# Patient Record
Sex: MALE | Race: WHITE | NOT HISPANIC OR LATINO | URBAN - METROPOLITAN AREA
[De-identification: names, ages, dates, MRNs, and addresses within clinical notes are randomized per-mention and may not be internally consistent; named-entity substitution may affect disease eponyms.]

---

## 2022-08-15 ENCOUNTER — INPATIENT (INPATIENT)
Facility: HOSPITAL | Age: 62
LOS: 4 days | Discharge: HOME | DRG: 240 | End: 2022-08-20
Attending: COLON & RECTAL SURGERY | Admitting: INTERNAL MEDICINE
Payer: MEDICAID

## 2022-08-15 VITALS
RESPIRATION RATE: 15 BRPM | DIASTOLIC BLOOD PRESSURE: 82 MMHG | HEART RATE: 96 BPM | HEIGHT: 71 IN | SYSTOLIC BLOOD PRESSURE: 162 MMHG | WEIGHT: 235.01 LBS | OXYGEN SATURATION: 100 % | TEMPERATURE: 98 F

## 2022-08-15 DIAGNOSIS — D64.9 ANEMIA, UNSPECIFIED: ICD-10-CM

## 2022-08-15 LAB
ALBUMIN SERPL ELPH-MCNC: 3.1 G/DL — LOW (ref 3.3–5)
ALP SERPL-CCNC: 130 U/L — HIGH (ref 40–120)
ALT FLD-CCNC: 33 U/L — SIGNIFICANT CHANGE UP (ref 12–78)
ANION GAP SERPL CALC-SCNC: 4 MMOL/L — LOW (ref 5–17)
APTT BLD: 23.5 SEC — LOW (ref 27.5–35.5)
AST SERPL-CCNC: 33 U/L — SIGNIFICANT CHANGE UP (ref 15–37)
BASOPHILS # BLD AUTO: 0.11 K/UL — SIGNIFICANT CHANGE UP (ref 0–0.2)
BASOPHILS NFR BLD AUTO: 1.4 % — SIGNIFICANT CHANGE UP (ref 0–2)
BILIRUB SERPL-MCNC: 0.2 MG/DL — SIGNIFICANT CHANGE UP (ref 0.2–1.2)
BUN SERPL-MCNC: 13 MG/DL — SIGNIFICANT CHANGE UP (ref 7–23)
CALCIUM SERPL-MCNC: 8.7 MG/DL — SIGNIFICANT CHANGE UP (ref 8.5–10.1)
CHLORIDE SERPL-SCNC: 109 MMOL/L — HIGH (ref 96–108)
CO2 SERPL-SCNC: 25 MMOL/L — SIGNIFICANT CHANGE UP (ref 22–31)
CREAT SERPL-MCNC: 1 MG/DL — SIGNIFICANT CHANGE UP (ref 0.5–1.3)
EGFR: 86 ML/MIN/1.73M2 — SIGNIFICANT CHANGE UP
EOSINOPHIL # BLD AUTO: 0.12 K/UL — SIGNIFICANT CHANGE UP (ref 0–0.5)
EOSINOPHIL NFR BLD AUTO: 1.5 % — SIGNIFICANT CHANGE UP (ref 0–6)
GLUCOSE SERPL-MCNC: 135 MG/DL — HIGH (ref 70–99)
HCT VFR BLD CALC: 25.7 % — LOW (ref 39–50)
HCT VFR BLD CALC: 26.5 % — LOW (ref 39–50)
HGB BLD-MCNC: 7.2 G/DL — LOW (ref 13–17)
HGB BLD-MCNC: 7.2 G/DL — LOW (ref 13–17)
IMM GRANULOCYTES NFR BLD AUTO: 0.6 % — SIGNIFICANT CHANGE UP (ref 0–1.5)
INR BLD: 1.3 RATIO — HIGH (ref 0.88–1.16)
LYMPHOCYTES # BLD AUTO: 1.16 K/UL — SIGNIFICANT CHANGE UP (ref 1–3.3)
LYMPHOCYTES # BLD AUTO: 14.6 % — SIGNIFICANT CHANGE UP (ref 13–44)
MCHC RBC-ENTMCNC: 19.7 PG — LOW (ref 27–34)
MCHC RBC-ENTMCNC: 27.2 GM/DL — LOW (ref 32–36)
MCV RBC AUTO: 72.4 FL — LOW (ref 80–100)
MONOCYTES # BLD AUTO: 0.62 K/UL — SIGNIFICANT CHANGE UP (ref 0–0.9)
MONOCYTES NFR BLD AUTO: 7.8 % — SIGNIFICANT CHANGE UP (ref 2–14)
NEUTROPHILS # BLD AUTO: 5.91 K/UL — SIGNIFICANT CHANGE UP (ref 1.8–7.4)
NEUTROPHILS NFR BLD AUTO: 74.1 % — SIGNIFICANT CHANGE UP (ref 43–77)
NT-PROBNP SERPL-SCNC: 47 PG/ML — SIGNIFICANT CHANGE UP (ref 0–125)
PLATELET # BLD AUTO: 359 K/UL — SIGNIFICANT CHANGE UP (ref 150–400)
POTASSIUM SERPL-MCNC: 4.3 MMOL/L — SIGNIFICANT CHANGE UP (ref 3.5–5.3)
POTASSIUM SERPL-SCNC: 4.3 MMOL/L — SIGNIFICANT CHANGE UP (ref 3.5–5.3)
PROT SERPL-MCNC: 7.8 GM/DL — SIGNIFICANT CHANGE UP (ref 6–8.3)
PROTHROM AB SERPL-ACNC: 15.1 SEC — HIGH (ref 10.5–13.4)
RBC # BLD: 3.66 M/UL — LOW (ref 4.2–5.8)
RBC # FLD: 16.5 % — HIGH (ref 10.3–14.5)
SODIUM SERPL-SCNC: 138 MMOL/L — SIGNIFICANT CHANGE UP (ref 135–145)
TROPONIN I, HIGH SENSITIVITY RESULT: 5.51 NG/L — SIGNIFICANT CHANGE UP
WBC # BLD: 7.97 K/UL — SIGNIFICANT CHANGE UP (ref 3.8–10.5)
WBC # FLD AUTO: 7.97 K/UL — SIGNIFICANT CHANGE UP (ref 3.8–10.5)

## 2022-08-15 PROCEDURE — 99223 1ST HOSP IP/OBS HIGH 75: CPT

## 2022-08-15 PROCEDURE — 82728 ASSAY OF FERRITIN: CPT

## 2022-08-15 PROCEDURE — 84443 ASSAY THYROID STIM HORMONE: CPT

## 2022-08-15 PROCEDURE — C1788: CPT

## 2022-08-15 PROCEDURE — 99285 EMERGENCY DEPT VISIT HI MDM: CPT

## 2022-08-15 PROCEDURE — 85027 COMPLETE CBC AUTOMATED: CPT

## 2022-08-15 PROCEDURE — 85018 HEMOGLOBIN: CPT

## 2022-08-15 PROCEDURE — 74177 CT ABD & PELVIS W/CONTRAST: CPT

## 2022-08-15 PROCEDURE — 83010 ASSAY OF HAPTOGLOBIN QUANT: CPT

## 2022-08-15 PROCEDURE — 86920 COMPATIBILITY TEST SPIN: CPT

## 2022-08-15 PROCEDURE — 93306 TTE W/DOPPLER COMPLETE: CPT

## 2022-08-15 PROCEDURE — 85045 AUTOMATED RETICULOCYTE COUNT: CPT

## 2022-08-15 PROCEDURE — 85730 THROMBOPLASTIN TIME PARTIAL: CPT

## 2022-08-15 PROCEDURE — P9016: CPT

## 2022-08-15 PROCEDURE — 77001 FLUOROGUIDE FOR VEIN DEVICE: CPT

## 2022-08-15 PROCEDURE — 84466 ASSAY OF TRANSFERRIN: CPT

## 2022-08-15 PROCEDURE — 84100 ASSAY OF PHOSPHORUS: CPT

## 2022-08-15 PROCEDURE — 82378 CARCINOEMBRYONIC ANTIGEN: CPT

## 2022-08-15 PROCEDURE — 83540 ASSAY OF IRON: CPT

## 2022-08-15 PROCEDURE — 76937 US GUIDE VASCULAR ACCESS: CPT

## 2022-08-15 PROCEDURE — 70450 CT HEAD/BRAIN W/O DYE: CPT | Mod: MA

## 2022-08-15 PROCEDURE — 83615 LACTATE (LD) (LDH) ENZYME: CPT

## 2022-08-15 PROCEDURE — 80048 BASIC METABOLIC PNL TOTAL CA: CPT

## 2022-08-15 PROCEDURE — 83550 IRON BINDING TEST: CPT

## 2022-08-15 PROCEDURE — 76942 ECHO GUIDE FOR BIOPSY: CPT | Mod: 59

## 2022-08-15 PROCEDURE — C1894: CPT

## 2022-08-15 PROCEDURE — 86803 HEPATITIS C AB TEST: CPT

## 2022-08-15 PROCEDURE — C9113: CPT

## 2022-08-15 PROCEDURE — C1769: CPT

## 2022-08-15 PROCEDURE — 70450 CT HEAD/BRAIN W/O DYE: CPT | Mod: 26,MA

## 2022-08-15 PROCEDURE — 85610 PROTHROMBIN TIME: CPT

## 2022-08-15 PROCEDURE — 83036 HEMOGLOBIN GLYCOSYLATED A1C: CPT

## 2022-08-15 PROCEDURE — 93010 ELECTROCARDIOGRAM REPORT: CPT

## 2022-08-15 PROCEDURE — 71045 X-RAY EXAM CHEST 1 VIEW: CPT | Mod: 26

## 2022-08-15 PROCEDURE — 47000 NEEDLE BIOPSY OF LIVER PERQ: CPT

## 2022-08-15 PROCEDURE — 87635 SARS-COV-2 COVID-19 AMP PRB: CPT

## 2022-08-15 PROCEDURE — 85025 COMPLETE CBC W/AUTO DIFF WBC: CPT

## 2022-08-15 PROCEDURE — 83735 ASSAY OF MAGNESIUM: CPT

## 2022-08-15 PROCEDURE — 36561 INSERT TUNNELED CV CATH: CPT

## 2022-08-15 PROCEDURE — 71260 CT THORAX DX C+: CPT

## 2022-08-15 PROCEDURE — 88341 IMHCHEM/IMCYTCHM EA ADD ANTB: CPT

## 2022-08-15 PROCEDURE — 88342 IMHCHEM/IMCYTCHM 1ST ANTB: CPT

## 2022-08-15 PROCEDURE — 85014 HEMATOCRIT: CPT

## 2022-08-15 PROCEDURE — 88312 SPECIAL STAINS GROUP 1: CPT

## 2022-08-15 PROCEDURE — 80053 COMPREHEN METABOLIC PANEL: CPT

## 2022-08-15 PROCEDURE — 88360 TUMOR IMMUNOHISTOCHEM/MANUAL: CPT

## 2022-08-15 PROCEDURE — 36430 TRANSFUSION BLD/BLD COMPNT: CPT

## 2022-08-15 PROCEDURE — 36415 COLL VENOUS BLD VENIPUNCTURE: CPT

## 2022-08-15 PROCEDURE — 88307 TISSUE EXAM BY PATHOLOGIST: CPT

## 2022-08-15 PROCEDURE — 88305 TISSUE EXAM BY PATHOLOGIST: CPT

## 2022-08-15 RX ORDER — PANTOPRAZOLE SODIUM 20 MG/1
80 TABLET, DELAYED RELEASE ORAL ONCE
Refills: 0 | Status: COMPLETED | OUTPATIENT
Start: 2022-08-15 | End: 2022-08-15

## 2022-08-15 RX ORDER — ONDANSETRON 8 MG/1
4 TABLET, FILM COATED ORAL EVERY 6 HOURS
Refills: 0 | Status: DISCONTINUED | OUTPATIENT
Start: 2022-08-15 | End: 2022-08-20

## 2022-08-15 RX ORDER — PANTOPRAZOLE SODIUM 20 MG/1
40 TABLET, DELAYED RELEASE ORAL EVERY 12 HOURS
Refills: 0 | Status: DISCONTINUED | OUTPATIENT
Start: 2022-08-15 | End: 2022-08-20

## 2022-08-15 RX ADMIN — PANTOPRAZOLE SODIUM 40 MILLIGRAM(S): 20 TABLET, DELAYED RELEASE ORAL at 21:47

## 2022-08-15 RX ADMIN — PANTOPRAZOLE SODIUM 80 MILLIGRAM(S): 20 TABLET, DELAYED RELEASE ORAL at 15:10

## 2022-08-15 NOTE — ED PROVIDER NOTE - PROGRESS NOTE DETAILS
Pt noted to be anemic.  Pt guaiac positive here.  No e/o melena on exam.  Dosed with protonix.  AVSS.  Would repeat h/h prior to any transfusion.  Will admit for further w/u and management.

## 2022-08-15 NOTE — H&P ADULT - HISTORY OF PRESENT ILLNESS
HPI:  61M w/ no PMH as he has not sought Healthcare for many years as he's always felt well, presents today after a syncopal episode as witnessed by a coworker.  Pt endorses that since he woke up this AM, he's been having weakness in both legs. Later at work, he went to the bathroom as he felt he had gas in his abdomen. As he wasn't feeling well, a coworker gave him some water while he was sitting and then told him that she witnessed his eyes roll back and he passed out. He does not recall any of this. He denies any dizziness, lightheadedness, cp, palpitations. 1 month ago, while at a [employer] game, after having some chicken and soda, he felt dizzy but felt better after an episode of vomiting. He does endorse that he been getting progressively more SMITH when he goes up the stairs.  1 month ago, he had an episode of Melena, but none since then.      In ED, Hg 7.2, MCV 72, Guaiac Pos, given IV PPI x1,       PAST MEDICAL & SURGICAL HISTORY:  none (PT HAS NOT SEEN DOCTOR FOR MANY YEARS)    Review of Systems:   CONSTITUTIONAL: No fever. +weakness   EYES: No eye pain or discharge.  ENMT:  No sinus or throat pain  NECK: No pain or stiffness  RESPIRATORY: No cough, wheezing, chills or hemoptysis; ++ shortness of breath  CARDIOVASCULAR: No chest pain, palpitations, dizziness, or leg swelling  GASTROINTESTINAL: No abdominal or epigastric pain. No nausea, vomiting, or hematemesis; No diarrhea or constipation. ++ melena NO hematochezia.  GENITOURINARY: No dysuria or incontinence  NEUROLOGICAL: No headaches, memory loss, loss of strength, numbness, or tremors  SKIN: No rashes.  MUSCULOSKELETAL: No joint pain or swelling; No muscle, back, or extremity pain  PSYCHIATRIC: No depression, anxiety, mood swings, or difficulty sleeping      Social History:   lives alone  denies smoking/etoh  ind ADLs  works in a [email or web address]    FAMILY HISTORY:  MOther  at 55yo from Leukemia  Father is estranged    Home Medications:  none    MEDICATIONS  (STANDING):  pantoprazole  Injectable 40 milliGRAM(s) IV Push every 12 hours    MEDICATIONS  (PRN):  ondansetron Injectable 4 milliGRAM(s) IV Push every 6 hours PRN Nausea      PHYSICAL EXAM:  Vital Signs Last 24 Hrs  T(C): 36.8 (15 Aug 2022 11:07), Max: 36.8 (15 Aug 2022 11:07)  T(F): 98.2 (15 Aug 2022 11:07), Max: 98.2 (15 Aug 2022 11:07)  HR: 96 (15 Aug 2022 11:07) (96 - 96)  BP: 162/82 (15 Aug 2022 11:07) (162/82 - 162/82)  BP(mean): --  RR: 15 (15 Aug 2022 11:07) (15 - 15)  SpO2: 100% (15 Aug 2022 11:07) (100% - 100%)  Parameters below as of 15 Aug 2022 11:07  Patient On (Oxygen Delivery Method): room air  GENERAL: NAD, PALE  HEAD:  Atraumatic, Normocephalic  EYES: EOMI, PERRLA, conjunctiva and sclera clear  ENT: normal hearing, no nasal discharge, throat clear, dentition normal ++Dark spot on Rt side of tongue (pt states he's had it for many years, no change in size)   NECK: Supple, No JVD, no LAD, no thyromegaly   CHEST/LUNG: Clear to auscultation bilaterally; No wheeze, respirations unlabored  HEART: Regular rate and rhythm; No murmurs, rubs, or gallops  ABDOMEN: Soft, Nontender, Nondistended; Bowel sounds present, no HSM  EXTREMITIES:  2+ Peripheral Pulses, No clubbing, cyanosis, or edema  PSYCH: AAOx3, normal behavior  NEUROLOGY: non-focal, sensory and cn 2-12 intact, speech/language intact  SKIN: No visible rashes or lesions    LABS:                        7.2    7.97  )-----------( 359      ( 15 Aug 2022 11:45 )             26.5     08-15    138  |  109<H>  |  13  ----------------------------<  135<H>  4.3   |  25  |  1.00    Ca    8.7      15 Aug 2022 11:45    TPro  7.8  /  Alb  3.1<L>  /  TBili  0.2  /  DBili  x   /  AST  33  /  ALT  33  /  AlkPhos  130<H>  08-15    PT/INR - ( 15 Aug 2022 11:45 )   PT: 15.1 sec;   INR: 1.30 ratio         PTT - ( 15 Aug 2022 11:45 )  PTT:23.5 sec      RADIOLOGY & ADDITIONAL TESTS:    Imaging Personally Reviewed:  n/a  EKG Personally Reviewed:  n/a  Consultant(s) Notes Reviewed:      Care Discussed with Consultants/Other Providers:

## 2022-08-15 NOTE — ED ADULT NURSE NOTE - OBJECTIVE STATEMENT
60 y/o alert male  comes into the ED for c/o of syncope, pt reports that he went to the bathroom and afterwards 'sat down and my coworker said I passed out for about 1 minute." +LOC -Blood thinners -Headstrike, EKG performed at bedside, vs are WNL, cardiac monitor reads NS rhythm at this time. 18g placed in Left AC.

## 2022-08-15 NOTE — ED PROVIDER NOTE - IV ALTEPLASE DOOR HIDDEN
*[facility]*   [address] [place] [address]   Transthoracic Echocardiogram (TTE)      Patient: Nelson Plunkett  Study Date/Time:        2018 2:20PM   MRN:     [id number]     FIN#:                   [id number]   :     1947    Ht/Wt:                  182.9cm 103.9kg   Age:     70            BSA/BMI:                2.32m2 31.1kg/m2   Gender:  M             Baseline BP:   Ordering Physician:      MARY BASSETT   Attending Physician:     MARY BASSETT   Diagnostic Physician:    Gilberto Adams MD   Sonographer:             Frederick Dockery      --------------------------------------------------------------------------   INDICATIONS:   Chf.      --------------------------------------------------------------------------   STUDY CONCLUSIONS   SUMMARY:      1. Left ventricle: The cavity size is mildly dilated. Wall thickness is      moderately to severely increased. There is concentric hypertrophy.      Systolic function is mildly to moderately reduced. The estimated      ejection fraction is 43%, by biplane method of disks. Features are      consistent with a pseudonormal left ventricular filling pattern, with      concomitant abnormal relaxation and increased filling pressure (grade 2      diastolic dysfunction).   2. Aortic valve: Mild regurgitation.   3. Mitral valve: Moderate regurgitation.   4. Left atrium: The atrium is moderately dilated.   5. Right ventricle: Systolic pressure is markedly increased. The estimated      peak pressure is 71mm Hg.   6. Pulmonary arteries: Systolic pressure is severely increased.   7. Pericardium, extracardiac: A moderate pericardial effusion is      identified circumferential to the heart.      --------------------------------------------------------------------------   STUDY DATA:   Procedure:  Transthoracic echocardiography was performed.   Image quality was good.  M-mode, complete 2D, complete spectral Doppler,   and color Doppler.   Study completion:  There were no complications.      FINDINGS      LEFT VENTRICLE:  The cavity size is mildly dilated. Wall thickness is   moderately to severely increased. There is concentric hypertrophy.   Systolic function is mildly to moderately reduced. The estimated ejection   fraction is 43%, by biplane method of disks.  Mild global hypokinesis.   Features are consistent with a pseudonormal left ventricular filling   pattern, with concomitant abnormal relaxation and increased filling   pressure (grade 2 diastolic dysfunction).      AORTIC VALVE:   Trileaflet; mildly thickened, mildly calcified leaflets.   Cusp separation is normal.  Doppler:  Transvalvular velocity is within the   normal range. There is no stenosis.  Mild regurgitation.      MITRAL VALVE:   Structurally normal valve.   Leaflet separation is normal.    Doppler:   There is no evidence for stenosis.    Moderate regurgitation.     Peak gradient (D): 5mm Hg.      LEFT ATRIUM:  The atrium is moderately dilated.      RIGHT VENTRICLE:  The cavity size is mildly dilated. The moderator band is   prominent. Systolic function is normal. Systolic pressure is markedly   increased. The estimated peak pressure is 71mm Hg.      PULMONIC VALVE:   The leaflets are normal thickness.  Doppler:   Trivial   regurgitation.      TRICUSPID VALVE:   Structurally normal valve.    Doppler:   Mild   regurgitation.      PULMONARY ARTERY:   Systolic pressure is severely increased.      RIGHT ATRIUM:  The atrium is dilated.      PERICARDIUM:  A moderate pericardial effusion is identified   circumferential to the heart.      BASELINE ECG:   Normal sinus rhythm.      --------------------------------------------------------------------------   Measurements       Left ventricle                          Value        04/28/2018 Reference    LV ID, ED, PLAX chordal         (H)     5.9   cm     6.8        4.2 - 5.8    LV ID, ES, PLAX chordal         (H)     4.3   cm     5.2         2.5 - 4.0    LV PW thickness, ED             (H)     1.8   cm     1.6        0.6 - 1.0    IVS/LV PW ratio, ED                     1.04         1.01       ---------    LV end-diastolic volume         (H)     174   ml     235        62 - 150    LV end-systolic volume          (H)     85    ml     127        21 - 61    Stroke volume, 2D                       90    ml     108        ---------    LV end-diastolic volume/bsa     (H)     75    ml/m2 109        34 - 74    LV end-systolic volume/bsa      (H)     36    ml/m2 59         11 - 31    Stroke volume/bsa, 2D                   39    ml/m2 50         ---------       Ventricular septum                      Value        04/28/2018 Reference    IVS thickness, ED               (H)     1.9   cm     1.6        0.6 - 1.0       Aortic valve                            Value        04/28/2018 Reference    Aortic regurg peak velocity             4.68  m/s    4.45       ---------    Aortic regurg deceleration              310   cm/s2 277        ---------    Aortic regurg deceleration time         1511  ms     1616       ---------    Aortic regurg pressure                  438   ms     469        ---------    half-time    Aortic regurg peak gradient             88    mm Hg  79         ---------       Aorta                                   Value        04/28/2018 Reference    Aortic root ID, S               (N)     4.1   cm     4.2        <4.4       Left atrium                             Value        04/28/2018 Reference    LA ID, A-P, ES                  (H)     5.0   cm     ---------- 3.0 - 4.0    LA ID/bsa, A-P                  (N)     2.2   cm/m2 ---------- 1.5 - 2.3       Mitral valve                            Value        04/28/2018 Reference    Mitral E-wave peak velocity             1.07  m/sec  0.77       ---------    Mitral A-wave peak velocity             0.65  m/sec  0.93       ---------    Mitral deceleration time                168   ms     185        ---------     Mitral peak gradient, D                 5     mm Hg  2          ---------    Mitral E/A ratio, peak                  1.65         0.83       ---------    Mitral regurg peak velocity             0.06  m/sec  0.05       ---------    Mitral peak LV-LA gradient, S           0     mm Hg  0          ---------       Pulmonary arteries                      Value        04/28/2018 Reference    PA pressure, S, DP                      71    mm Hg  47         ---------       Tricuspid valve                         Value        04/28/2018 Reference    Tricuspid regurg peak velocity          3.7   m/sec  3          ---------    Tricuspid peak RV-RA gradient           55    mm Hg  37         ---------       Right ventricle                         Value        04/28/2018 Reference    RV ID, ED, PLAX                         3.1   cm     3.1        ---------    RV pressure, S, DP                      71    mm Hg  47         ---------   Legend:   (L)  and  (H)  leidy values outside specified reference range.      (N)  marks values inside specified reference range.      Prepared and electronically signed by   Gilberto Adams MD   07/18/2018 15:49         show

## 2022-08-15 NOTE — PHARMACOTHERAPY INTERVENTION NOTE - COMMENTS
Med history complete, reviewed medications and allergies with patient and confirmed medication list with doctor first med profile, Patient states he does not take currently take any medications regularly all medication related questions answered

## 2022-08-15 NOTE — ED PROVIDER NOTE - NS ED ROS FT
Constitutional: nad, well appearing  HEENT:  no nasal congestion, eye drainage or ear pain.    CVS:  no cp  Resp:  No sob, no cough  GI:  no abdominal pain, no nausea or vomiting  :  no dysuria  MSK: no joint pain or limited ROM  Skin: no rash  Neuro: +LOC, weakness   Heme/lymph: no bleeding

## 2022-08-15 NOTE — ED PROVIDER NOTE - OBJECTIVE STATEMENT
62 yo male presents to the ED s/p syncope after having acute onset of abd pain at work. +LOC. Coworkers states that pt had an episode for a min. Pt had multiple syncope episodes this month, however, never had an episode before this month. Pt states that he experienced dark stools weeks ago but not recently. Pt states that he has been feeling weak. Denies fever,

## 2022-08-15 NOTE — H&P ADULT - ASSESSMENT
61M w/ no PMH as he has not sought Healthcare for many years as he's always felt well, presents today after a syncopal episode as witnessed by a coworker.  Pt endorses that since he woke up this AM, he's been having weakness in both legs.   In ED, Hg 7.2, MCV 72, Guaiac Pos, given IV PPI x1,     #Syncope- likely d/t anemia 2/2 gib  - admit to tele  - orthostatic vitals  - TTE  - cardio cs  - fall precautions, ambulate w/ assist    #GIB  - PPI IV BID  - serial h/h tonight and in am  - GI cs  - CLD for now     #Anemia, microcytic, suspect slow GIB  - check serial h/h  - iron studies    #Tongue lesion- chronic, needs op evaluation    #PPX- ambulate w/ assist, SCDs

## 2022-08-16 DIAGNOSIS — D62 ACUTE POSTHEMORRHAGIC ANEMIA: ICD-10-CM

## 2022-08-16 DIAGNOSIS — R55 SYNCOPE AND COLLAPSE: ICD-10-CM

## 2022-08-16 LAB
ADD ON TEST-SPECIMEN IN LAB: SIGNIFICANT CHANGE UP
BASOPHILS # BLD AUTO: 0.11 K/UL — SIGNIFICANT CHANGE UP (ref 0–0.2)
BASOPHILS NFR BLD AUTO: 1.4 % — SIGNIFICANT CHANGE UP (ref 0–2)
EOSINOPHIL # BLD AUTO: 0.28 K/UL — SIGNIFICANT CHANGE UP (ref 0–0.5)
EOSINOPHIL NFR BLD AUTO: 3.4 % — SIGNIFICANT CHANGE UP (ref 0–6)
FERRITIN SERPL-MCNC: 11 NG/ML — LOW (ref 30–400)
HCT VFR BLD CALC: 27.1 % — LOW (ref 39–50)
HCV AB S/CO SERPL IA: 0.15 S/CO — SIGNIFICANT CHANGE UP (ref 0–0.99)
HCV AB SERPL-IMP: SIGNIFICANT CHANGE UP
HGB BLD-MCNC: 7.3 G/DL — LOW (ref 13–17)
IMM GRANULOCYTES NFR BLD AUTO: 0.5 % — SIGNIFICANT CHANGE UP (ref 0–1.5)
IRON SATN MFR SERPL: 13 UG/DL — LOW (ref 45–165)
IRON SATN MFR SERPL: 3 % — LOW (ref 16–55)
LYMPHOCYTES # BLD AUTO: 2.11 K/UL — SIGNIFICANT CHANGE UP (ref 1–3.3)
LYMPHOCYTES # BLD AUTO: 25.9 % — SIGNIFICANT CHANGE UP (ref 13–44)
MCHC RBC-ENTMCNC: 19.5 PG — LOW (ref 27–34)
MCHC RBC-ENTMCNC: 26.9 GM/DL — LOW (ref 32–36)
MCV RBC AUTO: 72.3 FL — LOW (ref 80–100)
MONOCYTES # BLD AUTO: 0.66 K/UL — SIGNIFICANT CHANGE UP (ref 0–0.9)
MONOCYTES NFR BLD AUTO: 8.1 % — SIGNIFICANT CHANGE UP (ref 2–14)
NEUTROPHILS # BLD AUTO: 4.94 K/UL — SIGNIFICANT CHANGE UP (ref 1.8–7.4)
NEUTROPHILS NFR BLD AUTO: 60.7 % — SIGNIFICANT CHANGE UP (ref 43–77)
PLATELET # BLD AUTO: 379 K/UL — SIGNIFICANT CHANGE UP (ref 150–400)
RBC # BLD: 3.75 M/UL — LOW (ref 4.2–5.8)
RBC # FLD: 16.4 % — HIGH (ref 10.3–14.5)
TIBC SERPL-MCNC: 403 UG/DL — SIGNIFICANT CHANGE UP (ref 220–430)
UIBC SERPL-MCNC: 390 UG/DL — HIGH (ref 110–370)
WBC # BLD: 8.14 K/UL — SIGNIFICANT CHANGE UP (ref 3.8–10.5)
WBC # FLD AUTO: 8.14 K/UL — SIGNIFICANT CHANGE UP (ref 3.8–10.5)

## 2022-08-16 PROCEDURE — 99222 1ST HOSP IP/OBS MODERATE 55: CPT

## 2022-08-16 PROCEDURE — 93306 TTE W/DOPPLER COMPLETE: CPT | Mod: 26

## 2022-08-16 PROCEDURE — 99233 SBSQ HOSP IP/OBS HIGH 50: CPT

## 2022-08-16 PROCEDURE — 99254 IP/OBS CNSLTJ NEW/EST MOD 60: CPT

## 2022-08-16 RX ORDER — CHLORHEXIDINE GLUCONATE 213 G/1000ML
1 SOLUTION TOPICAL
Refills: 0 | Status: DISCONTINUED | OUTPATIENT
Start: 2022-08-16 | End: 2022-08-20

## 2022-08-16 RX ADMIN — PANTOPRAZOLE SODIUM 40 MILLIGRAM(S): 20 TABLET, DELAYED RELEASE ORAL at 22:26

## 2022-08-16 RX ADMIN — CHLORHEXIDINE GLUCONATE 1 APPLICATION(S): 213 SOLUTION TOPICAL at 11:00

## 2022-08-16 RX ADMIN — PANTOPRAZOLE SODIUM 40 MILLIGRAM(S): 20 TABLET, DELAYED RELEASE ORAL at 09:37

## 2022-08-16 NOTE — CONSULT NOTE ADULT - SUBJECTIVE AND OBJECTIVE BOX
CHIEF COMPLAINT: passed out at work       HPI:  61M w/ no PMH as he has not sought Healthcare for many years as he's always felt well, presents today after a syncopal episode as witnessed by a coworker.  Pt endorses that since he woke up this AM, he's been having weakness in both legs. Later at work, he went to the bathroom as he felt he had gas in his abdomen. As he wasn't feeling well, a coworker gave him some water while he was sitting and then told him that she witnessed his eyes roll back and he passed out. He does not recall any of this. He denies any dizziness, lightheadedness, cp, palpitations. 1 month ago, while at a [employer] game, after having some chicken and soda, he felt dizzy but felt better after an episode of vomiting. He does endorse that he been getting progressively more SMITH when he goes up the stairs.  1 month ago, he had an episode of Melena, but none since then.      In ED, Hg 7.2, MCV 72, Guaiac Pos, given IV PPI x1,  patient blood pressure noted was normal ,  ekg normal       PAST MEDICAL & SURGICAL HISTORY:   left knee surgery       Social History:   lives alone  denies smoking/etoh  ind ADLs  works in a [employer]    FAMILY HISTORY:  MOther  at 53yo from Leukemia  Father is estranged        MEDICATIONS:  MEDICATIONS  (STANDING):  chlorhexidine 4% Liquid 1 Application(s) Topical <User Schedule>  pantoprazole  Injectable 40 milliGRAM(s) IV Push every 12 hours    MEDICATIONS  (PRN):  ondansetron Injectable 4 milliGRAM(s) IV Push every 6 hours PRN Nausea      REVIEW OF SYSTEMS:    CONSTITUTIONAL: mild fatigue   EYES/ENT: No visual changes;  No vertigo or throat pain   NECK: No pain or stiffness  RESPIRATORY: No cough, wheezing, hemoptysis; No shortness of breath  CARDIOVASCULAR: No chest pain or palpitations  GASTROINTESTINAL: positive for melena , mild abdominal discomfort gassy feeling   GENITOURINARY: No dysuria, frequency or hematuria  NEUROLOGICAL: No numbness or weakness  SKIN: No itching, burning, rashes, or lesions   All other review of systems is negative unless indicated above    Vital Signs Last 24 Hrs  T(C): 36.9 (16 Aug 2022 08:18), Max: 36.9 (15 Aug 2022 15:51)  T(F): 98.4 (16 Aug 2022 08:18), Max: 98.5 (15 Aug 2022 19:39)  HR: 74 (16 Aug 2022 08:18) (74 - 96)  BP: 142/80 (16 Aug 2022 08:18) (141/67 - 162/82)  BP(mean): --  RR: 19 (16 Aug 2022 08:18) (15 - 20)  SpO2: 98% (16 Aug 2022 08:18) (98% - 100%)    Parameters below as of 16 Aug 2022 08:18  Patient On (Oxygen Delivery Method): room air        I&O's Summary      PHYSICAL EXAM:    Constitutional: NAD, awake and alert, well-developed  HEENT: PERR, EOMI,  No oral cyananosis.  Neck:  supple,  No JVD  Respiratory: Breath sounds are clear bilaterally, No wheezing, rales or rhonchi  Cardiovascular: S1 and S2, regular rate and rhythm, no Murmurs, gallops or rubs  Gastrointestinal: Bowel Sounds present, soft, nontender.   Extremities: No peripheral edema. No clubbing or cyanosis.  Vascular: 2+ peripheral pulses  Neurological: A/O x 3, no focal deficits  Musculoskeletal: no calf tenderness.  Skin: No rashes.      LABS: All Labs Reviewed:                        7.3    8.14  )-----------( 379      ( 16 Aug 2022 07:47 )             27.1                         7.2    x     )-----------( x        ( 15 Aug 2022 20:42 )             25.7                         7.2    7.97  )-----------( 359      ( 15 Aug 2022 11:45 )             26.5     15 Aug 2022 11:45    138    |  109    |  13     ----------------------------<  135    4.3     |  25     |  1.00     Ca    8.7        15 Aug 2022 11:45    TPro  7.8    /  Alb  3.1    /  TBili  0.2    /  DBili  x      /  AST  33     /  ALT  33     /  AlkPhos  130    15 Aug 2022 11:45    PT/INR - ( 15 Aug 2022 11:45 )   PT: 15.1 sec;   INR: 1.30 ratio         PTT - ( 15 Aug 2022 11:45 )  PTT:23.5 sec        Blood Culture:   08-15 @ 11:45  Pro Bnp 47        RADIOLOGY/EKG: normal sinus rhythm     - TroponinI hsT: <-5.51

## 2022-08-16 NOTE — CONSULT NOTE ADULT - SUBJECTIVE AND OBJECTIVE BOX
Patient is a 61y old  Male who presents with a chief complaint of syncope (16 Aug 2022 10:40)      HPI:  HPI:  61M w/ no PMH as he has not sought Healthcare for many years as he's always felt well, presents today after a syncopal episode as witnessed by a coworker.  Pt endorses that since he woke up this AM, he's been having weakness in both legs. Later at work, he went to the bathroom as he felt he had gas in his abdomen. As he wasn't feeling well, a coworker gave him some water while he was sitting and then told him that she witnessed his eyes roll back and he passed out. He does not recall any of this. He denies any dizziness, lightheadedness, cp, palpitations. 1 month ago, while at a [employer] game, after having some chicken and soda, he felt dizzy but felt better after an episode of vomiting. He does endorse that he been getting progressively more SMITH when he goes up the stairs.  1 month ago, he had an episode of Melena, but none since then.      In ED, Hg 7.2, MCV 72, Guaiac Pos, given IV PPI x1,       PAST MEDICAL & SURGICAL HISTORY:  none (PT HAS NOT SEEN DOCTOR FOR MANY YEARS)    Review of Systems:   CONSTITUTIONAL: No fever. +weakness   EYES: No eye pain or discharge.  ENMT:  No sinus or throat pain  NECK: No pain or stiffness  RESPIRATORY: No cough, wheezing, chills or hemoptysis; ++ shortness of breath  CARDIOVASCULAR: No chest pain, palpitations, dizziness, or leg swelling  GASTROINTESTINAL: No abdominal or epigastric pain. No nausea, vomiting, or hematemesis; No diarrhea or constipation. ++ melena NO hematochezia.  GENITOURINARY: No dysuria or incontinence  NEUROLOGICAL: No headaches, memory loss, loss of strength, numbness, or tremors  SKIN: No rashes.  MUSCULOSKELETAL: No joint pain or swelling; No muscle, back, or extremity pain  PSYCHIATRIC: No depression, anxiety, mood swings, or difficulty sleeping      Social History:   lives alone  denies smoking/etoh  ind ADLs  works in a [employer]    FAMILY HISTORY:  MOther  at 55yo from Leukemia  Father is estranged    Home Medications:  none    MEDICATIONS  (STANDING):  pantoprazole  Injectable 40 milliGRAM(s) IV Push every 12 hours    MEDICATIONS  (PRN):  ondansetron Injectable 4 milliGRAM(s) IV Push every 6 hours PRN Nausea      PHYSICAL EXAM:  Vital Signs Last 24 Hrs  T(C): 36.8 (15 Aug 2022 11:07), Max: 36.8 (15 Aug 2022 11:07)  T(F): 98.2 (15 Aug 2022 11:07), Max: 98.2 (15 Aug 2022 11:07)  HR: 96 (15 Aug 2022 11:07) (96 - 96)  BP: 162/82 (15 Aug 2022 11:07) (162/82 - 162/82)  BP(mean): --  RR: 15 (15 Aug 2022 11:07) (15 - 15)  SpO2: 100% (15 Aug 2022 11:07) (100% - 100%)  Parameters below as of 15 Aug 2022 11:07  Patient On (Oxygen Delivery Method): room air  GENERAL: NAD, PALE  HEAD:  Atraumatic, Normocephalic  EYES: EOMI, PERRLA, conjunctiva and sclera clear  ENT: normal hearing, no nasal discharge, throat clear, dentition normal ++Dark spot on Rt side of tongue (pt states he's had it for many years, no change in size)   NECK: Supple, No JVD, no LAD, no thyromegaly   CHEST/LUNG: Clear to auscultation bilaterally; No wheeze, respirations unlabored  HEART: Regular rate and rhythm; No murmurs, rubs, or gallops  ABDOMEN: Soft, Nontender, Nondistended; Bowel sounds present, no HSM  EXTREMITIES:  2+ Peripheral Pulses, No clubbing, cyanosis, or edema  PSYCH: AAOx3, normal behavior  NEUROLOGY: non-focal, sensory and cn 2-12 intact, speech/language intact  SKIN: No visible rashes or lesions    LABS:                        7.2    7.97  )-----------( 359      ( 15 Aug 2022 11:45 )             26.5     08-15    138  |  109<H>  |  13  ----------------------------<  135<H>  4.3   |  25  |  1.00    Ca    8.7      15 Aug 2022 11:45    TPro  7.8  /  Alb  3.1<L>  /  TBili  0.2  /  DBili  x   /  AST  33  /  ALT  33  /  AlkPhos  130<H>  08-15    PT/INR - ( 15 Aug 2022 11:45 )   PT: 15.1 sec;   INR: 1.30 ratio         PTT - ( 15 Aug 2022 11:45 )  PTT:23.5 sec      RADIOLOGY & ADDITIONAL TESTS:    Imaging Personally Reviewed:  n/a  EKG Personally Reviewed:  n/a  Consultant(s) Notes Reviewed:      Care Discussed with Consultants/Other Providers:   (15 Aug 2022 15:20)      PAST MEDICAL & SURGICAL HISTORY:  No pertinent past medical history          MEDICATIONS  (STANDING):  chlorhexidine 4% Liquid 1 Application(s) Topical <User Schedule>  pantoprazole  Injectable 40 milliGRAM(s) IV Push every 12 hours    MEDICATIONS  (PRN):  ondansetron Injectable 4 milliGRAM(s) IV Push every 6 hours PRN Nausea      Allergies    No Known Allergies    Intolerances        SOCIAL HISTORY:    FAMILY HISTORY:      REVIEW OF SYSTEMS:    CONSTITUTIONAL: No weakness, fevers or chills  EYES/ENT: No visual changes;  No vertigo or throat pain   NECK: No pain or stiffness  RESPIRATORY: No cough, wheezing, hemoptysis; No shortness of breath  CARDIOVASCULAR: No chest pain or palpitations  GASTROINTESTINAL: No abdominal or epigastric pain. No nausea, vomiting, or hematemesis; No diarrhea or constipation. No melena or hematochezia.  GENITOURINARY: No dysuria, frequency or hematuria  NEUROLOGICAL: No numbness or weakness  SKIN: No itching, burning, rashes, or lesions   PSYCH: Normal mood and affect  All other review of systems is negative unless indicated above.    Vital Signs Last 24 Hrs  T(C): 36.5 (16 Aug 2022 13:32), Max: 36.9 (15 Aug 2022 15:51)  T(F): 97.7 (16 Aug 2022 13:32), Max: 98.5 (15 Aug 2022 19:39)  HR: 78 (16 Aug 2022 13:32) (74 - 94)  BP: 151/77 (16 Aug 2022 13:32) (141/67 - 158/81)  BP(mean): --  RR: 18 (16 Aug 2022 13:32) (18 - 20)  SpO2: 100% (16 Aug 2022 13:32) (98% - 100%)    Parameters below as of 16 Aug 2022 13:32  Patient On (Oxygen Delivery Method): room air        PHYSICAL EXAM:    Constitutional: No acute distress, well-developed, non-toxic appearing  HEENT: masked, good phonation, not icteric  Neck: supple, no lymphadenopathy  Respiratory: clear to ascultation bilaterally, no wheezing  Cardiovascular: S1 and S2, regular rate and rhythm, no murmurs rubs or gallops  Gastrointestinal: soft, non-tender, non-distended, +bowel sounds, no rebound or guarding, no surgical scars, no drains  Extremities: No peripheral edema, no cyanosis or clubbing  Vascular: 2+ peripheral pulses, no venous stasis  Neurological: A/O x 3, no focal deficits, no asterixis  Psychiatric: Normal mood, normal affect  Skin: No rashes, not jaundiced    LABS:                        7.3    8.14  )-----------( 379      ( 16 Aug 2022 07:47 )             27.1     08-15    138  |  109<H>  |  13  ----------------------------<  135<H>  4.3   |  25  |  1.00    Ca    8.7      15 Aug 2022 11:45    TPro  7.8  /  Alb  3.1<L>  /  TBili  0.2  /  DBili  x   /  AST  33  /  ALT  33  /  AlkPhos  130<H>  08-15    PT/INR - ( 15 Aug 2022 11:45 )   PT: 15.1 sec;   INR: 1.30 ratio         PTT - ( 15 Aug 2022 11:45 )  PTT:23.5 sec  LIVER FUNCTIONS - ( 15 Aug 2022 11:45 )  Alb: 3.1 g/dL / Pro: 7.8 gm/dL / ALK PHOS: 130 U/L / ALT: 33 U/L / AST: 33 U/L / GGT: x             RADIOLOGY & ADDITIONAL STUDIES: Patient is a 61y old  Male who presents with a chief complaint of syncope (16 Aug 2022 10:40)    HPI:  This is a 61 year old male with no known pat medical history presenting to ED with report of weakness in legs and syncopal episode as witnessed by a coworker.  Pt endorses that since he woke up this AM, he's been having weakness in both legs. Later at work, he went to the bathroom as he felt he had gas in his abdomen. As he wasn't feeling well, a coworker gave him some water while he was sitting and then told him that she witnessed his eyes roll back and he passed out. He does not recall any of this. He denies any dizziness, lightheadedness, cp, palpitations. 1 month ago, while at a [employer] game, after having some chicken and soda, he felt dizzy but felt better after an episode of vomiting. He does endorse that he been getting progressively more SMITH when he goes up the stairs.  1 month ago, he had an episode of Melena, but none since then.      In ED, Hg 7.2, MCV 72, Guaiac Pos, given IV PPI x1,       PAST MEDICAL & SURGICAL HISTORY:  none (PT HAS NOT SEEN DOCTOR FOR MANY YEARS)    Review of Systems:   CONSTITUTIONAL: No fever. +weakness   EYES: No eye pain or discharge.  ENMT:  No sinus or throat pain  NECK: No pain or stiffness  RESPIRATORY: No cough, wheezing, chills or hemoptysis; ++ shortness of breath  CARDIOVASCULAR: No chest pain, palpitations, dizziness, or leg swelling  GASTROINTESTINAL: No abdominal or epigastric pain. No nausea, vomiting, or hematemesis; No diarrhea or constipation. ++ melena NO hematochezia.  GENITOURINARY: No dysuria or incontinence  NEUROLOGICAL: No headaches, memory loss, loss of strength, numbness, or tremors  SKIN: No rashes.  MUSCULOSKELETAL: No joint pain or swelling; No muscle, back, or extremity pain  PSYCHIATRIC: No depression, anxiety, mood swings, or difficulty sleeping      Social History:   lives alone  denies smoking/etoh  ind ADLs  works in a [employer]    FAMILY HISTORY:  MOther  at 55yo from Leukemia  Father is estranged    Home Medications:  none    MEDICATIONS  (STANDING):  pantoprazole  Injectable 40 milliGRAM(s) IV Push every 12 hours    MEDICATIONS  (PRN):  ondansetron Injectable 4 milliGRAM(s) IV Push every 6 hours PRN Nausea      PHYSICAL EXAM:  Vital Signs Last 24 Hrs  T(C): 36.8 (15 Aug 2022 11:07), Max: 36.8 (15 Aug 2022 11:07)  T(F): 98.2 (15 Aug 2022 11:07), Max: 98.2 (15 Aug 2022 11:07)  HR: 96 (15 Aug 2022 11:07) (96 - 96)  BP: 162/82 (15 Aug 2022 11:07) (162/82 - 162/82)  BP(mean): --  RR: 15 (15 Aug 2022 11:07) (15 - 15)  SpO2: 100% (15 Aug 2022 11:07) (100% - 100%)  Parameters below as of 15 Aug 2022 11:07  Patient On (Oxygen Delivery Method): room air  GENERAL: NAD, PALE  HEAD:  Atraumatic, Normocephalic  EYES: EOMI, PERRLA, conjunctiva and sclera clear  ENT: normal hearing, no nasal discharge, throat clear, dentition normal ++Dark spot on Rt side of tongue (pt states he's had it for many years, no change in size)   NECK: Supple, No JVD, no LAD, no thyromegaly   CHEST/LUNG: Clear to auscultation bilaterally; No wheeze, respirations unlabored  HEART: Regular rate and rhythm; No murmurs, rubs, or gallops  ABDOMEN: Soft, Nontender, Nondistended; Bowel sounds present, no HSM  EXTREMITIES:  2+ Peripheral Pulses, No clubbing, cyanosis, or edema  PSYCH: AAOx3, normal behavior  NEUROLOGY: non-focal, sensory and cn 2-12 intact, speech/language intact  SKIN: No visible rashes or lesions    LABS:                        7.2    7.97  )-----------( 359      ( 15 Aug 2022 11:45 )             26.5     08-15    138  |  109<H>  |  13  ----------------------------<  135<H>  4.3   |  25  |  1.00    Ca    8.7      15 Aug 2022 11:45    TPro  7.8  /  Alb  3.1<L>  /  TBili  0.2  /  DBili  x   /  AST  33  /  ALT  33  /  AlkPhos  130<H>  08-15    PT/INR - ( 15 Aug 2022 11:45 )   PT: 15.1 sec;   INR: 1.30 ratio         PTT - ( 15 Aug 2022 11:45 )  PTT:23.5 sec      RADIOLOGY & ADDITIONAL TESTS:    Imaging Personally Reviewed:  n/a  EKG Personally Reviewed:  n/a  Consultant(s) Notes Reviewed:      Care Discussed with Consultants/Other Providers:   (15 Aug 2022 15:20)      PAST MEDICAL & SURGICAL HISTORY:  No pertinent past medical history          MEDICATIONS  (STANDING):  chlorhexidine 4% Liquid 1 Application(s) Topical <User Schedule>  pantoprazole  Injectable 40 milliGRAM(s) IV Push every 12 hours    MEDICATIONS  (PRN):  ondansetron Injectable 4 milliGRAM(s) IV Push every 6 hours PRN Nausea      Allergies    No Known Allergies    Intolerances        SOCIAL HISTORY:    FAMILY HISTORY:      REVIEW OF SYSTEMS:    CONSTITUTIONAL: No weakness, fevers or chills  EYES/ENT: No visual changes;  No vertigo or throat pain   NECK: No pain or stiffness  RESPIRATORY: No cough, wheezing, hemoptysis; No shortness of breath  CARDIOVASCULAR: No chest pain or palpitations  GASTROINTESTINAL: No abdominal or epigastric pain. No nausea, vomiting, or hematemesis; No diarrhea or constipation. No melena or hematochezia.  GENITOURINARY: No dysuria, frequency or hematuria  NEUROLOGICAL: No numbness or weakness  SKIN: No itching, burning, rashes, or lesions   PSYCH: Normal mood and affect  All other review of systems is negative unless indicated above.    Vital Signs Last 24 Hrs  T(C): 36.5 (16 Aug 2022 13:32), Max: 36.9 (15 Aug 2022 15:51)  T(F): 97.7 (16 Aug 2022 13:32), Max: 98.5 (15 Aug 2022 19:39)  HR: 78 (16 Aug 2022 13:32) (74 - 94)  BP: 151/77 (16 Aug 2022 13:32) (141/67 - 158/81)  BP(mean): --  RR: 18 (16 Aug 2022 13:32) (18 - 20)  SpO2: 100% (16 Aug 2022 13:32) (98% - 100%)    Parameters below as of 16 Aug 2022 13:32  Patient On (Oxygen Delivery Method): room air        PHYSICAL EXAM:    Constitutional: No acute distress, well-developed, non-toxic appearing  HEENT: masked, good phonation, not icteric  Neck: supple, no lymphadenopathy  Respiratory: clear to ascultation bilaterally, no wheezing  Cardiovascular: S1 and S2, regular rate and rhythm, no murmurs rubs or gallops  Gastrointestinal: soft, non-tender, non-distended, +bowel sounds, no rebound or guarding, no surgical scars, no drains  Extremities: No peripheral edema, no cyanosis or clubbing  Vascular: 2+ peripheral pulses, no venous stasis  Neurological: A/O x 3, no focal deficits, no asterixis  Psychiatric: Normal mood, normal affect  Skin: No rashes, not jaundiced    LABS:                        7.3    8.14  )-----------( 379      ( 16 Aug 2022 07:47 )             27.1     08-15    138  |  109<H>  |  13  ----------------------------<  135<H>  4.3   |  25  |  1.00    Ca    8.7      15 Aug 2022 11:45    TPro  7.8  /  Alb  3.1<L>  /  TBili  0.2  /  DBili  x   /  AST  33  /  ALT  33  /  AlkPhos  130<H>  08-15    PT/INR - ( 15 Aug 2022 11:45 )   PT: 15.1 sec;   INR: 1.30 ratio         PTT - ( 15 Aug 2022 11:45 )  PTT:23.5 sec  LIVER FUNCTIONS - ( 15 Aug 2022 11:45 )  Alb: 3.1 g/dL / Pro: 7.8 gm/dL / ALK PHOS: 130 U/L / ALT: 33 U/L / AST: 33 U/L / GGT: x             RADIOLOGY & ADDITIONAL STUDIES: Patient is a 61y old  Male who presents with a chief complaint of syncope (16 Aug 2022 10:40)    HPI:  This is a 61 year old male with no known pat medical history presenting to ED with report of weakness in legs and syncopal episode as witnessed by a coworker.    Endorses he has been having progressive fatigue, dypsnea on exertion on stairs for past month. Denies chest pain, epigastric pain, abdominal pain. Does report black stool "once or twice" in past four weeks. Denies ever having colonoscopy or endoscopy. Denies hematochezia or hematemesis. Denies weight loss.   Denies blood thinning agents.     PAST MEDICAL & SURGICAL HISTORY:  No pertinent past medical history    MEDICATIONS  (STANDING):  chlorhexidine 4% Liquid 1 Application(s) Topical <User Schedule>  pantoprazole  Injectable 40 milliGRAM(s) IV Push every 12 hours    MEDICATIONS  (PRN):  ondansetron Injectable 4 milliGRAM(s) IV Push every 6 hours PRN Nausea      Allergies    No Known Allergies    Intolerances        SOCIAL HISTORY:    FAMILY HISTORY:      REVIEW OF SYSTEMS:    CONSTITUTIONAL: No weakness, fevers or chills  EYES/ENT: No visual changes;  No vertigo or throat pain   NECK: No pain or stiffness  RESPIRATORY: No cough, wheezing, hemoptysis; No shortness of breath  CARDIOVASCULAR: No chest pain or palpitations  GASTROINTESTINAL: See abovve  GENITOURINARY: No dysuria, frequency or hematuria  NEUROLOGICAL: No numbness or weakness  SKIN: No itching, burning, rashes, or lesions   PSYCH: Normal mood and affect  All other review of systems is negative unless indicated above.    Vital Signs Last 24 Hrs  T(C): 36.5 (16 Aug 2022 13:32), Max: 36.9 (15 Aug 2022 15:51)  T(F): 97.7 (16 Aug 2022 13:32), Max: 98.5 (15 Aug 2022 19:39)  HR: 78 (16 Aug 2022 13:32) (74 - 94)  BP: 151/77 (16 Aug 2022 13:32) (141/67 - 158/81)  BP(mean): --  RR: 18 (16 Aug 2022 13:32) (18 - 20)  SpO2: 100% (16 Aug 2022 13:32) (98% - 100%)    Parameters below as of 16 Aug 2022 13:32  Patient On (Oxygen Delivery Method): room air        PHYSICAL EXAM:    Constitutional: No acute distress, well-developed, non-toxic appearing  HEENT: masked, good phonation, not icteric  Neck: supple, no lymphadenopathy  Respiratory: clear to ascultation bilaterally, no wheezing  Cardiovascular: S1 and S2, regular rate and rhythm, no murmurs rubs or gallops  Gastrointestinal: soft, non-tender, non-distended, +bowel sounds, no rebound or guarding, no surgical scars, no drains  Extremities: No peripheral edema, no cyanosis or clubbing  Vascular: 2+ peripheral pulses, no venous stasis  Neurological: A/O x 3, no focal deficits, no asterixis  Psychiatric: Normal mood, normal affect  Skin: No rashes, not jaundiced, pale    LABS:                        7.3    8.14  )-----------( 379      ( 16 Aug 2022 07:47 )             27.1     08-15    138  |  109<H>  |  13  ----------------------------<  135<H>  4.3   |  25  |  1.00    Ca    8.7      15 Aug 2022 11:45    TPro  7.8  /  Alb  3.1<L>  /  TBili  0.2  /  DBili  x   /  AST  33  /  ALT  33  /  AlkPhos  130<H>  08-15    PT/INR - ( 15 Aug 2022 11:45 )   PT: 15.1 sec;   INR: 1.30 ratio         PTT - ( 15 Aug 2022 11:45 )  PTT:23.5 sec  LIVER FUNCTIONS - ( 15 Aug 2022 11:45 )  Alb: 3.1 g/dL / Pro: 7.8 gm/dL / ALK PHOS: 130 U/L / ALT: 33 U/L / AST: 33 U/L / GGT: x             RADIOLOGY & ADDITIONAL STUDIES: Patient is a 61y old  Male who presents with a chief complaint of syncope (16 Aug 2022 10:40)    HPI:  This is a 61 year old male with no known pat medical history presenting to ED with report of weakness in legs and syncopal episode as witnessed by a coworker.        Endorses he has been having progressive fatigue, dypsnea on exertion on stairs for past month. Doesn't remember falling or if hit head but does denies preceeding chest pain or shortness of breath. Felt very tired at work prior to the event occuring.  Denies chest pain, epigastric pain, abdominal pain. No post prandial nausea or vomiting.  Does report black stool "once or twice" in past four weeks. Denies ever having colonoscopy or endoscopy. Denies hematochezia or hematemesis. Denies weight loss. Denies blood thinning agents.      PAST MEDICAL & SURGICAL HISTORY:  No pertinent past medical history    MEDICATIONS  (STANDING):  chlorhexidine 4% Liquid 1 Application(s) Topical <User Schedule>  pantoprazole  Injectable 40 milliGRAM(s) IV Push every 12 hours    MEDICATIONS  (PRN):  ondansetron Injectable 4 milliGRAM(s) IV Push every 6 hours PRN Nausea      Allergies    No Known Allergies    Intolerances    SOCIAL HISTORY:  no smoking, drinking or drugs    FAMILY HISTORY:  no colon or stomach cancer    REVIEW OF SYSTEMS:    CONSTITUTIONAL: +weakness, no fevers or chills  EYES/ENT: No visual changes;  No vertigo or throat pain   NECK: No pain or stiffness  RESPIRATORY: No cough, wheezing, hemoptysis; +shortness of breath on exertion  CARDIOVASCULAR: No chest pain or palpitations  GASTROINTESTINAL: See above  GENITOURINARY: No dysuria, frequency or hematuria  NEUROLOGICAL: No numbness or neurologic weakness, +dizziness  SKIN: No itching, burning, rashes, or lesions   PSYCH: Normal mood and affect  All other review of systems is negative unless indicated above.    Vital Signs Last 24 Hrs  T(C): 36.5 (16 Aug 2022 13:32), Max: 36.9 (15 Aug 2022 15:51)  T(F): 97.7 (16 Aug 2022 13:32), Max: 98.5 (15 Aug 2022 19:39)  HR: 78 (16 Aug 2022 13:32) (74 - 94)  BP: 151/77 (16 Aug 2022 13:32) (141/67 - 158/81)  BP(mean): --  RR: 18 (16 Aug 2022 13:32) (18 - 20)  SpO2: 100% (16 Aug 2022 13:32) (98% - 100%)    Parameters below as of 16 Aug 2022 13:32  Patient On (Oxygen Delivery Method): room air        PHYSICAL EXAM:    Constitutional: No acute distress, pale  HEENT: masked, good phonation, not icteric  Neck: supple, no lymphadenopathy  Respiratory: clear to ascultation bilaterally, no wheezing  Cardiovascular: S1 and S2, regular rate and rhythm, no murmurs rubs or gallops  Gastrointestinal: soft, non-tender, non-distended, +bowel sounds, no rebound or guarding, no surgical scars, no drains  Extremities: No peripheral edema, no cyanosis or clubbing  Vascular: 2+ peripheral pulses, no venous stasis  Neurological: A/O x 3, no focal deficits, no asterixis  Psychiatric: Normal mood, normal affect  Skin: No rashes, not jaundiced, pale    LABS:                        7.3    8.14  )-----------( 379      ( 16 Aug 2022 07:47 )             27.1     08-15    138  |  109<H>  |  13  ----------------------------<  135<H>  4.3   |  25  |  1.00    Ca    8.7      15 Aug 2022 11:45    TPro  7.8  /  Alb  3.1<L>  /  TBili  0.2  /  DBili  x   /  AST  33  /  ALT  33  /  AlkPhos  130<H>  08-15    PT/INR - ( 15 Aug 2022 11:45 )   PT: 15.1 sec;   INR: 1.30 ratio         PTT - ( 15 Aug 2022 11:45 )  PTT:23.5 sec  LIVER FUNCTIONS - ( 15 Aug 2022 11:45 )  Alb: 3.1 g/dL / Pro: 7.8 gm/dL / ALK PHOS: 130 U/L / ALT: 33 U/L / AST: 33 U/L / GGT: x             RADIOLOGY & ADDITIONAL STUDIES:

## 2022-08-16 NOTE — CONSULT NOTE ADULT - NS ATTEND AMEND GEN_ALL_CORE FT
61 year old man with episodes of melena with symptomatic anemia.     Plan for EGD.   PPI.   At least two large bore IV's.   IV PPI  NPO at midnight

## 2022-08-16 NOTE — PROGRESS NOTE ADULT - SUBJECTIVE AND OBJECTIVE BOX
Chief Complaint: Syncope    Interval Hx: Patient seen and examined. Patient reports recent dyspnea with exertion, fatigue, reduced activity tolerance. Reports waking up the other day feeling weak, tired, bit lightheaded, went to work where the symptoms continued, sat down to rest and have some fluids and was witnessed to have passed out for about one minute. No other acute complaints. He does note that he had a dark, possibly melanotic bowel movement several weeks ago without recurrent episode since then.     ROS: Multi system review is comprehensively negative x 10 systems except as above    Vitals:  T(F): 98.5 (16 Aug 2022 17:16), Max: 98.5 (15 Aug 2022 19:39)  HR: 76 (16 Aug 2022 17:16) (74 - 98)  BP: 144/71 (16 Aug 2022 17:16) (142/80 - 163/75)  RR: 18 (16 Aug 2022 17:16) (18 - 19)  SpO2: 99% (16 Aug 2022 17:16) (98% - 100%) on RA    Exam:  Gen: Comfortable appearing  HEENT: NCAT PERRL EOMI MMM clear oropharynx  Neck: Supple no JVD  Chest: Normal resp effort, lungs CTA B/L  CVS: s1 s2 normal RRR  Abd: +BS, soft NT ND   Ext: No edema or calf tenderness  Skin: Warm, dry, intact  Neuro: A+Ox3, no deficits  Mood: Calm, pleasant    Labs:                       7.3    8.14 )-----------( 379              27.1     MCV 72  RDW 16.4  Retic 1.7%    Iron 13  TIBC 403  Ferritin 11    LDH WNL Bili WNL      138  |  109  |  13  ---------------------< 135  4.3   |   25   |  1.00    Ca 8.7    TPro  7.8  /  Alb  3.1  /  TBili  0.2  /  DBili  x   /  AST  33  /  ALT  33  /  AlkPhos  130    PT: 15.1 sec;   INR: 1.30 ratio;   PTT:23.5 sec    A1c 6.0   TSH WNL    Troponin (-)   proBNP 47      Micro:  COVID19 PCR 8/15: Negative  Flu PCR 8/15: Negative  RSV PCR 8/15: Negative    Imaging:  CXR 8/15: Heart magnified by technique. Lungs are clear.    CT head 8/15: Parenchymal volume loss and chronic microvascular ischemic changes. No evidence of acute hemorrhage, mass or mass-effect in the posterior fossa or in the supratentorial region.    Cardiac Testing:  EKG 8/15: Rate WNL. NSR.     Meds:  MEDICATIONS  (STANDING):  chlorhexidine 4% Liquid 1 Application(s) Topical <User Schedule>  pantoprazole  Injectable 40 milliGRAM(s) IV Push every 12 hours    MEDICATIONS  (PRN):  ondansetron Injectable 4 milliGRAM(s) IV Push every 6 hours PRN Nausea

## 2022-08-16 NOTE — PROGRESS NOTE ADULT - ASSESSMENT
61 year-old man with no known PMHx, has not seen a doctor in years as he has been generally in good health, here for further evaluation after an episode of syncope while at work. Event was preceded by lightheadedness, dizziness, fatigue. Reports recent new dyspnea on exertion, decreased activity tolerance. Possibly had an episode of dark stool vs melena several weeks ago though no recurrence since then. In the ED, hemodynamics were WNL. Exam unremarkable. Stool guaiac (+). Hgb 7.2. MCV 72. Patient given dose of IV Protonix and admitted to Medicine.     Exertional dyspnea, fatigue, syncope  Likely due to symptomatic anemia, see below. Alternatively, patient could have additional cardiac disease. Reassuringly though, troponin negative, EKG negative.   - Management of anemia as below  - TTE for completeness    Symptomatic anemia  Hgb 7.2 on presentation. MCV 72. Iron studies suggestive of iron deficiency. Guaiac (+). GI consulted.   - Awaiting GI input, likely will need endoscopic evaluation  - For transfusion of 2u PRBC for symptomatic anemia

## 2022-08-16 NOTE — CONSULT NOTE ADULT - ASSESSMENT
61 year old male with symptomatic anemia and melena.    Recommend:  ::EGD elyse am  ::CASTRO estes [place]  ::[facility] and transfuse prn  ::PPI

## 2022-08-17 ENCOUNTER — RESULT REVIEW (OUTPATIENT)
Age: 62
End: 2022-08-17

## 2022-08-17 LAB
ANION GAP SERPL CALC-SCNC: 6 MMOL/L — SIGNIFICANT CHANGE UP (ref 5–17)
BUN SERPL-MCNC: 8 MG/DL — SIGNIFICANT CHANGE UP (ref 7–23)
CALCIUM SERPL-MCNC: 8.7 MG/DL — SIGNIFICANT CHANGE UP (ref 8.5–10.1)
CHLORIDE SERPL-SCNC: 108 MMOL/L — SIGNIFICANT CHANGE UP (ref 96–108)
CO2 SERPL-SCNC: 26 MMOL/L — SIGNIFICANT CHANGE UP (ref 22–31)
CREAT SERPL-MCNC: 0.98 MG/DL — SIGNIFICANT CHANGE UP (ref 0.5–1.3)
EGFR: 88 ML/MIN/1.73M2 — SIGNIFICANT CHANGE UP
GLUCOSE SERPL-MCNC: 96 MG/DL — SIGNIFICANT CHANGE UP (ref 70–99)
HCT VFR BLD CALC: 32.4 % — LOW (ref 39–50)
HGB BLD-MCNC: 9.6 G/DL — LOW (ref 13–17)
MCHC RBC-ENTMCNC: 22.1 PG — LOW (ref 27–34)
MCHC RBC-ENTMCNC: 29.6 GM/DL — LOW (ref 32–36)
MCV RBC AUTO: 74.7 FL — LOW (ref 80–100)
PLATELET # BLD AUTO: 349 K/UL — SIGNIFICANT CHANGE UP (ref 150–400)
POTASSIUM SERPL-MCNC: 3.9 MMOL/L — SIGNIFICANT CHANGE UP (ref 3.5–5.3)
POTASSIUM SERPL-SCNC: 3.9 MMOL/L — SIGNIFICANT CHANGE UP (ref 3.5–5.3)
RBC # BLD: 4.34 M/UL — SIGNIFICANT CHANGE UP (ref 4.2–5.8)
RBC # FLD: 17.6 % — HIGH (ref 10.3–14.5)
SODIUM SERPL-SCNC: 140 MMOL/L — SIGNIFICANT CHANGE UP (ref 135–145)
WBC # BLD: 7.73 K/UL — SIGNIFICANT CHANGE UP (ref 3.8–10.5)
WBC # FLD AUTO: 7.73 K/UL — SIGNIFICANT CHANGE UP (ref 3.8–10.5)

## 2022-08-17 PROCEDURE — 88342 IMHCHEM/IMCYTCHM 1ST ANTB: CPT | Mod: 26

## 2022-08-17 PROCEDURE — 88312 SPECIAL STAINS GROUP 1: CPT | Mod: 26

## 2022-08-17 PROCEDURE — 88305 TISSUE EXAM BY PATHOLOGIST: CPT | Mod: 26

## 2022-08-17 PROCEDURE — 99232 SBSQ HOSP IP/OBS MODERATE 35: CPT

## 2022-08-17 PROCEDURE — 43239 EGD BIOPSY SINGLE/MULTIPLE: CPT | Mod: GC

## 2022-08-17 RX ORDER — SOD SULF/SODIUM/NAHCO3/KCL/PEG
2000 SOLUTION, RECONSTITUTED, ORAL ORAL ONCE
Refills: 0 | Status: COMPLETED | OUTPATIENT
Start: 2022-08-17 | End: 2022-08-17

## 2022-08-17 RX ADMIN — PANTOPRAZOLE SODIUM 40 MILLIGRAM(S): 20 TABLET, DELAYED RELEASE ORAL at 10:04

## 2022-08-17 RX ADMIN — CHLORHEXIDINE GLUCONATE 1 APPLICATION(S): 213 SOLUTION TOPICAL at 10:04

## 2022-08-17 RX ADMIN — Medication 2000 MILLILITER(S): at 21:09

## 2022-08-17 RX ADMIN — PANTOPRAZOLE SODIUM 40 MILLIGRAM(S): 20 TABLET, DELAYED RELEASE ORAL at 21:11

## 2022-08-17 NOTE — PROGRESS NOTE ADULT - ASSESSMENT
61 year-old man with no known PMHx, has not seen a doctor in years as he has been generally in good health, here for further evaluation after an episode of syncope while at work. Event was preceded by lightheadedness, dizziness, fatigue. Reports recent new dyspnea on exertion, decreased activity tolerance. Possibly had an episode of dark stool vs melena several weeks ago though no recurrence since then. In the ED, hemodynamics were WNL. Exam unremarkable. Stool guaiac (+). Hgb 7.2. MCV 72. Patient given dose of IV Protonix and admitted to Medicine.     Exertional dyspnea, fatigue, syncope  Likely due to symptomatic anemia, see below. Troponin negative, EKG negative. TTE done. Normal appearing left atrium. Left ventricle normal in size, wall thickness, wall motion and contractility. Ejection fraction ~ 60%. Normal appearing right ventricle structure and function. Normal appearing right atrium. No significant valve disease. No events on tele.   - Management of anemia as below    Symptomatic anemia  Hgb 7.2 on presentation. MCV 72. Iron studies suggestive of iron deficiency. Guaiac (+). S/P 2u PRBCs. Hgb now 9.6. GI consulted. EGD done today. Esophagus was normal. A few localized, 7 mm, non-bleeding erosions were found in the gastric antrum without stigmata of recent bleeding. Biopsies were taken for histology. Duodenum was normal. Not clear if this is the source but the non-bleeding erosive gastropathy could be since he reported having melena a few weeks ago and might have been from that. He is now planned for colonoscopy tomorrow.   - Colonoscopy tomorrow  - Anticipate starting iron supplementation

## 2022-08-17 NOTE — PROGRESS NOTE ADULT - SUBJECTIVE AND OBJECTIVE BOX
Chief Complaint: Syncope    Interval Hx: Patient seen and examined. No acute events overnight. No dizziness or lightheadedness. No abdominal complaints. Hgb improved s/p PRBC transfusion. Underwent EGD today. Esophagus was normal. A few localized, 7 mm, non-bleeding erosions were found in the gastric antrum without stigmata of recent bleeding. Biopsies were taken for histology. Duodenum was normal. He is now planned for colonoscopy tomorrow.     ROS: Multi system review is comprehensively negative x 10 systems except as above    Vitals:  T(F): 98.5 (16 Aug 2022 17:16), Max: 98.5 (15 Aug 2022 19:39)  HR: 76 (16 Aug 2022 17:16) (74 - 98)  BP: 144/71 (16 Aug 2022 17:16) (142/80 - 163/75)  RR: 18 (16 Aug 2022 17:16) (18 - 19)  SpO2: 99% (16 Aug 2022 17:16) (98% - 100%) on RA    Exam:  Gen: Comfortable appearing  HEENT: NCAT PERRL EOMI MMM clear oropharynx  Neck: Supple no JVD  Chest: Normal resp effort, lungs CTA B/L  CVS: s1 s2 normal RRR  Abd: +BS, soft NT ND   Ext: No edema or calf tenderness  Skin: Warm, dry, intact  Neuro: A+Ox3, no deficits  Mood: Calm, pleasant    Labs:                       9.6    7.73 )-----------( 349              32.4     MCV 72  RDW 16.4  Retic 1.7%    Iron 13  TIBC 403  Ferritin 11    LDH WNL Bili WNL      140  |  108  |  8  ---------------------< 96  3.9   |   26   |  0.98    Ca 8.7    TPro  7.8  /  Alb  3.1  /  TBili  0.2  /  DBili  x   /  AST  33  /  ALT  33  /  AlkPhos  130    PT: 15.1 sec;   INR: 1.30 ratio;   PTT:23.5 sec    A1c 6.0   TSH WNL    Troponin (-)   proBNP 47    Micro:  COVID19 PCR 8/15: Negative  Flu PCR 8/15: Negative  RSV PCR 8/15: Negative    Imaging:  CXR 8/15: Heart magnified by technique. Lungs are clear.    CT head 8/15: Parenchymal volume loss and chronic microvascular ischemic changes. No evidence of acute hemorrhage, mass or mass-effect in the posterior fossa or in the supratentorial region.    Cardiac Testing:  TTE 8/16: Normal appearing mitral valve structure. Trace mitral regurgitation is present. Normal aortic valve structure and function. Normal appearing tricuspid valve structure. Trace tricuspid valve regurgitation is present. Mild pulmonary hypertension. Normal appearing pulmonic valve structure. Trace pulmonic valvular regurgitation is present. Normal appearing left atrium. The left ventricle is normal in size, wall thickness, wall motion and contractility. Estimated left ventricular ejection fraction is 60%. Normal appearing right ventricle structure and function. Normal appearing right atrium.    EKG 8/15: Rate WNL. NSR.     Procedures:  EGD 8/17:  Esophagus was normal. A few localized, 7 mm, non-bleeding erosions were found in the gastric antrum without stigmata of recent bleeding. Biopsies were taken for histology. Duodenum was normal.     Meds:  MEDICATIONS  (STANDING):  chlorhexidine 4% Liquid 1 Application(s) Topical <User Schedule>  pantoprazole  Injectable 40 milliGRAM(s) IV Push every 12 hours  polyethylene glycol/electrolyte Solution 2000 milliLiter(s) Oral once    MEDICATIONS  (PRN):  ondansetron Injectable 4 milliGRAM(s) IV Push every 6 hours PRN Nausea

## 2022-08-18 ENCOUNTER — RESULT REVIEW (OUTPATIENT)
Age: 62
End: 2022-08-18

## 2022-08-18 LAB
ANION GAP SERPL CALC-SCNC: 5 MMOL/L — SIGNIFICANT CHANGE UP (ref 5–17)
BASOPHILS # BLD AUTO: 0.11 K/UL — SIGNIFICANT CHANGE UP (ref 0–0.2)
BASOPHILS NFR BLD AUTO: 1.4 % — SIGNIFICANT CHANGE UP (ref 0–2)
BUN SERPL-MCNC: 8 MG/DL — SIGNIFICANT CHANGE UP (ref 7–23)
CALCIUM SERPL-MCNC: 8.6 MG/DL — SIGNIFICANT CHANGE UP (ref 8.5–10.1)
CHLORIDE SERPL-SCNC: 108 MMOL/L — SIGNIFICANT CHANGE UP (ref 96–108)
CO2 SERPL-SCNC: 26 MMOL/L — SIGNIFICANT CHANGE UP (ref 22–31)
CREAT SERPL-MCNC: 0.96 MG/DL — SIGNIFICANT CHANGE UP (ref 0.5–1.3)
EGFR: 90 ML/MIN/1.73M2 — SIGNIFICANT CHANGE UP
EOSINOPHIL # BLD AUTO: 0.23 K/UL — SIGNIFICANT CHANGE UP (ref 0–0.5)
EOSINOPHIL NFR BLD AUTO: 3 % — SIGNIFICANT CHANGE UP (ref 0–6)
GLUCOSE SERPL-MCNC: 96 MG/DL — SIGNIFICANT CHANGE UP (ref 70–99)
HCT VFR BLD CALC: 33 % — LOW (ref 39–50)
HGB BLD-MCNC: 9.7 G/DL — LOW (ref 13–17)
IMM GRANULOCYTES NFR BLD AUTO: 0.3 % — SIGNIFICANT CHANGE UP (ref 0–1.5)
LYMPHOCYTES # BLD AUTO: 1.93 K/UL — SIGNIFICANT CHANGE UP (ref 1–3.3)
LYMPHOCYTES # BLD AUTO: 24.8 % — SIGNIFICANT CHANGE UP (ref 13–44)
MAGNESIUM SERPL-MCNC: 2.2 MG/DL — SIGNIFICANT CHANGE UP (ref 1.6–2.6)
MCHC RBC-ENTMCNC: 22.1 PG — LOW (ref 27–34)
MCHC RBC-ENTMCNC: 29.4 GM/DL — LOW (ref 32–36)
MCV RBC AUTO: 75.2 FL — LOW (ref 80–100)
MONOCYTES # BLD AUTO: 0.55 K/UL — SIGNIFICANT CHANGE UP (ref 0–0.9)
MONOCYTES NFR BLD AUTO: 7.1 % — SIGNIFICANT CHANGE UP (ref 2–14)
NEUTROPHILS # BLD AUTO: 4.94 K/UL — SIGNIFICANT CHANGE UP (ref 1.8–7.4)
NEUTROPHILS NFR BLD AUTO: 63.4 % — SIGNIFICANT CHANGE UP (ref 43–77)
PLATELET # BLD AUTO: 363 K/UL — SIGNIFICANT CHANGE UP (ref 150–400)
POTASSIUM SERPL-MCNC: 3.7 MMOL/L — SIGNIFICANT CHANGE UP (ref 3.5–5.3)
POTASSIUM SERPL-SCNC: 3.7 MMOL/L — SIGNIFICANT CHANGE UP (ref 3.5–5.3)
RBC # BLD: 4.39 M/UL — SIGNIFICANT CHANGE UP (ref 4.2–5.8)
RBC # FLD: 18.1 % — HIGH (ref 10.3–14.5)
SODIUM SERPL-SCNC: 139 MMOL/L — SIGNIFICANT CHANGE UP (ref 135–145)
WBC # BLD: 7.78 K/UL — SIGNIFICANT CHANGE UP (ref 3.8–10.5)
WBC # FLD AUTO: 7.78 K/UL — SIGNIFICANT CHANGE UP (ref 3.8–10.5)

## 2022-08-18 PROCEDURE — 74177 CT ABD & PELVIS W/CONTRAST: CPT | Mod: 26

## 2022-08-18 PROCEDURE — 99232 SBSQ HOSP IP/OBS MODERATE 35: CPT

## 2022-08-18 PROCEDURE — 71260 CT THORAX DX C+: CPT | Mod: 26

## 2022-08-18 PROCEDURE — 88305 TISSUE EXAM BY PATHOLOGIST: CPT | Mod: 26

## 2022-08-18 PROCEDURE — 45380 COLONOSCOPY AND BIOPSY: CPT | Mod: GC

## 2022-08-18 PROCEDURE — 88341 IMHCHEM/IMCYTCHM EA ADD ANTB: CPT | Mod: 26

## 2022-08-18 PROCEDURE — 45381 COLONOSCOPY SUBMUCOUS NJX: CPT | Mod: GC

## 2022-08-18 RX ADMIN — PANTOPRAZOLE SODIUM 40 MILLIGRAM(S): 20 TABLET, DELAYED RELEASE ORAL at 10:15

## 2022-08-18 RX ADMIN — PANTOPRAZOLE SODIUM 40 MILLIGRAM(S): 20 TABLET, DELAYED RELEASE ORAL at 22:45

## 2022-08-18 RX ADMIN — CHLORHEXIDINE GLUCONATE 1 APPLICATION(S): 213 SOLUTION TOPICAL at 10:18

## 2022-08-18 NOTE — PROGRESS NOTE ADULT - ASSESSMENT
61 year-old man with no known PMHx, has not seen a doctor in years as he has been generally in good health, here for further evaluation after an episode of syncope while at work. Event was preceded by lightheadedness, dizziness, fatigue. Reports recent new dyspnea on exertion, decreased activity tolerance. Possibly had an episode of dark stool vs melena several weeks ago though no recurrence since then. In the ED, hemodynamics were WNL. Exam unremarkable. Stool guaiac (+). Hgb 7.2. MCV 72. Patient given dose of IV Protonix and admitted to Medicine.     Exertional dyspnea, fatigue, syncope  Likely due to symptomatic anemia, see below. Troponin negative, EKG negative. TTE done. Normal appearing left atrium. Left ventricle normal in size, wall thickness, wall motion and contractility. Ejection fraction ~ 60%. Normal appearing right ventricle structure and function. Normal appearing right atrium. No significant valve disease. No events on tele.   - Management of anemia as below    Symptomatic anemia  Hgb 7.2 on presentation. MCV 72. Iron studies suggestive of iron deficiency. Guaiac (+). S/P 2u PRBCs. Hgb now 9.6. GI consulted. EGD done today. Esophagus was normal. A few localized, 7 mm, non-bleeding erosions were found in the gastric antrum without stigmata of recent bleeding. Biopsies were taken for histology. Duodenum was normal. Not clear if this is the source but the non-bleeding erosive gastropathy could be since he reported having melena a few weeks ago and might have been from that. He is now planned for colonoscopy tomorrow.   - Colonoscopy tomorrow  - Anticipate starting iron supplementation   61 year-old man with no known PMHx, has not seen a doctor in years as he has been generally in good health, here for further evaluation after an episode of syncope while at work. Event was preceded by lightheadedness, dizziness, fatigue. Reports recent new dyspnea on exertion, decreased activity tolerance. Possibly had an episode of dark stool vs melena several weeks ago though no recurrence since then. In the ED, hemodynamics were WNL. Exam unremarkable. Stool guaiac (+). Hgb 7.2. MCV 72. Patient given dose of IV Protonix and admitted to Medicine.     Exertional dyspnea, fatigue, syncope  Likely due to symptomatic anemia, colon mass highly suggestive of malignancy, see below. Troponin negative, EKG negative. TTE done. Normal appearing left atrium. Left ventricle normal in size, wall thickness, wall motion and contractility. Ejection fraction ~ 60%. Normal appearing right ventricle structure and function. Normal appearing right atrium. No significant valve disease. No events on tele.   - Management of anemia as below    Symptomatic anemia  Hgb 7.2 on presentation. MCV 72. Iron studies suggestive of iron deficiency. Guaiac (+). S/P 2u PRBCs. Hgb now 9.6. GI consulted. EGD done 8/17. Esophagus was normal. A few localized, 7 mm, non-bleeding erosions were found in the gastric antrum without stigmata of recent bleeding. Biopsies were taken for histology. Duodenum was normal. Colonoscopy was performed today, significant for finding of descending colon mass suggestive of malignancy.   - Management of colon mass as below    Colon mass  As noted on colonoscopy today. Non obstructing. Suggestive of malignancy.  - CT C/A/P W/ contrast ordered to evaluate for metastases  - Tumor markers  - Oncology consult  - Colorectal Surgery consult

## 2022-08-18 NOTE — CONSULT NOTE ADULT - SUBJECTIVE AND OBJECTIVE BOX
61 year old male presents with melena and syncope. Patient reports    ROS neg except as above    PMH:   Allergies: NKA  Meds: as per chart  PSH: None  Sohx: no tobacco, etoh, or illicit drug use    Vitals:  T(C): 36.9 ( @ 07:51), Max: 36.9 ( @ 07:51)  HR: 66 ( @ 07:51) (66 - 78)  BP: 146/83 ( @ 07:51) (146/83 - 163/79)  RR: 19 ( @ 07:51) (19 - 19)  SpO2: 98% ( @ 07:51) (98% - 99%)    Physical Exam:  General: AAOx3, Well developed, NAD  Chest: Normal respiratory effort  Heart: RRR, normotensive  Abdomen: Soft, NTND, no masses  Neuro/Psych: No localized deficits. Normal speech, normal tone  Skin: Normal, no rashes, no lesions noted.   Extremities: Warm, well perfused, no edema, Pulses intact     @ 07:15                    9.7  CBC: 7.78>)-------(<363                     33.0                 139 | 108 | 8    CMP:  ----------------------< 96               3.7 | 26 | 0.96                      Ca:8.6  Phos:-  M.2               -|      |-        LFTs:  ------|-|-----             -|      |-  08-17 @ 07:10                    9.6  CBC: 7.73>)-------(<349                     32.4                 140 | 108 | 8    CMP:  ----------------------< 96               3.9 | 26 | 0.98                      Ca:8.7  Phos:-  Mg:-               -|      |-        LFTs:  ------|-|-----             -|      |-      Current Inpatient Medications:  chlorhexidine 4% Liquid 1 Application(s) Topical <User Schedule>  ondansetron Injectable 4 milliGRAM(s) IV Push every 6 hours PRN  pantoprazole  Injectable 40 milliGRAM(s) IV Push every 12 hours       61 year old male presents with melena and syncope. Patient reports    ROS neg except as above    PMH: preDM,   Allergies: NKA  Meds: as per chart  PSH: left knee surgery  FH: leukemia in mother   Sohx: no tobacco, etoh, or illicit drug use    Vitals:  T(C): 36.9 ( @ 07:51), Max: 36.9 (18 @ 07:51)  HR: 66 ( @ 07:51) (66 - 78)  BP: 146/83 ( @ 07:51) (146/83 - 163/79)  RR: 19 ( @ 07:51) (19 - 19)  SpO2: 98% ( @ 07:51) (98% - 99%)    Physical Exam:  General: AAOx3, Well developed, NAD  Chest: Normal respiratory effort  Heart: RRR, normotensive  Abdomen: Soft, NTND, no masses  Neuro/Psych: No localized deficits. Normal speech, normal tone  Skin: Normal, no rashes, no lesions noted.   Extremities: Warm, well perfused, no edema, Pulses intact     @ 07:15                    9.7  CBC: 7.78>)-------(<363                     33.0                 139 | 108 | 8    CMP:  ----------------------< 96               3.7 | 26 | 0.96                      Ca:8.6  Phos:-  M.2               -|      |-        LFTs:  ------|-|-----             -|      |-   @ 07:10                    9.6  CBC: 7.73>)-------(<349                     32.4                 140 | 108 | 8    CMP:  ----------------------< 96               3.9 | 26 | 0.98                      Ca:8.7  Phos:-  Mg:-               -|      |-        LFTs:  ------|-|-----             -|      |-      Current Inpatient Medications:  chlorhexidine 4% Liquid 1 Application(s) Topical <User Schedule>  ondansetron Injectable 4 milliGRAM(s) IV Push every 6 hours PRN  pantoprazole  Injectable 40 milliGRAM(s) IV Push every 12 hours       61 year old male presents with melena and syncope. Patient reports bilateral leg weakness for 2 weeks. He states this has neever happended in the past. He also reported an episode of nonbloody, nonbilious vomiting. He had maroon-colored stools 1 month ago. He denies loss of appetite, fever or chills. No change in stool caliber or bowel habits. He had a colonoscopy today that showed a descending colon mass, nonobstructing.    ROS neg except as above    PMH: preDM, left DVT treated with AC  Allergies: NKA  Meds: as per chart  PSH: left knee surgery 40 yrs ago  FH: mother  from leukemia at the age of 54  Sohx: no tobacco, etoh, or illicit drug use    Vitals:  T(C): 36.9 (08-18 @ 07:51), Max: 36.9 (08-18 @ 07:51)  HR: 66 (08-18 @ 07:51) (66 - 78)  BP: 146/83 (08-18 @ 07:51) (146/83 - 163/79)  RR: 19 (08-18 @ 07:51) (19 - 19)  SpO2: 98% (08-18 @ 07:51) (98% - 99%)    Physical Exam:  General: AAOx3, Well developed, NAD  Chest: Normal respiratory effort  Heart: RRR, normotensive  Abdomen: Soft, NTND, no masses  Neuro/Psych: No localized deficits. Normal speech, normal tone  Skin: Normal, no rashes, no lesions noted.   Extremities: Warm, well perfused, no edema, Pulses intact    08-18 @ 07:15                    9.7  CBC: 7.78>)-------(<363                     33.0                 139 | 108 | 8    CMP:  ----------------------< 96               3.7 | 26 | 0.96                      Ca:8.6  Phos:-  M.2               -|      |-        LFTs:  ------|-|-----             -|      |-  08-17 @ 07:10                    9.6  CBC: 7.73>)-------(<349                     32.4                 140 | 108 | 8    CMP:  ----------------------< 96               3.9 | 26 | 0.98                      Ca:8.7  Phos:-  Mg:-               -|      |-        LFTs:  ------|-|-----             -|      |-      Current Inpatient Medications:  chlorhexidine 4% Liquid 1 Application(s) Topical <User Schedule>  ondansetron Injectable 4 milliGRAM(s) IV Push every 6 hours PRN  pantoprazole  Injectable 40 milliGRAM(s) IV Push every 12 hours

## 2022-08-18 NOTE — CONSULT NOTE ADULT - ASSESSMENT
60 yo male with sigmoid colon wall thickening, with suspected metastatic disease in the lungs, liver and left adrenal gland.    Plan:  -colonoscopy and biopsy 8/18/22:  descending colon non-obstructing mass concerning for malignancy. Path pending.  -CT:   irregular colonic wall thickening of the proximal sigmoid colon with stranding of the surrounding paracolic fat. Regional lymph nodes are identified adjacent to the colonic mass measuring up to 1.2 cm.  Multiple hypodense masses identified within the hepatic   parenchyma is noted to be 3.9 cm, suspicious for hepatic parenchymal neoplastic disease. 3.4 cm left adrenal mass, suspicious for neoplastic disease. There is a 1.0 cm soft tissue nodule identified along the right lateral wall of the distal trachea, superior to the tracheal bifurcation. Lung nodules suspicious for lung parenchymal neoplastic disease.     62 yo male with sigmoid colon wall thickening, with suspected metastatic disease in the lungs, liver and left adrenal gland.    Assessment/Plan:  -8/16/22 CEA = 633  -8/18/22 Colonoscopy : A frond-like/villous and ulcerated non-obstructing large mass was found in the descending colon. The mass was circumferential. The mass measured five cm in length.   -Path pending.   Will send for NGS with [facility] for immunoprofile.  -08/18/22 CT: irregular colonic wall thickening of the proximal sigmoid colon with stranding of the surrounding paracolic fat. Regional lymph nodes are identified adjacent to the colonic mass measuring up to 1.2 cm.  Multiple hypodense masses identified within the hepatic parenchyma noted to be 3.9 cm, suspicious for hepatic parenchymal neoplastic disease. 3.4 cm left adrenal mass, suspicious for neoplastic disease. There is a 1.0 cm soft tissue nodule identified along the right lateral wall of the distal trachea, superior to the tracheal bifurcation. Lung nodules suspicious for lung parenchymal neoplastic disease.  -pending biopsy confirmation of metastatic colon cancer, recommend IR port placement and plan for outpatient systemic therapy with combination targeted and chemotherapy.  Will assess for Her2, extended PHI and BRAF status.

## 2022-08-18 NOTE — PROGRESS NOTE ADULT - SUBJECTIVE AND OBJECTIVE BOX
Chief Complaint: Syncope    Interval Hx: Patient seen and examined. No acute events overnight. No dizziness or lightheadedness. No abdominal complaints. Patient underwent colonoscopy this AM which found a descending colon non-obstructing mass concerning for malignancy. Colorectal Surgery was consulted. CT C/A/P ordered to assess whether patient has localized disease. Tumor markers ordered as well.     ROS: Multi system review is comprehensively negative x 10 systems except as above    Vitals:  T(F): 98.4 (18 Aug 2022 07:51), Max: 98.4 (18 Aug 2022 07:51)  HR: 66 (18 Aug 2022 07:51) (66 - 78)  BP: 146/83 (18 Aug 2022 07:51) (146/83 - 163/79)  RR: 19 (18 Aug 2022 07:51) (19 - 19)  SpO2: 98% (18 Aug 2022 07:51) (98% - 99%) on RA    Exam:  Gen: Comfortable appearing  HEENT: NCAT PERRL EOMI MMM clear oropharynx  Neck: Supple no JVD  Chest: Normal resp effort, lungs CTA B/L  CVS: s1 s2 normal RRR  Abd: +BS, soft NT ND   Ext: No edema or calf tenderness  Skin: Warm, dry, intact  Neuro: A+Ox3, no deficits  Mood: Calm, pleasant    Labs:                       9.7    7.78 )-----------( 363              33.0     MCV 72  RDW 16.4  Retic 1.7%    Iron 13  TIBC 403  Ferritin 11    LDH WNL Bili WNL      139  |  108  |  8  ---------------------< 96  3.9   |   26   |  0.96    Ca 8.6    TPro  7.8  /  Alb  3.1  /  TBili  0.2  /  DBili  x   /  AST  33  /  ALT  33  /  AlkPhos  130    PT: 15.1 sec;   INR: 1.30 ratio;   PTT:23.5 sec    A1c 6.0   TSH WNL    Troponin (-)   proBNP 47    Micro:  COVID19 PCR 8/15: Negative  Flu PCR 8/15: Negative  RSV PCR 8/15: Negative    Imaging:  CXR 8/15: Heart magnified by technique. Lungs are clear.    CT head 8/15: Parenchymal volume loss and chronic microvascular ischemic changes. No evidence of acute hemorrhage, mass or mass-effect in the posterior fossa or in the supratentorial region.    Cardiac Testing:  TTE 8/16: Normal appearing mitral valve structure. Trace mitral regurgitation is present. Normal aortic valve structure and function. Normal appearing tricuspid valve structure. Trace tricuspid valve regurgitation is present. Mild pulmonary hypertension. Normal appearing pulmonic valve structure. Trace pulmonic valvular regurgitation is present. Normal appearing left atrium. The left ventricle is normal in size, wall thickness, wall motion and contractility. Estimated left ventricular ejection fraction is 60%. Normal appearing right ventricle structure and function. Normal appearing right atrium.    EKG 8/15: Rate WNL. NSR.     Procedures:  EGD 8/17:  Esophagus was normal. A few localized, 7 mm, non-bleeding erosions were found in the gastric antrum without stigmata of recent bleeding. Biopsies were taken for histology. Duodenum was normal.     Meds:  MEDICATIONS  (STANDING):  chlorhexidine 4% Liquid 1 Application(s) Topical <User Schedule>  pantoprazole  Injectable 40 milliGRAM(s) IV Push every 12 hours  polyethylene glycol/electrolyte Solution 2000 milliLiter(s) Oral once    MEDICATIONS  (PRN):  ondansetron Injectable 4 milliGRAM(s) IV Push every 6 hours PRN Nausea                           Chief Complaint: Syncope    Interval Hx: Patient seen and examined. No acute events overnight. No dizziness or lightheadedness. No abdominal complaints. Patient underwent colonoscopy this AM which found a descending colon non-obstructing mass concerning for malignancy. Colorectal Surgery was consulted. CT C/A/P ordered to assess whether patient has localized disease. Tumor markers ordered as well.     ROS: Multi system review is comprehensively negative x 10 systems except as above    Vitals:  T(F): 98.4 (18 Aug 2022 07:51), Max: 98.4 (18 Aug 2022 07:51)  HR: 66 (18 Aug 2022 07:51) (66 - 78)  BP: 146/83 (18 Aug 2022 07:51) (146/83 - 163/79)  RR: 19 (18 Aug 2022 07:51) (19 - 19)  SpO2: 98% (18 Aug 2022 07:51) (98% - 99%) on RA    Exam:  Gen: Comfortable appearing  HEENT: NCAT PERRL EOMI MMM clear oropharynx  Neck: Supple no JVD  Chest: Normal resp effort, lungs CTA B/L  CVS: s1 s2 normal RRR  Abd: +BS, soft NT ND   Ext: No edema or calf tenderness  Skin: Warm, dry, intact  Neuro: A+Ox3, no deficits  Mood: Calm, pleasant    Labs:                       9.7    7.78 )---------( 363              33.0     MCV 72  RDW 16.4  Retic 1.7%  Iron 13  TIBC 403  Ferritin 11  LDH WNL Bili WNL      139  |  108  |  8  -----------------------< 96  3.9   |   26   |  0.96    Ca 8.6    TPro  7.8  /  Alb  3.1  /  TBili  0.2  /  DBili  x   /  AST  33  /  ALT  33  /  AlkPhos  130    PT: 15.1 sec;   INR: 1.30 ratio;   PTT:23.5 sec    A1c 6.0   TSH WNL    Troponin (-)   proBNP 47    Micro:  COVID19 PCR 8/15: Negative  Flu PCR 8/15: Negative  RSV PCR 8/15: Negative    Imaging:  CXR 8/15: Heart magnified by technique. Lungs are clear.    CT head 8/15: Parenchymal volume loss and chronic microvascular ischemic changes. No evidence of acute hemorrhage, mass or mass-effect in the posterior fossa or in the supratentorial region.    Cardiac Testing:  TTE 8/16: Normal appearing mitral valve structure. Trace mitral regurgitation is present. Normal aortic valve structure and function. Normal appearing tricuspid valve structure. Trace tricuspid valve regurgitation is present. Mild pulmonary hypertension. Normal appearing pulmonic valve structure. Trace pulmonic valvular regurgitation is present. Normal appearing left atrium. The left ventricle is normal in size, wall thickness, wall motion and contractility. Estimated left ventricular ejection fraction is 60%. Normal appearing right ventricle structure and function. Normal appearing right atrium.    EKG 8/15: Rate WNL. NSR.     Procedures:  Colonoscopy 8/18: A frond-like/villous and ulcerated non-obstructing large mass was found in the descending colon. The mass was circumferential. The mass measured five cm in length. Oozing was present. This was biopsied with a cold forceps for histology. Area was successfully injected with 8 mL Spot (carbon black) for tattooing. The scope was able to traverse the mass. The colon was otherwise normal. No immediate complications.    EGD 8/17:  Esophagus was normal. A few localized, 7 mm, non-bleeding erosions were found in the gastric antrum without stigmata of recent bleeding. Biopsies were taken for histology. Duodenum was normal.     Meds:  MEDICATIONS  (STANDING):  chlorhexidine 4% Liquid 1 Application(s) Topical <User Schedule>  pantoprazole  Injectable 40 milliGRAM(s) IV Push every 12 hours    MEDICATIONS  (PRN):  ondansetron Injectable 4 milliGRAM(s) IV Push every 6 hours PRN Nausea

## 2022-08-18 NOTE — CONSULT NOTE ADULT - SUBJECTIVE AND OBJECTIVE BOX
HPI:  61M w/ no PMH as he has not sought Healthcare for many years as he's always felt well, presents today after a syncopal episode as witnessed by a coworker.  Pt endorses that since he woke up this AM, he's been having weakness in both legs. Later at work, he went to the bathroom as he felt he had gas in his abdomen. As he wasn't feeling well, a coworker gave him some water while he was sitting and then told him that she witnessed his eyes roll back and he passed out. He does not recall any of this. He denies any dizziness, lightheadedness, cp, palpitations. 1 month ago, while at a [employer] game, after having some chicken and soda, he felt dizzy but felt better after an episode of vomiting. He does endorse that he been getting progressively more SMITH when he goes up the stairs.  1 month ago, he had an episode of melena, but none since then.      In ED, Hg 7.2, MCV 72, Guaiac Pos, given IV PPI x1,       PAST MEDICAL & SURGICAL HISTORY:  none (PT HAS NOT SEEN DOCTOR FOR MANY YEARS)    Review of Systems:   CONSTITUTIONAL: No fever. +weakness   EYES: No eye pain or discharge.  ENMT:  No sinus or throat pain  NECK: No pain or stiffness  RESPIRATORY: No cough, wheezing, chills or hemoptysis; ++ shortness of breath  CARDIOVASCULAR: No chest pain, palpitations, dizziness, or leg swelling  GASTROINTESTINAL: No abdominal or epigastric pain. No nausea, vomiting, or hematemesis; No diarrhea or constipation. ++ melena NO hematochezia.  GENITOURINARY: No dysuria or incontinence  NEUROLOGICAL: No headaches, memory loss, loss of strength, numbness, or tremors  SKIN: No rashes.  MUSCULOSKELETAL: No joint pain or swelling; No muscle, back, or extremity pain  PSYCHIATRIC: No depression, anxiety, mood swings, or difficulty sleeping      Social History:   lives alone  denies smoking/etoh  ind ADLs  works in a [employer]    FAMILY HISTORY:  MOther  at 53yo from Leukemia  Father is estranged    Home Medications:  none    MEDICATIONS  (STANDING):  pantoprazole  Injectable 40 milliGRAM(s) IV Push every 12 hours    MEDICATIONS  (PRN):  ondansetron Injectable 4 milliGRAM(s) IV Push every 6 hours PRN Nausea    PAST MEDICAL & SURGICAL HISTORY:  No pertinent past medical history          MEDICATIONS  (STANDING):  chlorhexidine 4% Liquid 1 Application(s) Topical <User Schedule>  pantoprazole  Injectable 40 milliGRAM(s) IV Push every 12 hours    MEDICATIONS  (PRN):  ondansetron Injectable 4 milliGRAM(s) IV Push every 6 hours PRN Nausea      Allergies    No Known Allergies    Intolerances        FAMILY HISTORY:      Review of Systems    Constitutional, Eyes, ENT, Cardiovascular, Respiratory, Gastrointestinal, Genitourinary, Musculoskeletal, Integumentary, Neurological, Psychiatric, Endocrine, Heme/Lymph and Allergic/Immunologic review of systems are otherwise negative except as noted in HPI.     Vital Signs Last 24 Hrs  T(C): 36.9 (18 Aug 2022 19:17), Max: 36.9 (18 Aug 2022 07:51)  T(F): 98.5 (18 Aug 2022 19:17), Max: 98.5 (18 Aug 2022 19:17)  HR: 75 (18 Aug 2022 19:17) (66 - 75)  BP: 152/73 (18 Aug 2022 19:17) (146/83 - 152/73)  BP(mean): --  RR: 18 (18 Aug 2022 19:17) (18 - 19)  SpO2: 98% (18 Aug 2022 19:17) (98% - 98%)    Parameters below as of 18 Aug 2022 19:17  Patient On (Oxygen Delivery Method): room air    Physical Exam  GENERAL: NAD, PALE  HEAD:  Atraumatic, Normocephalic  EYES: EOMI, PERRLA, conjunctiva and sclera clear  ENT: normal hearing, no nasal discharge, throat clear, dentition normal ++Dark spot on Rt side of tongue (pt states he's had it for many years, no change in size)   NECK: Supple, No JVD, no LAD, no thyromegaly   CHEST/LUNG: Clear to auscultation bilaterally; No wheeze, respirations unlabored  HEART: Regular rate and rhythm; No murmurs, rubs, or gallops  ABDOMEN: Soft, Nontender, Nondistended; Bowel sounds present, no HSM  EXTREMITIES:  2+ Peripheral Pulses, No clubbing, cyanosis, or edema  PSYCH: AAOx3, normal behavior  NEUROLOGY: non-focal, sensory and cn 2-12 intact, speech/language intact  SKIN: No visible rashes or lesions    LABS:  CBC Full  -  ( 18 Aug 2022 07:15 )  WBC Count : 7.78 K/uL  RBC Count : 4.39 M/uL  Hemoglobin : 9.7 g/dL  Hematocrit : 33.0 %  Platelet Count - Automated : 363 K/uL  Mean Cell Volume : 75.2 fl  Mean Cell Hemoglobin : 22.1 pg  Mean Cell Hemoglobin Concentration : 29.4 gm/dL  Auto Neutrophil # : 4.94 K/uL  Auto Lymphocyte # : 1.93 K/uL  Auto Monocyte # : 0.55 K/uL  Auto Eosinophil # : 0.23 K/uL  Auto Basophil # : 0.11 K/uL  Auto Neutrophil % : 63.4 %  Auto Lymphocyte % : 24.8 %  Auto Monocyte % : 7.1 %  Auto Eosinophil % : 3.0 %  Auto Basophil % : 1.4 %        139  |  108  |  8   ----------------------------<  96  3.7   |  26  |  0.96    Ca    8.6      18 Aug 2022 07:15  Mg     2.2                 RADIOLOGY & ADDITIONAL STUDIES:    < from: CT Abdomen and Pelvis w/ IV Cont (22 @ 10:45) >  ACC: [id number] EXAM:  CT ABDOMEN AND PELVIS IC                        ACC: [id number] EXAM:  CT CHEST IC                          PROCEDURE DATE:  2022          INTERPRETATION:  CLINICAL INFORMATION: Descending colonic mass. Evaluate   for metastatic disease.    COMPARISON: None.    CONTRAST/COMPLICATIONS:  IV Contrast: Omnipaque 350.  90 cc administered   10 cc discarded  Oral Contrast: NONE  Complications: None reported at time of study completion    PROCEDURE:  CT of the Chest, Abdomen and Pelvis was performed.  Sagittal and coronal reformats were performed.    FINDINGS:  CHEST:  LUNGS AND LARGE AIRWAYS: There is a 1.0 cm soft tissue nodule identified   along the right lateral wall of the distal trachea, medially superior to   the tracheal bifurcation. 7 mm right middle lobe nodule. 8 mm left lower   lobe nodule medially located (series 2, image 49). Minimal nodular   opacity anterior right upper lobe (series 2, image 34).  PLEURA: No pleural effusion. No pneumothorax.  VESSELS: Within normal limits.  HEART: Heart size is normal. No pericardial effusion.  MEDIASTINUM AND HERMES: No lymphadenopathy.  CHEST WALL AND LOWER NECK: Within normal limits.    ABDOMEN AND PELVIS:  LIVER: Multiple hypodense masses identified within the hepatic parenchyma   is not to 3.9 cm, suspicious for hepatic parenchymal neoplastic disease.  BILE DUCTS: Normal caliber.  GALLBLADDER: Within normal limits.  SPLEEN: Within normal limits.  PANCREAS: Within normal limits.  ADRENALS: Right adrenal gland unremarkable. 3.4 cm left adrenal mass,   suspicious for neoplastic disease.  KIDNEYS/URETERS: No hydronephrosis. No renal calculi. Hypodense foci   noted within the upper pole region measuring 1.1 cm and midpole region   measuring 0.9 cm, too small to characterize. No space-occupying lesions   of the left kidney noted.    BLADDER: Within normal limits.  REPRODUCTIVE ORGANS: Prostate appears enlarged.    BOWEL: Fecal material scattered throughout the colon. No evidence for   mechanical bowel obstruction. There is irregular colonic wall thickening   of the proximal sigmoid colon with stranding of the surrounding paracolic   fat. Regional lymph nodes are identified adjacent to the colonic mass   measuring up to 1.2 cm. The appendix appears unremarkable.    PERITONEUM: No free intraperitoneal air or fluid.  VESSELS: Within normal limits.  RETROPERITONEUM/LYMPH NODES: No retroperitoneal lymphadenopathy.   Mesenteric lymphadenopathy, as described above.  ABDOMINAL WALL: Within normal limits.  BONES: No acute osseous fractures. No osteolytic or osteosclerotic foci   identified.    IMPRESSION:  There is irregular colonic wall thickening of the proximal   sigmoid colon with stranding of the surrounding paracolic fat. Regional   lymph nodes areidentified adjacent to the colonic mass measuring up to   1.2 cm.  Multiple hypodense masses identified within the hepatic   parenchyma is not to 3.9 cm, suspicious for hepatic parenchymal   neoplastic disease. 3.4 cm left adrenal mass, suspicious for neoplastic   disease. There is a 1.0 cm soft tissue nodule identified along the right   lateral wall of the distal trachea, superior to the tracheal bifurcation.   Lung nodules suspicious for lung parenchymal neoplastic disease.          --- End of Report ---    < end of copied text >

## 2022-08-18 NOTE — CONSULT NOTE ADULT - ASSESSMENT
61 year old male presents with a descending colon mass.    Plan:  Obtain a CT of chest and abdomen with IV contrast to rule out metastasis  Obtain CEA  Rest of management as per primary team    Plan discussed with Dr. Woods.  61 year old male presents with a descending colon mass.    Plan:  Obtain a CT of chest and abdomen with IV contrast to rule out metastasis  Obtain CEA  Rest of management as per primary team    Plan discussed with Tesfaye Weber & Peggy. 61 year old male presents with a descending colon mass.    Plan:  Obtain a CT of chest and abdomen with IV contrast to rule out metastasis  Obtain CEA  Transfer service to colorectal surgery, Dr. Weber  Plan for left hemicolectomy Monday    Plan discussed with Tesfaye Weber & Peggy. 61 year old male presents with a descending colon mass.    Plan:  Obtain a CT of chest and abdomen with IV contrast to rule out metastasis  Obtain CEA  Transfer service to colorectal surgery, Dr. Weber. Spoke to Dr. Henriquez ( attending) who agreed to transfer service. According to him, patient is cleared for surgical intervention  Transfer to   Plan for left hemicolectomy Monday    Plan discussed with Tesfaye Weber & Peggy. 61 year old male presents with a descending colon mass.     Plan:  Obtain a CT of chest and abdomen with IV contrast to rule out metastasis  Obtain CEA  Transfer service to colorectal surgery, Dr. Weber. Spoke to Dr. Henriquez ( attending) who agreed to transfer service. According to him, patient is cleared for surgical intervention  Transfer to   Follow up biopsy results  Plan for left hemicolectomy Monday if no metastasis on imaging    Plan discussed with Tesfaye Weber & Peggy.

## 2022-08-18 NOTE — BRIEF OPERATIVE NOTE - COMMENTS
CT chest abd pelvis  CEA  Dr. Murdock aware
Colonoscopy tomorrow.   Clears today, npo midnight.   prep orders written start at 7 pm

## 2022-08-18 NOTE — CHART NOTE - NSCHARTNOTEFT_GEN_A_CORE
CT of chest and abdomen + likely for diffuse metastatic disease. Patient will need IR for biopsy of lung and liver mets. to confirm origin.

## 2022-08-19 ENCOUNTER — RESULT REVIEW (OUTPATIENT)
Age: 62
End: 2022-08-19

## 2022-08-19 ENCOUNTER — TRANSCRIPTION ENCOUNTER (OUTPATIENT)
Age: 62
End: 2022-08-19

## 2022-08-19 DIAGNOSIS — R16.0 HEPATOMEGALY, NOT ELSEWHERE CLASSIFIED: ICD-10-CM

## 2022-08-19 LAB
ANION GAP SERPL CALC-SCNC: 6 MMOL/L — SIGNIFICANT CHANGE UP (ref 5–17)
APTT BLD: 27 SEC — LOW (ref 27.5–35.5)
BUN SERPL-MCNC: 13 MG/DL — SIGNIFICANT CHANGE UP (ref 7–23)
CALCIUM SERPL-MCNC: 8.9 MG/DL — SIGNIFICANT CHANGE UP (ref 8.5–10.1)
CHLORIDE SERPL-SCNC: 108 MMOL/L — SIGNIFICANT CHANGE UP (ref 96–108)
CO2 SERPL-SCNC: 26 MMOL/L — SIGNIFICANT CHANGE UP (ref 22–31)
CREAT SERPL-MCNC: 0.99 MG/DL — SIGNIFICANT CHANGE UP (ref 0.5–1.3)
EGFR: 87 ML/MIN/1.73M2 — SIGNIFICANT CHANGE UP
GLUCOSE SERPL-MCNC: 106 MG/DL — HIGH (ref 70–99)
HCT VFR BLD CALC: 35.1 % — LOW (ref 39–50)
HGB BLD-MCNC: 10.2 G/DL — LOW (ref 13–17)
INR BLD: 1.3 RATIO — HIGH (ref 0.88–1.16)
MAGNESIUM SERPL-MCNC: 2.3 MG/DL — SIGNIFICANT CHANGE UP (ref 1.6–2.6)
MCHC RBC-ENTMCNC: 21.6 PG — LOW (ref 27–34)
MCHC RBC-ENTMCNC: 29.1 GM/DL — LOW (ref 32–36)
MCV RBC AUTO: 74.2 FL — LOW (ref 80–100)
PHOSPHATE SERPL-MCNC: 3.3 MG/DL — SIGNIFICANT CHANGE UP (ref 2.5–4.5)
PLATELET # BLD AUTO: 380 K/UL — SIGNIFICANT CHANGE UP (ref 150–400)
POTASSIUM SERPL-MCNC: 3.9 MMOL/L — SIGNIFICANT CHANGE UP (ref 3.5–5.3)
POTASSIUM SERPL-SCNC: 3.9 MMOL/L — SIGNIFICANT CHANGE UP (ref 3.5–5.3)
PROTHROM AB SERPL-ACNC: 15.1 SEC — HIGH (ref 10.5–13.4)
RBC # BLD: 4.73 M/UL — SIGNIFICANT CHANGE UP (ref 4.2–5.8)
RBC # FLD: 18.4 % — HIGH (ref 10.3–14.5)
SARS-COV-2 RNA SPEC QL NAA+PROBE: SIGNIFICANT CHANGE UP
SODIUM SERPL-SCNC: 140 MMOL/L — SIGNIFICANT CHANGE UP (ref 135–145)
SURGICAL PATHOLOGY STUDY: SIGNIFICANT CHANGE UP
WBC # BLD: 9.64 K/UL — SIGNIFICANT CHANGE UP (ref 3.8–10.5)
WBC # FLD AUTO: 9.64 K/UL — SIGNIFICANT CHANGE UP (ref 3.8–10.5)

## 2022-08-19 PROCEDURE — 99221 1ST HOSP IP/OBS SF/LOW 40: CPT

## 2022-08-19 PROCEDURE — 88307 TISSUE EXAM BY PATHOLOGIST: CPT | Mod: 26

## 2022-08-19 PROCEDURE — 76942 ECHO GUIDE FOR BIOPSY: CPT | Mod: 26,59

## 2022-08-19 PROCEDURE — 47000 NEEDLE BIOPSY OF LIVER PERQ: CPT

## 2022-08-19 PROCEDURE — 99255 IP/OBS CONSLTJ NEW/EST HI 80: CPT

## 2022-08-19 PROCEDURE — 36561 INSERT TUNNELED CV CATH: CPT

## 2022-08-19 PROCEDURE — 99232 SBSQ HOSP IP/OBS MODERATE 35: CPT

## 2022-08-19 PROCEDURE — 88341 IMHCHEM/IMCYTCHM EA ADD ANTB: CPT | Mod: 26

## 2022-08-19 PROCEDURE — 76937 US GUIDE VASCULAR ACCESS: CPT | Mod: 26

## 2022-08-19 PROCEDURE — 88342 IMHCHEM/IMCYTCHM 1ST ANTB: CPT | Mod: 26

## 2022-08-19 PROCEDURE — 77001 FLUOROGUIDE FOR VEIN DEVICE: CPT | Mod: 26

## 2022-08-19 RX ORDER — OXYCODONE HYDROCHLORIDE 5 MG/1
5 TABLET ORAL ONCE
Refills: 0 | Status: DISCONTINUED | OUTPATIENT
Start: 2022-08-19 | End: 2022-08-19

## 2022-08-19 RX ORDER — SODIUM CHLORIDE 9 MG/ML
1000 INJECTION INTRAMUSCULAR; INTRAVENOUS; SUBCUTANEOUS
Refills: 0 | Status: DISCONTINUED | OUTPATIENT
Start: 2022-08-19 | End: 2022-08-19

## 2022-08-19 RX ORDER — ONDANSETRON 8 MG/1
4 TABLET, FILM COATED ORAL ONCE
Refills: 0 | Status: DISCONTINUED | OUTPATIENT
Start: 2022-08-19 | End: 2022-08-19

## 2022-08-19 RX ORDER — FENTANYL CITRATE 50 UG/ML
50 INJECTION INTRAVENOUS
Refills: 0 | Status: DISCONTINUED | OUTPATIENT
Start: 2022-08-19 | End: 2022-08-19

## 2022-08-19 RX ORDER — ACETAMINOPHEN 500 MG
650 TABLET ORAL EVERY 6 HOURS
Refills: 0 | Status: DISCONTINUED | OUTPATIENT
Start: 2022-08-19 | End: 2022-08-20

## 2022-08-19 RX ORDER — SODIUM CHLORIDE 9 MG/ML
1000 INJECTION, SOLUTION INTRAVENOUS
Refills: 0 | Status: DISCONTINUED | OUTPATIENT
Start: 2022-08-19 | End: 2022-08-20

## 2022-08-19 RX ORDER — ACETAMINOPHEN 500 MG
2 TABLET ORAL
Qty: 0 | Refills: 0 | DISCHARGE
Start: 2022-08-19

## 2022-08-19 RX ADMIN — CHLORHEXIDINE GLUCONATE 1 APPLICATION(S): 213 SOLUTION TOPICAL at 09:28

## 2022-08-19 RX ADMIN — Medication 650 MILLIGRAM(S): at 15:14

## 2022-08-19 RX ADMIN — Medication 650 MILLIGRAM(S): at 14:10

## 2022-08-19 RX ADMIN — SODIUM CHLORIDE 150 MILLILITER(S): 9 INJECTION, SOLUTION INTRAVENOUS at 07:45

## 2022-08-19 RX ADMIN — SODIUM CHLORIDE 150 MILLILITER(S): 9 INJECTION, SOLUTION INTRAVENOUS at 14:10

## 2022-08-19 RX ADMIN — PANTOPRAZOLE SODIUM 40 MILLIGRAM(S): 20 TABLET, DELAYED RELEASE ORAL at 09:46

## 2022-08-19 RX ADMIN — Medication 650 MILLIGRAM(S): at 21:51

## 2022-08-19 RX ADMIN — PANTOPRAZOLE SODIUM 40 MILLIGRAM(S): 20 TABLET, DELAYED RELEASE ORAL at 21:51

## 2022-08-19 RX ADMIN — Medication 650 MILLIGRAM(S): at 22:20

## 2022-08-19 NOTE — DISCHARGE NOTE PROVIDER - HOSPITAL COURSE
61 year old male presents with melena and syncope. Patient reports bilateral leg weakness for 2 weeks. He states this has neever happended in the past. He also reported an episode of nonbloody, nonbilious vomiting. He had maroon-colored stools 1 month ago. He denies loss of appetite, fever or chills. No change in stool caliber or bowel habits. He had a colonoscopy today that showed a descending colon mass, nonobstructing.   CT: irregular colonic wall thickening of the proximal sigmoid colon with stranding of the surrounding paracolic fat. Regional lymph nodes are identified adjacent to the colonic mass measuring up to 1.2 cm.  Multiple hypodense masses identified within the hepatic parenchyma noted to be 3.9 cm, suspicious for hepatic parenchymal neoplastic disease. 3.4 cm left adrenal mass, suspicious for neoplastic disease. There is a 1.0 cm soft tissue nodule identified along the right lateral wall of the distal trachea, superior to the tracheal bifurcation. Lung nodules suspicious for lung parenchymal neoplastic disease.  -pending biopsy confirmation of metastatic colon cancer, recommend IR port placement and plan for outpatient systemic therapy with combination targeted and chemotherapy. 61 year old male presents with melena and syncope. Patient reports bilateral leg weakness for 2 weeks. He states this has neever happended in the past. He also reported an episode of nonbloody, nonbilious vomiting. He had maroon-colored stools 1 month ago. He denies loss of appetite, fever or chills. No change in stool caliber or bowel habits. He had a colonoscopy today that showed a descending colon mass, nonobstructing.   CT: irregular colonic wall thickening of the proximal sigmoid colon with stranding of the surrounding paracolic fat. Regional lymph nodes are identified adjacent to the colonic mass measuring up to 1.2 cm.  Multiple hypodense masses identified within the hepatic parenchyma noted to be 3.9 cm, suspicious for hepatic parenchymal neoplastic disease. 3.4 cm left adrenal mass, suspicious for neoplastic disease. There is a 1.0 cm soft tissue nodule identified along the right lateral wall of the distal trachea, superior to the tracheal bifurcation. Lung nodules suspicious for lung parenchymal neoplastic disease.  -pending biopsy confirmation of metastatic colon cancer, recommend IR port placement and plan for outpatient systemic therapy with combination targeted and chemotherapy.    s/p IR liver biopsy and chemoport placement 08/19. PT would like to go home and follow up outpatient with Dr Harrison for chemotherapy.

## 2022-08-19 NOTE — CONSULT NOTE ADULT - PROBLEM SELECTOR RECOMMENDATION 9
- Case reviewed with Dr. Duarte. Keep pt NPO for potential IR biopsy/port placement today, pending schedule availability. Otherwise plan for outpatient or next week. Surgical resident Jj goodson
vasovagal/ acute  blood loss anemia? microcytic anemia  ,severe anemia , normal ekg, normal troponin  if echo is normal , can proceed with endo/colonoscopy

## 2022-08-19 NOTE — PROGRESS NOTE ADULT - TIME BILLING
Seen and examined.  No complaints.  Passing gas and stool.  AFVSS  NAD  soft, NTND  Labs reviewed    CT imaging and report reviewed  A/P - Presumed metastatic colon cancer  For IR biopsy of liver lesions and port placement.  Given absence of obstructing symptoms and active bleeding, anticipate systemic chemotherapy first for treatment.

## 2022-08-19 NOTE — PROGRESS NOTE ADULT - ASSESSMENT
61 year old male presents with a descending colon mass.   CT scan is concerning for lung and liver metastasis.    Plan:    Transfer to   Follow up biopsy results  IR consult for liver and lung masses  NPO   hold DVT ppx  Hem/onc reccs      Plan discussed with colorectal surgery team.

## 2022-08-19 NOTE — CONSULT NOTE ADULT - CONSULT REASON
Descending colon mass
syncope , severe anemia GI bleed
Anemia, melena
colorectal mass
60yo M with colon CA, possible mets, referred to IR for liver mass biopsy and port placement

## 2022-08-19 NOTE — CONSULT NOTE ADULT - SUBJECTIVE AND OBJECTIVE BOX
Chief Complaint:  Patient is a 61y old  Male who presents with a chief complaint of needs biopsy and port    HPI:  61M w/ no PMH as he has not sought Healthcare for many years as he's always felt well, presents today after a syncopal episode as witnessed by a coworker.  Pt admitted for management, found ot have colon mass, as well as lesions in the liver. IR consulted for biopsy and port placement.     Allergies  No Known Allergies    MEDICATIONS  (STANDING):  chlorhexidine 4% Liquid 1 Application(s) Topical <User Schedule>  lactated ringers. 1000 milliLiter(s) (150 mL/Hr) IV Continuous <Continuous>  pantoprazole  Injectable 40 milliGRAM(s) IV Push every 12 hours    MEDICATIONS  (PRN):  ondansetron Injectable 4 milliGRAM(s) IV Push every 6 hours PRN Nausea      PAST MEDICAL & SURGICAL HISTORY:  No pertinent past medical history      Vital Signs Last 24 Hrs  T(C): 36.7 (19 Aug 2022 07:53), Max: 36.9 (18 Aug 2022 19:17)  T(F): 98.1 (19 Aug 2022 07:53), Max: 98.5 (18 Aug 2022 19:17)  HR: 63 (19 Aug 2022 07:53) (63 - 75)  BP: 156/72 (19 Aug 2022 07:53) (152/73 - 156/72)  BP(mean): --  RR: 18 (19 Aug 2022 07:53) (18 - 18)  SpO2: 98% (19 Aug 2022 07:53) (98% - 98%)    Parameters below as of 19 Aug 2022 07:53  Patient On (Oxygen Delivery Method): room air        CBC                        10.2   9.64  )-----------( 380      ( 19 Aug 2022 07:31 )             35.1       Chemistry  08-19    140  |  108  |  13  ----------------------------<  106<H>  3.9   |  26  |  0.99    Ca    8.9      19 Aug 2022 07:31  Phos  3.3     08-19  Mg     2.3     08-19      Coags  PT/INR - ( 19 Aug 2022 07:31 )   PT: 15.1 sec;   INR: 1.30 ratio    PTT - ( 19 Aug 2022 07:31 )  PTT:27.0 sec    < from: CT Abdomen and Pelvis w/ IV Cont (08.18.22 @ 10:45) >  IMPRESSION:  There is irregular colonic wall thickening of the proximal   sigmoid colon with stranding of the surrounding paracolic fat. Regional   lymph nodes areidentified adjacent to the colonic mass measuring up to   1.2 cm.  Multiple hypodense masses identified within the hepatic   parenchyma is not to 3.9 cm, suspicious for hepatic parenchymal   neoplastic disease. 3.4 cm left adrenal mass, suspicious for neoplastic   disease. There is a 1.0 cm soft tissue nodule identified along the right   lateral wall of the distal trachea, superior to the tracheal bifurcation.   Lung nodules suspicious for lung parenchymal neoplastic disease.    < end of copied text >

## 2022-08-19 NOTE — BRIEF OPERATIVE NOTE - OPERATION/FINDINGS
Colon mass in descending colon. Biopsied. Tattoo placed.
Gastropathy, biopsied.   No clear source of anemia, melena.
1) 8F SL RIJV Port, tip in SVC/RA  2) 18G core of liver mass x 4, formalin

## 2022-08-19 NOTE — PROGRESS NOTE ADULT - SUBJECTIVE AND OBJECTIVE BOX
Pt seen at bedside, resting comfortably.   Pt denies nausea, vomiting, fever, chills or pain.  No acute events overnight.     Vitals:  T(C): 36.7 ( @ 07:53), Max: 36.9 ( @ 19:17)  HR: 63 ( @ 07:53) (63 - 75)  BP: 156/72 ( @ 07:53) (152/73 - 156/72)  RR: 18 ( @ 07:53) (18 - 18)  SpO2: 98% ( @ 07:53) (98% - 98%)      Physical Exam:  General: AAOx3, Well developed, NAD  Chest: Normal respiratory effort  Heart: RRR  Abdomen: Soft, NTND, no masses  Neuro/Psych: No localized deficits. Normal speech, normal tone  Skin: Normal, no rashes, no lesions noted.   Extremities: Warm, well perfused, no edema, Pulses intact     @ 07:31                    10.2  CBC: 9.64>)-------(<380                     35.1                 140 | 108 | 13    CMP:  ----------------------< 106               3.9 | 26 | 0.99                      Ca:8.9  Phos:3.3  M.3               -|      |-        LFTs:  ------|-|-----             -|      |-   @ 07:15                    9.7  CBC: 7.78>)-------(<363                     33.0                 139 | 108 | 8    CMP:  ----------------------< 96               3.7 | 26 | 0.96                      Ca:8.6  Phos:-  M.2               -|      |-        LFTs:  ------|-|-----             -|      |-      Current Inpatient Medications:  chlorhexidine 4% Liquid 1 Application(s) Topical <User Schedule>  lactated ringers. 1000 milliLiter(s) (150 mL/Hr) IV Continuous <Continuous>  ondansetron Injectable 4 milliGRAM(s) IV Push every 6 hours PRN  pantoprazole  Injectable 40 milliGRAM(s) IV Push every 12 hours

## 2022-08-19 NOTE — DISCHARGE NOTE PROVIDER - CARE PROVIDER_API CALL
Carmela Harrison)  Medical Oncology  [address], [place] [address]  Phone: [phone]  Fax: [phone]  Follow Up Time: 1 week

## 2022-08-19 NOTE — DISCHARGE NOTE PROVIDER - NSDCCPCAREPLAN_GEN_ALL_CORE_FT
PRINCIPAL DISCHARGE DIAGNOSIS  Diagnosis: Anemia  Assessment and Plan of Treatment:       SECONDARY DISCHARGE DIAGNOSES  Diagnosis: GI bleed  Assessment and Plan of Treatment:     Diagnosis: Syncope  Assessment and Plan of Treatment:

## 2022-08-19 NOTE — CONSULT NOTE ADULT - ASSESSMENT
60yo M with colon CA, possible mets, referred to IR for liver mass biopsy and port placement  - Pt is NPO  - Covid negative  - Labs OK  - No blood thinners

## 2022-08-20 ENCOUNTER — TRANSCRIPTION ENCOUNTER (OUTPATIENT)
Age: 62
End: 2022-08-20

## 2022-08-20 VITALS
DIASTOLIC BLOOD PRESSURE: 85 MMHG | SYSTOLIC BLOOD PRESSURE: 145 MMHG | HEART RATE: 76 BPM | RESPIRATION RATE: 17 BRPM | OXYGEN SATURATION: 100 % | TEMPERATURE: 98 F

## 2022-08-20 LAB
ALBUMIN SERPL ELPH-MCNC: 3 G/DL — LOW (ref 3.3–5)
ALP SERPL-CCNC: 145 U/L — HIGH (ref 40–120)
ALT FLD-CCNC: 34 U/L — SIGNIFICANT CHANGE UP (ref 12–78)
ANION GAP SERPL CALC-SCNC: 6 MMOL/L — SIGNIFICANT CHANGE UP (ref 5–17)
AST SERPL-CCNC: 28 U/L — SIGNIFICANT CHANGE UP (ref 15–37)
BASOPHILS # BLD AUTO: 0.08 K/UL — SIGNIFICANT CHANGE UP (ref 0–0.2)
BASOPHILS NFR BLD AUTO: 0.9 % — SIGNIFICANT CHANGE UP (ref 0–2)
BILIRUB SERPL-MCNC: 0.3 MG/DL — SIGNIFICANT CHANGE UP (ref 0.2–1.2)
BUN SERPL-MCNC: 11 MG/DL — SIGNIFICANT CHANGE UP (ref 7–23)
CALCIUM SERPL-MCNC: 8.6 MG/DL — SIGNIFICANT CHANGE UP (ref 8.5–10.1)
CHLORIDE SERPL-SCNC: 108 MMOL/L — SIGNIFICANT CHANGE UP (ref 96–108)
CO2 SERPL-SCNC: 27 MMOL/L — SIGNIFICANT CHANGE UP (ref 22–31)
CREAT SERPL-MCNC: 0.85 MG/DL — SIGNIFICANT CHANGE UP (ref 0.5–1.3)
EGFR: 99 ML/MIN/1.73M2 — SIGNIFICANT CHANGE UP
EOSINOPHIL # BLD AUTO: 0.25 K/UL — SIGNIFICANT CHANGE UP (ref 0–0.5)
EOSINOPHIL NFR BLD AUTO: 2.9 % — SIGNIFICANT CHANGE UP (ref 0–6)
GLUCOSE SERPL-MCNC: 92 MG/DL — SIGNIFICANT CHANGE UP (ref 70–99)
HCT VFR BLD CALC: 31.6 % — LOW (ref 39–50)
HGB BLD-MCNC: 9.2 G/DL — LOW (ref 13–17)
IMM GRANULOCYTES NFR BLD AUTO: 0.3 % — SIGNIFICANT CHANGE UP (ref 0–1.5)
LYMPHOCYTES # BLD AUTO: 2.09 K/UL — SIGNIFICANT CHANGE UP (ref 1–3.3)
LYMPHOCYTES # BLD AUTO: 24.3 % — SIGNIFICANT CHANGE UP (ref 13–44)
MAGNESIUM SERPL-MCNC: 2.1 MG/DL — SIGNIFICANT CHANGE UP (ref 1.6–2.6)
MCHC RBC-ENTMCNC: 21.6 PG — LOW (ref 27–34)
MCHC RBC-ENTMCNC: 29.1 GM/DL — LOW (ref 32–36)
MCV RBC AUTO: 74.2 FL — LOW (ref 80–100)
MONOCYTES # BLD AUTO: 0.62 K/UL — SIGNIFICANT CHANGE UP (ref 0–0.9)
MONOCYTES NFR BLD AUTO: 7.2 % — SIGNIFICANT CHANGE UP (ref 2–14)
NEUTROPHILS # BLD AUTO: 5.54 K/UL — SIGNIFICANT CHANGE UP (ref 1.8–7.4)
NEUTROPHILS NFR BLD AUTO: 64.4 % — SIGNIFICANT CHANGE UP (ref 43–77)
PHOSPHATE SERPL-MCNC: 3.3 MG/DL — SIGNIFICANT CHANGE UP (ref 2.5–4.5)
PLATELET # BLD AUTO: 349 K/UL — SIGNIFICANT CHANGE UP (ref 150–400)
POTASSIUM SERPL-MCNC: 3.7 MMOL/L — SIGNIFICANT CHANGE UP (ref 3.5–5.3)
POTASSIUM SERPL-SCNC: 3.7 MMOL/L — SIGNIFICANT CHANGE UP (ref 3.5–5.3)
PROT SERPL-MCNC: 7.3 GM/DL — SIGNIFICANT CHANGE UP (ref 6–8.3)
RBC # BLD: 4.26 M/UL — SIGNIFICANT CHANGE UP (ref 4.2–5.8)
RBC # FLD: 18.2 % — HIGH (ref 10.3–14.5)
SODIUM SERPL-SCNC: 141 MMOL/L — SIGNIFICANT CHANGE UP (ref 135–145)
WBC # BLD: 8.61 K/UL — SIGNIFICANT CHANGE UP (ref 3.8–10.5)
WBC # FLD AUTO: 8.61 K/UL — SIGNIFICANT CHANGE UP (ref 3.8–10.5)

## 2022-08-20 PROCEDURE — 99232 SBSQ HOSP IP/OBS MODERATE 35: CPT

## 2022-08-20 RX ADMIN — PANTOPRAZOLE SODIUM 40 MILLIGRAM(S): 20 TABLET, DELAYED RELEASE ORAL at 09:47

## 2022-08-20 NOTE — DISCHARGE NOTE NURSING/CASE MANAGEMENT/SOCIAL WORK - NSDCPEFALRISK_GEN_ALL_CORE
For information on Fall & Injury Prevention, visit: [email or web address] OR  [email or web address] OR  https://www.cdc.gov/steadi/patient.html

## 2022-08-20 NOTE — PROGRESS NOTE ADULT - SUBJECTIVE AND OBJECTIVE BOX
Pt seen at bedside, resting comfortably.   Pt denies nausea, vomiting, fever, chills or pain.  No acute events overnight.     Vital Signs Last 24 Hrs  T(C): 36.5 (20 Aug 2022 05:35), Max: 37 (19 Aug 2022 22:23)  T(F): 97.7 (20 Aug 2022 05:35), Max: 98.6 (19 Aug 2022 22:23)  HR: 68 (20 Aug 2022 05:35) (63 - 85)  BP: 164/91 (20 Aug 2022 05:35) (129/67 - 166/82)  BP(mean): 107 (20 Aug 2022 05:35) (107 - 107)  RR: 18 (20 Aug 2022 05:35) (14 - 20)  SpO2: 98% (20 Aug 2022 05:35) (97% - 100%)    Parameters below as of 20 Aug 2022 05:35  Patient On (Oxygen Delivery Method): room air      Physical Exam:  General: AAOx3, Well developed, NAD  Chest: Normal respiratory effort  Heart: RRR  Abdomen: Soft, NTND, no masses  Neuro/Psych: No localized deficits. Normal speech, normal tone  Skin: Normal, no rashes, no lesions noted.   Extremities: Warm, well perfused, no edema, Pulses intact     LABS:                        10.2   9.64  )-----------( 380      ( 19 Aug 2022 07:31 )             35.1     19 Aug 2022 07:31    140    |  108    |  13     ----------------------------<  106    3.9     |  26     |  0.99     Ca    8.9        19 Aug 2022 07:31  Phos  3.3       19 Aug 2022 07:31  Mg     2.3       19 Aug 2022 07:31      PT/INR - ( 19 Aug 2022 07:31 )   PT: 15.1 sec;   INR: 1.30 ratio         PTT - ( 19 Aug 2022 07:31 )  PTT:27.0 sec

## 2022-08-20 NOTE — DISCHARGE NOTE NURSING/CASE MANAGEMENT/SOCIAL WORK - PATIENT PORTAL LINK FT
You can access the FollowMyHealth Patient Portal offered by [facility] by registering at the following website: [email or web address]. By joining [employer]’s FollowMyHealth portal, you will also be able to view your health information using other applications (apps) compatible with our system.

## 2022-08-20 NOTE — PROGRESS NOTE ADULT - ASSESSMENT
61 year old male presents with a descending colon mass.   CT scan is concerning for lung and liver metastasis.    Plan:    s/p IR liver bx and chemoport placement  Follow up biopsy results outpatient  hold DVT ppx  Hem/onc reccs, f/u outpaitent w/ Dr Harrison  Dispo discharge home this am      Plan discussed with colorectal surgery team, Dr Salmeron

## 2022-08-22 ENCOUNTER — APPOINTMENT (OUTPATIENT)
Dept: COLORECTAL SURGERY | Facility: HOSPITAL | Age: 62
End: 2022-08-22

## 2022-08-22 ENCOUNTER — NON-APPOINTMENT (OUTPATIENT)
Age: 62
End: 2022-08-22

## 2022-08-22 LAB — SURGICAL PATHOLOGY STUDY: SIGNIFICANT CHANGE UP

## 2022-08-24 ENCOUNTER — OUTPATIENT (OUTPATIENT)
Dept: OUTPATIENT SERVICES | Facility: HOSPITAL | Age: 62
LOS: 1 days | Discharge: HOME | End: 2022-08-24

## 2022-08-24 DIAGNOSIS — C18.9 MALIGNANT NEOPLASM OF COLON, UNSPECIFIED: ICD-10-CM

## 2022-08-28 DIAGNOSIS — K92.2 GASTROINTESTINAL HEMORRHAGE, UNSPECIFIED: ICD-10-CM

## 2022-08-28 DIAGNOSIS — C18.6 MALIGNANT NEOPLASM OF DESCENDING COLON: ICD-10-CM

## 2022-08-28 DIAGNOSIS — D62 ACUTE POSTHEMORRHAGIC ANEMIA: ICD-10-CM

## 2022-08-28 DIAGNOSIS — K31.89 OTHER DISEASES OF STOMACH AND DUODENUM: ICD-10-CM

## 2022-08-28 DIAGNOSIS — C78.7 SECONDARY MALIGNANT NEOPLASM OF LIVER AND INTRAHEPATIC BILE DUCT: ICD-10-CM

## 2022-08-28 DIAGNOSIS — C79.72 SECONDARY MALIGNANT NEOPLASM OF LEFT ADRENAL GLAND: ICD-10-CM

## 2022-08-28 DIAGNOSIS — R55 SYNCOPE AND COLLAPSE: ICD-10-CM

## 2022-08-28 DIAGNOSIS — C78.01 SECONDARY MALIGNANT NEOPLASM OF RIGHT LUNG: ICD-10-CM

## 2022-08-28 DIAGNOSIS — Z20.822 CONTACT WITH AND (SUSPECTED) EXPOSURE TO COVID-19: ICD-10-CM

## 2022-08-28 DIAGNOSIS — Z80.6 FAMILY HISTORY OF LEUKEMIA: ICD-10-CM

## 2022-08-28 DIAGNOSIS — C78.02 SECONDARY MALIGNANT NEOPLASM OF LEFT LUNG: ICD-10-CM

## 2022-08-28 DIAGNOSIS — K14.9 DISEASE OF TONGUE, UNSPECIFIED: ICD-10-CM

## 2022-08-29 ENCOUNTER — RESULT REVIEW (OUTPATIENT)
Age: 62
End: 2022-08-29

## 2022-08-29 ENCOUNTER — APPOINTMENT (OUTPATIENT)
Dept: HEMATOLOGY ONCOLOGY | Facility: CLINIC | Age: 62
End: 2022-08-29

## 2022-08-29 ENCOUNTER — NON-APPOINTMENT (OUTPATIENT)
Age: 62
End: 2022-08-29

## 2022-08-29 VITALS
HEART RATE: 104 BPM | WEIGHT: 233.19 LBS | TEMPERATURE: 98 F | DIASTOLIC BLOOD PRESSURE: 89 MMHG | OXYGEN SATURATION: 99 % | HEIGHT: 71 IN | RESPIRATION RATE: 18 BRPM | SYSTOLIC BLOOD PRESSURE: 170 MMHG | BODY MASS INDEX: 32.65 KG/M2

## 2022-08-29 DIAGNOSIS — Z00.00 ENCOUNTER FOR GENERAL ADULT MEDICAL EXAMINATION W/OUT ABNORMAL FINDINGS: ICD-10-CM

## 2022-08-29 LAB
ALBUMIN SERPL ELPH-MCNC: 4.4 G/DL — SIGNIFICANT CHANGE UP (ref 3.3–5)
ALP SERPL-CCNC: 166 U/L — HIGH (ref 40–120)
ALT FLD-CCNC: 28 U/L — SIGNIFICANT CHANGE UP (ref 10–45)
ANION GAP SERPL CALC-SCNC: 11 MMOL/L — SIGNIFICANT CHANGE UP (ref 5–17)
AST SERPL-CCNC: 30 U/L — SIGNIFICANT CHANGE UP (ref 10–40)
BASOPHILS # BLD AUTO: 0.12 K/UL — SIGNIFICANT CHANGE UP (ref 0–0.2)
BASOPHILS NFR BLD AUTO: 1.5 % — SIGNIFICANT CHANGE UP (ref 0–2)
BILIRUB SERPL-MCNC: 0.2 MG/DL — SIGNIFICANT CHANGE UP (ref 0.2–1.2)
BUN SERPL-MCNC: 13 MG/DL — SIGNIFICANT CHANGE UP (ref 7–23)
CALCIUM SERPL-MCNC: 10 MG/DL — SIGNIFICANT CHANGE UP (ref 8.4–10.5)
CEA SERPL-MCNC: 718 NG/ML — HIGH (ref 0–3.8)
CHLORIDE SERPL-SCNC: 105 MMOL/L — SIGNIFICANT CHANGE UP (ref 96–108)
CO2 SERPL-SCNC: 25 MMOL/L — SIGNIFICANT CHANGE UP (ref 22–31)
CREAT SERPL-MCNC: 1.24 MG/DL — SIGNIFICANT CHANGE UP (ref 0.5–1.3)
EGFR: 66 ML/MIN/1.73M2 — SIGNIFICANT CHANGE UP
EOSINOPHIL # BLD AUTO: 0.25 K/UL — SIGNIFICANT CHANGE UP (ref 0–0.5)
EOSINOPHIL NFR BLD AUTO: 3.1 % — SIGNIFICANT CHANGE UP (ref 0–6)
GLUCOSE SERPL-MCNC: 107 MG/DL — HIGH (ref 70–99)
HAV IGM SER-ACNC: SIGNIFICANT CHANGE UP
HBV CORE IGM SER-ACNC: SIGNIFICANT CHANGE UP
HBV SURFACE AG SER-ACNC: SIGNIFICANT CHANGE UP
HCT VFR BLD CALC: 34.1 % — LOW (ref 39–50)
HCV AB S/CO SERPL IA: 0.17 S/CO — SIGNIFICANT CHANGE UP (ref 0–0.99)
HCV AB SERPL-IMP: SIGNIFICANT CHANGE UP
HGB BLD-MCNC: 10.2 G/DL — LOW (ref 13–17)
IMM GRANULOCYTES NFR BLD AUTO: 0.4 % — SIGNIFICANT CHANGE UP (ref 0–1.5)
INR BLD: 1.19 RATIO — HIGH (ref 0.88–1.16)
LYMPHOCYTES # BLD AUTO: 1.69 K/UL — SIGNIFICANT CHANGE UP (ref 1–3.3)
LYMPHOCYTES # BLD AUTO: 20.9 % — SIGNIFICANT CHANGE UP (ref 13–44)
MAGNESIUM SERPL-MCNC: 2.2 MG/DL — SIGNIFICANT CHANGE UP (ref 1.6–2.6)
MCHC RBC-ENTMCNC: 21.9 PG — LOW (ref 27–34)
MCHC RBC-ENTMCNC: 29.9 GM/DL — LOW (ref 32–36)
MCV RBC AUTO: 73.3 FL — LOW (ref 80–100)
MONOCYTES # BLD AUTO: 0.59 K/UL — SIGNIFICANT CHANGE UP (ref 0–0.9)
MONOCYTES NFR BLD AUTO: 7.3 % — SIGNIFICANT CHANGE UP (ref 2–14)
NEUTROPHILS # BLD AUTO: 5.39 K/UL — SIGNIFICANT CHANGE UP (ref 1.8–7.4)
NEUTROPHILS NFR BLD AUTO: 66.8 % — SIGNIFICANT CHANGE UP (ref 43–77)
NRBC # BLD: 0 /100 WBCS — SIGNIFICANT CHANGE UP (ref 0–0)
PLATELET # BLD AUTO: 475 K/UL — HIGH (ref 150–400)
POTASSIUM SERPL-MCNC: 4.7 MMOL/L — SIGNIFICANT CHANGE UP (ref 3.5–5.3)
POTASSIUM SERPL-SCNC: 4.7 MMOL/L — SIGNIFICANT CHANGE UP (ref 3.5–5.3)
PROT SERPL-MCNC: 7.8 G/DL — SIGNIFICANT CHANGE UP (ref 6–8.3)
PROTHROM AB SERPL-ACNC: 14 SEC — HIGH (ref 10.5–13.4)
RBC # BLD: 4.65 M/UL — SIGNIFICANT CHANGE UP (ref 4.2–5.8)
RBC # FLD: 19.1 % — HIGH (ref 10.3–14.5)
SODIUM SERPL-SCNC: 141 MMOL/L — SIGNIFICANT CHANGE UP (ref 135–145)
WBC # BLD: 8.07 K/UL — SIGNIFICANT CHANGE UP (ref 3.8–10.5)
WBC # FLD AUTO: 8.07 K/UL — SIGNIFICANT CHANGE UP (ref 3.8–10.5)

## 2022-08-29 PROCEDURE — 99215 OFFICE O/P EST HI 40 MIN: CPT

## 2022-08-30 ENCOUNTER — NON-APPOINTMENT (OUTPATIENT)
Age: 62
End: 2022-08-30

## 2022-08-30 RX ORDER — PROCHLORPERAZINE MALEATE 10 MG/1
10 TABLET ORAL EVERY 6 HOURS
Qty: 120 | Refills: 2 | Status: ACTIVE | COMMUNITY
Start: 2022-08-30 | End: 1900-01-01

## 2022-08-30 RX ORDER — ONDANSETRON 8 MG/1
8 TABLET, ORALLY DISINTEGRATING ORAL
Qty: 90 | Refills: 3 | Status: ACTIVE | COMMUNITY
Start: 2022-08-30 | End: 1900-01-01

## 2022-09-12 ENCOUNTER — NON-APPOINTMENT (OUTPATIENT)
Age: 62
End: 2022-09-12

## 2022-09-13 ENCOUNTER — APPOINTMENT (OUTPATIENT)
Dept: INFUSION THERAPY | Facility: CLINIC | Age: 62
End: 2022-09-13

## 2022-09-15 ENCOUNTER — APPOINTMENT (OUTPATIENT)
Dept: INFUSION THERAPY | Facility: CLINIC | Age: 62
End: 2022-09-15

## 2022-09-26 ENCOUNTER — TRANSCRIPTION ENCOUNTER (OUTPATIENT)
Age: 62
End: 2022-09-26

## 2022-09-26 NOTE — PHYSICAL EXAM
[Restricted in physically strenuous activity but ambulatory and able to carry out work of a light or sedentary nature] : Status 1- Restricted in physically strenuous activity but ambulatory and able to carry out work of a light or sedentary nature, e.g., light house work, office work [Normal] : affect appropriate [de-identified] : family denies wt changes [de-identified] : port dressing remains intact

## 2022-09-26 NOTE — REVIEW OF SYSTEMS
[Negative] : Allergic/Immunologic [Recent Change In Weight] : ~T no recent weight change [Chest Pain] : no chest pain [Shortness Of Breath] : no shortness of breath [Abdominal Pain] : no abdominal pain [Confused] : no confusion

## 2022-09-26 NOTE — RESULTS/DATA
[FreeTextEntry1] : 8/19/22 Path: Liver, core biopsy: Metastatic adenocarcinoma, consistent with metastasis from colonic primary. Immunohistochemical stains were performed and results as follows: CDX - 2 is positive. CK 20 is patchy positive. CK 7 is negative. These findings support the diagnosis. No loss of nuclear expression of MMR proteins (MLH1, MSH2,\par MSH6, and PMS2).   These results show low probability of microsatellite instability-high (MSI-H).\par \par 8/18/22 Path: Colon, descending, mass, biopsy. Invasive adenocarcinoma, moderately differentiated. No loss of nuclear expression of MMR proteins (MLH1, MSH2,\par MSH6, and PMS2).   These results show low probability of microsatellite instability-high (MSI-H).\par \par 8/18/22 Colonoscopy: A frond-like/villous and ulcerated non-obstructing large mass was found in the descending colon. The lass was circumferential. The mass measured five cm in length. Oozing was present. Biopsied and tattooed. The scope was able to transverse the mass. The colon was otherwise normal.\par \par 8/18/22 CT C/A/P: There is irregular colonic wall thickening of the proximal sigmoid colon with stranding of the surrounding paracolic fat. Regional lymph nodes are identified adjacent to the colonic mass measuring up to 1.2 cm.  Multiple hypodense masses identified within the hepatic parenchyma is not to 3.9 cm, suspicious for hepatic parenchymal neoplastic disease. 3.4 cm left adrenal mass, suspicious for neoplastic disease. There is a 1.0 cm soft tissue nodule identified along the right lateral wall of the distal trachea, superior to the tracheal bifurcation. Lung nodules suspicious for lung parenchymal neoplastic disease.

## 2022-09-26 NOTE — HISTORY OF PRESENT ILLNESS
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 On 08/18/22, he had a CT C/A/P which showed  irregular colonic wall thickening of the proximal sigmoid colon with stranding of the surrounding paracolic fat. Regional lymph nodes are identified adjacent to the colonic mass measuring up to 1.2 cm. Multiple masses identified within the hepatic parenchyma noted to be 3.9 cm, suspicious for hepatic parenchymal neoplastic disease. 3.4 cm left adrenal mass, suspicious for neoplastic disease. There is a 1.0 cm soft tissue nodule identified along the right lateral wall of the distal trachea, superior to the tracheal bifurcation. Lung nodules suspicious for lung parenchymal neoplastic disease.  \par \par On 8/18/22, he had a colonoscopy that showed a frond-like/villous and ulcerated non-obstructing large mass was found in the descending colon. The mass was circumferential. The mass measured five cm in length. Pathology showed invasive adenocarcinoma, moderately differentiated. No loss of nuclear expression of MMR proteins (MLH1, MSH2, MSH6, and PMS2). These results show low probability of microsatellite instability-high (MSI-H). On 8/19/22, he had a liver, core biopsy which showed metastatic adenocarcinoma, consistent with metastasis from colonic primary.\par \par  [de-identified] : no MMR deficiency [de-identified] : Medical Hx: DVT 14 years ago (unknown cause) \par Surgical Hx: left eye sx; left torn meniscus repaired \par Family Hx: Mother leukemia\par Social Hx: never smoker; ETOH never; lives alone; single; works PT at [employer]; daughters close by [de-identified] : Today he denies BRBPR and pain. No abdominal discomfort. See full review of systems below.

## 2022-09-27 ENCOUNTER — APPOINTMENT (OUTPATIENT)
Dept: INFUSION THERAPY | Facility: CLINIC | Age: 62
End: 2022-09-27

## 2022-09-27 ENCOUNTER — APPOINTMENT (OUTPATIENT)
Dept: HEMATOLOGY ONCOLOGY | Facility: CLINIC | Age: 62
End: 2022-09-27

## 2022-09-28 ENCOUNTER — TRANSCRIPTION ENCOUNTER (OUTPATIENT)
Age: 62
End: 2022-09-28

## 2022-09-28 ENCOUNTER — RESULT REVIEW (OUTPATIENT)
Age: 62
End: 2022-09-28

## 2022-09-28 ENCOUNTER — INPATIENT (INPATIENT)
Facility: HOSPITAL | Age: 62
LOS: 4 days | Discharge: HOME | DRG: 488 | End: 2022-10-03
Attending: ORTHOPAEDIC SURGERY | Admitting: ORTHOPAEDIC SURGERY
Payer: MEDICAID

## 2022-09-28 ENCOUNTER — NON-APPOINTMENT (OUTPATIENT)
Age: 62
End: 2022-09-28

## 2022-09-28 ENCOUNTER — APPOINTMENT (OUTPATIENT)
Dept: ORTHOPEDIC SURGERY | Facility: HOSPITAL | Age: 62
End: 2022-09-28

## 2022-09-28 ENCOUNTER — OUTPATIENT (OUTPATIENT)
Dept: OUTPATIENT SERVICES | Facility: HOSPITAL | Age: 62
LOS: 1 days | End: 2022-09-28
Payer: SELF-PAY

## 2022-09-28 VITALS
SYSTOLIC BLOOD PRESSURE: 125 MMHG | TEMPERATURE: 96.5 F | BODY MASS INDEX: 32.62 KG/M2 | HEART RATE: 94 BPM | HEIGHT: 71 IN | DIASTOLIC BLOOD PRESSURE: 84 MMHG | RESPIRATION RATE: 16 BRPM | WEIGHT: 233 LBS

## 2022-09-28 VITALS
OXYGEN SATURATION: 97 % | HEART RATE: 96 BPM | WEIGHT: 229.94 LBS | RESPIRATION RATE: 16 BRPM | DIASTOLIC BLOOD PRESSURE: 82 MMHG | HEIGHT: 71 IN | TEMPERATURE: 99 F | SYSTOLIC BLOOD PRESSURE: 126 MMHG

## 2022-09-28 DIAGNOSIS — S82.142A DISPLACED BICONDYLAR FRACTURE OF LEFT TIBIA, INITIAL ENCOUNTER FOR CLOSED FRACTURE: ICD-10-CM

## 2022-09-28 DIAGNOSIS — S82.209A UNSPECIFIED FRACTURE OF SHAFT OF UNSPECIFIED TIBIA, INITIAL ENCOUNTER FOR CLOSED FRACTURE: ICD-10-CM

## 2022-09-28 DIAGNOSIS — M79.609 PAIN IN UNSPECIFIED LIMB: ICD-10-CM

## 2022-09-28 LAB
ALBUMIN SERPL ELPH-MCNC: 4.3 G/DL — SIGNIFICANT CHANGE UP (ref 3.3–5)
ALP SERPL-CCNC: 232 U/L — HIGH (ref 40–120)
ALT FLD-CCNC: 39 U/L — SIGNIFICANT CHANGE UP (ref 10–45)
ANION GAP SERPL CALC-SCNC: 12 MMOL/L — SIGNIFICANT CHANGE UP (ref 5–17)
APTT BLD: 29.1 SEC — SIGNIFICANT CHANGE UP (ref 27.5–35.5)
AST SERPL-CCNC: 29 U/L — SIGNIFICANT CHANGE UP (ref 10–40)
BASOPHILS # BLD AUTO: 0.11 K/UL — SIGNIFICANT CHANGE UP (ref 0–0.2)
BASOPHILS NFR BLD AUTO: 1.3 % — SIGNIFICANT CHANGE UP (ref 0–2)
BILIRUB SERPL-MCNC: 0.3 MG/DL — SIGNIFICANT CHANGE UP (ref 0.2–1.2)
BLD GP AB SCN SERPL QL: NEGATIVE — SIGNIFICANT CHANGE UP
BUN SERPL-MCNC: 13 MG/DL — SIGNIFICANT CHANGE UP (ref 7–23)
CALCIUM SERPL-MCNC: 9.8 MG/DL — SIGNIFICANT CHANGE UP (ref 8.4–10.5)
CHLORIDE SERPL-SCNC: 101 MMOL/L — SIGNIFICANT CHANGE UP (ref 96–108)
CO2 SERPL-SCNC: 26 MMOL/L — SIGNIFICANT CHANGE UP (ref 22–31)
CREAT SERPL-MCNC: 0.79 MG/DL — SIGNIFICANT CHANGE UP (ref 0.5–1.3)
EGFR: 101 ML/MIN/1.73M2 — SIGNIFICANT CHANGE UP
EOSINOPHIL # BLD AUTO: 0.16 K/UL — SIGNIFICANT CHANGE UP (ref 0–0.5)
EOSINOPHIL NFR BLD AUTO: 2 % — SIGNIFICANT CHANGE UP (ref 0–6)
FLUAV AG NPH QL: SIGNIFICANT CHANGE UP
FLUBV AG NPH QL: SIGNIFICANT CHANGE UP
GLUCOSE SERPL-MCNC: 99 MG/DL — SIGNIFICANT CHANGE UP (ref 70–99)
HCT VFR BLD CALC: 34.3 % — LOW (ref 39–50)
HGB BLD-MCNC: 9.6 G/DL — LOW (ref 13–17)
IMM GRANULOCYTES NFR BLD AUTO: 0.6 % — SIGNIFICANT CHANGE UP (ref 0–0.9)
INR BLD: 1.21 RATIO — HIGH (ref 0.88–1.16)
LYMPHOCYTES # BLD AUTO: 1.7 K/UL — SIGNIFICANT CHANGE UP (ref 1–3.3)
LYMPHOCYTES # BLD AUTO: 20.8 % — SIGNIFICANT CHANGE UP (ref 13–44)
MCHC RBC-ENTMCNC: 22.4 PG — LOW (ref 27–34)
MCHC RBC-ENTMCNC: 28 GM/DL — LOW (ref 32–36)
MCV RBC AUTO: 80 FL — SIGNIFICANT CHANGE UP (ref 80–100)
MONOCYTES # BLD AUTO: 0.58 K/UL — SIGNIFICANT CHANGE UP (ref 0–0.9)
MONOCYTES NFR BLD AUTO: 7.1 % — SIGNIFICANT CHANGE UP (ref 2–14)
NEUTROPHILS # BLD AUTO: 5.57 K/UL — SIGNIFICANT CHANGE UP (ref 1.8–7.4)
NEUTROPHILS NFR BLD AUTO: 68.2 % — SIGNIFICANT CHANGE UP (ref 43–77)
NRBC # BLD: 0 /100 WBCS — SIGNIFICANT CHANGE UP (ref 0–0)
PLATELET # BLD AUTO: 667 K/UL — HIGH (ref 150–400)
POTASSIUM SERPL-MCNC: 4.3 MMOL/L — SIGNIFICANT CHANGE UP (ref 3.5–5.3)
POTASSIUM SERPL-SCNC: 4.3 MMOL/L — SIGNIFICANT CHANGE UP (ref 3.5–5.3)
PROT SERPL-MCNC: 8.7 G/DL — HIGH (ref 6–8.3)
PROTHROM AB SERPL-ACNC: 13.9 SEC — HIGH (ref 10.5–13.4)
RBC # BLD: 4.29 M/UL — SIGNIFICANT CHANGE UP (ref 4.2–5.8)
RBC # FLD: 19.9 % — HIGH (ref 10.3–14.5)
RH IG SCN BLD-IMP: POSITIVE — SIGNIFICANT CHANGE UP
RSV RNA NPH QL NAA+NON-PROBE: SIGNIFICANT CHANGE UP
SARS-COV-2 RNA SPEC QL NAA+PROBE: SIGNIFICANT CHANGE UP
SODIUM SERPL-SCNC: 139 MMOL/L — SIGNIFICANT CHANGE UP (ref 135–145)
WBC # BLD: 8.17 K/UL — SIGNIFICANT CHANGE UP (ref 3.8–10.5)
WBC # FLD AUTO: 8.17 K/UL — SIGNIFICANT CHANGE UP (ref 3.8–10.5)

## 2022-09-28 PROCEDURE — 73590 X-RAY EXAM OF LOWER LEG: CPT

## 2022-09-28 PROCEDURE — 73590 X-RAY EXAM OF LOWER LEG: CPT | Mod: 26,LT

## 2022-09-28 PROCEDURE — G0463: CPT

## 2022-09-28 PROCEDURE — 73562 X-RAY EXAM OF KNEE 3: CPT

## 2022-09-28 PROCEDURE — 99285 EMERGENCY DEPT VISIT HI MDM: CPT

## 2022-09-28 PROCEDURE — 71045 X-RAY EXAM CHEST 1 VIEW: CPT | Mod: 26

## 2022-09-28 PROCEDURE — 73562 X-RAY EXAM OF KNEE 3: CPT | Mod: 26,LT

## 2022-09-28 PROCEDURE — 93010 ELECTROCARDIOGRAM REPORT: CPT

## 2022-09-28 PROCEDURE — 73700 CT LOWER EXTREMITY W/O DYE: CPT | Mod: 26,LT

## 2022-09-28 PROCEDURE — 76377 3D RENDER W/INTRP POSTPROCES: CPT | Mod: 26

## 2022-09-28 RX ORDER — INFLUENZA VIRUS VACCINE 15; 15; 15; 15 UG/.5ML; UG/.5ML; UG/.5ML; UG/.5ML
0.5 SUSPENSION INTRAMUSCULAR ONCE
Refills: 0 | Status: DISCONTINUED | OUTPATIENT
Start: 2022-09-28 | End: 2022-10-03

## 2022-09-28 RX ORDER — OXYCODONE HYDROCHLORIDE 5 MG/1
10 TABLET ORAL EVERY 4 HOURS
Refills: 0 | Status: DISCONTINUED | OUTPATIENT
Start: 2022-09-28 | End: 2022-09-29

## 2022-09-28 RX ORDER — ACETAMINOPHEN 500 MG
650 TABLET ORAL EVERY 6 HOURS
Refills: 0 | Status: DISCONTINUED | OUTPATIENT
Start: 2022-09-28 | End: 2022-09-29

## 2022-09-28 RX ORDER — HYDROMORPHONE HYDROCHLORIDE 2 MG/ML
0.5 INJECTION INTRAMUSCULAR; INTRAVENOUS; SUBCUTANEOUS EVERY 4 HOURS
Refills: 0 | Status: DISCONTINUED | OUTPATIENT
Start: 2022-09-28 | End: 2022-09-29

## 2022-09-28 RX ORDER — HEPARIN SODIUM 5000 [USP'U]/ML
5000 INJECTION INTRAVENOUS; SUBCUTANEOUS EVERY 8 HOURS
Refills: 0 | Status: COMPLETED | OUTPATIENT
Start: 2022-09-28 | End: 2022-09-28

## 2022-09-28 RX ORDER — OXYCODONE HYDROCHLORIDE 5 MG/1
5 TABLET ORAL EVERY 4 HOURS
Refills: 0 | Status: DISCONTINUED | OUTPATIENT
Start: 2022-09-28 | End: 2022-09-29

## 2022-09-28 RX ORDER — SODIUM CHLORIDE 9 MG/ML
1000 INJECTION, SOLUTION INTRAVENOUS
Refills: 0 | Status: DISCONTINUED | OUTPATIENT
Start: 2022-09-28 | End: 2022-09-29

## 2022-09-28 RX ADMIN — Medication 650 MILLIGRAM(S): at 21:32

## 2022-09-28 RX ADMIN — SODIUM CHLORIDE 75 MILLILITER(S): 9 INJECTION, SOLUTION INTRAVENOUS at 16:06

## 2022-09-28 RX ADMIN — Medication 650 MILLIGRAM(S): at 23:46

## 2022-09-28 RX ADMIN — Medication 1 TABLET(S): at 16:06

## 2022-09-28 RX ADMIN — Medication 650 MILLIGRAM(S): at 21:02

## 2022-09-28 RX ADMIN — OXYCODONE HYDROCHLORIDE 10 MILLIGRAM(S): 5 TABLET ORAL at 17:38

## 2022-09-28 RX ADMIN — Medication 650 MILLIGRAM(S): at 23:16

## 2022-09-28 NOTE — ED PROVIDER NOTE - OBJECTIVE STATEMENT
61M pmhx of recently diagnosed colon CA (chemo delayed due to accident)), left tibial plateau fx diagnosed 3 wks ago with ex-fix applied presenting for planned ORIF by [facility] gruber. Pt was seen at [facility] and referred to ortho clinic for traumatic fx of tibial plateau. he was seen today and forwarded here for admission for surgery. took an oxycontin this morning with relief of pain. Denies fever, chills, nausea, vomitting, diarrhea, dizziness, headache, numbness, tingling, and paresthesias. worst with weight bearing, better at rest.

## 2022-09-28 NOTE — ED ADULT NURSE NOTE - NSIMPLEMENTINTERV_GEN_ALL_ED
Implemented All Fall with Harm Risk Interventions:  [place] to call system. Call bell, personal items and telephone within reach. Instruct patient to call for assistance. Room bathroom lighting operational. Non-slip footwear when patient is off stretcher. Physically safe environment: no spills, clutter or unnecessary equipment. Stretcher in lowest position, wheels locked, appropriate side rails in place. Provide visual cue, wrist band, yellow gown, etc. Monitor gait and stability. Monitor for mental status changes and reorient to person, place, and time. Review medications for side effects contributing to fall risk. Reinforce activity limits and safety measures with patient and family. Provide visual clues: red socks.

## 2022-09-28 NOTE — PATIENT PROFILE ADULT - FALL HARM RISK - HARM RISK INTERVENTIONS
Assistance with ambulation/Assistance OOB with selected safe patient handling equipment/Communicate Risk of Fall with Harm to all staff/Discuss with provider need for PT consult/Monitor gait and stability/Provide patient with walking aids - walker, cane, crutches/Reinforce activity limits and safety measures with patient and family/Sit up slowly, dangle for a short time, stand at bedside before walking/Tailored Fall Risk Interventions/Use of alarms - bed, chair and/or voice tab/Visual Cue: Yellow wristband and red socks/Bed in lowest position, wheels locked, appropriate side rails in place/Call bell, personal items and telephone in reach/Instruct patient to call for assistance before getting out of bed or chair/Non-slip footwear when patient is out of bed/[place] to call system/Physically safe environment - no spills, clutter or unnecessary equipment/Purposeful Proactive Rounding/Room/bathroom lighting operational, light cord in reach

## 2022-09-28 NOTE — ED PROVIDER NOTE - CLINICAL SUMMARY MEDICAL DECISION MAKING FREE TEXT BOX
discussed the case with ortho resident, who is aware. he advised admission to dr. shannan gruber. preoplabs and ct knee ordered as per their instructions....

## 2022-09-28 NOTE — ED ADULT NURSE NOTE - OBJECTIVE STATEMENT
patient received alert & oriented x4, came via wheelchair accompanied by daughter tbzoie for left leg fracture for surgery. Hx of fall 9/9/22 with hardware to LE. Denies fever, pain, nausea or vomiting. (+) strong peripheral pulses & sensation.

## 2022-09-28 NOTE — ED ADULT NURSE NOTE - CAS TRG GEN SKIN CONDITION
"She does not have any chronic medical issues other than her weight that might possibly get her to the Dietician.  I listed "overweight" with her current BMI as her primary diagnosis.  Some insurances just do not pay for this type of appointment.  "
----- Message from May Gunderson MA sent at 4/17/2018  4:50 PM CDT -----  Contact: pt spouse       ----- Message -----  From: Tonia Puentes  Sent: 4/17/2018   3:08 PM  To: Theresa JACOBSON Staff    Called he stated pt visit is not covered by the insurance because it is list for weight loss, it only cover if it is medical necessary want to know if that can be changed, he can be reached at [phone]    
Spoke with pt about coding for insurance with understanding.  
Warm

## 2022-09-28 NOTE — ED ADULT NURSE NOTE - NSFALLRSKPSTHSTOCCUR_ED_ALL_ED
" [facility]    [address]    [facility] [address]    Phone:  [phone]    Fax:  [phone]                                       Raymundo Mora   MRN: [id number]    Department:  [facility] Emergency [facility]   Date of Visit:  6/17/2018           Patient Information     Date Of Birth          1986        Your diagnoses for this visit were:     Facial laceration, initial encounter     Fall, initial encounter     Closed head injury, initial encounter        You were seen by Marie Walters MD.      Follow-up Information     Follow up with [facility].    Specialty:  EMERGENCY MEDICINE    Why:  If symptoms worsen    Contact information:    [address]  [facility] [address] [phone]        Follow up with No Ref-Primary, Physician In 5 days.    Why:  For suture removal and recheck of head injury        Discharge Instructions       Watch the area surrounding the wound for signs of infection which can include increased redness, drainage, fevers, or swelling. Inspect the area daily. No swimming or baths for the next 3-5 days, showering is ok. Return in 5 days for stitches removal. Apply antibacterial ointment such as bacitracin to the wound daily.     Also watch for any \"red flag\" symptoms appear/develop in the next hours/few days. If these appear, return to the ED for further evaluation and possible CT scan. Symptoms to watch for include: headaches that get worse, increased drowsiness, strange behavior, repetitive speech, seizures, repeated vomiting, growing confusion, increased irritability, slurred speech, weakness/numbness, or loss of consciousness. Take tylenol and ibuprofen as needed at home for pain control. Do not drive for the rest of the day due to the narcotic medication you were given in the ED.     Discharge Instructions  Laceration (Cut)    You were seen today for a laceration " (cut).  Your doctor examined your laceration for any problems such a buried foreign body (like glass, a splinter, or gravel), or injury to blood vessels, tendons, and nerves.  Your doctor may have also rinsed and/or scrubbed your laceration to help prevent an infection.  Your laceration may have been closed with glue, staples or sutures (stitches).      It may not be possible to find all problems with your laceration on the first visit, and we can't always prevent infections.  Antibiotics are only given when the benefit is more than the risk, and don't prevent all infections. Some lacerations are too high risk to close, and are left open to heal.  All lacerations, no matter how expertly repaired, will cause scarring.    Return to the Emergency Department right away if:    You have more redness, swelling, pain, drainage (pus), a bad smell, or red streaking from your laceration.      You have a fever of 101oF or more.    You have bleeding that you can t stop at home. If your cut starts to bleed, hold pressure on the bleeding area with a clean cloth or put pressure over the bandage.  If the bleeding doesn t stop after using constant pressure for 30 minutes, you should return to the Emergency Department for further treatment.    An area past the laceration is cool, pale, or blue compared with the other side, or has a slower return of color when squeezed.    Your dressing seems too tight or starts to get uncomfortable or painful.    You have loss of normal function or use of an area, such as being unable to straighten or bend a finger normally.    You have a numb area past the laceration.    Return to the Emergency Department or see your regular doctor if:    The laceration starts to come open.     You have something coming out of the cut or a feeling that there is something in the laceration.    Your wound will not heal, or keeps breaking open. There can always be glass, wood, dirt or other things in any wound.  They  won t always show up, even on x-rays.  If a wound doesn t heal, this may be why, and it is important to follow-up with your regular doctor.    Home Care:    Take your dressing off in 12 hours, or as instructed by your doctor, to check your laceration. Remove the dressing sooner if it seems too tight or painful, or if it is getting numb, tingly, or pale past the dressing.    Gently wash your laceration 2 times a day with clean cloth and soap.     It is okay to shower, but do not let the laceration soak in water.    For all other repairs: after you wash your laceration, or at least 2 times a day, apply bacitracin or other antibiotic ointment to the laceration, then cover it with a Band-Aid  or gauze.    Keep the laceration clean. Wear gloves or other protective clothing if you are around dirt.    Follow-up:    You need to follow-up with your regular doctor in 5 days.    Your sutures need to be removed in 5 days. Schedule an appointment with your regular doctor to have this done.    Scars:  To help minimize scarring:    Wear sunscreen over the healed laceration when out in the sun.    Massage the area regularly.    You may use Vitamin E oil.    Wait a year.  Most scars will start to fade within a year.      Remember that you can always come back to the Emergency Department if you are not able to see your regular doctor in the amount of time listed above, if you get any new symptoms, or if there is anything that worries you.    Discharge Instructions  Head Injury    You have been seen today for a head injury. You were checked for serious problems, like bleeding on the brain, but these problems cannot always be found right away.  Due to this risk, you should not be alone for 24 hours after your injury.  Follow up with your regular physician in 3-5 days.     Return to the Emergency Department if:    You are confused, have amnesia, or you are not acting right.    Your headache gets worse or you start to have a really bad  Single Mechanical/Accidental Fall headache even with your recommended treatment plan.    You vomit more than once.    You have a convulsion or seizure.    You have trouble walking.    You have weakness or paralysis in an arm or a leg.    You have blood or fluid coming from your ears or nose.    You have new symptoms or anything that worries you.    Sleeping:  It is okay for you to sleep, but someone should wake you up as instructed by your doctor, and someone should check on you at your usual time to wake up.     Activity:    Do not drive for at least 24 hours.    Do not drive if you have dizzy spells or trouble concentrating, or remembering things.    Do not return to any contact sports until cleared by your regular doctor.     Follow-up:  It is very important that you make an appointment with your clinic and go to the appointment.  If you do not follow-up with your regular doctor, it may result in missing an important development which could result in permanent injury or disability and/or lasting pain.  If there is any problem keeping your appointment, call your doctor or return to the Emergency Department.    MORE INFORMATION:    Concussion:  A concussion is a minor head injury that may cause temporary problems with the way your brain works.  Some symptoms include:  confusion, amnesia, nausea and vomiting, dizziness, fatigue, memory or concentration problems, irritability and sleep problems.          Discharge References/Attachments     (S) CONCUSSION DISCHARGE INSTRUCTIONS (ENGLISH)    (S) HEALING AFTER A CONCUSSION (ENGLISH)      24 Hour Appointment Hotline       To make an appointment at any [facility], call [phone] ([phone]). If you don't have a family doctor or clinic, we will help you find one. [facility] [place] are conveniently located to serve the needs of you and your family.             Review of your medicines      Our records show that you are taking the medicines listed below. If these are incorrect, please call  your family doctor or clinic.        Dose / Directions Last dose taken    fluocinonide 0.05 % topical gel   Commonly known as:  LIDEX   Quantity:  30 g        Apply sparingly to affected area twice daily for 14 days.  Do not apply to face.   Refills:  1        ketoconazole 2 % shampoo   Commonly known as:  NIZORAL   Quantity:  120 mL        Apply topically daily as needed for itching or irritation Apply to the affected area and wash off after 5 minutes.   Refills:  3                Orders Needing Specimen Collection     None      Pending Results     No orders found from 6/15/2018 to 6/18/2018.            Pending Culture Results     No orders found from 6/15/2018 to 6/18/2018.            Pending Results Instructions     If you had any lab results that were not finalized at the time of your Discharge, you can call the ED Lab Result RN at [phone]. You will be contacted by this team for any positive Lab results or changes in treatment. The nurses are available 7 days a week from 10A to 6:30P.  You can leave a message 24 hours per day and they will return your call.        Test Results From Your Hospital Stay               Clinical Quality Measure: Blood Pressure Screening     Your blood pressure was checked while you were in the emergency department today. The last reading we obtained was  BP: 120/80 . Please read the guidelines below about what these numbers mean and what you should do about them.  If your systolic blood pressure (the top number) is less than 120 and your diastolic blood pressure (the bottom number) is less than 80, then your blood pressure is normal. There is nothing more that you need to do about it.  If your systolic blood pressure (the top number) is 120-139 or your diastolic blood pressure (the bottom number) is 80-89, your blood pressure may be higher than it should be. You should have your blood pressure rechecked within a year by a primary care provider.  If your systolic blood pressure  "(the top number) is 140 or greater or your diastolic blood pressure (the bottom number) is 90 or greater, you may have high blood pressure. High blood pressure is treatable, but if left untreated over time it can put you at risk for heart attack, stroke, or kidney failure. You should have your blood pressure rechecked by a primary care provider within the next 4 weeks.  If your provider in the emergency department today gave you specific instructions to follow-up with your doctor or provider even sooner than that, you should follow that instruction and not wait for up to 4 weeks for your follow-up visit.        Thank you for choosing [facility]       Thank you for choosing [facility] for your care. Our goal is always to provide you with excellent care. Hearing back from our patients is one way we can continue to improve our services. Please take a few minutes to complete the written survey that you may receive in the mail after you visit with us. Thank you!        [employer] Information     [employer] lets you send messages to your doctor, view your test results, renew your prescriptions, schedule appointments and more. To sign up, go to [email or web address] . Click on \"Log in\" on the left side of the screen, which will take you to the Welcome page. Then click on \"Sign up Now\" on the right side of the page.     You will be asked to enter the access code listed below, as well as some personal information. Please follow the directions to create your username and password.     Your access code is: [email or web address]  Expires: 2018  8:12 PM     Your access code will  in 90 days. If you need help or a new code, please call your [facility] clinic or [phone].        Care EveryWhere ID     This is your Care EveryWhere ID. This could be used by other organizations to access your [facility] medical records  [id number]        Equal Access to Services     MARCE ESTRADA : yohana Gregory qaybta " tanesha young ah. So [facility] [phone].    ATENCIÓN: Si habla español, tiene a solo disposición servicios gratuitos de asistencia lingüística. Llame al [phone].    We comply with applicable federal civil rights laws and [place] laws. We do not discriminate on the basis of race, color, national origin, age, disability, sex, sexual orientation, or gender identity.            After Visit Summary       This is your record. Keep this with you and show to your community pharmacist(s) and doctor(s) at your next visit.

## 2022-09-28 NOTE — ED PROVIDER NOTE - PHYSICAL EXAMINATION
Physical Exam:   GEN: in no acute distress, AAOx3  HENT: NCAT, MMM, no stridor  EYES: PERRLA, EOMI  RESP: CTAB, no respiratory distress  CV: normal rate and regular rhythm, S1 S2  ABD: soft and NTND  EXT: exfix seen on LLE, fixed on left upper femur and lower left tibia. no drainage or swelling seen at screw sites   SKIN: No skin breaks, skin color normal for race  NEURO: CN grossly intact, No focal motor or sensory deficits.

## 2022-09-28 NOTE — CONSULT NOTE ADULT - SUBJECTIVE AND OBJECTIVE BOX
Medical attending Pre op medica clearance    History of Present Illness:  Reason for Admission: L Tibial plateau ORIF  History of Present Illness:   Orthopedics    Patient is a 61yMale who [facility] ED w/ a c/o of worseing L knee pain s/p L Tibial plateau ex fix 3 weeks ago at [facility]. Patient states he fell off of a table at that time, denies any preceding CP/SOB/palpitations/headache/confusion/dizziness/weakness/fatigue. Denies any numbness or tingling. Denies having any other pain elsewhere. No other orthopedic concerns at this time.    Past medical history    Colon cancer            No Known Allergies      PHYSICAL EXAM:  T(C): 36.8 (09-28-22 @ 13:04), Max: 37 (09-28-22 @ 11:26)  HR: 90 (09-28-22 @ 13:04) (90 - 96)  BP: 150/77 (09-28-22 @ 13:04) (126/82 - 150/77)  RR: 18 (09-28-22 @ 13:04) (16 - 18)  SpO2: 100% (09-28-22 @ 13:04) (97% - 100%)    Gen: NAD, Resting comfortably    LLE:  Skin intact, knee spanning ex fix in place, pin sites free of infection  +bony tenderness to palpation  +EHL/FHL/TA/GSC  +SILT L3-S1  + DP  Compartments soft and compressible  No calf tenderness    Secondary Survey:   No TTP over bony prominences, SILT, palpable pulses, full/painless A/PROM, compartments soft. No TTP over spinous processes or paraspinal muscles at C/T/L spine. No palpable step off. No other injuries or complaints.        A/P: 61M w/ L Shatzker6 tibial plateau fx    Plan:    -Plan for surgical intervention 9/29 w/ Dr Martinez,   -Preop labs/imaging: CBC/BMP/PT/PTT/INR/T&S/Covid/CXR/EKG.  -NPO after midnight except meds/IVFs while NPO.  -NWB LLE Ex fix  -Hold chem DVT ppx after midnight  -Pain control prn  -Medical management, continue home meds.  -Please document medical clearance  -Case discussed with attending, will advise if plan changes.       Review of Systems:  Other Review of Systems: All other review of systems negative, except as noted in HPI      Allergies and Intolerances:        Allergies:  	No Known Allergies:     Home Medications:   * Patient Currently Takes Medications as of 19-Aug-2022 18:21 documented in Structured Notes  · 	acetaminophen 325 mg oral tablet: 2 tab(s) orally every 6 hours, As needed, Mild Pain (1 - 3)    .    Patient History:    Past Medical, Past Surgical, and Family History:  PAST MEDICAL HISTORY:  Colon cancer     No other pertinent past medical history.     PAST SURGICAL HISTORY:  No significant past surgical history.     FAMILY HISTORY:  No pertinent family history in first degree relatives. No pertinent family history of: None.     Social History:  · Substance use	No     Tobacco Screening:  · Core Measure Site	No    Risk Assessment:    Present on Admission:  Deep Venous Thrombosis	no  Pulmonary Embolus	no     HIV Screening:  · In accordance with [place] State law, we offer every patient who comes to our ED an HIV test. Would you like to be tested today?	Opt out

## 2022-09-28 NOTE — ED PROVIDER NOTE - ATTENDING CONTRIBUTION TO CARE
Arron Blackmon MD:   I personally saw the patient and performed a substantive portion of the visit including all aspects of the medical decision making.    MDM: 61-year-old male with recent diagnosis of colon cancer, chemotherapy delayed due to accident, with a left tibial plateau fracture that was diagnosed 3 weeks ago after he sustained a fall.  Patient was seen at [facility] and placed in Ex-Fix and was referred from the orthopedic clinic for surgical repair.  Patient with no significant symptoms at this time.  On examination patient is neurovascular intact with Ex-Fix in place and ecchymosis noted over the medial aspects of the knee.  Declines pain medication at this time.  Patient seen and evaluated by orthopedics who recommended for CT of knee and admission for ORIF to Dr. Martinez.  The patient will need to be admitted to the hospital for continued evaluation and management, as well as to optimize medical management and to provide outpatient needs assessment.  Discussed with the accepting physician regarding the initial presentation, diagnostic studies, treatments given in the ED, and current plan of care.   The patient was accepted by and endorsed to the orthopedics team pending labs and imaging.

## 2022-09-28 NOTE — H&P ADULT - HISTORY OF PRESENT ILLNESS
Orthopedics    Patient is a 61yMale who presents to [facility] ED w/ a c/o of worseing L knee pain s/p L Tibial plateau ex fix 3 weeks ago at [facility]. Patient states he fell off of a table at that time, denies any preceding CP/SOB/palpitations/headache/confusion/dizziness/weakness/fatigue. Denies any numbness or tingling. Denies having any other pain elsewhere. No other orthopedic concerns at this time.    No pertinent past medical history    Colon cancer            No Known Allergies      PHYSICAL EXAM:  T(C): 36.8 (09-28-22 @ 13:04), Max: 37 (09-28-22 @ 11:26)  HR: 90 (09-28-22 @ 13:04) (90 - 96)  BP: 150/77 (09-28-22 @ 13:04) (126/82 - 150/77)  RR: 18 (09-28-22 @ 13:04) (16 - 18)  SpO2: 100% (09-28-22 @ 13:04) (97% - 100%)    Gen: NAD, Resting comfortably    LLE:  Skin intact, knee spanning ex fix in place, pin sites free of infection  +bony tenderness to palpation  +EHL/FHL/TA/GSC  +SILT L3-S1  + DP  Compartments soft and compressible  No calf tenderness    Secondary Survey:   No TTP over bony prominences, SILT, palpable pulses, full/painless A/PROM, compartments soft. No TTP over spinous processes or paraspinal muscles at C/T/L spine. No palpable step off. No other injuries or complaints.        A/P: 61M w/ L [id number] tibial plateau fx    Plan:    -Plan for surgical intervention 9/29 w/ Dr Martinez, will book and begin preop.  -Preop labs/imaging: CBC/BMP/PT/PTT/INR/T&S/Covid/CXR/EKG.  -NPO after midnight except meds/IVFs while NPO.  -NWB LLE Ex fix  -Hold chem DVT ppx after midnight  -Pain control prn  -Medical management, continue home meds.  -Please document medical clearance  -Case discussed with attending, will advise if plan changes.

## 2022-09-28 NOTE — ED ADULT NURSE NOTE - CHIEF COMPLAINT
[de-identified] : 11/01/2018 : Patient returns for follow up , he was diagnosed 2 years ago with CLL ,trisomy 12 and is here for follow up . He is scheduled for ablation for atrial fibrillation , he continues on coumadin for mechanical valve. He reports minor bruising on coumadin and plavix. He feels slight fatigue . He denies fever , weight loss or night sweats . He is also on androgen deprivation for elevated PSA , , He had previously on surveillance and had multiple prostate biopsies ( unclear if it showed cancer ) . Bone scan is allegedly negative . Last PSA is less than 1 and patient denies obstructive symptoms. \par "\par 01/10/2019 Patient returns for follow up for CLL on watchful waiting , he " feels tired all the time " , He had unsuccessful ablation for atrial fibrillation and was told to increase metoprolol . He denies B symptoms . \par \par 02/07/2019 Patient returns for follow up , he had unsuccessful ablation for paroxysmal atrial fibrillation and was placed on amiodarone , he continues on coumadin without ASA and denies abnormal bleeding , he reports occasional LUQ pain . no  B symptoms.\par \par 05/16/2019 Patient returns for follow up for CLL/SLL he reports few episodes of dizziness associated with nausea lasting for 1 to 2 hours . CT head ( non-contrast ) was negative , he had negative ENT evaluation and had long term event monitor placed 2 weeks ago without recurrence ( also discontinued amiodarone ) , CBC shows slight decrease in Hb and platelets from baseline . He continues on coumadin and plavix and denies abnormal bleeding . He continues with mild LUQ and back pain . \par \par 06/13/2019 Patient returns for follow up for SLL/CLL with anemia, thrombocytopenia and marked splenomegaly ( 19cm ) . He complains of left flank and back pain . he had a trial of epidural anethesia and is scheduled for nerve block/ ablation / epidural injection . he continues on coumadin and denies any abnormal bleeding . \par \par 07/11/2019 Patient returns with persistent LUQ discomfort , back pain despite epidural injections . no bleeding , fever or night sweats .\par \par 07/19/2019 Patient returns for follow up to discuss the recommended treatment for progressive high risk CLL ( trisomy 12 ,11q and ch 6 deletion ) with anemia, thrombocytopenia and massive splenomegaly . He is relatively asymptomatic except for mild left flank pain . no significant bleeding on coumadin and plavix . Given his age , multiple co-morbidities and high risk features , the regimen of choice is combination of venetoclax and obinotuzumab . \par \par 08/22/2019 Patient returns for follow up after starting on obinotuzumab , he had mild reaction during the split first dose ( nausea ) . He tolerated day 8 and day 15 very well ( also given in split dose ) . He lost 10 lbs despite good po intake . He denies fever . he continues with mild back pain . \par \par 11/19/2019 patient returns for follow up , he continues on venetoclax/obino and feels well except for persistent back pain , MRI done elsewhere  was allegedly normal , spleen is no longer palpable , Hb is 13 , wbc is normal except for lymphopenia , platelets  : 111 .  He reports occasional episodes of abdominal cramping with nausea. no weight loss. \par \par 12/16/19: Pt returns for a f/u visit. He has been c/o nausea with decreased appetite. However, he has not been taking antiemetics as previously prescribed. Has lost 11 lbs in the last month. Also had a recent TIA manifested as aphasia that lasted for about 45 mins. He was treated in an ED in [place]. CTH was unremarkable. Echo showed EF 45-50%. No clots seen. US carotids showed plaques but no significant stenosis. He is being seen by neurology in [place] and will have MRI/MRA. He was already on plavix and coumadin before having TIA. His INR at the time of ER visit was 4.3. \par \par 01/13/2020\par Patient returns for a follow up visit. This will be his last cycle of obinutuzumab ( #6). He is on venetoclax 300 mg. It was dose reduced on 12/16 as he was complaining of nausea and abdominal pain. He also has chronic back pain. He remains on coumadin and plavix. \par He is tolerating obinutuzumab with no reactions.  [de-identified] : this is a 76-year-old white man with multiple medical problems including coronary artery disease status post angioplasty valvular heart disease status post metallic aortic valve replacement. He is referred for abnormal hemogram noted recently, WBC is 10.6 hemoglobin 14.2 platelets 131 absolute lymphocytes 5268 chemistry is unremarkable. He denies any constitutional symptoms  specifically no fever ,weight loss ,night sweats, fatigue or  pruritus. he feels fantastic with good energy level. The patient is a 61y Male complaining of

## 2022-09-29 ENCOUNTER — APPOINTMENT (OUTPATIENT)
Dept: INFUSION THERAPY | Facility: CLINIC | Age: 62
End: 2022-09-29

## 2022-09-29 ENCOUNTER — APPOINTMENT (OUTPATIENT)
Dept: ORTHOPEDIC SURGERY | Facility: HOSPITAL | Age: 62
End: 2022-09-29

## 2022-09-29 DIAGNOSIS — R52 PAIN, UNSPECIFIED: ICD-10-CM

## 2022-09-29 DIAGNOSIS — S82.142A DISPLACED BICONDYLAR FRACTURE OF LEFT TIBIA, INITIAL ENCOUNTER FOR CLOSED FRACTURE: ICD-10-CM

## 2022-09-29 LAB
ANION GAP SERPL CALC-SCNC: 12 MMOL/L — SIGNIFICANT CHANGE UP (ref 5–17)
ANION GAP SERPL CALC-SCNC: 8 MMOL/L — SIGNIFICANT CHANGE UP (ref 5–17)
APTT BLD: 27.6 SEC — SIGNIFICANT CHANGE UP (ref 27.5–35.5)
BLD GP AB SCN SERPL QL: NEGATIVE — SIGNIFICANT CHANGE UP
BUN SERPL-MCNC: 12 MG/DL — SIGNIFICANT CHANGE UP (ref 7–23)
BUN SERPL-MCNC: 13 MG/DL — SIGNIFICANT CHANGE UP (ref 7–23)
CALCIUM SERPL-MCNC: 8.9 MG/DL — SIGNIFICANT CHANGE UP (ref 8.4–10.5)
CALCIUM SERPL-MCNC: 9.2 MG/DL — SIGNIFICANT CHANGE UP (ref 8.4–10.5)
CHLORIDE SERPL-SCNC: 103 MMOL/L — SIGNIFICANT CHANGE UP (ref 96–108)
CHLORIDE SERPL-SCNC: 104 MMOL/L — SIGNIFICANT CHANGE UP (ref 96–108)
CO2 SERPL-SCNC: 22 MMOL/L — SIGNIFICANT CHANGE UP (ref 22–31)
CO2 SERPL-SCNC: 29 MMOL/L — SIGNIFICANT CHANGE UP (ref 22–31)
CREAT SERPL-MCNC: 0.87 MG/DL — SIGNIFICANT CHANGE UP (ref 0.5–1.3)
CREAT SERPL-MCNC: 0.89 MG/DL — SIGNIFICANT CHANGE UP (ref 0.5–1.3)
EGFR: 98 ML/MIN/1.73M2 — SIGNIFICANT CHANGE UP
EGFR: 98 ML/MIN/1.73M2 — SIGNIFICANT CHANGE UP
GLUCOSE SERPL-MCNC: 145 MG/DL — HIGH (ref 70–99)
GLUCOSE SERPL-MCNC: 92 MG/DL — SIGNIFICANT CHANGE UP (ref 70–99)
HCT VFR BLD CALC: 28.3 % — LOW (ref 39–50)
HCT VFR BLD CALC: 31.2 % — LOW (ref 39–50)
HGB BLD-MCNC: 8.1 G/DL — LOW (ref 13–17)
HGB BLD-MCNC: 9.2 G/DL — LOW (ref 13–17)
INR BLD: 1.28 RATIO — HIGH (ref 0.88–1.16)
MCHC RBC-ENTMCNC: 22.1 PG — LOW (ref 27–34)
MCHC RBC-ENTMCNC: 22.9 PG — LOW (ref 27–34)
MCHC RBC-ENTMCNC: 28.6 GM/DL — LOW (ref 32–36)
MCHC RBC-ENTMCNC: 29.5 GM/DL — LOW (ref 32–36)
MCV RBC AUTO: 77.1 FL — LOW (ref 80–100)
MCV RBC AUTO: 77.6 FL — LOW (ref 80–100)
MRSA PCR RESULT.: SIGNIFICANT CHANGE UP
NRBC # BLD: 0 /100 WBCS — SIGNIFICANT CHANGE UP (ref 0–0)
NRBC # BLD: 0 /100 WBCS — SIGNIFICANT CHANGE UP (ref 0–0)
PLATELET # BLD AUTO: 524 K/UL — HIGH (ref 150–400)
PLATELET # BLD AUTO: 644 K/UL — HIGH (ref 150–400)
POTASSIUM SERPL-MCNC: 4.1 MMOL/L — SIGNIFICANT CHANGE UP (ref 3.5–5.3)
POTASSIUM SERPL-MCNC: 4.6 MMOL/L — SIGNIFICANT CHANGE UP (ref 3.5–5.3)
POTASSIUM SERPL-SCNC: 4.1 MMOL/L — SIGNIFICANT CHANGE UP (ref 3.5–5.3)
POTASSIUM SERPL-SCNC: 4.6 MMOL/L — SIGNIFICANT CHANGE UP (ref 3.5–5.3)
PROTHROM AB SERPL-ACNC: 14.7 SEC — HIGH (ref 10.5–13.4)
RBC # BLD: 3.67 M/UL — LOW (ref 4.2–5.8)
RBC # BLD: 4.02 M/UL — LOW (ref 4.2–5.8)
RBC # FLD: 19.1 % — HIGH (ref 10.3–14.5)
RBC # FLD: 19.8 % — HIGH (ref 10.3–14.5)
RH IG SCN BLD-IMP: POSITIVE — SIGNIFICANT CHANGE UP
S AUREUS DNA NOSE QL NAA+PROBE: SIGNIFICANT CHANGE UP
SODIUM SERPL-SCNC: 138 MMOL/L — SIGNIFICANT CHANGE UP (ref 135–145)
SODIUM SERPL-SCNC: 140 MMOL/L — SIGNIFICANT CHANGE UP (ref 135–145)
WBC # BLD: 11.05 K/UL — HIGH (ref 3.8–10.5)
WBC # BLD: 7.49 K/UL — SIGNIFICANT CHANGE UP (ref 3.8–10.5)
WBC # FLD AUTO: 11.05 K/UL — HIGH (ref 3.8–10.5)
WBC # FLD AUTO: 7.49 K/UL — SIGNIFICANT CHANGE UP (ref 3.8–10.5)

## 2022-09-29 PROCEDURE — 27403 REPAIR OF KNEE CARTILAGE: CPT | Mod: LT

## 2022-09-29 PROCEDURE — 27536 TREAT KNEE FRACTURE: CPT | Mod: LT

## 2022-09-29 PROCEDURE — 20694 RMVL EXT FIXJ SYS UNDER ANES: CPT | Mod: LT

## 2022-09-29 DEVICE — IMPLANTABLE DEVICE: Type: IMPLANTABLE DEVICE | Site: LEFT | Status: FUNCTIONAL

## 2022-09-29 DEVICE — SCREW LOKG SLF-TPNG W/ STARDRIVE RECESS 3.5X32MM: Type: IMPLANTABLE DEVICE | Site: LEFT | Status: FUNCTIONAL

## 2022-09-29 DEVICE — GWIRE DRILL TIP 1.6MM 150MM: Type: IMPLANTABLE DEVICE | Site: LEFT | Status: FUNCTIONAL

## 2022-09-29 DEVICE — K-WIRE SYNTHES (THREADED) TROCAR POINT 1.6MM X 150MM: Type: IMPLANTABLE DEVICE | Site: LEFT | Status: FUNCTIONAL

## 2022-09-29 DEVICE — SCREW LOKG SLF-TPNG W/ STARDRIVE RECESS 2.X16MM: Type: IMPLANTABLE DEVICE | Site: LEFT | Status: FUNCTIONAL

## 2022-09-29 DEVICE — SCREW PUSH/PULL REDUCTION: Type: IMPLANTABLE DEVICE | Site: LEFT | Status: FUNCTIONAL

## 2022-09-29 DEVICE — SCREW LOKG SLF-TPNG W/ STARDRIVE RECESS 2.X20MM: Type: IMPLANTABLE DEVICE | Site: LEFT | Status: FUNCTIONAL

## 2022-09-29 DEVICE — GWIRE DRILL TIP 2.5 X 300MM: Type: IMPLANTABLE DEVICE | Site: LEFT | Status: FUNCTIONAL

## 2022-09-29 DEVICE — SCREW CORT S-T 3.5X30MM: Type: IMPLANTABLE DEVICE | Site: LEFT | Status: FUNCTIONAL

## 2022-09-29 DEVICE — SCREW LOKG SLF-TPNG W/ STARDRIVE RECESS 2.X18MM: Type: IMPLANTABLE DEVICE | Site: LEFT | Status: FUNCTIONAL

## 2022-09-29 DEVICE — SCREW CORT S-T 3.5X40MM: Type: IMPLANTABLE DEVICE | Site: LEFT | Status: FUNCTIONAL

## 2022-09-29 DEVICE — SCREW LOKG SLF-TPNG W/ STARDRIVE RECESS 3.5X38MM: Type: IMPLANTABLE DEVICE | Site: LEFT | Status: FUNCTIONAL

## 2022-09-29 RX ORDER — HYDROMORPHONE HYDROCHLORIDE 2 MG/ML
0.5 INJECTION INTRAMUSCULAR; INTRAVENOUS; SUBCUTANEOUS
Refills: 0 | Status: DISCONTINUED | OUTPATIENT
Start: 2022-09-29 | End: 2022-09-30

## 2022-09-29 RX ORDER — CHLORHEXIDINE GLUCONATE 213 G/1000ML
1 SOLUTION TOPICAL DAILY
Refills: 0 | Status: DISCONTINUED | OUTPATIENT
Start: 2022-09-29 | End: 2022-09-29

## 2022-09-29 RX ORDER — CEFAZOLIN SODIUM 1 G
1000 VIAL (EA) INJECTION EVERY 8 HOURS
Refills: 0 | Status: COMPLETED | OUTPATIENT
Start: 2022-09-30 | End: 2022-09-30

## 2022-09-29 RX ORDER — ENOXAPARIN SODIUM 100 MG/ML
40 INJECTION SUBCUTANEOUS EVERY 24 HOURS
Refills: 0 | Status: DISCONTINUED | OUTPATIENT
Start: 2022-09-30 | End: 2022-10-03

## 2022-09-29 RX ORDER — OXYCODONE HYDROCHLORIDE 5 MG/1
10 TABLET ORAL EVERY 6 HOURS
Refills: 0 | Status: DISCONTINUED | OUTPATIENT
Start: 2022-09-29 | End: 2022-09-30

## 2022-09-29 RX ORDER — OXYCODONE HYDROCHLORIDE 5 MG/1
5 TABLET ORAL EVERY 6 HOURS
Refills: 0 | Status: DISCONTINUED | OUTPATIENT
Start: 2022-09-29 | End: 2022-09-30

## 2022-09-29 RX ORDER — HYDROMORPHONE HYDROCHLORIDE 2 MG/ML
0.5 INJECTION INTRAMUSCULAR; INTRAVENOUS; SUBCUTANEOUS ONCE
Refills: 0 | Status: DISCONTINUED | OUTPATIENT
Start: 2022-09-29 | End: 2022-10-03

## 2022-09-29 RX ORDER — SODIUM CHLORIDE 9 MG/ML
1000 INJECTION, SOLUTION INTRAVENOUS
Refills: 0 | Status: DISCONTINUED | OUTPATIENT
Start: 2022-09-29 | End: 2022-10-03

## 2022-09-29 RX ORDER — ONDANSETRON 8 MG/1
4 TABLET, FILM COATED ORAL EVERY 6 HOURS
Refills: 0 | Status: DISCONTINUED | OUTPATIENT
Start: 2022-09-29 | End: 2022-09-30

## 2022-09-29 RX ORDER — ACETAMINOPHEN 500 MG
650 TABLET ORAL EVERY 6 HOURS
Refills: 0 | Status: DISCONTINUED | OUTPATIENT
Start: 2022-09-29 | End: 2022-10-03

## 2022-09-29 RX ORDER — ACETAMINOPHEN 500 MG
1000 TABLET ORAL ONCE
Refills: 0 | Status: DISCONTINUED | OUTPATIENT
Start: 2022-09-29 | End: 2022-10-03

## 2022-09-29 RX ADMIN — SODIUM CHLORIDE 75 MILLILITER(S): 9 INJECTION, SOLUTION INTRAVENOUS at 01:54

## 2022-09-29 RX ADMIN — Medication 650 MILLIGRAM(S): at 17:03

## 2022-09-29 RX ADMIN — Medication 650 MILLIGRAM(S): at 05:50

## 2022-09-29 RX ADMIN — OXYCODONE HYDROCHLORIDE 10 MILLIGRAM(S): 5 TABLET ORAL at 02:23

## 2022-09-29 RX ADMIN — Medication 650 MILLIGRAM(S): at 05:20

## 2022-09-29 RX ADMIN — Medication 650 MILLIGRAM(S): at 12:31

## 2022-09-29 RX ADMIN — CHLORHEXIDINE GLUCONATE 1 APPLICATION(S): 213 SOLUTION TOPICAL at 11:35

## 2022-09-29 RX ADMIN — Medication 1 TABLET(S): at 11:31

## 2022-09-29 RX ADMIN — Medication 650 MILLIGRAM(S): at 11:31

## 2022-09-29 RX ADMIN — OXYCODONE HYDROCHLORIDE 10 MILLIGRAM(S): 5 TABLET ORAL at 01:53

## 2022-09-29 RX ADMIN — SODIUM CHLORIDE 75 MILLILITER(S): 9 INJECTION, SOLUTION INTRAVENOUS at 23:25

## 2022-09-30 ENCOUNTER — TRANSCRIPTION ENCOUNTER (OUTPATIENT)
Age: 62
End: 2022-09-30

## 2022-09-30 LAB
ANION GAP SERPL CALC-SCNC: 11 MMOL/L — SIGNIFICANT CHANGE UP (ref 5–17)
BUN SERPL-MCNC: 14 MG/DL — SIGNIFICANT CHANGE UP (ref 7–23)
CALCIUM SERPL-MCNC: 9.1 MG/DL — SIGNIFICANT CHANGE UP (ref 8.4–10.5)
CHLORIDE SERPL-SCNC: 101 MMOL/L — SIGNIFICANT CHANGE UP (ref 96–108)
CO2 SERPL-SCNC: 25 MMOL/L — SIGNIFICANT CHANGE UP (ref 22–31)
CREAT SERPL-MCNC: 0.86 MG/DL — SIGNIFICANT CHANGE UP (ref 0.5–1.3)
EGFR: 99 ML/MIN/1.73M2 — SIGNIFICANT CHANGE UP
GLUCOSE SERPL-MCNC: 133 MG/DL — HIGH (ref 70–99)
HCT VFR BLD CALC: 31.4 % — LOW (ref 39–50)
HGB BLD-MCNC: 9 G/DL — LOW (ref 13–17)
MCHC RBC-ENTMCNC: 23 PG — LOW (ref 27–34)
MCHC RBC-ENTMCNC: 28.7 GM/DL — LOW (ref 32–36)
MCV RBC AUTO: 80.3 FL — SIGNIFICANT CHANGE UP (ref 80–100)
NRBC # BLD: 0 /100 WBCS — SIGNIFICANT CHANGE UP (ref 0–0)
PLATELET # BLD AUTO: 530 K/UL — HIGH (ref 150–400)
POTASSIUM SERPL-MCNC: 4.6 MMOL/L — SIGNIFICANT CHANGE UP (ref 3.5–5.3)
POTASSIUM SERPL-SCNC: 4.6 MMOL/L — SIGNIFICANT CHANGE UP (ref 3.5–5.3)
RBC # BLD: 3.91 M/UL — LOW (ref 4.2–5.8)
RBC # FLD: 19.1 % — HIGH (ref 10.3–14.5)
SODIUM SERPL-SCNC: 137 MMOL/L — SIGNIFICANT CHANGE UP (ref 135–145)
WBC # BLD: 13.61 K/UL — HIGH (ref 3.8–10.5)
WBC # FLD AUTO: 13.61 K/UL — HIGH (ref 3.8–10.5)

## 2022-09-30 RX ORDER — OXYCODONE HYDROCHLORIDE 5 MG/1
10 TABLET ORAL EVERY 4 HOURS
Refills: 0 | Status: DISCONTINUED | OUTPATIENT
Start: 2022-09-30 | End: 2022-10-03

## 2022-09-30 RX ORDER — CEPHALEXIN 500 MG
500 CAPSULE ORAL EVERY 12 HOURS
Refills: 0 | Status: DISCONTINUED | OUTPATIENT
Start: 2022-10-01 | End: 2022-10-03

## 2022-09-30 RX ORDER — OXYCODONE HYDROCHLORIDE 5 MG/1
5 TABLET ORAL EVERY 4 HOURS
Refills: 0 | Status: DISCONTINUED | OUTPATIENT
Start: 2022-09-30 | End: 2022-10-03

## 2022-09-30 RX ADMIN — OXYCODONE HYDROCHLORIDE 10 MILLIGRAM(S): 5 TABLET ORAL at 13:41

## 2022-09-30 RX ADMIN — OXYCODONE HYDROCHLORIDE 10 MILLIGRAM(S): 5 TABLET ORAL at 18:55

## 2022-09-30 RX ADMIN — OXYCODONE HYDROCHLORIDE 10 MILLIGRAM(S): 5 TABLET ORAL at 22:31

## 2022-09-30 RX ADMIN — OXYCODONE HYDROCHLORIDE 10 MILLIGRAM(S): 5 TABLET ORAL at 02:04

## 2022-09-30 RX ADMIN — OXYCODONE HYDROCHLORIDE 10 MILLIGRAM(S): 5 TABLET ORAL at 01:34

## 2022-09-30 RX ADMIN — OXYCODONE HYDROCHLORIDE 10 MILLIGRAM(S): 5 TABLET ORAL at 14:11

## 2022-09-30 RX ADMIN — Medication 100 MILLIGRAM(S): at 12:58

## 2022-09-30 RX ADMIN — OXYCODONE HYDROCHLORIDE 10 MILLIGRAM(S): 5 TABLET ORAL at 08:44

## 2022-09-30 RX ADMIN — Medication 100 MILLIGRAM(S): at 06:40

## 2022-09-30 RX ADMIN — OXYCODONE HYDROCHLORIDE 10 MILLIGRAM(S): 5 TABLET ORAL at 18:25

## 2022-09-30 RX ADMIN — OXYCODONE HYDROCHLORIDE 10 MILLIGRAM(S): 5 TABLET ORAL at 23:01

## 2022-09-30 RX ADMIN — ENOXAPARIN SODIUM 40 MILLIGRAM(S): 100 INJECTION SUBCUTANEOUS at 12:00

## 2022-09-30 RX ADMIN — OXYCODONE HYDROCHLORIDE 10 MILLIGRAM(S): 5 TABLET ORAL at 08:14

## 2022-09-30 NOTE — DISCHARGE NOTE PROVIDER - NSDCFUSCHEDAPPT_GEN_ALL_CORE_FT
Carmela Harrison  [facility]  [facility]  Scheduled Appointment: 10/13/2022    Dion Hills  [facility]  [address]  Scheduled Appointment: 11/02/2022

## 2022-09-30 NOTE — PHYSICAL THERAPY INITIAL EVALUATION ADULT - ADDITIONAL COMMENTS
PTA pt was independent with functional mobility and ADLs, pt recently using standard walker and W/C since surgery ~3weeks ago. pt lives in a  with 3 steps to enter no HRs on front steps +LESLIE HRs on back steps.

## 2022-09-30 NOTE — PHYSICAL THERAPY INITIAL EVALUATION ADULT - PLANNED THERAPY INTERVENTIONS, PT EVAL
GOAL Pt will negotiate 3 steps +axillary crutch and UHR independently in 2 weeks./balance training/bed mobility training/gait training/strengthening/transfer training

## 2022-09-30 NOTE — PROGRESS NOTE ADULT - NUTRITIONAL ASSESSMENT
62 yo male presents with a left tibial plateau fracture in exfix. scheduled for an Open reduction and internal fixation of the left tibial plateau.
60 yo male presents with a left tibial plateau fracture in exfix. S/P an Open reduction and internal fixation of the left tibial plateau.

## 2022-09-30 NOTE — OCCUPATIONAL THERAPY INITIAL EVALUATION ADULT - ADDITIONAL COMMENTS
PTA pt was independent with functional mobility and ADLs, pt recently using standard walker and W/C since surgery ~3weeks ago. pt lives in a  with 3 steps to enter no HRs on front steps +LESLIE HRs on back steps, tub shower, sponge bathing prior to admission.

## 2022-09-30 NOTE — PHYSICAL THERAPY INITIAL EVALUATION ADULT - ACTIVE RANGE OF MOTION EXAMINATION, REHAB EVAL
except L knee/bilateral upper extremity Active ROM was WFL (within functional limits)/bilateral  lower extremity Active ROM was WFL (within functional limits)

## 2022-09-30 NOTE — OCCUPATIONAL THERAPY INITIAL EVALUATION ADULT - RANGE OF MOTION EXAMINATION, LOWER EXTREMITY
except LLE not assessed 2* NWB, KI/bilateral LE Active ROM was WFL  (within functional limits) NWDORON

## 2022-09-30 NOTE — DISCHARGE NOTE PROVIDER - NSDCFUADDINST_GEN_ALL_CORE_FT
Maintain non-weight bearing on left lower extremity. Keep Miles dressing, knee immobilizer clean and dry, follow up with Dr. Martinez in his office post operative day #14(10/13/2022) Maintain non-weight bearing on left lower extremity. Keep Miles dressing, knee immobilizer clean and dry, follow up with Dr. Martinez in his office post operative day #14(10/13/2022).  DVT ppx x 6 weeks total.  Continue Keflex antibiotic until exfix pinsite is completely closed.  Dr. Martinez will re-evaluate at 2 week postop visit.

## 2022-09-30 NOTE — OCCUPATIONAL THERAPY INITIAL EVALUATION ADULT - PERTINENT HX OF CURRENT PROBLEM, REHAB EVAL
Patient is a 61yMale who presents to [facility] ED w/ a c/o of worseing L knee pain s/p L Tibial plateau ex fix 3 weeks ago at [facility]. Patient states he fell off of a table at that time, denies any preceding CP/SOB/palpitations/headache/confusion/dizziness/weakness/fatigue. Denies any numbness or tingling. Denies having any other pain elsewhere. No other orthopedic concerns at this time. now s/p Open treatment L Tibial plateau ORIF 9/29

## 2022-09-30 NOTE — DISCHARGE NOTE PROVIDER - HOSPITAL COURSE
Reason for Admission: L Tibial plateau fracture  History of Present Illness:   Orthopedics    Patient is a 61yMale who presents to [facility] ED w/ a c/o of worseing L knee pain s/p L Tibial plateau ex fix 3 weeks ago at [facility]. Patient states he fell off of a table at that time, denies any preceding CP/SOB/palpitations/headache/confusion/dizziness/weakness/fatigue. Denies any numbness or tingling. Denies having any other pain elsewhere. No other orthopedic concerns at this time.    PAST MEDICAL/SURGICAL HISTORY  Colon cancer    No pertinent past surgical history    HOSPITAL COURSE;  62 y/o M underwent ORIF left tibial plateau on 9/29/2022 with Dr. Martinez.  Patient was placed in a bulky Miles dressing and knee Immobilizer. Patient tolerated procedure well.  Patient was evaluated postoperatively by physical and occupational therapists for non-weight bearing on left lower extremity and cleared patient for discharge home.  Patient advised to keep surgical incision/dressing clean and dry, and  to follow up with Dr. Martinez in his office post operative day #14 (10/13/2022).

## 2022-09-30 NOTE — DISCHARGE NOTE PROVIDER - CARE PROVIDER_API CALL
Mendoza Martinez (MD)  Orthopaedic Surgery  [address]  [place] [address]  Phone: [phone]  Fax: [phone]  Follow Up Time:

## 2022-09-30 NOTE — DISCHARGE NOTE PROVIDER - NSDCMRMEDTOKEN_GEN_ALL_CORE_FT
acetaminophen 325 mg oral tablet: 2 tab(s) orally every 6 hours, As needed, Mild Pain (1 - 3)   acetaminophen 325 mg oral tablet: 2 tab(s) orally every 6 hours, As needed, Mild Pain (1 - 3)  cephalexin 500 mg oral tablet: 1 tab(s) orally 4 times a day   Eliquis 2.5 mg oral tablet: 1 tab(s) orally 2 times a day   oxyCODONE 5 mg oral tablet: 1 tabs orally every 4-6 hours as needed for moderate pain, 2 tab(s) orally every 4-6 hours, As Needed for  severe pain MDD:8

## 2022-10-01 DIAGNOSIS — I95.1 ORTHOSTATIC HYPOTENSION: ICD-10-CM

## 2022-10-01 LAB
HCT VFR BLD CALC: 27 % — LOW (ref 39–50)
HGB BLD-MCNC: 7.8 G/DL — LOW (ref 13–17)
MCHC RBC-ENTMCNC: 22.8 PG — LOW (ref 27–34)
MCHC RBC-ENTMCNC: 28.9 GM/DL — LOW (ref 32–36)
MCV RBC AUTO: 78.9 FL — LOW (ref 80–100)
NRBC # BLD: 0 /100 WBCS — SIGNIFICANT CHANGE UP (ref 0–0)
PLATELET # BLD AUTO: 443 K/UL — HIGH (ref 150–400)
RBC # BLD: 3.42 M/UL — LOW (ref 4.2–5.8)
RBC # FLD: 19.1 % — HIGH (ref 10.3–14.5)
WBC # BLD: 11.28 K/UL — HIGH (ref 3.8–10.5)
WBC # FLD AUTO: 11.28 K/UL — HIGH (ref 3.8–10.5)

## 2022-10-01 RX ADMIN — Medication 500 MILLIGRAM(S): at 05:56

## 2022-10-01 RX ADMIN — OXYCODONE HYDROCHLORIDE 10 MILLIGRAM(S): 5 TABLET ORAL at 16:09

## 2022-10-01 RX ADMIN — OXYCODONE HYDROCHLORIDE 10 MILLIGRAM(S): 5 TABLET ORAL at 22:59

## 2022-10-01 RX ADMIN — OXYCODONE HYDROCHLORIDE 10 MILLIGRAM(S): 5 TABLET ORAL at 04:07

## 2022-10-01 RX ADMIN — OXYCODONE HYDROCHLORIDE 10 MILLIGRAM(S): 5 TABLET ORAL at 22:29

## 2022-10-01 RX ADMIN — OXYCODONE HYDROCHLORIDE 10 MILLIGRAM(S): 5 TABLET ORAL at 17:01

## 2022-10-01 RX ADMIN — ENOXAPARIN SODIUM 40 MILLIGRAM(S): 100 INJECTION SUBCUTANEOUS at 11:29

## 2022-10-01 RX ADMIN — OXYCODONE HYDROCHLORIDE 10 MILLIGRAM(S): 5 TABLET ORAL at 04:37

## 2022-10-01 RX ADMIN — Medication 500 MILLIGRAM(S): at 17:03

## 2022-10-01 NOTE — PROVIDER CONTACT NOTE (OTHER) - ASSESSMENT
Pt A&Ox4. VS otherwise stable. Pt resting comfortably in bed. pt denies SOB and chest pain. pt reporting 5/10 pain in L leg.

## 2022-10-02 LAB
ANION GAP SERPL CALC-SCNC: 11 MMOL/L — SIGNIFICANT CHANGE UP (ref 5–17)
BUN SERPL-MCNC: 10 MG/DL — SIGNIFICANT CHANGE UP (ref 7–23)
CALCIUM SERPL-MCNC: 8.6 MG/DL — SIGNIFICANT CHANGE UP (ref 8.4–10.5)
CHLORIDE SERPL-SCNC: 101 MMOL/L — SIGNIFICANT CHANGE UP (ref 96–108)
CO2 SERPL-SCNC: 25 MMOL/L — SIGNIFICANT CHANGE UP (ref 22–31)
CREAT SERPL-MCNC: 0.69 MG/DL — SIGNIFICANT CHANGE UP (ref 0.5–1.3)
EGFR: 105 ML/MIN/1.73M2 — SIGNIFICANT CHANGE UP
GLUCOSE SERPL-MCNC: 91 MG/DL — SIGNIFICANT CHANGE UP (ref 70–99)
HCT VFR BLD CALC: 28 % — LOW (ref 39–50)
HGB BLD-MCNC: 8.3 G/DL — LOW (ref 13–17)
MCHC RBC-ENTMCNC: 23.5 PG — LOW (ref 27–34)
MCHC RBC-ENTMCNC: 29.6 GM/DL — LOW (ref 32–36)
MCV RBC AUTO: 79.3 FL — LOW (ref 80–100)
NRBC # BLD: 0 /100 WBCS — SIGNIFICANT CHANGE UP (ref 0–0)
PLATELET # BLD AUTO: 419 K/UL — HIGH (ref 150–400)
POTASSIUM SERPL-MCNC: 3.9 MMOL/L — SIGNIFICANT CHANGE UP (ref 3.5–5.3)
POTASSIUM SERPL-SCNC: 3.9 MMOL/L — SIGNIFICANT CHANGE UP (ref 3.5–5.3)
RBC # BLD: 3.53 M/UL — LOW (ref 4.2–5.8)
RBC # FLD: 18.7 % — HIGH (ref 10.3–14.5)
SODIUM SERPL-SCNC: 137 MMOL/L — SIGNIFICANT CHANGE UP (ref 135–145)
WBC # BLD: 10.64 K/UL — HIGH (ref 3.8–10.5)
WBC # FLD AUTO: 10.64 K/UL — HIGH (ref 3.8–10.5)

## 2022-10-02 RX ADMIN — Medication 500 MILLIGRAM(S): at 17:27

## 2022-10-02 RX ADMIN — Medication 500 MILLIGRAM(S): at 06:23

## 2022-10-02 RX ADMIN — OXYCODONE HYDROCHLORIDE 10 MILLIGRAM(S): 5 TABLET ORAL at 23:28

## 2022-10-02 RX ADMIN — ENOXAPARIN SODIUM 40 MILLIGRAM(S): 100 INJECTION SUBCUTANEOUS at 11:05

## 2022-10-02 RX ADMIN — OXYCODONE HYDROCHLORIDE 10 MILLIGRAM(S): 5 TABLET ORAL at 22:58

## 2022-10-03 ENCOUNTER — TRANSCRIPTION ENCOUNTER (OUTPATIENT)
Age: 62
End: 2022-10-03

## 2022-10-03 VITALS
SYSTOLIC BLOOD PRESSURE: 120 MMHG | DIASTOLIC BLOOD PRESSURE: 77 MMHG | HEART RATE: 85 BPM | OXYGEN SATURATION: 97 % | TEMPERATURE: 98 F | RESPIRATION RATE: 18 BRPM

## 2022-10-03 PROCEDURE — 97110 THERAPEUTIC EXERCISES: CPT

## 2022-10-03 PROCEDURE — 85610 PROTHROMBIN TIME: CPT

## 2022-10-03 PROCEDURE — 97530 THERAPEUTIC ACTIVITIES: CPT

## 2022-10-03 PROCEDURE — 85027 COMPLETE CBC AUTOMATED: CPT

## 2022-10-03 PROCEDURE — 86901 BLOOD TYPING SEROLOGIC RH(D): CPT

## 2022-10-03 PROCEDURE — 73700 CT LOWER EXTREMITY W/O DYE: CPT | Mod: MA

## 2022-10-03 PROCEDURE — 87640 STAPH A DNA AMP PROBE: CPT

## 2022-10-03 PROCEDURE — 87040 BLOOD CULTURE FOR BACTERIA: CPT

## 2022-10-03 PROCEDURE — 76377 3D RENDER W/INTRP POSTPROCES: CPT

## 2022-10-03 PROCEDURE — C1713: CPT

## 2022-10-03 PROCEDURE — 86923 COMPATIBILITY TEST ELECTRIC: CPT

## 2022-10-03 PROCEDURE — 36415 COLL VENOUS BLD VENIPUNCTURE: CPT

## 2022-10-03 PROCEDURE — 80053 COMPREHEN METABOLIC PANEL: CPT

## 2022-10-03 PROCEDURE — 86900 BLOOD TYPING SEROLOGIC ABO: CPT

## 2022-10-03 PROCEDURE — C1769: CPT

## 2022-10-03 PROCEDURE — C1889: CPT

## 2022-10-03 PROCEDURE — 99285 EMERGENCY DEPT VISIT HI MDM: CPT

## 2022-10-03 PROCEDURE — 97161 PT EVAL LOW COMPLEX 20 MIN: CPT

## 2022-10-03 PROCEDURE — C9399: CPT

## 2022-10-03 PROCEDURE — 87637 SARSCOV2&INF A&B&RSV AMP PRB: CPT

## 2022-10-03 PROCEDURE — 85730 THROMBOPLASTIN TIME PARTIAL: CPT

## 2022-10-03 PROCEDURE — 76000 FLUOROSCOPY <1 HR PHYS/QHP: CPT

## 2022-10-03 PROCEDURE — P9016: CPT

## 2022-10-03 PROCEDURE — 86850 RBC ANTIBODY SCREEN: CPT

## 2022-10-03 PROCEDURE — 87641 MR-STAPH DNA AMP PROBE: CPT

## 2022-10-03 PROCEDURE — 97116 GAIT TRAINING THERAPY: CPT

## 2022-10-03 PROCEDURE — 80048 BASIC METABOLIC PNL TOTAL CA: CPT

## 2022-10-03 PROCEDURE — 97166 OT EVAL MOD COMPLEX 45 MIN: CPT

## 2022-10-03 PROCEDURE — 71045 X-RAY EXAM CHEST 1 VIEW: CPT

## 2022-10-03 PROCEDURE — 36430 TRANSFUSION BLD/BLD COMPNT: CPT

## 2022-10-03 PROCEDURE — 85025 COMPLETE CBC W/AUTO DIFF WBC: CPT

## 2022-10-03 RX ORDER — CEPHALEXIN 500 MG
1 CAPSULE ORAL
Qty: 40 | Refills: 0
Start: 2022-10-03 | End: 2022-10-12

## 2022-10-03 RX ORDER — CEPHALEXIN 500 MG
500 CAPSULE ORAL
Refills: 0 | Status: DISCONTINUED | OUTPATIENT
Start: 2022-10-03 | End: 2022-10-03

## 2022-10-03 RX ORDER — OXYCODONE HYDROCHLORIDE 5 MG/1
1 TABLET ORAL
Qty: 20 | Refills: 0
Start: 2022-10-03

## 2022-10-03 RX ORDER — APIXABAN 2.5 MG/1
1 TABLET, FILM COATED ORAL
Qty: 78 | Refills: 0
Start: 2022-10-03 | End: 2022-11-10

## 2022-10-03 RX ORDER — OXYCODONE HYDROCHLORIDE 5 MG/1
1 TABLET ORAL
Qty: 30 | Refills: 0
Start: 2022-10-03

## 2022-10-03 RX ORDER — APIXABAN 2.5 MG/1
1 TABLET, FILM COATED ORAL
Qty: 70 | Refills: 0
Start: 2022-10-03 | End: 2022-11-06

## 2022-10-03 RX ADMIN — OXYCODONE HYDROCHLORIDE 10 MILLIGRAM(S): 5 TABLET ORAL at 15:51

## 2022-10-03 RX ADMIN — ENOXAPARIN SODIUM 40 MILLIGRAM(S): 100 INJECTION SUBCUTANEOUS at 13:08

## 2022-10-03 RX ADMIN — OXYCODONE HYDROCHLORIDE 10 MILLIGRAM(S): 5 TABLET ORAL at 15:21

## 2022-10-03 RX ADMIN — Medication 500 MILLIGRAM(S): at 05:19

## 2022-10-03 NOTE — PROGRESS NOTE ADULT - TIME BILLING
Discussed treatment plan with patient at bedside.
Discussed treatment plan with patient at bedside.
Patient DCed home  continue current meds  PO as tolerated  follow up with ortho  weight bearing as per ortho  Patient aware of plan
Discussed treatment plan with patient at bedside.
Discussed treatment plan with patient at bedside.

## 2022-10-03 NOTE — DISCHARGE NOTE NURSING/CASE MANAGEMENT/SOCIAL WORK - PATIENT PORTAL LINK FT
You can access the FollowMyHealth Patient Portal offered by [facility] by registering at the following website: [email or web address]. By joining [facility]’s FollowMyHealth portal, you will also be able to view your health information using other applications (apps) compatible with our system.

## 2022-10-03 NOTE — PROGRESS NOTE ADULT - ASSESSMENT
60 yo male presents with a left tibial plateau fracture in exfix. S/P an Open reduction and internal fixation of the left tibial plateau.
61M s/p L tibial plateau fx, POD#4 ORIF    - NWB LLE  - DVT ppx: Lovenox, SCDs  - Long term Keflex until ex pin sites close  - IS  - Pain control  - Dispo: home    - PT/OT
61M w/ L Mira6 tibial plateau fx    Plan:    - OR today  - NWB LLE Ex fix  - DVT ppx held  - Pain control prn  - Medical management, continue home meds.  
A/p: 61y Male POD#3 s/p L Tibial Plateau Fx ORIF.  VSS. NAD.    PT/OT-NWB in [facility]  F/U AM labs tomorrow.  IS  DVT PPx: Lovenox and SCDs  Pain Control  Continue Current Tx.  Dispo planning to Home once PT cleared    Robinson Parada PA-C  Orthopedic Surgery Team  Team Pager: [phone]
62 yo male presents with a left tibial plateau fracture in exfix. S/P an Open reduction and internal fixation of the left tibial plateau.
60 yo male presents with a left tibial plateau fracture in exfix. S/P an Open reduction and internal fixation of the left tibial plateau.

## 2022-10-03 NOTE — PROGRESS NOTE ADULT - PROBLEM SELECTOR PLAN 2
Pain meds as needed  follow for oversedation  GI regime to prevent constipation

## 2022-10-03 NOTE — PROGRESS NOTE ADULT - SUBJECTIVE AND OBJECTIVE BOX
Orthopedic Surgery Progress Note     S: Patient seen and examined today. No acute events overnight. Pain is well controlled. Denies f/c, chest pain, shortness of breath, dizziness.    MEDICATIONS  (STANDING):  acetaminophen     Tablet .. 650 milliGRAM(s) Oral every 6 hours  influenza   Vaccine 0.5 milliLiter(s) IntraMuscular once  lactated ringers. 1000 milliLiter(s) (75 mL/Hr) IV Continuous <Continuous>  multivitamin 1 Tablet(s) Oral daily    MEDICATIONS  (PRN):  HYDROmorphone  Injectable 0.5 milliGRAM(s) IV Push every 4 hours PRN breakthrough  oxyCODONE    IR 10 milliGRAM(s) Oral every 4 hours PRN Severe Pain (7 - 10)  oxyCODONE    IR 5 milliGRAM(s) Oral every 4 hours PRN Moderate Pain (4 - 6)      Physical Exam:    Vital Signs Last 24 Hrs  T(C): 36.8 (29 Sep 2022 04:39), Max: 37 (28 Sep 2022 11:26)  T(F): 98.2 (29 Sep 2022 04:39), Max: 98.6 (28 Sep 2022 11:26)  HR: 86 (29 Sep 2022 04:39) (77 - 96)  BP: 148/79 (29 Sep 2022 04:39) (126/82 - 150/81)  BP(mean): --  RR: 18 (29 Sep 2022 04:39) (16 - 18)  SpO2: 97% (29 Sep 2022 04:39) (96% - 100%)    Parameters below as of 29 Sep 2022 04:39  Patient On (Oxygen Delivery Method): room air        09-28-22 @ 07:01  -  09-29-22 @ 06:18  --------------------------------------------------------  IN: 240 mL / OUT: 350 mL / NET: -110 mL      Gen  - NAD, no increased WOB  LLE  - Ex fx applied  - WWP, 2+ DP/PT pulses  - SILT  - AT/PT/EHL/FHL strength 2/2        LABS:                        8.1    7.49  )-----------( 524      ( 29 Sep 2022 02:05 )             28.3     09-29    140  |  103  |  13  ----------------------------<  92  4.1   |  29  |  0.89    Ca    9.2      29 Sep 2022 02:05    TPro  8.7<H>  /  Alb  4.3  /  TBili  0.3  /  DBili  x   /  AST  29  /  ALT  39  /  AlkPhos  232<H>  09-28  
Orthopedic Surgery Progress Note     S: Patient seen and examined today. No acute events overnight. Pain is well controlled. Denies f/c, chest pain, shortness of breath, dizziness.    MEDICATIONS  (STANDING):  acetaminophen   IVPB .. 1000 milliGRAM(s) IV Intermittent once  cephalexin 500 milliGRAM(s) Oral every 12 hours  enoxaparin Injectable 40 milliGRAM(s) SubCutaneous every 24 hours  influenza   Vaccine 0.5 milliLiter(s) IntraMuscular once  lactated ringers. 1000 milliLiter(s) (75 mL/Hr) IV Continuous <Continuous>    MEDICATIONS  (PRN):  acetaminophen     Tablet .. 650 milliGRAM(s) Oral every 6 hours PRN Mild Pain (1 - 3)  aluminum hydroxide/magnesium hydroxide/simethicone Suspension 30 milliLiter(s) Oral every 4 hours PRN Dyspepsia  HYDROmorphone  Injectable 0.5 milliGRAM(s) IV Push once PRN Severe Pain (7 - 10)  oxyCODONE    IR 5 milliGRAM(s) Oral every 4 hours PRN Moderate Pain (4 - 6)  oxyCODONE    IR 10 milliGRAM(s) Oral every 4 hours PRN Severe Pain (7 - 10)      Physical Exam:    Gen  NAD, no increased WOB  LLE  Knee immobilizer applies  WWP DP/PT 2+  SILT  AT/GS/EHL/FHL strength 2/2  B/l calves no skin changes, NTTP      Vital Signs Last 24 Hrs  T(C): 36.9 (03 Oct 2022 04:39), Max: 37.8 (02 Oct 2022 20:56)  T(F): 98.4 (03 Oct 2022 04:39), Max: 100 (02 Oct 2022 20:56)  HR: 89 (03 Oct 2022 04:39) (89 - 121)  BP: 130/70 (03 Oct 2022 04:39) (101/63 - 162/77)  BP(mean): --  RR: 18 (03 Oct 2022 04:39) (16 - 18)  SpO2: 96% (03 Oct 2022 04:39) (96% - 97%)    Parameters below as of 03 Oct 2022 04:39  Patient On (Oxygen Delivery Method): room air        10-01-22 @ 07:01  -  10-02-22 @ 07:00  --------------------------------------------------------  IN: 1210 mL / OUT: 1650 mL / NET: -440 mL    10-02-22 @ 07:01  -  10-03-22 @ 06:27  --------------------------------------------------------  IN: 1340 mL / OUT: 2250 mL / NET: -910 mL                    LABS:                        8.3    10.64 )-----------( 419      ( 02 Oct 2022 07:02 )             28.0     10-02    137  |  101  |  10  ----------------------------<  91  3.9   |  25  |  0.69    Ca    8.6      02 Oct 2022 07:03    
Orthopedic Surgery Progress Note    S: No acute events overnight, patient denies any pain.  No chest pain, fevers/chills, shortness of breath, light-headedness.    O:  Vital Signs Last 24 Hrs  T(C): 36.9 (03 Oct 2022 04:39), Max: 37.8 (02 Oct 2022 20:56)  T(F): 98.4 (03 Oct 2022 04:39), Max: 100 (02 Oct 2022 20:56)  HR: 89 (03 Oct 2022 04:39) (89 - 121)  BP: 130/70 (03 Oct 2022 04:39) (101/63 - 162/77)  BP(mean): --  RR: 18 (03 Oct 2022 04:39) (16 - 18)  SpO2: 96% (03 Oct 2022 04:39) (96% - 97%)    Parameters below as of 03 Oct 2022 04:39  Patient On (Oxygen Delivery Method): room air        Gen: Alert and oriented x3, NAD, patient resting comfortably in bed  LLE:  Bulky smith dressing C/D/I  LLE in knee immobilizer  EHL/FHL/TA/GS intact  SILT DP/SP/Sa, slightly decreased sensation over sural nerve distribution, improving     WWP distally    Labs:                        8.3    10.64 )-----------( 419      ( 02 Oct 2022 07:02 )             28.0                         7.8    11.28 )-----------( 443      ( 01 Oct 2022 13:12 )             27.0       10-02    137  |  101  |  10  ----------------------------<  91  3.9   |  25  |  0.69            A/P 61y year old male s/p ORIF L bicondylar tibial plateau fracture, now POD#4    Pain Control as needed  DVT PPX with lovenox  PT/OOB  Strict NWB LLE  Long term keflex (until prior ex fix pin sites close by secondary intention)  Dispo Planning- home, possibly today
Patient seen and examined at bedside. Pain is controlled. Pt feeling well.     Vital Signs Last 24 Hrs  T(C): 36.6 (09-30-22 @ 02:19), Max: 37.1 (09-29-22 @ 18:00)  T(F): 97.9 (09-30-22 @ 02:19), Max: 98.8 (09-29-22 @ 18:00)  HR: 79 (09-30-22 @ 02:19) (64 - 98)  BP: 111/70 (09-30-22 @ 02:19) (100/54 - 170/80)  BP(mean): 82 (09-30-22 @ 00:15) (72 - 83)  RR: 18 (09-30-22 @ 02:19) (16 - 18)  SpO2: 97% (09-30-22 @ 02:19) (95% - 99%)                        9.2    11.05 )-----------( 644      ( 29 Sep 2022 23:17 )             31.2     29 Sep 2022 23:17    138    |  104    |  12     ----------------------------<  145    4.6     |  22     |  0.87     Ca    8.9        29 Sep 2022 23:17    TPro  8.7    /  Alb  4.3    /  TBili  0.3    /  DBili  x      /  AST  29     /  ALT  39     /  AlkPhos  232    28 Sep 2022 13:22    PT/INR - ( 29 Sep 2022 02:05 )   PT: 14.7 sec;   INR: 1.28 ratio         PTT - ( 29 Sep 2022 02:05 )  PTT:27.6 sec    Exam:  Gen: NAD, resting comfortably  E  BJ  Dressing c/d/i  +EHL/FHL/TA/GS  SILT deep per/sup per/saph/sural  +DP  Calf NTTP b/l  Compartments soft and compressible    A/P:  61yMale L tibial plateau orif     -FU labs  -NWB LLE BJ  -Pain control  -PT/OT  -Ppx ABX  -DVT PE ppx- hold until POD1  -Incentive spirometry
ORTHO PROGRESS NOTE     Pt seen and examined at bedside, denies SOB, CP, Dizziness. N/V/D/HA.    No significant overnight events. Pain well controlled. Patient ambulated with PT, recommended for dispo to home pending clearance.     Vital Signs Last 24 Hrs  T(C): 37.4 (01 Oct 2022 04:38), Max: 37.4 (01 Oct 2022 04:38)  T(F): 99.4 (01 Oct 2022 04:38), Max: 99.4 (01 Oct 2022 04:38)  HR: 105 (01 Oct 2022 04:38) (96 - 115)  BP: 150/75 (01 Oct 2022 04:38) (143/77 - 162/90)  BP(mean): --  RR: 18 (01 Oct 2022 04:38) (18 - 18)  SpO2: 97% (01 Oct 2022 04:38) (95% - 97%)    Parameters below as of 01 Oct 2022 04:38  Patient On (Oxygen Delivery Method): room air      Gen: No apparent distress  LLE:  BJKI, dressing clean dry and intact.   BLE: motor intact EHL/FHL/TA/GS .  Sensation is grossly intact to light touch in the distal extremities.  Compartments are soft bilaterally.  Extremities are warm .  DP 2+ BLE     Labs:  CBC Full  -  ( 30 Sep 2022 06:04 )  WBC Count : 13.61 K/uL  RBC Count : 3.91 M/uL  Hemoglobin : 9.0 g/dL  Hematocrit : 31.4 %  Platelet Count - Automated : 530 K/uL  Mean Cell Volume : 80.3 fl  Mean Cell Hemoglobin : 23.0 pg  Mean Cell Hemoglobin Concentration : 28.7 gm/dL        09-30    137  |  101  |  14  ----------------------------<  133<H>  4.6   |  25  |  0.86    Ca    9.1      30 Sep 2022 06:01           A/P:  Pt is a 61 yr old male with left tibial plateau fx ORIF.     - Pain control/ Analgesia  - DVT ppx Lovenox  - PT/OT - NWB LLE in BJKI.   - Incentive Spirometer  - Patient now on PO Keflex suppressive antibiotic coverage as per Dr. Martinez.   - GI prophylaxis: Protonix  - notify Ortho for questions   - Dispo: Pending PT/OT clearance.     Trudi Espinosa PA-C  Orthopedic Surgery  Team Pager #[phone]
ORTHO ATTENDING POST OP    s/p ORIF L  proximal tibia  Jd dressing  knee immobilizer  NWB L  LE  elevation  Lovenox 40 QD  ANcef 1g x 24h  venodynes  CBC in RR and AM  OOB to chair  PT consult  f/u 2 weeks
Post op Day [3]    Patient resting without complaints.  No chest pain, SOB, N/V.    T(C): 37.4 (10-02-22 @ 08:10), Max: 38.2 (10-01-22 @ 21:17)  HR: 100 (10-02-22 @ 08:10) (97 - 119)  BP: 101/63 (10-02-22 @ 09:48) (101/63 - 162/73)  RR: 18 (10-02-22 @ 08:10) (16 - 18)  SpO2: 97% (10-02-22 @ 08:10) (95% - 97%)      Exam:  Alert and Oriented, No Acute Distress  Lower Extremities: L Knee  BJKI c/d/i  Compartments and Non-tender  +PF/DF/EHL/FHL  SILT  +DP Pulse                              8.3    10.64 )-----------( 419      ( 02 Oct 2022 07:02 )             28.0    10-02    137  |  101  |  10  ----------------------------<  91  3.9   |  25  |  0.69    Ca    8.6      02 Oct 2022 07:03        
61M pmhx of recently diagnosed colon CA (chemo delayed due to accident)), left tibial plateau fx diagnosed 3 wks ago with ex-fix applied presenting for planned ORIF by Mendoza gruber. Pt was seen at [place] and referred to ortho clinic for traumatic fx of tibial plateau. he was seen today and forwarded here for admission for surgery. Patient is s/p an Open reduction and internal fixation of the left tibial plateau. Patient seen resting comfortably. Continued complaint of pain at the surgical site.         MEDICATIONS  (STANDING):  acetaminophen   IVPB .. 1000 milliGRAM(s) IV Intermittent once  enoxaparin Injectable 40 milliGRAM(s) SubCutaneous every 24 hours  influenza   Vaccine 0.5 milliLiter(s) IntraMuscular once  lactated ringers. 1000 milliLiter(s) (75 mL/Hr) IV Continuous <Continuous>    MEDICATIONS  (PRN):  acetaminophen     Tablet .. 650 milliGRAM(s) Oral every 6 hours PRN Mild Pain (1 - 3)  HYDROmorphone  Injectable 0.5 milliGRAM(s) IV Push once PRN Severe Pain (7 - 10)  oxyCODONE    IR 5 milliGRAM(s) Oral every 4 hours PRN Moderate Pain (4 - 6)  oxyCODONE    IR 10 milliGRAM(s) Oral every 4 hours PRN Severe Pain (7 - 10)          VITALS:   T(C): 37.3 (09-30-22 @ 13:10), Max: 37.3 (09-30-22 @ 13:10)  HR: 102 (09-30-22 @ 13:10) (76 - 102)  BP: 147/74 (09-30-22 @ 13:10) (100/54 - 170/80)  RR: 18 (09-30-22 @ 13:10) (16 - 18)  SpO2: 97% (09-30-22 @ 13:10) (96% - 99%)  Wt(kg): --    PHYSICAL EXAM:  GENERAL: NAD, well nourished and conversant  HEAD:  Atraumatic  EYES: EOM, PERRLA, conjunctiva pink and sclera white  ENT: No tonsillar erythema, exudates, or enlargement, moist mucous membranes, good dentition, no lesions  NECK: Supple, No JVD, normal thyroid, carotids with normal upstrokes and no bruits  CHEST/LUNG: Clear to auscultation bilaterally, No rales, rhonchi, wheezing, or rubs  HEART: Regular rate and rhythm, No murmurs, rubs, or gallops  ABDOMEN: Soft, nondistended, no masses, guarding, tenderness or rebound, bowel sounds present  EXTREMITIES:  2+ Peripheral Pulses, No clubbing, cyanosis, or edema.   LYMPH: No lymphadenopathy noted  SKIN: No rashes or lesions  NERVOUS SYSTEM:  Alert & Oriented X3, normal cognitive function. Motor Strength 5/5 right upper and right lower.  5/5 left upper and left lower extremities, DTRs 2+ intact and symmetric      LABS:        CBC Full  -  ( 30 Sep 2022 06:04 )  WBC Count : 13.61 K/uL  RBC Count : 3.91 M/uL  Hemoglobin : 9.0 g/dL  Hematocrit : 31.4 %  Platelet Count - Automated : 530 K/uL  Mean Cell Volume : 80.3 fl  Mean Cell Hemoglobin : 23.0 pg  Mean Cell Hemoglobin Concentration : 28.7 gm/dL  Auto Neutrophil # : x  Auto Lymphocyte # : x  Auto Monocyte # : x  Auto Eosinophil # : x  Auto Basophil # : x  Auto Neutrophil % : x  Auto Lymphocyte % : x  Auto Monocyte % : x  Auto Eosinophil % : x  Auto Basophil % : x    09-30    137  |  101  |  14  ----------------------------<  133<H>  4.6   |  25  |  0.86    Ca    9.1      30 Sep 2022 06:01        PT/INR - ( 29 Sep 2022 02:05 )   PT: 14.7 sec;   INR: 1.28 ratio         PTT - ( 29 Sep 2022 02:05 )  PTT:27.6 sec    CAPILLARY BLOOD GLUCOSE          RADIOLOGY & ADDITIONAL TESTS:      
61M pmhx of recently diagnosed colon CA (chemo delayed due to accident)), left tibial plateau fx diagnosed 3 wks ago with ex-fix applied presenting for planned ORIF by Mendoza gruber. Pt was seen at [place] and referred to ortho clinic for traumatic fx of tibial plateau. he was seen today and forwarded here for admission for surgery. Patient is currently in exfix and is scheduled for an Open reduction and internal fixation of the left tibial plateau. Patient seen resting comfortably..      MEDICATIONS  (STANDING):  acetaminophen     Tablet .. 650 milliGRAM(s) Oral every 6 hours  chlorhexidine 2% Cloths 1 Application(s) Topical daily  influenza   Vaccine 0.5 milliLiter(s) IntraMuscular once  lactated ringers. 1000 milliLiter(s) (75 mL/Hr) IV Continuous <Continuous>  multivitamin 1 Tablet(s) Oral daily    MEDICATIONS  (PRN):  HYDROmorphone  Injectable 0.5 milliGRAM(s) IV Push every 4 hours PRN breakthrough  oxyCODONE    IR 10 milliGRAM(s) Oral every 4 hours PRN Severe Pain (7 - 10)  oxyCODONE    IR 5 milliGRAM(s) Oral every 4 hours PRN Moderate Pain (4 - 6)          VITALS:   T(C): 36.7 (09-29-22 @ 13:25), Max: 36.9 (09-28-22 @ 16:52)  HR: 80 (09-29-22 @ 13:25) (64 - 86)  BP: 148/79 (09-29-22 @ 13:25) (137/77 - 164/81)  RR: 18 (09-29-22 @ 13:25) (18 - 18)  SpO2: 98% (09-29-22 @ 13:25) (95% - 99%)  Wt(kg): --        LABS:        CBC Full  -  ( 29 Sep 2022 02:05 )  WBC Count : 7.49 K/uL  RBC Count : 3.67 M/uL  Hemoglobin : 8.1 g/dL  Hematocrit : 28.3 %  Platelet Count - Automated : 524 K/uL  Mean Cell Volume : 77.1 fl  Mean Cell Hemoglobin : 22.1 pg  Mean Cell Hemoglobin Concentration : 28.6 gm/dL  Auto Neutrophil # : x  Auto Lymphocyte # : x  Auto Monocyte # : x  Auto Eosinophil # : x  Auto Basophil # : x  Auto Neutrophil % : x  Auto Lymphocyte % : x  Auto Monocyte % : x  Auto Eosinophil % : x  Auto Basophil % : x    09-29    140  |  103  |  13  ----------------------------<  92  4.1   |  29  |  0.89    Ca    9.2      29 Sep 2022 02:05    TPro  8.7<H>  /  Alb  4.3  /  TBili  0.3  /  DBili  x   /  AST  29  /  ALT  39  /  AlkPhos  232<H>  09-28    LIVER FUNCTIONS - ( 28 Sep 2022 13:22 )  Alb: 4.3 g/dL / Pro: 8.7 g/dL / ALK PHOS: 232 U/L / ALT: 39 U/L / AST: 29 U/L / GGT: x           PT/INR - ( 29 Sep 2022 02:05 )   PT: 14.7 sec;   INR: 1.28 ratio         PTT - ( 29 Sep 2022 02:05 )  PTT:27.6 sec    CAPILLARY BLOOD GLUCOSE          RADIOLOGY & ADDITIONAL TESTS:      
61M pmhx of recently diagnosed colon CA (chemo delayed due to accident)), left tibial plateau fx diagnosed 3 wks ago with ex-fix applied presenting for planned ORIF by Mendoza gruber. Pt was seen at [place] and referred to ortho clinic for traumatic fx of tibial plateau. he was seen today and forwarded here for admission for surgery. Patient is s/p an Open reduction and internal fixation of the left tibial plateau. Patient seen resting comfortably. Continued complaint of pain at the surgical site. Orthostasis has improved and Patient was able to participate with physical therapy         MEDICATIONS  (STANDING):  acetaminophen   IVPB .. 1000 milliGRAM(s) IV Intermittent once  cephalexin 500 milliGRAM(s) Oral every 12 hours  enoxaparin Injectable 40 milliGRAM(s) SubCutaneous every 24 hours  influenza   Vaccine 0.5 milliLiter(s) IntraMuscular once  lactated ringers. 1000 milliLiter(s) (75 mL/Hr) IV Continuous <Continuous>    MEDICATIONS  (PRN):  acetaminophen     Tablet .. 650 milliGRAM(s) Oral every 6 hours PRN Mild Pain (1 - 3)  HYDROmorphone  Injectable 0.5 milliGRAM(s) IV Push once PRN Severe Pain (7 - 10)  oxyCODONE    IR 5 milliGRAM(s) Oral every 4 hours PRN Moderate Pain (4 - 6)  oxyCODONE    IR 10 milliGRAM(s) Oral every 4 hours PRN Severe Pain (7 - 10)          VITALS:   T(C): 37.2 (10-02-22 @ 12:15), Max: 38.2 (10-01-22 @ 21:17)  HR: 99 (10-02-22 @ 12:15) (97 - 119)  BP: 129/74 (10-02-22 @ 12:15) (101/63 - 162/73)  RR: 18 (10-02-22 @ 12:15) (16 - 18)  SpO2: 96% (10-02-22 @ 12:15) (95% - 97%)  Wt(kg): --    PHYSICAL EXAM:  GENERAL: NAD, well nourished and conversant  HEAD:  Atraumatic  EYES: EOM, PERRLA, conjunctiva pink and sclera white  ENT: No tonsillar erythema, exudates, or enlargement, moist mucous membranes, good dentition, no lesions  NECK: Supple, No JVD, normal thyroid, carotids with normal upstrokes and no bruits  CHEST/LUNG: Clear to auscultation bilaterally, No rales, rhonchi, wheezing, or rubs  HEART: Regular rate and rhythm, No murmurs, rubs, or gallops  ABDOMEN: Soft, nondistended, no masses, guarding, tenderness or rebound, bowel sounds present  EXTREMITIES:  2+ Peripheral Pulses, No clubbing, cyanosis, or edema.   LYMPH: No lymphadenopathy noted  SKIN: No rashes or lesions  NERVOUS SYSTEM:  Alert & Oriented X3, normal cognitive function. Motor Strength 5/5 right upper and right lower.  5/5 left upper and left lower extremities, DTRs 2+ intact and symmetric    LABS:        CBC Full  -  ( 02 Oct 2022 07:02 )  WBC Count : 10.64 K/uL  RBC Count : 3.53 M/uL  Hemoglobin : 8.3 g/dL  Hematocrit : 28.0 %  Platelet Count - Automated : 419 K/uL  Mean Cell Volume : 79.3 fl  Mean Cell Hemoglobin : 23.5 pg  Mean Cell Hemoglobin Concentration : 29.6 gm/dL  Auto Neutrophil # : x  Auto Lymphocyte # : x  Auto Monocyte # : x  Auto Eosinophil # : x  Auto Basophil # : x  Auto Neutrophil % : x  Auto Lymphocyte % : x  Auto Monocyte % : x  Auto Eosinophil % : x  Auto Basophil % : x    10-02    137  |  101  |  10  ----------------------------<  91  3.9   |  25  |  0.69    Ca    8.6      02 Oct 2022 07:03            CAPILLARY BLOOD GLUCOSE          RADIOLOGY & ADDITIONAL TESTS:      
61M pmhx of recently diagnosed colon CA (chemo delayed due to accident)), left tibial plateau fx diagnosed 3 wks ago with ex-fix applied presenting for planned ORIF by Mendoza gruber. Pt was seen at [place] and referred to ortho clinic for traumatic fx of tibial plateau. he was seen today and forwarded here for admission for surgery. Patient is s/p an Open reduction and internal fixation of the left tibial plateau. Patient seen resting comfortably. Continued complaint of pain at the surgical site.  Patient found to be orthostatic.        MEDICATIONS  (STANDING):  acetaminophen   IVPB .. 1000 milliGRAM(s) IV Intermittent once  cephalexin 500 milliGRAM(s) Oral every 12 hours  enoxaparin Injectable 40 milliGRAM(s) SubCutaneous every 24 hours  influenza   Vaccine 0.5 milliLiter(s) IntraMuscular once  lactated ringers. 1000 milliLiter(s) (75 mL/Hr) IV Continuous <Continuous>    MEDICATIONS  (PRN):  acetaminophen     Tablet .. 650 milliGRAM(s) Oral every 6 hours PRN Mild Pain (1 - 3)  HYDROmorphone  Injectable 0.5 milliGRAM(s) IV Push once PRN Severe Pain (7 - 10)  oxyCODONE    IR 5 milliGRAM(s) Oral every 4 hours PRN Moderate Pain (4 - 6)  oxyCODONE    IR 10 milliGRAM(s) Oral every 4 hours PRN Severe Pain (7 - 10)          VITALS:   T(C): 36.6 (10-01-22 @ 08:45), Max: 37.4 (10-01-22 @ 04:38)  HR: 106 (10-01-22 @ 12:34) (105 - 115)  BP: 144/81 (10-01-22 @ 08:45) (144/81 - 162/90)  RR: 18 (10-01-22 @ 12:34) (18 - 18)  SpO2: 95% (10-01-22 @ 12:34) (95% - 97%)  Wt(kg): --    PHYSICAL EXAM:  GENERAL: NAD, well nourished and conversant  HEAD:  Atraumatic  EYES: EOM, PERRLA, conjunctiva pink and sclera white  ENT: No tonsillar erythema, exudates, or enlargement, moist mucous membranes, good dentition, no lesions  NECK: Supple, No JVD, normal thyroid, carotids with normal upstrokes and no bruits  CHEST/LUNG: Clear to auscultation bilaterally, No rales, rhonchi, wheezing, or rubs  HEART: Regular rate and rhythm, No murmurs, rubs, or gallops  ABDOMEN: Soft, nondistended, no masses, guarding, tenderness or rebound, bowel sounds present  EXTREMITIES:  2+ Peripheral Pulses, No clubbing, cyanosis, or edema.   LYMPH: No lymphadenopathy noted  SKIN: No rashes or lesions  NERVOUS SYSTEM:  Alert & Oriented X3, normal cognitive function. Motor Strength 5/5 right upper and right lower.  5/5 left upper and left lower extremities, DTRs 2+ intact and symmetric    LABS:        CBC Full  -  ( 01 Oct 2022 13:12 )  WBC Count : 11.28 K/uL  RBC Count : 3.42 M/uL  Hemoglobin : 7.8 g/dL  Hematocrit : 27.0 %  Platelet Count - Automated : 443 K/uL  Mean Cell Volume : 78.9 fl  Mean Cell Hemoglobin : 22.8 pg  Mean Cell Hemoglobin Concentration : 28.9 gm/dL  Auto Neutrophil # : x  Auto Lymphocyte # : x  Auto Monocyte # : x  Auto Eosinophil # : x  Auto Basophil # : x  Auto Neutrophil % : x  Auto Lymphocyte % : x  Auto Monocyte % : x  Auto Eosinophil % : x  Auto Basophil % : x    09-30    137  |  101  |  14  ----------------------------<  133<H>  4.6   |  25  |  0.86    Ca    9.1      30 Sep 2022 06:01            CAPILLARY BLOOD GLUCOSE          RADIOLOGY & ADDITIONAL TESTS:      
61M pmhx of recently diagnosed colon CA (chemo delayed due to accident)), left tibial plateau fx diagnosed 3 wks ago with ex-fix applied presenting for planned ORIF by Mendoza gruber. Pt was seen at [place] and referred to ortho clinic for traumatic fx of tibial plateau. he was seen today and forwarded here for admission for surgery. Patient is s/p an Open reduction and internal fixation of the left tibial plateau. Patient seen resting comfortably. Continued complaint of pain at the surgical site. Orthostasis has improved and Patient was able to participate with physical therapy         MEDICATIONS  (STANDING):  acetaminophen   IVPB .. 1000 milliGRAM(s) IV Intermittent once  cephalexin 500 milliGRAM(s) Oral every 12 hours  enoxaparin Injectable 40 milliGRAM(s) SubCutaneous every 24 hours  influenza   Vaccine 0.5 milliLiter(s) IntraMuscular once  lactated ringers. 1000 milliLiter(s) (75 mL/Hr) IV Continuous <Continuous>    MEDICATIONS  (PRN):  acetaminophen     Tablet .. 650 milliGRAM(s) Oral every 6 hours PRN Mild Pain (1 - 3)  HYDROmorphone  Injectable 0.5 milliGRAM(s) IV Push once PRN Severe Pain (7 - 10)  oxyCODONE    IR 5 milliGRAM(s) Oral every 4 hours PRN Moderate Pain (4 - 6)  oxyCODONE    IR 10 milliGRAM(s) Oral every 4 hours PRN Severe Pain (7 - 10)          VITALS:   T(C): 37.2 (10-02-22 @ 12:15), Max: 38.2 (10-01-22 @ 21:17)  HR: 99 (10-02-22 @ 12:15) (97 - 119)  BP: 129/74 (10-02-22 @ 12:15) (101/63 - 162/73)  RR: 18 (10-02-22 @ 12:15) (16 - 18)  SpO2: 96% (10-02-22 @ 12:15) (95% - 97%)  Wt(kg): --    PHYSICAL EXAM:  GENERAL: NAD, well nourished and conversant  HEAD:  Atraumatic  EYES: EOM, PERRLA, conjunctiva pink and sclera white  ENT: No tonsillar erythema, exudates, or enlargement, moist mucous membranes, good dentition, no lesions  NECK: Supple, No JVD, normal thyroid, carotids with normal upstrokes and no bruits  CHEST/LUNG: Clear to auscultation bilaterally, No rales, rhonchi, wheezing, or rubs  HEART: Regular rate and rhythm, No murmurs, rubs, or gallops  ABDOMEN: Soft, nondistended, no masses, guarding, tenderness or rebound, bowel sounds present  EXTREMITIES:  2+ Peripheral Pulses, No clubbing, cyanosis, or edema.   LYMPH: No lymphadenopathy noted  SKIN: No rashes or lesions  NERVOUS SYSTEM:  Alert & Oriented X3, normal cognitive function. Motor Strength 5/5 right upper and right lower.  5/5 left upper and left lower extremities, DTRs 2+ intact and symmetric    LABS:        CBC Full  -  ( 02 Oct 2022 07:02 )  WBC Count : 10.64 K/uL  RBC Count : 3.53 M/uL  Hemoglobin : 8.3 g/dL  Hematocrit : 28.0 %  Platelet Count - Automated : 419 K/uL  Mean Cell Volume : 79.3 fl  Mean Cell Hemoglobin : 23.5 pg  Mean Cell Hemoglobin Concentration : 29.6 gm/dL  Auto Neutrophil # : x  Auto Lymphocyte # : x  Auto Monocyte # : x  Auto Eosinophil # : x  Auto Basophil # : x  Auto Neutrophil % : x  Auto Lymphocyte % : x  Auto Monocyte % : x  Auto Eosinophil % : x  Auto Basophil % : x    10-02    137  |  101  |  10  ----------------------------<  91  3.9   |  25  |  0.69    Ca    8.6      02 Oct 2022 07:03            CAPILLARY BLOOD GLUCOSE          RADIOLOGY & ADDITIONAL TESTS:

## 2022-10-03 NOTE — PROGRESS NOTE ADULT - REASON FOR ADMISSION
L Tibial plateau ORIF

## 2022-10-03 NOTE — PROGRESS NOTE ADULT - PROBLEM SELECTOR PROBLEM 1
Tibial plateau fracture, left

## 2022-10-03 NOTE — PROGRESS NOTE ADULT - PROBLEM SELECTOR PLAN 3
BP has improved after transfusion  liberalize sodium in diet  will continue to monitor  IV fluids as needed
would start IV hydration  liberalize sodium in diet  will continue to monitor
BP has improved after transfusion  liberalize sodium in diet  will continue to monitor  IV fluids as needed

## 2022-10-03 NOTE — DISCHARGE NOTE NURSING/CASE MANAGEMENT/SOCIAL WORK - NSDCPEFALRISK_GEN_ALL_CORE
For information on Fall & Injury Prevention, visit: [email or web address] OR  [email or web address] OR  https://www.cdc.gov/steadi/patient.html

## 2022-10-03 NOTE — PROGRESS NOTE ADULT - ATTENDING COMMENTS
PT. DVT ppx. f/u medicine. f/u labs. OOB
PT. DVT ppx. f/u labs. DC planning
PT. DVT ppx. f/u medicine. f/u labs. OOB
PT. DVT ppx. f/u medicine. f/u labs. OOB
L proximal tibia fx.  s/p ex fix.  swelling improved.  For ORIF.  R/B/A discussed

## 2022-10-03 NOTE — PROGRESS NOTE ADULT - PROBLEM SELECTOR PLAN 1
Patient scheduled for an Open reduction and internal fixation of the left tibial plateau  No contraindication to scheduled procedure  NPO for procedure  DVT and GI prophylaxis
Tolerate the procedure well  PO as tolerated  weight bearing as per ortho  DVT and GI prophylaxis

## 2022-10-04 PROBLEM — C18.9 MALIGNANT NEOPLASM OF COLON, UNSPECIFIED: Chronic | Status: ACTIVE | Noted: 2022-09-28

## 2022-10-04 LAB
CULTURE RESULTS: SIGNIFICANT CHANGE UP
CULTURE RESULTS: SIGNIFICANT CHANGE UP
SPECIMEN SOURCE: SIGNIFICANT CHANGE UP
SPECIMEN SOURCE: SIGNIFICANT CHANGE UP

## 2022-10-11 ENCOUNTER — APPOINTMENT (OUTPATIENT)
Dept: INFUSION THERAPY | Facility: CLINIC | Age: 62
End: 2022-10-11

## 2022-10-13 ENCOUNTER — APPOINTMENT (OUTPATIENT)
Dept: INFUSION THERAPY | Facility: CLINIC | Age: 62
End: 2022-10-13

## 2022-10-13 ENCOUNTER — RESULT REVIEW (OUTPATIENT)
Age: 62
End: 2022-10-13

## 2022-10-13 ENCOUNTER — APPOINTMENT (OUTPATIENT)
Dept: HEMATOLOGY ONCOLOGY | Facility: CLINIC | Age: 62
End: 2022-10-13

## 2022-10-13 VITALS
HEART RATE: 96 BPM | TEMPERATURE: 97.8 F | DIASTOLIC BLOOD PRESSURE: 89 MMHG | SYSTOLIC BLOOD PRESSURE: 143 MMHG | RESPIRATION RATE: 18 BRPM | OXYGEN SATURATION: 99 %

## 2022-10-13 LAB
ALBUMIN SERPL ELPH-MCNC: 4 G/DL — SIGNIFICANT CHANGE UP (ref 3.3–5)
ALP SERPL-CCNC: 254 U/L — HIGH (ref 40–120)
ALT FLD-CCNC: 52 U/L — HIGH (ref 10–45)
ANION GAP SERPL CALC-SCNC: 13 MMOL/L — SIGNIFICANT CHANGE UP (ref 5–17)
AST SERPL-CCNC: 33 U/L — SIGNIFICANT CHANGE UP (ref 10–40)
BASOPHILS # BLD AUTO: 0.04 K/UL — SIGNIFICANT CHANGE UP (ref 0–0.2)
BASOPHILS NFR BLD AUTO: 0.5 % — SIGNIFICANT CHANGE UP (ref 0–2)
BILIRUB SERPL-MCNC: 0.3 MG/DL — SIGNIFICANT CHANGE UP (ref 0.2–1.2)
BUN SERPL-MCNC: 10 MG/DL — SIGNIFICANT CHANGE UP (ref 7–23)
CALCIUM SERPL-MCNC: 9.5 MG/DL — SIGNIFICANT CHANGE UP (ref 8.4–10.5)
CHLORIDE SERPL-SCNC: 100 MMOL/L — SIGNIFICANT CHANGE UP (ref 96–108)
CO2 SERPL-SCNC: 27 MMOL/L — SIGNIFICANT CHANGE UP (ref 22–31)
CREAT SERPL-MCNC: 0.77 MG/DL — SIGNIFICANT CHANGE UP (ref 0.5–1.3)
EGFR: 102 ML/MIN/1.73M2 — SIGNIFICANT CHANGE UP
EOSINOPHIL # BLD AUTO: 0.18 K/UL — SIGNIFICANT CHANGE UP (ref 0–0.5)
EOSINOPHIL NFR BLD AUTO: 2 % — SIGNIFICANT CHANGE UP (ref 0–6)
GLUCOSE SERPL-MCNC: 102 MG/DL — HIGH (ref 70–99)
HCT VFR BLD CALC: 33.2 % — LOW (ref 39–50)
HGB BLD-MCNC: 9.7 G/DL — LOW (ref 13–17)
IMM GRANULOCYTES NFR BLD AUTO: 0.6 % — SIGNIFICANT CHANGE UP (ref 0–0.9)
LYMPHOCYTES # BLD AUTO: 1.76 K/UL — SIGNIFICANT CHANGE UP (ref 1–3.3)
LYMPHOCYTES # BLD AUTO: 19.9 % — SIGNIFICANT CHANGE UP (ref 13–44)
MAGNESIUM SERPL-MCNC: 2.1 MG/DL — SIGNIFICANT CHANGE UP (ref 1.6–2.6)
MCHC RBC-ENTMCNC: 23.3 PG — LOW (ref 27–34)
MCHC RBC-ENTMCNC: 29.2 GM/DL — LOW (ref 32–36)
MCV RBC AUTO: 79.8 FL — LOW (ref 80–100)
MONOCYTES # BLD AUTO: 0.7 K/UL — SIGNIFICANT CHANGE UP (ref 0–0.9)
MONOCYTES NFR BLD AUTO: 7.9 % — SIGNIFICANT CHANGE UP (ref 2–14)
NEUTROPHILS # BLD AUTO: 6.11 K/UL — SIGNIFICANT CHANGE UP (ref 1.8–7.4)
NEUTROPHILS NFR BLD AUTO: 69.1 % — SIGNIFICANT CHANGE UP (ref 43–77)
NRBC # BLD: 0 /100 WBCS — SIGNIFICANT CHANGE UP (ref 0–0)
PLATELET # BLD AUTO: 421 K/UL — HIGH (ref 150–400)
POTASSIUM SERPL-MCNC: 4.2 MMOL/L — SIGNIFICANT CHANGE UP (ref 3.5–5.3)
POTASSIUM SERPL-SCNC: 4.2 MMOL/L — SIGNIFICANT CHANGE UP (ref 3.5–5.3)
PROT SERPL-MCNC: 7.7 G/DL — SIGNIFICANT CHANGE UP (ref 6–8.3)
RBC # BLD: 4.16 M/UL — LOW (ref 4.2–5.8)
RBC # FLD: 18.4 % — HIGH (ref 10.3–14.5)
SODIUM SERPL-SCNC: 140 MMOL/L — SIGNIFICANT CHANGE UP (ref 135–145)
WBC # BLD: 8.84 K/UL — SIGNIFICANT CHANGE UP (ref 3.8–10.5)
WBC # FLD AUTO: 8.84 K/UL — SIGNIFICANT CHANGE UP (ref 3.8–10.5)

## 2022-10-13 PROCEDURE — 99214 OFFICE O/P EST MOD 30 MIN: CPT

## 2022-10-14 LAB — CEA SERPL-MCNC: 1359 NG/ML — HIGH (ref 0–3.8)

## 2022-10-17 ENCOUNTER — NON-APPOINTMENT (OUTPATIENT)
Age: 62
End: 2022-10-17

## 2022-10-18 ENCOUNTER — APPOINTMENT (OUTPATIENT)
Dept: ORTHOPEDIC SURGERY | Facility: CLINIC | Age: 62
End: 2022-10-18

## 2022-10-18 VITALS
DIASTOLIC BLOOD PRESSURE: 89 MMHG | HEART RATE: 114 BPM | WEIGHT: 233 LBS | BODY MASS INDEX: 32.62 KG/M2 | HEIGHT: 71 IN | SYSTOLIC BLOOD PRESSURE: 143 MMHG

## 2022-10-18 PROCEDURE — 99024 POSTOP FOLLOW-UP VISIT: CPT

## 2022-10-19 ENCOUNTER — APPOINTMENT (OUTPATIENT)
Dept: INFUSION THERAPY | Facility: CLINIC | Age: 62
End: 2022-10-19

## 2022-10-19 ENCOUNTER — RESULT REVIEW (OUTPATIENT)
Age: 62
End: 2022-10-19

## 2022-10-19 VITALS
TEMPERATURE: 97.1 F | HEART RATE: 106 BPM | RESPIRATION RATE: 18 BRPM | SYSTOLIC BLOOD PRESSURE: 136 MMHG | DIASTOLIC BLOOD PRESSURE: 85 MMHG | OXYGEN SATURATION: 98 %

## 2022-10-19 LAB
ALBUMIN SERPL ELPH-MCNC: 4 G/DL — SIGNIFICANT CHANGE UP (ref 3.3–5)
ALP SERPL-CCNC: 219 U/L — HIGH (ref 40–120)
ALT FLD-CCNC: 29 U/L — SIGNIFICANT CHANGE UP (ref 10–45)
ANION GAP SERPL CALC-SCNC: 13 MMOL/L — SIGNIFICANT CHANGE UP (ref 5–17)
AST SERPL-CCNC: 30 U/L — SIGNIFICANT CHANGE UP (ref 10–40)
BASOPHILS # BLD AUTO: 0.07 K/UL — SIGNIFICANT CHANGE UP (ref 0–0.2)
BASOPHILS NFR BLD AUTO: 0.8 % — SIGNIFICANT CHANGE UP (ref 0–2)
BILIRUB SERPL-MCNC: 0.3 MG/DL — SIGNIFICANT CHANGE UP (ref 0.2–1.2)
BUN SERPL-MCNC: 10 MG/DL — SIGNIFICANT CHANGE UP (ref 7–23)
CALCIUM SERPL-MCNC: 9.4 MG/DL — SIGNIFICANT CHANGE UP (ref 8.4–10.5)
CHLORIDE SERPL-SCNC: 100 MMOL/L — SIGNIFICANT CHANGE UP (ref 96–108)
CO2 SERPL-SCNC: 25 MMOL/L — SIGNIFICANT CHANGE UP (ref 22–31)
CREAT SERPL-MCNC: 0.73 MG/DL — SIGNIFICANT CHANGE UP (ref 0.5–1.3)
EGFR: 104 ML/MIN/1.73M2 — SIGNIFICANT CHANGE UP
EOSINOPHIL # BLD AUTO: 0.11 K/UL — SIGNIFICANT CHANGE UP (ref 0–0.5)
EOSINOPHIL NFR BLD AUTO: 1.3 % — SIGNIFICANT CHANGE UP (ref 0–6)
GLUCOSE SERPL-MCNC: 96 MG/DL — SIGNIFICANT CHANGE UP (ref 70–99)
HCT VFR BLD CALC: 31.2 % — LOW (ref 39–50)
HGB BLD-MCNC: 9.4 G/DL — LOW (ref 13–17)
IMM GRANULOCYTES NFR BLD AUTO: 0.2 % — SIGNIFICANT CHANGE UP (ref 0–0.9)
LYMPHOCYTES # BLD AUTO: 1.41 K/UL — SIGNIFICANT CHANGE UP (ref 1–3.3)
LYMPHOCYTES # BLD AUTO: 16.7 % — SIGNIFICANT CHANGE UP (ref 13–44)
MAGNESIUM SERPL-MCNC: 1.9 MG/DL — SIGNIFICANT CHANGE UP (ref 1.6–2.6)
MCHC RBC-ENTMCNC: 23.7 PG — LOW (ref 27–34)
MCHC RBC-ENTMCNC: 30.1 GM/DL — LOW (ref 32–36)
MCV RBC AUTO: 78.6 FL — LOW (ref 80–100)
MONOCYTES # BLD AUTO: 0.71 K/UL — SIGNIFICANT CHANGE UP (ref 0–0.9)
MONOCYTES NFR BLD AUTO: 8.4 % — SIGNIFICANT CHANGE UP (ref 2–14)
NEUTROPHILS # BLD AUTO: 6.13 K/UL — SIGNIFICANT CHANGE UP (ref 1.8–7.4)
NEUTROPHILS NFR BLD AUTO: 72.6 % — SIGNIFICANT CHANGE UP (ref 43–77)
NRBC # BLD: 0 /100 WBCS — SIGNIFICANT CHANGE UP (ref 0–0)
PLATELET # BLD AUTO: 427 K/UL — HIGH (ref 150–400)
POTASSIUM SERPL-MCNC: 4.3 MMOL/L — SIGNIFICANT CHANGE UP (ref 3.5–5.3)
POTASSIUM SERPL-SCNC: 4.3 MMOL/L — SIGNIFICANT CHANGE UP (ref 3.5–5.3)
PROT SERPL-MCNC: 7.3 G/DL — SIGNIFICANT CHANGE UP (ref 6–8.3)
RBC # BLD: 3.97 M/UL — LOW (ref 4.2–5.8)
RBC # FLD: 17.4 % — HIGH (ref 10.3–14.5)
SODIUM SERPL-SCNC: 137 MMOL/L — SIGNIFICANT CHANGE UP (ref 135–145)
WBC # BLD: 8.45 K/UL — SIGNIFICANT CHANGE UP (ref 3.8–10.5)
WBC # FLD AUTO: 8.45 K/UL — SIGNIFICANT CHANGE UP (ref 3.8–10.5)

## 2022-10-20 DIAGNOSIS — R11.2 NAUSEA WITH VOMITING, UNSPECIFIED: ICD-10-CM

## 2022-10-20 DIAGNOSIS — Z51.11 ENCOUNTER FOR ANTINEOPLASTIC CHEMOTHERAPY: ICD-10-CM

## 2022-10-21 ENCOUNTER — APPOINTMENT (OUTPATIENT)
Dept: INFUSION THERAPY | Facility: CLINIC | Age: 62
End: 2022-10-21

## 2022-10-24 NOTE — HISTORY OF PRESENT ILLNESS
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 On 08/18/22, he had a CT C/A/P which showed  irregular colonic wall thickening of the proximal sigmoid colon with stranding of the surrounding paracolic fat. Regional lymph nodes are identified adjacent to the colonic mass measuring up to 1.2 cm. Multiple masses identified within the hepatic parenchyma noted to be 3.9 cm, suspicious for hepatic parenchymal neoplastic disease. 3.4 cm left adrenal mass, suspicious for neoplastic disease. There is a 1.0 cm soft tissue nodule identified along the right lateral wall of the distal trachea, superior to the tracheal bifurcation. Lung nodules suspicious for lung parenchymal neoplastic disease.  \par \par On 8/18/22, he had a colonoscopy that showed a frond-like/villous and ulcerated non-obstructing large mass was found in the descending colon. The mass was circumferential. The mass measured five cm in length. Pathology showed invasive adenocarcinoma, moderately differentiated. No loss of nuclear expression of MMR proteins (MLH1, MSH2, MSH6, and PMS2). These results show low probability of microsatellite instability-high (MSI-H). On 8/19/22, he had a liver, core biopsy which showed metastatic adenocarcinoma, consistent with metastasis from colonic primary.\par \par  [de-identified] : no MMR deficiency [de-identified] : Medical Hx: DVT 14 years ago (unknown cause) \par Surgical Hx: left eye sx; left torn meniscus repaired \par Family Hx: Mother leukemia\par Social Hx: never smoker; ETOH never; lives alone; single; works PT at [employer]; daughters close by [de-identified] : Today he denies BRBPR and pain. No abdominal discomfort.\par \par S/p fall 9/9/22 from table and fractured knee, lost his balance. 9/9/22 temp pins placed to stabilize knee and 9/29/22 s/p ORIF of the left tibial plateau with 18 screws. Completes antibiotics tomorrow, remains on anticoagulant Lovenox. In wheelchair today, uses crutches around the house and is able to get around well. No PT at this time, will see surgeon next week. \par \par Pt is planned for FOLFIOXIRI q 2 weeks starting next week.

## 2022-10-24 NOTE — REVIEW OF SYSTEMS
[Negative] : Allergic/Immunologic [Recent Change In Weight] : ~T no recent weight change [Chest Pain] : no chest pain [Shortness Of Breath] : no shortness of breath [Abdominal Pain] : no abdominal pain [Confused] : no confusion [FreeTextEntry9] : left leg in ace bandage and leg immobilizer  [de-identified] : right chest wall port palpable

## 2022-10-29 ENCOUNTER — OUTPATIENT (OUTPATIENT)
Dept: OUTPATIENT SERVICES | Facility: HOSPITAL | Age: 62
LOS: 1 days | Discharge: HOME | End: 2022-10-29

## 2022-10-29 DIAGNOSIS — C18.9 MALIGNANT NEOPLASM OF COLON, UNSPECIFIED: ICD-10-CM

## 2022-11-02 ENCOUNTER — RESULT REVIEW (OUTPATIENT)
Age: 62
End: 2022-11-02

## 2022-11-02 ENCOUNTER — APPOINTMENT (OUTPATIENT)
Dept: GASTROENTEROLOGY | Facility: CLINIC | Age: 62
End: 2022-11-02

## 2022-11-02 ENCOUNTER — APPOINTMENT (OUTPATIENT)
Dept: INFUSION THERAPY | Facility: CLINIC | Age: 62
End: 2022-11-02

## 2022-11-02 VITALS
SYSTOLIC BLOOD PRESSURE: 137 MMHG | OXYGEN SATURATION: 96 % | DIASTOLIC BLOOD PRESSURE: 82 MMHG | TEMPERATURE: 97.2 F | RESPIRATION RATE: 17 BRPM | WEIGHT: 211.19 LBS | HEART RATE: 130 BPM | BODY MASS INDEX: 29.45 KG/M2

## 2022-11-02 LAB
ALBUMIN SERPL ELPH-MCNC: 3.5 G/DL — SIGNIFICANT CHANGE UP (ref 3.3–5)
ALP SERPL-CCNC: 150 U/L — HIGH (ref 40–120)
ALT FLD-CCNC: 65 U/L — HIGH (ref 10–45)
ANION GAP SERPL CALC-SCNC: 14 MMOL/L — SIGNIFICANT CHANGE UP (ref 5–17)
ANISOCYTOSIS BLD QL: SLIGHT — SIGNIFICANT CHANGE UP
AST SERPL-CCNC: 34 U/L — SIGNIFICANT CHANGE UP (ref 10–40)
BASOPHILS # BLD AUTO: 0.08 K/UL — SIGNIFICANT CHANGE UP (ref 0–0.2)
BASOPHILS NFR BLD AUTO: 2 % — SIGNIFICANT CHANGE UP (ref 0–2)
BILIRUB SERPL-MCNC: 0.2 MG/DL — SIGNIFICANT CHANGE UP (ref 0.2–1.2)
BUN SERPL-MCNC: 8 MG/DL — SIGNIFICANT CHANGE UP (ref 7–23)
CALCIUM SERPL-MCNC: 9.1 MG/DL — SIGNIFICANT CHANGE UP (ref 8.4–10.5)
CHLORIDE SERPL-SCNC: 102 MMOL/L — SIGNIFICANT CHANGE UP (ref 96–108)
CO2 SERPL-SCNC: 23 MMOL/L — SIGNIFICANT CHANGE UP (ref 22–31)
CREAT SERPL-MCNC: 0.66 MG/DL — SIGNIFICANT CHANGE UP (ref 0.5–1.3)
DOHLE BOD BLD QL SMEAR: PRESENT — SIGNIFICANT CHANGE UP
EGFR: 106 ML/MIN/1.73M2 — SIGNIFICANT CHANGE UP
ELLIPTOCYTES BLD QL SMEAR: SLIGHT — SIGNIFICANT CHANGE UP
EOSINOPHIL # BLD AUTO: 0.04 K/UL — SIGNIFICANT CHANGE UP (ref 0–0.5)
EOSINOPHIL NFR BLD AUTO: 1 % — SIGNIFICANT CHANGE UP (ref 0–6)
GIANT PLATELETS BLD QL SMEAR: PRESENT — SIGNIFICANT CHANGE UP
GLUCOSE SERPL-MCNC: 94 MG/DL — SIGNIFICANT CHANGE UP (ref 70–99)
HCT VFR BLD CALC: 30.9 % — LOW (ref 39–50)
HGB BLD-MCNC: 9.1 G/DL — LOW (ref 13–17)
LG PLATELETS BLD QL AUTO: SLIGHT — SIGNIFICANT CHANGE UP
LYMPHOCYTES # BLD AUTO: 1.24 K/UL — SIGNIFICANT CHANGE UP (ref 1–3.3)
LYMPHOCYTES # BLD AUTO: 32 % — SIGNIFICANT CHANGE UP (ref 13–44)
MACROCYTES BLD QL: SLIGHT — SIGNIFICANT CHANGE UP
MAGNESIUM SERPL-MCNC: 2.1 MG/DL — SIGNIFICANT CHANGE UP (ref 1.6–2.6)
MCHC RBC-ENTMCNC: 23.1 PG — LOW (ref 27–34)
MCHC RBC-ENTMCNC: 29.4 GM/DL — LOW (ref 32–36)
MCV RBC AUTO: 78.4 FL — LOW (ref 80–100)
MICROCYTES BLD QL: SLIGHT — SIGNIFICANT CHANGE UP
MONOCYTES # BLD AUTO: 0.74 K/UL — SIGNIFICANT CHANGE UP (ref 0–0.9)
MONOCYTES NFR BLD AUTO: 19 % — HIGH (ref 2–14)
MYELOCYTES NFR BLD: 1 % — HIGH (ref 0–0)
NEUTROPHILS # BLD AUTO: 1.67 K/UL — LOW (ref 1.8–7.4)
NEUTROPHILS NFR BLD AUTO: 42 % — LOW (ref 43–77)
NEUTS BAND # BLD: 1 % — SIGNIFICANT CHANGE UP (ref 0–8)
NRBC # BLD: 0 /100 — SIGNIFICANT CHANGE UP (ref 0–0)
NRBC # BLD: SIGNIFICANT CHANGE UP /100 WBCS (ref 0–0)
OVALOCYTES BLD QL SMEAR: SLIGHT — SIGNIFICANT CHANGE UP
PLAT MORPH BLD: NORMAL — SIGNIFICANT CHANGE UP
PLATELET # BLD AUTO: 526 K/UL — HIGH (ref 150–400)
POIKILOCYTOSIS BLD QL AUTO: SLIGHT — SIGNIFICANT CHANGE UP
POLYCHROMASIA BLD QL SMEAR: SLIGHT — SIGNIFICANT CHANGE UP
POTASSIUM SERPL-MCNC: 4 MMOL/L — SIGNIFICANT CHANGE UP (ref 3.5–5.3)
POTASSIUM SERPL-SCNC: 4 MMOL/L — SIGNIFICANT CHANGE UP (ref 3.5–5.3)
PROT SERPL-MCNC: 7.1 G/DL — SIGNIFICANT CHANGE UP (ref 6–8.3)
RBC # BLD: 3.94 M/UL — LOW (ref 4.2–5.8)
RBC # FLD: 16.8 % — HIGH (ref 10.3–14.5)
RBC BLD AUTO: SIGNIFICANT CHANGE UP
SODIUM SERPL-SCNC: 139 MMOL/L — SIGNIFICANT CHANGE UP (ref 135–145)
STOMATOCYTES BLD QL SMEAR: SLIGHT — SIGNIFICANT CHANGE UP
TOXIC GRANULES BLD QL SMEAR: PRESENT — SIGNIFICANT CHANGE UP
VARIANT LYMPHS # BLD: 2 % — SIGNIFICANT CHANGE UP (ref 0–6)
WBC # BLD: 3.88 K/UL — SIGNIFICANT CHANGE UP (ref 3.8–10.5)
WBC # FLD AUTO: 3.88 K/UL — SIGNIFICANT CHANGE UP (ref 3.8–10.5)

## 2022-11-03 DIAGNOSIS — R11.2 NAUSEA WITH VOMITING, UNSPECIFIED: ICD-10-CM

## 2022-11-03 DIAGNOSIS — Z51.11 ENCOUNTER FOR ANTINEOPLASTIC CHEMOTHERAPY: ICD-10-CM

## 2022-11-04 ENCOUNTER — APPOINTMENT (OUTPATIENT)
Dept: INFUSION THERAPY | Facility: CLINIC | Age: 62
End: 2022-11-04

## 2022-11-04 ENCOUNTER — APPOINTMENT (OUTPATIENT)
Dept: HEMATOLOGY ONCOLOGY | Facility: CLINIC | Age: 62
End: 2022-11-04

## 2022-11-04 VITALS
SYSTOLIC BLOOD PRESSURE: 125 MMHG | TEMPERATURE: 98 F | BODY MASS INDEX: 29.43 KG/M2 | RESPIRATION RATE: 18 BRPM | OXYGEN SATURATION: 99 % | WEIGHT: 211 LBS | DIASTOLIC BLOOD PRESSURE: 73 MMHG | HEART RATE: 91 BPM

## 2022-11-04 PROCEDURE — 99215 OFFICE O/P EST HI 40 MIN: CPT

## 2022-11-04 NOTE — PHYSICAL EXAM
[Restricted in physically strenuous activity but ambulatory and able to carry out work of a light or sedentary nature] : Status 1- Restricted in physically strenuous activity but ambulatory and able to carry out work of a light or sedentary nature, e.g., light house work, office work [Normal] : affect appropriate [de-identified] : family denies wt changes (unable to weigh)  [de-identified] : port dressing remains intact

## 2022-11-04 NOTE — REVIEW OF SYSTEMS
[Negative] : Allergic/Immunologic [Recent Change In Weight] : ~T no recent weight change [Chest Pain] : no chest pain [Shortness Of Breath] : no shortness of breath [Abdominal Pain] : no abdominal pain [Confused] : no confusion [FreeTextEntry9] : left leg in ace bandage and leg immobilizer  [de-identified] : right chest wall port palpable

## 2022-11-04 NOTE — HISTORY OF PRESENT ILLNESS
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 On 08/18/22, he had a CT C/A/P which showed  irregular colonic wall thickening of the proximal sigmoid colon with stranding of the surrounding paracolic fat. Regional lymph nodes are identified adjacent to the colonic mass measuring up to 1.2 cm. Multiple masses identified within the hepatic parenchyma noted to be 3.9 cm, suspicious for hepatic parenchymal neoplastic disease. 3.4 cm left adrenal mass, suspicious for neoplastic disease. There is a 1.0 cm soft tissue nodule identified along the right lateral wall of the distal trachea, superior to the tracheal bifurcation. Lung nodules suspicious for lung parenchymal neoplastic disease.  \par \par On 8/18/22, he had a colonoscopy that showed a frond-like/villous and ulcerated non-obstructing large mass was found in the descending colon. The mass was circumferential. The mass measured five cm in length. Pathology showed invasive adenocarcinoma, moderately differentiated. No loss of nuclear expression of MMR proteins (MLH1, MSH2, MSH6, and PMS2). These results show low probability of microsatellite instability-high (MSI-H). On 8/19/22, he had a liver, core biopsy which showed metastatic adenocarcinoma, consistent with metastasis from colonic primary.\par \par  [de-identified] : no MMR deficiency [de-identified] : Medical Hx: DVT 14 years ago (unknown cause) \par Surgical Hx: left eye sx; left torn meniscus repaired \par Family Hx: Mother leukemia\par Social Hx: never smoker; ETOH never; lives alone; single; works PT at [employer]; daughters close by [de-identified] : Today he denies BRBPR and pain. No abdominal discomfort.\par \par S/p fall 9/9/22 from table and fractured knee, lost his balance. 9/9/22 temp pins placed to stabilize knee and 9/29/22 s/p ORIF of the left tibial plateau with 18 screws. Completes antibiotics tomorrow, remains on anticoagulant Lovenox. In wheelchair today, uses crutches around the house and is able to get around well. No PT at this time, will see surgeon next week. \par \par Pt is C2D3 of FOLFIOXIRI q 2 weeks, with ongoing cycles planned.

## 2022-11-06 DIAGNOSIS — R11.0 NAUSEA: ICD-10-CM

## 2022-11-06 DIAGNOSIS — R51.9 HEADACHE, UNSPECIFIED: ICD-10-CM

## 2022-11-07 NOTE — DISCHARGE NOTE PROVIDER - CARE PROVIDERS DIRECT ADDRESSES
Patient Name: Aliya Villaseñor  : 1946    MRN: [id number]                              Today's Date: 2022       Admit Date: 10/26/2022    Visit Dx:     ICD-10-CM ICD-9-CM   1. Moderate malnutrition (HCC)  E44.0 263.0   2. Nodule of upper lobe of left lung  R91.1 793.11   3. Lung nodule  R91.1 793.11   4. Cerebrovascular accident (CVA), unspecified mechanism (HCC)  I63.9 434.91     Patient Active Problem List   Diagnosis   • Dizziness   • CVA (cerebral vascular accident) (HCC)   • Lung nodule   • Nodule of upper lobe of left lung   • Moderate malnutrition (HCC)     Past Medical History:   Diagnosis Date   • Anxiety    • Cancer (HCC)    • COPD (chronic obstructive pulmonary disease) (HCC)     [facility] COPD   • COVID 2022    IN [facility]   • Diverticulitis    • Dizzinesses    • Hypertension    • Melanoma (HCC)     right leg   • Nodule of left lung    • PONV (postoperative nausea and vomiting)    • Stroke (HCC)     AUG 2022     Past Surgical History:   Procedure Laterality Date   • APPENDECTOMY     • COLONOSCOPY     • HYSTERECTOMY     • LAPAROSCOPIC CHOLECYSTECTOMY     • NASAL SEPTUM SURGERY      X2   • SKIN BIOPSY     • THORACOSCOPY Right 10/26/2022    Procedure: THORACOSCOPY WITH DAVINCI ROBOT, RIGHT UPPER LOBE WEDGE RESECTION, MEDIASTINAL LYMPH NODE DISSECTION, FLEXIBLE BRONCHOSCOPY, INTERCOSTAL NERVE BLOCK;  Surgeon: Matt Wood MD;  [facility];  Service: Robotics - DaVinci;  Laterality: Right;   • THORACOSCOPY Right 10/31/2022    Procedure: RIGHT VIDEO ASSISTED THORACOSCOPY WITH THORACIC DUCT LIGATION WITH DAVINCI ROBOT;  Surgeon: Matt Wood MD;  Location[facility];  Service: Thoracic;  Laterality: Right;   • TONSILLECTOMY        General Information     Row Name 22 1124          Physical Therapy Time and Intention    Document Type therapy note (daily note)  -PC     Mode of Treatment physical therapy  -PC           User Key  (r) = Recorded By,  (t) = Taken By, (c) = Cosigned By    Initials Name Provider Type    PC Radha Jiménez, PT Physical Therapist               Mobility     Row Name 11/07/22 1124          Bed Mobility    Comment, (Bed Mobility) in chair  -PC     Row Name 11/07/22 1124          Sit-Stand Transfer    Sit-Stand [place] (Transfers) standby assist  -PC     Assistive Device (Sit-Stand Transfers) walker, front-wheeled  -PC     Row Name 11/07/22 1124          Gait/Stairs (Locomotion)    [place] Level (Gait) standby assist  -PC     Assistive Device (Gait) walker, front-wheeled  -PC     Distance in Feet (Gait) 200 ft, tried to walk the last 75 ft without rwx, needed HHA and occasionally unsteady, rwx ordered for home  -PC           User Key  (r) = Recorded By, (t) = Taken By, (c) = Cosigned By    Initials Name Provider Type    PC Radha Jiménez, PT Physical Therapist               Obj/Interventions    No documentation.                Goals/Plan    No documentation.                Clinical Impression     Row Name 11/07/22 1125          Pain    Pretreatment Pain Rating 6/10  -PC     Pain Location - Side/Orientation Right  -PC     Pain Location - flank  -PC     Pain Intervention(s) Medication (See MAR);Repositioned  -PC     Row Name 11/07/22 1125          Plan of Care Review    Plan of Care Reviewed With patient  -PC     Outcome Evaluation Pt has shown improvement with mobility and is able to walk with stand by assist, she feels more confident with a rolling walker and is a little steadier and can walk farther with a rwx, Rwx has been ordered for pt for home, pt able to walk 200 ft today, plans home with assist today  -PC     Row Name 11/07/22 1125          Vital Signs    Pre SpO2 (%) 96  -PC     O2 Delivery Pre Treatment room air  -PC     Post SpO2 (%) 100  -PC     O2 Delivery Post Treatment room air  -PC     Row Name 11/07/22 1125          Positioning and Restraints    Pre-Treatment Position sitting in chair/recliner  -PC     Post  Treatment Position chair  -PC     In Chair reclined;call light within reach;encouraged to call for assist;exit alarm on  -PC           User Key  (r) = Recorded By, (t) = Taken By, (c) = Cosigned By    Initials Name Provider Type    PC Radha Jiménez, PT Physical Therapist               Outcome Measures     Row Name 11/07/22 1128          How much help from another person do you currently need...    Turning from your back to your side while in flat bed without using bedrails? 4  -PC     Moving from lying on back to sitting on the side of a flat bed without bedrails? 4  -PC     Moving to and from a bed to a chair (including a wheelchair)? 4  -PC     Standing up from a chair using your arms (e.g., wheelchair, bedside chair)? 4  -PC     Climbing 3-5 steps with a railing? 3  -PC     To walk in hospital room? 3  -PC     AM-PAC 6 Clicks Score (PT) 22  -PC     Highest level of mobility 7 --> Walked 25 feet or more  -PC           User Key  (r) = Recorded By, (t) = Taken By, (c) = Cosigned By    Initials Name Provider Type    PC Radha Jiménez, PT Physical Therapist                             Physical Therapy Education     Title: PT OT SLP Therapies (Done)     Topic: Physical Therapy (Done)     Point: Mobility training (Done)     Learning Progress Summary           Patient Acceptance, E,D, DU by PC at 11/7/2022 1129    Acceptance, E, VU by KT at 11/6/2022 1211    Acceptance, E, VU,NR by KT at 11/5/2022 1040    Acceptance, E,TB,D, VU by EJ at 11/4/2022 1236                   Point: Home exercise program (Done)     Learning Progress Summary           Patient Acceptance, E, VU by KT at 11/6/2022 1211    Acceptance, E, VU,NR by KT at 11/5/2022 1040                   Point: Body mechanics (Done)     Learning Progress Summary           Patient Acceptance, E,D, DU by PC at 11/7/2022 1129    Acceptance, E, VU by KT at 11/6/2022 1211    Acceptance, E, VU,NR by KT at 11/5/2022 1040                   Point: Precautions (Done)      Learning Progress Summary           Patient Acceptance, E,D, DU by PC at 11/7/2022 1129    Acceptance, E, VU by KT at 11/6/2022 1211    Acceptance, E, VU,NR by KT at 11/5/2022 1040                               User Key     Initials Effective Dates Name Provider Type Discipline    PC 06/16/21 -  Radha Jiménez, PT Physical Therapist PT    KT 06/16/21 -  Lorena Rios RN Registered Nurse Nurse     06/16/21 -  Avani Guo, PT Physical Therapist PT              PT Recommendation and Plan     Plan of Care Reviewed With: patient  Outcome Evaluation: Pt has shown improvement with mobility and is able to walk with stand by assist, she feels more confident with a rolling walker and is a little steadier and can walk farther with a rwx, Rwx has been ordered for pt for home, pt able to walk 200 ft today, plans home with assist today     Time Calculation:    PT Charges     Row Name 11/07/22 1129             Time Calculation    Start Time 1014  -PC      Stop Time 1026  -PC      Time Calculation (min) 12 min  -PC      PT Received On 11/07/22  -PC            User Key  (r) = Recorded By, (t) = Taken By, (c) = Cosigned By    Initials Name Provider Type    PC Radha Jiménez, PT Physical Therapist              Therapy Charges for Today     Code Description Service Date Service Provider Modifiers Qty    [id number] HC PT THER PROC EA 15 MIN 11/7/2022 Radha Jiménez, PT GP 1          PT G-Codes  Outcome Measure Options: AM-PAC 6 Clicks Basic Mobility (PT)  AM-PAC 6 Clicks Score (PT): 22    Radha Jiménez PT  11/7/2022     [email or web address]

## 2022-11-16 ENCOUNTER — RESULT REVIEW (OUTPATIENT)
Age: 62
End: 2022-11-16

## 2022-11-16 ENCOUNTER — APPOINTMENT (OUTPATIENT)
Dept: INFUSION THERAPY | Facility: CLINIC | Age: 62
End: 2022-11-16

## 2022-11-16 VITALS
BODY MASS INDEX: 29.88 KG/M2 | HEART RATE: 124 BPM | TEMPERATURE: 98.1 F | HEIGHT: 71 IN | WEIGHT: 213.44 LBS | RESPIRATION RATE: 18 BRPM | DIASTOLIC BLOOD PRESSURE: 72 MMHG | SYSTOLIC BLOOD PRESSURE: 149 MMHG | OXYGEN SATURATION: 99 %

## 2022-11-16 LAB
ALBUMIN SERPL ELPH-MCNC: 3.5 G/DL — SIGNIFICANT CHANGE UP (ref 3.3–5)
ALP SERPL-CCNC: 142 U/L — HIGH (ref 40–120)
ALT FLD-CCNC: 34 U/L — SIGNIFICANT CHANGE UP (ref 10–45)
ANION GAP SERPL CALC-SCNC: 13 MMOL/L — SIGNIFICANT CHANGE UP (ref 5–17)
ANISOCYTOSIS BLD QL: SLIGHT — SIGNIFICANT CHANGE UP
AST SERPL-CCNC: 28 U/L — SIGNIFICANT CHANGE UP (ref 10–40)
BASOPHILS # BLD AUTO: 0.11 K/UL — SIGNIFICANT CHANGE UP (ref 0–0.2)
BASOPHILS NFR BLD AUTO: 5 % — HIGH (ref 0–2)
BILIRUB SERPL-MCNC: <0.2 MG/DL — SIGNIFICANT CHANGE UP (ref 0.2–1.2)
BLASTS # FLD: 2 % — HIGH (ref 0–0)
BUN SERPL-MCNC: 8 MG/DL — SIGNIFICANT CHANGE UP (ref 7–23)
CALCIUM SERPL-MCNC: 8.9 MG/DL — SIGNIFICANT CHANGE UP (ref 8.4–10.5)
CHLORIDE SERPL-SCNC: 106 MMOL/L — SIGNIFICANT CHANGE UP (ref 96–108)
CO2 SERPL-SCNC: 23 MMOL/L — SIGNIFICANT CHANGE UP (ref 22–31)
CREAT SERPL-MCNC: 0.75 MG/DL — SIGNIFICANT CHANGE UP (ref 0.5–1.3)
DACRYOCYTES BLD QL SMEAR: SLIGHT — SIGNIFICANT CHANGE UP
EGFR: 102 ML/MIN/1.73M2 — SIGNIFICANT CHANGE UP
ELLIPTOCYTES BLD QL SMEAR: SLIGHT — SIGNIFICANT CHANGE UP
EOSINOPHIL # BLD AUTO: 0.17 K/UL — SIGNIFICANT CHANGE UP (ref 0–0.5)
EOSINOPHIL NFR BLD AUTO: 8 % — HIGH (ref 0–6)
GIANT PLATELETS BLD QL SMEAR: PRESENT — SIGNIFICANT CHANGE UP
GLUCOSE SERPL-MCNC: 102 MG/DL — HIGH (ref 70–99)
HCT VFR BLD CALC: 31.3 % — LOW (ref 39–50)
HGB BLD-MCNC: 9.1 G/DL — LOW (ref 13–17)
LG PLATELETS BLD QL AUTO: SLIGHT — SIGNIFICANT CHANGE UP
LYMPHOCYTES # BLD AUTO: 1.1 K/UL — SIGNIFICANT CHANGE UP (ref 1–3.3)
LYMPHOCYTES # BLD AUTO: 51 % — HIGH (ref 13–44)
LYMPHOCYTES # SPEC AUTO: 1 % — HIGH (ref 0–0)
MAGNESIUM SERPL-MCNC: 2.1 MG/DL — SIGNIFICANT CHANGE UP (ref 1.6–2.6)
MCHC RBC-ENTMCNC: 22.9 PG — LOW (ref 27–34)
MCHC RBC-ENTMCNC: 29.1 GM/DL — LOW (ref 32–36)
MCV RBC AUTO: 78.6 FL — LOW (ref 80–100)
MICROCYTES BLD QL: SIGNIFICANT CHANGE UP
MONOCYTES # BLD AUTO: 0.28 K/UL — SIGNIFICANT CHANGE UP (ref 0–0.9)
MONOCYTES NFR BLD AUTO: 13 % — SIGNIFICANT CHANGE UP (ref 2–14)
NEUTROPHILS # BLD AUTO: 0.43 K/UL — LOW (ref 1.8–7.4)
NEUTROPHILS NFR BLD AUTO: 20 % — LOW (ref 43–77)
NRBC # BLD: 0 /100 — SIGNIFICANT CHANGE UP (ref 0–0)
NRBC # BLD: SIGNIFICANT CHANGE UP /100 WBCS (ref 0–0)
PLAT MORPH BLD: NORMAL — SIGNIFICANT CHANGE UP
PLATELET # BLD AUTO: 304 K/UL — SIGNIFICANT CHANGE UP (ref 150–400)
POIKILOCYTOSIS BLD QL AUTO: SLIGHT — SIGNIFICANT CHANGE UP
POLYCHROMASIA BLD QL SMEAR: SLIGHT — SIGNIFICANT CHANGE UP
POTASSIUM SERPL-MCNC: 4.4 MMOL/L — SIGNIFICANT CHANGE UP (ref 3.5–5.3)
POTASSIUM SERPL-SCNC: 4.4 MMOL/L — SIGNIFICANT CHANGE UP (ref 3.5–5.3)
PROT SERPL-MCNC: 7 G/DL — SIGNIFICANT CHANGE UP (ref 6–8.3)
RBC # BLD: 3.98 M/UL — LOW (ref 4.2–5.8)
RBC # FLD: 17.6 % — HIGH (ref 10.3–14.5)
RBC BLD AUTO: SIGNIFICANT CHANGE UP
SCHISTOCYTES BLD QL AUTO: SLIGHT — SIGNIFICANT CHANGE UP
SODIUM SERPL-SCNC: 141 MMOL/L — SIGNIFICANT CHANGE UP (ref 135–145)
WBC # BLD: 2.15 K/UL — LOW (ref 3.8–10.5)
WBC # FLD AUTO: 2.15 K/UL — LOW (ref 3.8–10.5)

## 2022-11-17 ENCOUNTER — APPOINTMENT (OUTPATIENT)
Dept: INFUSION THERAPY | Facility: CLINIC | Age: 62
End: 2022-11-17

## 2022-11-17 VITALS
RESPIRATION RATE: 18 BRPM | SYSTOLIC BLOOD PRESSURE: 135 MMHG | TEMPERATURE: 96.7 F | DIASTOLIC BLOOD PRESSURE: 73 MMHG | HEART RATE: 130 BPM | OXYGEN SATURATION: 97 %

## 2022-11-17 DIAGNOSIS — Z51.89 ENCOUNTER FOR OTHER SPECIFIED AFTERCARE: ICD-10-CM

## 2022-11-18 ENCOUNTER — APPOINTMENT (OUTPATIENT)
Dept: INFUSION THERAPY | Facility: CLINIC | Age: 62
End: 2022-11-18

## 2022-11-18 VITALS
OXYGEN SATURATION: 97 % | TEMPERATURE: 97.1 F | HEART RATE: 144 BPM | SYSTOLIC BLOOD PRESSURE: 135 MMHG | DIASTOLIC BLOOD PRESSURE: 84 MMHG | RESPIRATION RATE: 17 BRPM

## 2022-11-21 ENCOUNTER — APPOINTMENT (OUTPATIENT)
Dept: ORTHOPEDIC SURGERY | Facility: CLINIC | Age: 62
End: 2022-11-21
Payer: SELF-PAY

## 2022-11-21 PROCEDURE — 73562 X-RAY EXAM OF KNEE 3: CPT | Mod: LT

## 2022-11-21 PROCEDURE — 99024 POSTOP FOLLOW-UP VISIT: CPT

## 2022-11-23 ENCOUNTER — RESULT REVIEW (OUTPATIENT)
Age: 62
End: 2022-11-23

## 2022-11-23 ENCOUNTER — APPOINTMENT (OUTPATIENT)
Dept: HEMATOLOGY ONCOLOGY | Facility: CLINIC | Age: 62
End: 2022-11-23

## 2022-11-23 ENCOUNTER — APPOINTMENT (OUTPATIENT)
Dept: INFUSION THERAPY | Facility: CLINIC | Age: 62
End: 2022-11-23

## 2022-11-23 VITALS
HEART RATE: 137 BPM | RESPIRATION RATE: 18 BRPM | SYSTOLIC BLOOD PRESSURE: 159 MMHG | DIASTOLIC BLOOD PRESSURE: 84 MMHG | BODY MASS INDEX: 29.43 KG/M2 | TEMPERATURE: 97 F | OXYGEN SATURATION: 100 % | WEIGHT: 211 LBS

## 2022-11-23 LAB
ALBUMIN SERPL ELPH-MCNC: 3.6 G/DL — SIGNIFICANT CHANGE UP (ref 3.3–5)
ALP SERPL-CCNC: 161 U/L — HIGH (ref 40–120)
ALT FLD-CCNC: 16 U/L — SIGNIFICANT CHANGE UP (ref 10–45)
ANION GAP SERPL CALC-SCNC: 11 MMOL/L — SIGNIFICANT CHANGE UP (ref 5–17)
ANISOCYTOSIS BLD QL: SLIGHT — SIGNIFICANT CHANGE UP
AST SERPL-CCNC: 26 U/L — SIGNIFICANT CHANGE UP (ref 10–40)
BASOPHILS # BLD AUTO: 0 K/UL — SIGNIFICANT CHANGE UP (ref 0–0.2)
BASOPHILS NFR BLD AUTO: 0 % — SIGNIFICANT CHANGE UP (ref 0–2)
BILIRUB SERPL-MCNC: 0.2 MG/DL — SIGNIFICANT CHANGE UP (ref 0.2–1.2)
BUN SERPL-MCNC: 7 MG/DL — SIGNIFICANT CHANGE UP (ref 7–23)
CALCIUM SERPL-MCNC: 9.4 MG/DL — SIGNIFICANT CHANGE UP (ref 8.4–10.5)
CHLORIDE SERPL-SCNC: 102 MMOL/L — SIGNIFICANT CHANGE UP (ref 96–108)
CO2 SERPL-SCNC: 25 MMOL/L — SIGNIFICANT CHANGE UP (ref 22–31)
CREAT SERPL-MCNC: 0.76 MG/DL — SIGNIFICANT CHANGE UP (ref 0.5–1.3)
EGFR: 102 ML/MIN/1.73M2 — SIGNIFICANT CHANGE UP
ELLIPTOCYTES BLD QL SMEAR: SLIGHT — SIGNIFICANT CHANGE UP
EOSINOPHIL # BLD AUTO: 0 K/UL — SIGNIFICANT CHANGE UP (ref 0–0.5)
EOSINOPHIL NFR BLD AUTO: 0 % — SIGNIFICANT CHANGE UP (ref 0–6)
GLUCOSE SERPL-MCNC: 134 MG/DL — HIGH (ref 70–99)
HCT VFR BLD CALC: 32.2 % — LOW (ref 39–50)
HGB BLD-MCNC: 9.6 G/DL — LOW (ref 13–17)
HYPOCHROMIA BLD QL: SLIGHT — SIGNIFICANT CHANGE UP
LYMPHOCYTES # BLD AUTO: 2.36 K/UL — SIGNIFICANT CHANGE UP (ref 1–3.3)
LYMPHOCYTES # BLD AUTO: 22 % — SIGNIFICANT CHANGE UP (ref 13–44)
MACROCYTES BLD QL: SLIGHT — SIGNIFICANT CHANGE UP
MAGNESIUM SERPL-MCNC: 2.1 MG/DL — SIGNIFICANT CHANGE UP (ref 1.6–2.6)
MCHC RBC-ENTMCNC: 23.8 PG — LOW (ref 27–34)
MCHC RBC-ENTMCNC: 29.8 GM/DL — LOW (ref 32–36)
MCV RBC AUTO: 79.7 FL — LOW (ref 80–100)
METAMYELOCYTES # FLD: 6 % — HIGH (ref 0–0)
MONOCYTES # BLD AUTO: 0.75 K/UL — SIGNIFICANT CHANGE UP (ref 0–0.9)
MONOCYTES NFR BLD AUTO: 7 % — SIGNIFICANT CHANGE UP (ref 2–14)
MYELOCYTES NFR BLD: 4 % — HIGH (ref 0–0)
NEUTROPHILS # BLD AUTO: 6.54 K/UL — SIGNIFICANT CHANGE UP (ref 1.8–7.4)
NEUTROPHILS NFR BLD AUTO: 61 % — SIGNIFICANT CHANGE UP (ref 43–77)
NRBC # BLD: 1 /100 — HIGH (ref 0–0)
NRBC # BLD: SIGNIFICANT CHANGE UP /100 WBCS (ref 0–0)
PLAT MORPH BLD: NORMAL — SIGNIFICANT CHANGE UP
PLATELET # BLD AUTO: 146 K/UL — LOW (ref 150–400)
POLYCHROMASIA BLD QL SMEAR: SLIGHT — SIGNIFICANT CHANGE UP
POTASSIUM SERPL-MCNC: 4.1 MMOL/L — SIGNIFICANT CHANGE UP (ref 3.5–5.3)
POTASSIUM SERPL-SCNC: 4.1 MMOL/L — SIGNIFICANT CHANGE UP (ref 3.5–5.3)
PROT SERPL-MCNC: 8 G/DL — SIGNIFICANT CHANGE UP (ref 6–8.3)
RBC # BLD: 4.04 M/UL — LOW (ref 4.2–5.8)
RBC # FLD: 19.8 % — HIGH (ref 10.3–14.5)
RBC BLD AUTO: ABNORMAL
SCHISTOCYTES BLD QL AUTO: SLIGHT — SIGNIFICANT CHANGE UP
SODIUM SERPL-SCNC: 138 MMOL/L — SIGNIFICANT CHANGE UP (ref 135–145)
TOXIC GRANULES BLD QL SMEAR: PRESENT — SIGNIFICANT CHANGE UP
WBC # BLD: 10.72 K/UL — HIGH (ref 3.8–10.5)
WBC # FLD AUTO: 10.72 K/UL — HIGH (ref 3.8–10.5)

## 2022-11-23 PROCEDURE — 99214 OFFICE O/P EST MOD 30 MIN: CPT

## 2022-11-23 RX ORDER — LANSOPRAZOLE, AMOXICILLIN, CLARITHROMYCIN 30-500-500
KIT ORAL
Qty: 1 | Refills: 0 | Status: DISCONTINUED | COMMUNITY
Start: 2022-08-30 | End: 2022-11-23

## 2022-11-23 NOTE — REVIEW OF SYSTEMS
[Negative] : Allergic/Immunologic [Vomiting] : vomiting [Recent Change In Weight] : ~T no recent weight change [Chest Pain] : no chest pain [Shortness Of Breath] : no shortness of breath [Abdominal Pain] : no abdominal pain [Constipation] : no constipation [Diarrhea] : no diarrhea [Confused] : no confusion [FreeTextEntry2] : wt stable overall  [FreeTextEntry7] : x2 episodes [FreeTextEntry9] : left leg in ace bandage and leg immobilizer  [de-identified] : right chest wall port palpable accessed

## 2022-11-23 NOTE — PHYSICAL EXAM
[Restricted in physically strenuous activity but ambulatory and able to carry out work of a light or sedentary nature] : Status 1- Restricted in physically strenuous activity but ambulatory and able to carry out work of a light or sedentary nature, e.g., light house work, office work [Normal] : affect appropriate [de-identified] : wt stable overall  [de-identified] : rt port accessed, dressing C/D/I

## 2022-11-23 NOTE — HISTORY OF PRESENT ILLNESS
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 On 08/18/22, he had a CT C/A/P which showed  irregular colonic wall thickening of the proximal sigmoid colon with stranding of the surrounding paracolic fat. Regional lymph nodes are identified adjacent to the colonic mass measuring up to 1.2 cm. Multiple masses identified within the hepatic parenchyma noted to be 3.9 cm, suspicious for hepatic parenchymal neoplastic disease. 3.4 cm left adrenal mass, suspicious for neoplastic disease. There is a 1.0 cm soft tissue nodule identified along the right lateral wall of the distal trachea, superior to the tracheal bifurcation. Lung nodules suspicious for lung parenchymal neoplastic disease.  \par \par On 8/18/22, he had a colonoscopy that showed a frond-like/villous and ulcerated non-obstructing large mass was found in the descending colon. The mass was circumferential. The mass measured five cm in length. Pathology showed invasive adenocarcinoma, moderately differentiated. No loss of nuclear expression of MMR proteins (MLH1, MSH2, MSH6, and PMS2). These results show low probability of microsatellite instability-high (MSI-H). On 8/19/22, he had a liver, core biopsy which showed metastatic adenocarcinoma, consistent with metastasis from colonic primary.\par \par  [de-identified] : no MMR deficiency [de-identified] : Medical Hx: DVT 14 years ago (unknown cause) \par Surgical Hx: left eye sx; left torn meniscus repaired \par Family Hx: Mother leukemia\par Social Hx: never smoker; ETOH never; lives alone; single; works PT at [employer]; daughters close by [de-identified] : Today he denies BRBPR and pain. No abdominal discomfort.\par \par S/p fall 9/9/22 from table and fractured knee, lost his balance. 9/9/22 temp pins placed to stabilize knee and 9/29/22 s/p ORIF of the left tibial plateau with 18 screws. Completes antibiotics tomorrow, remains on anticoagulant Lovenox. In wheelchair today, uses crutches around the house and is able to get around well. No PT at this time, will see surgeon next week. \par \par Pt is C3D1 of FOLFIOXIRI q 2 weeks, with ongoing cycles planned. He denies fatigue, cold sensitivity, eye changes, hair loss, nail / skin changes, he feels related to from leg brace, neuropathy, pain, mouth sores, vomiting for 2 episodes after last chemo; will take meds as needed, heartburn, C/D.

## 2022-11-25 ENCOUNTER — APPOINTMENT (OUTPATIENT)
Dept: INFUSION THERAPY | Facility: CLINIC | Age: 62
End: 2022-11-25

## 2022-12-07 ENCOUNTER — RESULT REVIEW (OUTPATIENT)
Age: 62
End: 2022-12-07

## 2022-12-07 ENCOUNTER — APPOINTMENT (OUTPATIENT)
Dept: INFUSION THERAPY | Facility: CLINIC | Age: 62
End: 2022-12-07

## 2022-12-07 VITALS
HEART RATE: 124 BPM | DIASTOLIC BLOOD PRESSURE: 76 MMHG | RESPIRATION RATE: 17 BRPM | BODY MASS INDEX: 29.65 KG/M2 | SYSTOLIC BLOOD PRESSURE: 124 MMHG | OXYGEN SATURATION: 98 % | TEMPERATURE: 98.1 F | WEIGHT: 212.56 LBS

## 2022-12-07 LAB
ALBUMIN SERPL ELPH-MCNC: 3.7 G/DL — SIGNIFICANT CHANGE UP (ref 3.3–5)
ALP SERPL-CCNC: 176 U/L — HIGH (ref 40–120)
ALT FLD-CCNC: 20 U/L — SIGNIFICANT CHANGE UP (ref 10–45)
ANION GAP SERPL CALC-SCNC: 11 MMOL/L — SIGNIFICANT CHANGE UP (ref 5–17)
AST SERPL-CCNC: 20 U/L — SIGNIFICANT CHANGE UP (ref 10–40)
BASOPHILS # BLD AUTO: 0.09 K/UL — SIGNIFICANT CHANGE UP (ref 0–0.2)
BASOPHILS NFR BLD AUTO: 1.4 % — SIGNIFICANT CHANGE UP (ref 0–2)
BILIRUB SERPL-MCNC: 0.2 MG/DL — SIGNIFICANT CHANGE UP (ref 0.2–1.2)
BUN SERPL-MCNC: 7 MG/DL — SIGNIFICANT CHANGE UP (ref 7–23)
CALCIUM SERPL-MCNC: 9.1 MG/DL — SIGNIFICANT CHANGE UP (ref 8.4–10.5)
CEA SERPL-MCNC: 335 NG/ML — HIGH (ref 0–3.8)
CHLORIDE SERPL-SCNC: 104 MMOL/L — SIGNIFICANT CHANGE UP (ref 96–108)
CO2 SERPL-SCNC: 24 MMOL/L — SIGNIFICANT CHANGE UP (ref 22–31)
CREAT SERPL-MCNC: 0.7 MG/DL — SIGNIFICANT CHANGE UP (ref 0.5–1.3)
EGFR: 104 ML/MIN/1.73M2 — SIGNIFICANT CHANGE UP
EOSINOPHIL # BLD AUTO: 0.58 K/UL — HIGH (ref 0–0.5)
EOSINOPHIL NFR BLD AUTO: 9.3 % — HIGH (ref 0–6)
GLUCOSE SERPL-MCNC: 90 MG/DL — SIGNIFICANT CHANGE UP (ref 70–99)
HCT VFR BLD CALC: 33.7 % — LOW (ref 39–50)
HGB BLD-MCNC: 10 G/DL — LOW (ref 13–17)
IMM GRANULOCYTES NFR BLD AUTO: 1 % — HIGH (ref 0–0.9)
LYMPHOCYTES # BLD AUTO: 1.64 K/UL — SIGNIFICANT CHANGE UP (ref 1–3.3)
LYMPHOCYTES # BLD AUTO: 26.2 % — SIGNIFICANT CHANGE UP (ref 13–44)
MAGNESIUM SERPL-MCNC: 2 MG/DL — SIGNIFICANT CHANGE UP (ref 1.6–2.6)
MCHC RBC-ENTMCNC: 23.9 PG — LOW (ref 27–34)
MCHC RBC-ENTMCNC: 29.7 GM/DL — LOW (ref 32–36)
MCV RBC AUTO: 80.4 FL — SIGNIFICANT CHANGE UP (ref 80–100)
MONOCYTES # BLD AUTO: 1.01 K/UL — HIGH (ref 0–0.9)
MONOCYTES NFR BLD AUTO: 16.1 % — HIGH (ref 2–14)
NEUTROPHILS # BLD AUTO: 2.89 K/UL — SIGNIFICANT CHANGE UP (ref 1.8–7.4)
NEUTROPHILS NFR BLD AUTO: 46 % — SIGNIFICANT CHANGE UP (ref 43–77)
NRBC # BLD: 0 /100 WBCS — SIGNIFICANT CHANGE UP (ref 0–0)
PLATELET # BLD AUTO: 239 K/UL — SIGNIFICANT CHANGE UP (ref 150–400)
POTASSIUM SERPL-MCNC: 4.3 MMOL/L — SIGNIFICANT CHANGE UP (ref 3.5–5.3)
POTASSIUM SERPL-SCNC: 4.3 MMOL/L — SIGNIFICANT CHANGE UP (ref 3.5–5.3)
PROT SERPL-MCNC: 7.1 G/DL — SIGNIFICANT CHANGE UP (ref 6–8.3)
RBC # BLD: 4.19 M/UL — LOW (ref 4.2–5.8)
RBC # FLD: 19.6 % — HIGH (ref 10.3–14.5)
SODIUM SERPL-SCNC: 140 MMOL/L — SIGNIFICANT CHANGE UP (ref 135–145)
WBC # BLD: 6.27 K/UL — SIGNIFICANT CHANGE UP (ref 3.8–10.5)
WBC # FLD AUTO: 6.27 K/UL — SIGNIFICANT CHANGE UP (ref 3.8–10.5)

## 2022-12-09 ENCOUNTER — APPOINTMENT (OUTPATIENT)
Dept: INFUSION THERAPY | Facility: CLINIC | Age: 62
End: 2022-12-09

## 2022-12-16 ENCOUNTER — APPOINTMENT (OUTPATIENT)
Dept: HEMATOLOGY ONCOLOGY | Facility: CLINIC | Age: 62
End: 2022-12-16
Payer: MEDICAID

## 2022-12-19 ENCOUNTER — APPOINTMENT (OUTPATIENT)
Dept: ORTHOPEDIC SURGERY | Facility: CLINIC | Age: 62
End: 2022-12-19
Payer: COMMERCIAL

## 2022-12-19 PROCEDURE — 73562 X-RAY EXAM OF KNEE 3: CPT | Mod: LT

## 2022-12-19 PROCEDURE — 99024 POSTOP FOLLOW-UP VISIT: CPT

## 2022-12-20 ENCOUNTER — APPOINTMENT (OUTPATIENT)
Dept: HEMATOLOGY ONCOLOGY | Facility: CLINIC | Age: 62
End: 2022-12-20
Payer: MEDICAID

## 2022-12-20 VITALS
HEIGHT: 71 IN | WEIGHT: 206.44 LBS | RESPIRATION RATE: 18 BRPM | OXYGEN SATURATION: 97 % | HEART RATE: 102 BPM | DIASTOLIC BLOOD PRESSURE: 22 MMHG | TEMPERATURE: 97.2 F | SYSTOLIC BLOOD PRESSURE: 132 MMHG | BODY MASS INDEX: 28.9 KG/M2

## 2022-12-20 PROCEDURE — 99215 OFFICE O/P EST HI 40 MIN: CPT

## 2022-12-20 NOTE — HISTORY OF PRESENT ILLNESS
[de-identified] : The patient was diagnosed with colon adenocarcinoma, moderately differentiated, with metastatic disease in the lungs, liver and left adrenal gland in Aug 2022 at the age of 61. On 08/18/22, he had a CT C/A/P which showed  irregular colonic wall thickening of the proximal sigmoid colon with stranding of the surrounding paracolic fat. Regional lymph nodes are identified adjacent to the colonic mass measuring up to 1.2 cm. Multiple masses identified within the hepatic parenchyma noted to be 3.9 cm, suspicious for hepatic parenchymal neoplastic disease. 3.4 cm left adrenal mass, suspicious for neoplastic disease. There is a 1.0 cm soft tissue nodule identified along the right lateral wall of the distal trachea, superior to the tracheal bifurcation. Lung nodules suspicious for lung parenchymal neoplastic disease.  \par \par On 8/18/22, he had a colonoscopy that showed a frond-like/villous and ulcerated non-obstructing large mass was found in the descending colon. The mass was circumferential. The mass measured five cm in length. Pathology showed invasive adenocarcinoma, moderately differentiated. No loss of nuclear expression of MMR proteins (MLH1, MSH2, MSH6, and PMS2). These results show low probability of microsatellite instability-high (MSI-H). On 8/19/22, he had a liver, core biopsy which showed metastatic adenocarcinoma, consistent with metastasis from colonic primary.\par \par  [de-identified] : no MMR deficiency [de-identified] : Medical Hx: DVT 14 years ago (unknown cause) \par Surgical Hx: left eye sx; left torn meniscus repaired \par Family Hx: Mother leukemia\par Social Hx: never smoker; ETOH never; lives alone; single; works PT at [employer]; daughters close by [de-identified] : Today he denies BRBPR and pain. No abdominal discomfort.\par \par S/p fall 9/9/22 from table and fractured knee, lost his balance. 9/9/22 temp pins placed to stabilize knee and 9/29/22 s/p ORIF of the left tibial plateau with 18 screws. Completes antibiotics tomorrow, remains on anticoagulant Lovenox. In wheelchair today, uses crutches around the house and is able to get around well. No PT at this time, will see surgeon next week. \par \par Pt is C4D10 of FOLFIOXIRI q 2 weeks, with ongoing cycles planned. He denies fatigue, cold sensitivity, eye changes, hair loss, nail / skin changes, he feels related to from leg brace, neuropathy, pain, mouth sores, vomiting for 2 episodes after last chemo; will take meds as needed, heartburn, C/D.

## 2022-12-20 NOTE — REVIEW OF SYSTEMS
[Recent Change In Weight] : ~T no recent weight change [Chest Pain] : no chest pain [Shortness Of Breath] : no shortness of breath [Abdominal Pain] : no abdominal pain [Constipation] : no constipation [Diarrhea] : no diarrhea [Confused] : no confusion [FreeTextEntry2] : wt stable overall  [FreeTextEntry7] : x2 episodes [FreeTextEntry9] : left leg in ace bandage and leg immobilizer  [de-identified] : right chest wall port palpable accessed

## 2022-12-21 ENCOUNTER — RESULT REVIEW (OUTPATIENT)
Age: 62
End: 2022-12-21

## 2022-12-21 ENCOUNTER — APPOINTMENT (OUTPATIENT)
Dept: INFUSION THERAPY | Facility: CLINIC | Age: 62
End: 2022-12-21

## 2022-12-21 VITALS
OXYGEN SATURATION: 98 % | TEMPERATURE: 98.2 F | DIASTOLIC BLOOD PRESSURE: 78 MMHG | SYSTOLIC BLOOD PRESSURE: 155 MMHG | HEIGHT: 71 IN | RESPIRATION RATE: 18 BRPM | WEIGHT: 206 LBS | BODY MASS INDEX: 28.84 KG/M2 | HEART RATE: 118 BPM

## 2022-12-21 LAB
ALBUMIN SERPL ELPH-MCNC: 3.4 G/DL — SIGNIFICANT CHANGE UP (ref 3.3–5)
ALP SERPL-CCNC: 155 U/L — HIGH (ref 40–120)
ALT FLD-CCNC: 31 U/L — SIGNIFICANT CHANGE UP (ref 10–45)
ANION GAP SERPL CALC-SCNC: 9 MMOL/L — SIGNIFICANT CHANGE UP (ref 5–17)
ANISOCYTOSIS BLD QL: SIGNIFICANT CHANGE UP
AST SERPL-CCNC: 32 U/L — SIGNIFICANT CHANGE UP (ref 10–40)
BASOPHILS # BLD AUTO: 0.24 K/UL — HIGH (ref 0–0.2)
BASOPHILS NFR BLD AUTO: 3 % — HIGH (ref 0–2)
BILIRUB SERPL-MCNC: 0.2 MG/DL — SIGNIFICANT CHANGE UP (ref 0.2–1.2)
BUN SERPL-MCNC: 8 MG/DL — SIGNIFICANT CHANGE UP (ref 7–23)
CALCIUM SERPL-MCNC: 9 MG/DL — SIGNIFICANT CHANGE UP (ref 8.4–10.5)
CHLORIDE SERPL-SCNC: 105 MMOL/L — SIGNIFICANT CHANGE UP (ref 96–108)
CO2 SERPL-SCNC: 27 MMOL/L — SIGNIFICANT CHANGE UP (ref 22–31)
CREAT SERPL-MCNC: 0.77 MG/DL — SIGNIFICANT CHANGE UP (ref 0.5–1.3)
EGFR: 101 ML/MIN/1.73M2 — SIGNIFICANT CHANGE UP
ELLIPTOCYTES BLD QL SMEAR: SLIGHT — SIGNIFICANT CHANGE UP
EOSINOPHIL # BLD AUTO: 0.16 K/UL — SIGNIFICANT CHANGE UP (ref 0–0.5)
EOSINOPHIL NFR BLD AUTO: 2 % — SIGNIFICANT CHANGE UP (ref 0–6)
GLUCOSE SERPL-MCNC: 110 MG/DL — HIGH (ref 70–99)
HCT VFR BLD CALC: 32.3 % — LOW (ref 39–50)
HGB BLD-MCNC: 9.7 G/DL — LOW (ref 13–17)
HYPOCHROMIA BLD QL: SLIGHT — SIGNIFICANT CHANGE UP
LYMPHOCYTES # BLD AUTO: 1.88 K/UL — SIGNIFICANT CHANGE UP (ref 1–3.3)
LYMPHOCYTES # BLD AUTO: 24 % — SIGNIFICANT CHANGE UP (ref 13–44)
MACROCYTES BLD QL: SLIGHT — SIGNIFICANT CHANGE UP
MAGNESIUM SERPL-MCNC: 2 MG/DL — SIGNIFICANT CHANGE UP (ref 1.6–2.6)
MCHC RBC-ENTMCNC: 24 PG — LOW (ref 27–34)
MCHC RBC-ENTMCNC: 30 GM/DL — LOW (ref 32–36)
MCV RBC AUTO: 80 FL — SIGNIFICANT CHANGE UP (ref 80–100)
METAMYELOCYTES # FLD: 2 % — HIGH (ref 0–0)
MICROCYTES BLD QL: SLIGHT — SIGNIFICANT CHANGE UP
MONOCYTES # BLD AUTO: 0.94 K/UL — HIGH (ref 0–0.9)
MONOCYTES NFR BLD AUTO: 12 % — SIGNIFICANT CHANGE UP (ref 2–14)
MYELOCYTES NFR BLD: 5 % — HIGH (ref 0–0)
NEUTROPHILS # BLD AUTO: 4.08 K/UL — SIGNIFICANT CHANGE UP (ref 1.8–7.4)
NEUTROPHILS NFR BLD AUTO: 52 % — SIGNIFICANT CHANGE UP (ref 43–77)
NRBC # BLD: 0 /100 — SIGNIFICANT CHANGE UP (ref 0–0)
NRBC # BLD: SIGNIFICANT CHANGE UP /100 WBCS (ref 0–0)
PLAT MORPH BLD: NORMAL — SIGNIFICANT CHANGE UP
PLATELET # BLD AUTO: 296 K/UL — SIGNIFICANT CHANGE UP (ref 150–400)
POLYCHROMASIA BLD QL SMEAR: SLIGHT — SIGNIFICANT CHANGE UP
POTASSIUM SERPL-MCNC: 4 MMOL/L — SIGNIFICANT CHANGE UP (ref 3.5–5.3)
POTASSIUM SERPL-SCNC: 4 MMOL/L — SIGNIFICANT CHANGE UP (ref 3.5–5.3)
PROT SERPL-MCNC: 7.5 G/DL — SIGNIFICANT CHANGE UP (ref 6–8.3)
RBC # BLD: 4.04 M/UL — LOW (ref 4.2–5.8)
RBC # FLD: 19.3 % — HIGH (ref 10.3–14.5)
RBC BLD AUTO: ABNORMAL
SODIUM SERPL-SCNC: 141 MMOL/L — SIGNIFICANT CHANGE UP (ref 135–145)
WBC # BLD: 7.84 K/UL — SIGNIFICANT CHANGE UP (ref 3.8–10.5)
WBC # FLD AUTO: 7.84 K/UL — SIGNIFICANT CHANGE UP (ref 3.8–10.5)

## 2022-12-22 ENCOUNTER — RESULT REVIEW (OUTPATIENT)
Age: 62
End: 2022-12-22

## 2022-12-23 ENCOUNTER — APPOINTMENT (OUTPATIENT)
Dept: INFUSION THERAPY | Facility: CLINIC | Age: 62
End: 2022-12-23

## 2022-12-28 ENCOUNTER — OUTPATIENT (OUTPATIENT)
Dept: OUTPATIENT SERVICES | Facility: HOSPITAL | Age: 62
LOS: 1 days | Discharge: HOME | End: 2022-12-28

## 2022-12-28 DIAGNOSIS — C18.9 MALIGNANT NEOPLASM OF COLON, UNSPECIFIED: ICD-10-CM

## 2023-01-04 ENCOUNTER — RESULT REVIEW (OUTPATIENT)
Age: 63
End: 2023-01-04

## 2023-01-04 ENCOUNTER — APPOINTMENT (OUTPATIENT)
Dept: INFUSION THERAPY | Facility: CLINIC | Age: 63
End: 2023-01-04

## 2023-01-04 VITALS
BODY MASS INDEX: 28.73 KG/M2 | SYSTOLIC BLOOD PRESSURE: 105 MMHG | TEMPERATURE: 98.4 F | HEART RATE: 124 BPM | WEIGHT: 206 LBS | DIASTOLIC BLOOD PRESSURE: 68 MMHG | OXYGEN SATURATION: 98 % | RESPIRATION RATE: 17 BRPM

## 2023-01-04 LAB
ALBUMIN SERPL ELPH-MCNC: 3.6 G/DL — SIGNIFICANT CHANGE UP (ref 3.3–5)
ALP SERPL-CCNC: 185 U/L — HIGH (ref 40–120)
ALT FLD-CCNC: 25 U/L — SIGNIFICANT CHANGE UP (ref 10–45)
ANION GAP SERPL CALC-SCNC: 13 MMOL/L — SIGNIFICANT CHANGE UP (ref 5–17)
AST SERPL-CCNC: 28 U/L — SIGNIFICANT CHANGE UP (ref 10–40)
BASOPHILS # BLD AUTO: 0.16 K/UL — SIGNIFICANT CHANGE UP (ref 0–0.2)
BASOPHILS NFR BLD AUTO: 1.2 % — SIGNIFICANT CHANGE UP (ref 0–2)
BILIRUB SERPL-MCNC: 0.2 MG/DL — SIGNIFICANT CHANGE UP (ref 0.2–1.2)
BUN SERPL-MCNC: 9 MG/DL — SIGNIFICANT CHANGE UP (ref 7–23)
CALCIUM SERPL-MCNC: 9.4 MG/DL — SIGNIFICANT CHANGE UP (ref 8.4–10.5)
CHLORIDE SERPL-SCNC: 104 MMOL/L — SIGNIFICANT CHANGE UP (ref 96–108)
CO2 SERPL-SCNC: 24 MMOL/L — SIGNIFICANT CHANGE UP (ref 22–31)
CREAT SERPL-MCNC: 0.82 MG/DL — SIGNIFICANT CHANGE UP (ref 0.5–1.3)
EGFR: 99 ML/MIN/1.73M2 — SIGNIFICANT CHANGE UP
EOSINOPHIL # BLD AUTO: 0.23 K/UL — SIGNIFICANT CHANGE UP (ref 0–0.5)
EOSINOPHIL NFR BLD AUTO: 1.8 % — SIGNIFICANT CHANGE UP (ref 0–6)
GLUCOSE SERPL-MCNC: 98 MG/DL — SIGNIFICANT CHANGE UP (ref 70–99)
HCT VFR BLD CALC: 33.3 % — LOW (ref 39–50)
HGB BLD-MCNC: 9.9 G/DL — LOW (ref 13–17)
IMM GRANULOCYTES NFR BLD AUTO: 4.5 % — HIGH (ref 0–0.9)
LYMPHOCYTES # BLD AUTO: 1.95 K/UL — SIGNIFICANT CHANGE UP (ref 1–3.3)
LYMPHOCYTES # BLD AUTO: 15.1 % — SIGNIFICANT CHANGE UP (ref 13–44)
MAGNESIUM SERPL-MCNC: 2 MG/DL — SIGNIFICANT CHANGE UP (ref 1.6–2.6)
MCHC RBC-ENTMCNC: 24.2 PG — LOW (ref 27–34)
MCHC RBC-ENTMCNC: 29.7 GM/DL — LOW (ref 32–36)
MCV RBC AUTO: 81.4 FL — SIGNIFICANT CHANGE UP (ref 80–100)
MONOCYTES # BLD AUTO: 1.48 K/UL — HIGH (ref 0–0.9)
MONOCYTES NFR BLD AUTO: 11.5 % — SIGNIFICANT CHANGE UP (ref 2–14)
NEUTROPHILS # BLD AUTO: 8.48 K/UL — HIGH (ref 1.8–7.4)
NEUTROPHILS NFR BLD AUTO: 65.9 % — SIGNIFICANT CHANGE UP (ref 43–77)
NRBC # BLD: 0 /100 WBCS — SIGNIFICANT CHANGE UP (ref 0–0)
PLATELET # BLD AUTO: 240 K/UL — SIGNIFICANT CHANGE UP (ref 150–400)
POTASSIUM SERPL-MCNC: 4.4 MMOL/L — SIGNIFICANT CHANGE UP (ref 3.5–5.3)
POTASSIUM SERPL-SCNC: 4.4 MMOL/L — SIGNIFICANT CHANGE UP (ref 3.5–5.3)
PROT SERPL-MCNC: 7.2 G/DL — SIGNIFICANT CHANGE UP (ref 6–8.3)
RBC # BLD: 4.09 M/UL — LOW (ref 4.2–5.8)
RBC # FLD: 20.4 % — HIGH (ref 10.3–14.5)
SODIUM SERPL-SCNC: 141 MMOL/L — SIGNIFICANT CHANGE UP (ref 135–145)
WBC # BLD: 12.88 K/UL — HIGH (ref 3.8–10.5)
WBC # FLD AUTO: 12.88 K/UL — HIGH (ref 3.8–10.5)

## 2023-01-05 DIAGNOSIS — R11.2 NAUSEA WITH VOMITING, UNSPECIFIED: ICD-10-CM

## 2023-01-05 DIAGNOSIS — Z51.11 ENCOUNTER FOR ANTINEOPLASTIC CHEMOTHERAPY: ICD-10-CM

## 2023-01-06 ENCOUNTER — APPOINTMENT (OUTPATIENT)
Dept: INFUSION THERAPY | Facility: CLINIC | Age: 63
End: 2023-01-06

## 2023-01-09 ENCOUNTER — APPOINTMENT (OUTPATIENT)
Dept: CT IMAGING | Facility: CLINIC | Age: 63
End: 2023-01-09
Payer: COMMERCIAL

## 2023-01-09 DIAGNOSIS — Z51.89 ENCOUNTER FOR OTHER SPECIFIED AFTERCARE: ICD-10-CM

## 2023-01-09 PROCEDURE — 74177 CT ABD & PELVIS W/CONTRAST: CPT

## 2023-01-09 PROCEDURE — 71260 CT THORAX DX C+: CPT

## 2023-01-11 ENCOUNTER — APPOINTMENT (OUTPATIENT)
Dept: INFUSION THERAPY | Facility: CLINIC | Age: 63
End: 2023-01-11

## 2023-01-11 ENCOUNTER — NON-APPOINTMENT (OUTPATIENT)
Age: 63
End: 2023-01-11

## 2023-01-11 VITALS
TEMPERATURE: 97.8 F | SYSTOLIC BLOOD PRESSURE: 143 MMHG | HEART RATE: 132 BPM | OXYGEN SATURATION: 98 % | RESPIRATION RATE: 18 BRPM | DIASTOLIC BLOOD PRESSURE: 103 MMHG

## 2023-01-12 DIAGNOSIS — E86.0 DEHYDRATION: ICD-10-CM

## 2023-01-18 ENCOUNTER — RESULT REVIEW (OUTPATIENT)
Age: 63
End: 2023-01-18

## 2023-01-18 ENCOUNTER — APPOINTMENT (OUTPATIENT)
Dept: HEMATOLOGY ONCOLOGY | Facility: CLINIC | Age: 63
End: 2023-01-18
Payer: COMMERCIAL

## 2023-01-18 ENCOUNTER — APPOINTMENT (OUTPATIENT)
Dept: INFUSION THERAPY | Facility: CLINIC | Age: 63
End: 2023-01-18

## 2023-01-18 VITALS
OXYGEN SATURATION: 98 % | SYSTOLIC BLOOD PRESSURE: 107 MMHG | RESPIRATION RATE: 17 BRPM | BODY MASS INDEX: 28.31 KG/M2 | DIASTOLIC BLOOD PRESSURE: 67 MMHG | WEIGHT: 203 LBS | HEART RATE: 117 BPM | TEMPERATURE: 97.8 F

## 2023-01-18 DIAGNOSIS — I31.39 OTHER PERICARDIAL EFFUSION (NONINFLAMMATORY): ICD-10-CM

## 2023-01-18 LAB
ALBUMIN SERPL ELPH-MCNC: 3.6 G/DL — SIGNIFICANT CHANGE UP (ref 3.3–5)
ALP SERPL-CCNC: 111 U/L — SIGNIFICANT CHANGE UP (ref 40–120)
ALT FLD-CCNC: 23 U/L — SIGNIFICANT CHANGE UP (ref 10–45)
ANION GAP SERPL CALC-SCNC: 12 MMOL/L — SIGNIFICANT CHANGE UP (ref 5–17)
ANISOCYTOSIS BLD QL: SIGNIFICANT CHANGE UP
AST SERPL-CCNC: 22 U/L — SIGNIFICANT CHANGE UP (ref 10–40)
BASOPHILS # BLD AUTO: 0 K/UL — SIGNIFICANT CHANGE UP (ref 0–0.2)
BASOPHILS NFR BLD AUTO: 0 % — SIGNIFICANT CHANGE UP (ref 0–2)
BILIRUB SERPL-MCNC: 0.2 MG/DL — SIGNIFICANT CHANGE UP (ref 0.2–1.2)
BUN SERPL-MCNC: 10 MG/DL — SIGNIFICANT CHANGE UP (ref 7–23)
CALCIUM SERPL-MCNC: 9.3 MG/DL — SIGNIFICANT CHANGE UP (ref 8.4–10.5)
CEA SERPL-MCNC: 86.5 NG/ML — HIGH (ref 0–3.8)
CHLORIDE SERPL-SCNC: 102 MMOL/L — SIGNIFICANT CHANGE UP (ref 96–108)
CO2 SERPL-SCNC: 25 MMOL/L — SIGNIFICANT CHANGE UP (ref 22–31)
CREAT SERPL-MCNC: 0.73 MG/DL — SIGNIFICANT CHANGE UP (ref 0.5–1.3)
EGFR: 103 ML/MIN/1.73M2 — SIGNIFICANT CHANGE UP
EOSINOPHIL # BLD AUTO: 0.08 K/UL — SIGNIFICANT CHANGE UP (ref 0–0.5)
EOSINOPHIL NFR BLD AUTO: 3 % — SIGNIFICANT CHANGE UP (ref 0–6)
GLUCOSE SERPL-MCNC: 74 MG/DL — SIGNIFICANT CHANGE UP (ref 70–99)
HCT VFR BLD CALC: 31.9 % — LOW (ref 39–50)
HGB BLD-MCNC: 9.7 G/DL — LOW (ref 13–17)
HYPOCHROMIA BLD QL: SLIGHT — SIGNIFICANT CHANGE UP
LYMPHOCYTES # BLD AUTO: 1.18 K/UL — SIGNIFICANT CHANGE UP (ref 1–3.3)
LYMPHOCYTES # BLD AUTO: 42 % — SIGNIFICANT CHANGE UP (ref 13–44)
MACROCYTES BLD QL: SLIGHT — SIGNIFICANT CHANGE UP
MAGNESIUM SERPL-MCNC: 2 MG/DL — SIGNIFICANT CHANGE UP (ref 1.6–2.6)
MCHC RBC-ENTMCNC: 24.5 PG — LOW (ref 27–34)
MCHC RBC-ENTMCNC: 30.4 GM/DL — LOW (ref 32–36)
MCV RBC AUTO: 80.6 FL — SIGNIFICANT CHANGE UP (ref 80–100)
MICROCYTES BLD QL: SLIGHT — SIGNIFICANT CHANGE UP
MONOCYTES # BLD AUTO: 0.62 K/UL — SIGNIFICANT CHANGE UP (ref 0–0.9)
MONOCYTES NFR BLD AUTO: 22 % — HIGH (ref 2–14)
NEUTROPHILS # BLD AUTO: 0.93 K/UL — LOW (ref 1.8–7.4)
NEUTROPHILS NFR BLD AUTO: 33 % — LOW (ref 43–77)
NRBC # BLD: 0 /100 — SIGNIFICANT CHANGE UP (ref 0–0)
NRBC # BLD: SIGNIFICANT CHANGE UP /100 WBCS (ref 0–0)
PLAT MORPH BLD: NORMAL — SIGNIFICANT CHANGE UP
PLATELET # BLD AUTO: 181 K/UL — SIGNIFICANT CHANGE UP (ref 150–400)
POTASSIUM SERPL-MCNC: 3.3 MMOL/L — LOW (ref 3.5–5.3)
POTASSIUM SERPL-SCNC: 3.3 MMOL/L — LOW (ref 3.5–5.3)
PROT SERPL-MCNC: 7.4 G/DL — SIGNIFICANT CHANGE UP (ref 6–8.3)
RBC # BLD: 3.96 M/UL — LOW (ref 4.2–5.8)
RBC # FLD: 19.7 % — HIGH (ref 10.3–14.5)
RBC BLD AUTO: ABNORMAL
SODIUM SERPL-SCNC: 139 MMOL/L — SIGNIFICANT CHANGE UP (ref 135–145)
SPHEROCYTES BLD QL SMEAR: SLIGHT — SIGNIFICANT CHANGE UP
WBC # BLD: 2.81 K/UL — LOW (ref 3.8–10.5)
WBC # FLD AUTO: 2.81 K/UL — LOW (ref 3.8–10.5)

## 2023-01-18 PROCEDURE — 99215 OFFICE O/P EST HI 40 MIN: CPT

## 2023-01-18 NOTE — RESULTS/DATA
[FreeTextEntry1] : 1/9/23 CT C/A/P: Findings consistent with descending colon cancer with extension through the wall with extensive mesenteric confluent tumor and/or ivelisse disease mildly worse when compared to the prior examination. Diffuse metastatic disease identified throughout the liver and small lesion most noted within the right middle lobe improved when compared to the prior examination (now measuring 2.7 x 2.5 cm previously measuring 3.7 x 3.2 cm and a lesion in the right lobe measuring 2.1 x by 1.6 cm previously measuring 3.3 x 2.3 cm). Small pericardial effusion and areas of thickening of questionable significance. Small amount of pericatheter clot near the tip of the catheter in the superior vena cava.\par \par 8/19/22 Path: Liver, core biopsy: Metastatic adenocarcinoma, consistent with metastasis from colonic primary. Immunohistochemical stains were performed and results as follows: CDX - 2 is positive. CK 20 is patchy positive. CK 7 is negative. These findings support the diagnosis. No loss of nuclear expression of MMR proteins (MLH1, MSH2,\par MSH6, and PMS2).   These results show low probability of microsatellite instability-high (MSI-H).\par \par 8/18/22 Path: Colon, descending, mass, biopsy. Invasive adenocarcinoma, moderately differentiated. No loss of nuclear expression of MMR proteins (MLH1, MSH2,\par MSH6, and PMS2).   These results show low probability of microsatellite instability-high (MSI-H).\par \par 8/18/22 Colonoscopy: A frond-like/villous and ulcerated non-obstructing large mass was found in the descending colon. The lass was circumferential. The mass measured five cm in length. Oozing was present. Biopsied and tattooed. The scope was able to transverse the mass. The colon was otherwise normal.\par \par 8/18/22 CT C/A/P: There is irregular colonic wall thickening of the proximal sigmoid colon with stranding of the surrounding paracolic fat. Regional lymph nodes are identified adjacent to the colonic mass measuring up to 1.2 cm.  Multiple hypodense masses identified within the hepatic parenchyma is not to 3.9 cm, suspicious for hepatic parenchymal neoplastic disease. 3.4 cm left adrenal mass, suspicious for neoplastic disease. There is a 1.0 cm soft tissue nodule identified along the right lateral wall of the distal trachea, superior to the tracheal bifurcation. Lung nodules suspicious for lung parenchymal neoplastic disease.

## 2023-01-18 NOTE — HISTORY OF PRESENT ILLNESS
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 On 08/18/22, he had a CT C/A/P which showed  irregular colonic wall thickening of the proximal sigmoid colon with stranding of the surrounding paracolic fat. Regional lymph nodes are identified adjacent to the colonic mass measuring up to 1.2 cm. Multiple masses identified within the hepatic parenchyma noted to be 3.9 cm, suspicious for hepatic parenchymal neoplastic disease. 3.4 cm left adrenal mass, suspicious for neoplastic disease. There is a 1.0 cm soft tissue nodule identified along the right lateral wall of the distal trachea, superior to the tracheal bifurcation. Lung nodules suspicious for lung parenchymal neoplastic disease.  \par \par On 8/18/22, he had a colonoscopy that showed a frond-like/villous and ulcerated non-obstructing large mass was found in the descending colon. The mass was circumferential. The mass measured five cm in length. Pathology showed invasive adenocarcinoma, moderately differentiated. No loss of nuclear expression of MMR proteins (MLH1, MSH2, MSH6, and PMS2). These results show low probability of microsatellite instability-high (MSI-H). On 8/19/22, he had a liver, core biopsy which showed metastatic adenocarcinoma, consistent with metastasis from colonic primary.\par \par  [de-identified] : no MMR deficiency [de-identified] : Medical Hx: DVT 14 years ago (unknown cause) \par Surgical Hx: left eye sx; left torn meniscus repaired \par Family Hx: Mother leukemia\par Social Hx: never smoker; ETOH never; lives alone; single; works PT at [employer]; daughters close by [de-identified] : Pt is C7D1 of FOLFIOXIRI q 2 weeks, with ongoing cycles planned. He denies fatigue, cold sensitivity, eye changes, hair loss, nail / skin changes, he feels related to from leg brace, neuropathy, pain, mouth sores, vomiting for 2 episodes after last chemo; will take meds as needed, heartburn, C/D.

## 2023-01-18 NOTE — PHYSICAL EXAM
[Restricted in physically strenuous activity but ambulatory and able to carry out work of a light or sedentary nature] : Status 1- Restricted in physically strenuous activity but ambulatory and able to carry out work of a light or sedentary nature, e.g., light house work, office work [Normal] : affect appropriate [de-identified] : wt stable overall  [de-identified] : rt port accessed, dressing C/D/I

## 2023-01-18 NOTE — REVIEW OF SYSTEMS
[Vomiting] : vomiting [Negative] : Allergic/Immunologic [Recent Change In Weight] : ~T no recent weight change [Chest Pain] : no chest pain [Shortness Of Breath] : no shortness of breath [Abdominal Pain] : no abdominal pain [Constipation] : no constipation [Diarrhea] : no diarrhea [Confused] : no confusion [FreeTextEntry2] : wt stable overall  [FreeTextEntry7] : x2 episodes [FreeTextEntry9] : left leg in ace bandage and leg immobilizer  [de-identified] : right chest wall port palpable accessed

## 2023-01-19 DIAGNOSIS — R51.9 HEADACHE, UNSPECIFIED: ICD-10-CM

## 2023-01-19 DIAGNOSIS — T82.898A OTHER SPECIFIED COMPLICATION OF VASCULAR PROSTHETIC DEVICES, IMPLANTS AND GRAFTS, INITIAL ENCOUNTER: ICD-10-CM

## 2023-01-19 DIAGNOSIS — I31.39 OTHER PERICARDIAL EFFUSION (NONINFLAMMATORY): ICD-10-CM

## 2023-01-19 DIAGNOSIS — R11.0 NAUSEA: ICD-10-CM

## 2023-01-23 ENCOUNTER — RESULT REVIEW (OUTPATIENT)
Age: 63
End: 2023-01-23

## 2023-01-23 ENCOUNTER — INPATIENT (INPATIENT)
Facility: HOSPITAL | Age: 63
LOS: 3 days | Discharge: HOME | DRG: 240 | End: 2023-01-27
Attending: FAMILY MEDICINE | Admitting: INTERNAL MEDICINE
Payer: MEDICAID

## 2023-01-23 ENCOUNTER — APPOINTMENT (OUTPATIENT)
Dept: INFUSION THERAPY | Facility: CLINIC | Age: 63
End: 2023-01-23

## 2023-01-23 VITALS
RESPIRATION RATE: 18 BRPM | TEMPERATURE: 97.8 F | OXYGEN SATURATION: 98 % | DIASTOLIC BLOOD PRESSURE: 88 MMHG | HEART RATE: 135 BPM | BODY MASS INDEX: 26.64 KG/M2 | WEIGHT: 191 LBS | SYSTOLIC BLOOD PRESSURE: 120 MMHG

## 2023-01-23 VITALS — HEIGHT: 71 IN | WEIGHT: 190.92 LBS

## 2023-01-23 DIAGNOSIS — K56.609 UNSPECIFIED INTESTINAL OBSTRUCTION, UNSPECIFIED AS TO PARTIAL VERSUS COMPLETE OBSTRUCTION: ICD-10-CM

## 2023-01-23 LAB
ALBUMIN SERPL ELPH-MCNC: 3 G/DL — LOW (ref 3.3–5)
ALP SERPL-CCNC: 118 U/L — SIGNIFICANT CHANGE UP (ref 40–120)
ALT FLD-CCNC: 38 U/L — SIGNIFICANT CHANGE UP (ref 12–78)
ANION GAP SERPL CALC-SCNC: 8 MMOL/L — SIGNIFICANT CHANGE UP (ref 5–17)
APPEARANCE UR: ABNORMAL
APTT BLD: 27.9 SEC — SIGNIFICANT CHANGE UP (ref 27.5–35.5)
AST SERPL-CCNC: 27 U/L — SIGNIFICANT CHANGE UP (ref 15–37)
BASOPHILS # BLD AUTO: 0.05 K/UL — SIGNIFICANT CHANGE UP (ref 0–0.2)
BASOPHILS # BLD AUTO: 0.06 K/UL — SIGNIFICANT CHANGE UP (ref 0–0.2)
BASOPHILS NFR BLD AUTO: 0.6 % — SIGNIFICANT CHANGE UP (ref 0–2)
BASOPHILS NFR BLD AUTO: 0.8 % — SIGNIFICANT CHANGE UP (ref 0–2)
BILIRUB SERPL-MCNC: 0.3 MG/DL — SIGNIFICANT CHANGE UP (ref 0.2–1.2)
BILIRUB UR-MCNC: ABNORMAL
BUN SERPL-MCNC: 12 MG/DL — SIGNIFICANT CHANGE UP (ref 7–23)
CALCIUM SERPL-MCNC: 9.6 MG/DL — SIGNIFICANT CHANGE UP (ref 8.5–10.1)
CHLORIDE SERPL-SCNC: 104 MMOL/L — SIGNIFICANT CHANGE UP (ref 96–108)
CO2 SERPL-SCNC: 29 MMOL/L — SIGNIFICANT CHANGE UP (ref 22–31)
COLOR SPEC: ABNORMAL
CREAT SERPL-MCNC: 1.04 MG/DL — SIGNIFICANT CHANGE UP (ref 0.5–1.3)
DIFF PNL FLD: ABNORMAL
EGFR: 81 ML/MIN/1.73M2 — SIGNIFICANT CHANGE UP
EOSINOPHIL # BLD AUTO: 0.01 K/UL — SIGNIFICANT CHANGE UP (ref 0–0.5)
EOSINOPHIL # BLD AUTO: 0.01 K/UL — SIGNIFICANT CHANGE UP (ref 0–0.5)
EOSINOPHIL NFR BLD AUTO: 0.1 % — SIGNIFICANT CHANGE UP (ref 0–6)
EOSINOPHIL NFR BLD AUTO: 0.1 % — SIGNIFICANT CHANGE UP (ref 0–6)
FLUAV AG NPH QL: SIGNIFICANT CHANGE UP
FLUBV AG NPH QL: SIGNIFICANT CHANGE UP
GLUCOSE SERPL-MCNC: 107 MG/DL — HIGH (ref 70–99)
GLUCOSE UR QL: 50 MG/DL
HCT VFR BLD CALC: 36.9 % — LOW (ref 39–50)
HCT VFR BLD CALC: 36.9 % — LOW (ref 39–50)
HGB BLD-MCNC: 11 G/DL — LOW (ref 13–17)
HGB BLD-MCNC: 11.2 G/DL — LOW (ref 13–17)
IMM GRANULOCYTES NFR BLD AUTO: 2.3 % — HIGH (ref 0–0.9)
IMM GRANULOCYTES NFR BLD AUTO: 2.6 % — HIGH (ref 0–0.9)
INR BLD: 1.34 RATIO — HIGH (ref 0.88–1.16)
KETONES UR-MCNC: ABNORMAL
LACTATE SERPL-SCNC: 2.7 MMOL/L — HIGH (ref 0.7–2)
LEUKOCYTE ESTERASE UR-ACNC: ABNORMAL
LIDOCAIN IGE QN: 257 U/L — SIGNIFICANT CHANGE UP (ref 73–393)
LYMPHOCYTES # BLD AUTO: 1.25 K/UL — SIGNIFICANT CHANGE UP (ref 1–3.3)
LYMPHOCYTES # BLD AUTO: 1.51 K/UL — SIGNIFICANT CHANGE UP (ref 1–3.3)
LYMPHOCYTES # BLD AUTO: 16 % — SIGNIFICANT CHANGE UP (ref 13–44)
LYMPHOCYTES # BLD AUTO: 18 % — SIGNIFICANT CHANGE UP (ref 13–44)
MCHC RBC-ENTMCNC: 24.1 PG — LOW (ref 27–34)
MCHC RBC-ENTMCNC: 24.7 PG — LOW (ref 27–34)
MCHC RBC-ENTMCNC: 29.8 GM/DL — LOW (ref 32–36)
MCHC RBC-ENTMCNC: 30.4 GM/DL — LOW (ref 32–36)
MCV RBC AUTO: 80.9 FL — SIGNIFICANT CHANGE UP (ref 80–100)
MCV RBC AUTO: 81.5 FL — SIGNIFICANT CHANGE UP (ref 80–100)
MONOCYTES # BLD AUTO: 0.83 K/UL — SIGNIFICANT CHANGE UP (ref 0–0.9)
MONOCYTES # BLD AUTO: 0.99 K/UL — HIGH (ref 0–0.9)
MONOCYTES NFR BLD AUTO: 10.6 % — SIGNIFICANT CHANGE UP (ref 2–14)
MONOCYTES NFR BLD AUTO: 11.8 % — SIGNIFICANT CHANGE UP (ref 2–14)
NEUTROPHILS # BLD AUTO: 5.47 K/UL — SIGNIFICANT CHANGE UP (ref 1.8–7.4)
NEUTROPHILS # BLD AUTO: 5.63 K/UL — SIGNIFICANT CHANGE UP (ref 1.8–7.4)
NEUTROPHILS NFR BLD AUTO: 67.2 % — SIGNIFICANT CHANGE UP (ref 43–77)
NEUTROPHILS NFR BLD AUTO: 69.9 % — SIGNIFICANT CHANGE UP (ref 43–77)
NITRITE UR-MCNC: NEGATIVE — SIGNIFICANT CHANGE UP
NRBC # BLD: 0 /100 WBCS — SIGNIFICANT CHANGE UP (ref 0–0)
PH UR: 6 — SIGNIFICANT CHANGE UP (ref 5–8)
PLATELET # BLD AUTO: 362 K/UL — SIGNIFICANT CHANGE UP (ref 150–400)
PLATELET # BLD AUTO: 400 K/UL — SIGNIFICANT CHANGE UP (ref 150–400)
POTASSIUM SERPL-MCNC: 3 MMOL/L — LOW (ref 3.5–5.3)
POTASSIUM SERPL-SCNC: 3 MMOL/L — LOW (ref 3.5–5.3)
PROT SERPL-MCNC: 8.3 GM/DL — SIGNIFICANT CHANGE UP (ref 6–8.3)
PROT UR-MCNC: 100
PROTHROM AB SERPL-ACNC: 15.6 SEC — HIGH (ref 10.5–13.4)
RBC # BLD: 4.53 M/UL — SIGNIFICANT CHANGE UP (ref 4.2–5.8)
RBC # BLD: 4.56 M/UL — SIGNIFICANT CHANGE UP (ref 4.2–5.8)
RBC # FLD: 19.4 % — HIGH (ref 10.3–14.5)
RBC # FLD: 19.5 % — HIGH (ref 10.3–14.5)
RSV RNA NPH QL NAA+NON-PROBE: SIGNIFICANT CHANGE UP
SARS-COV-2 RNA SPEC QL NAA+PROBE: SIGNIFICANT CHANGE UP
SODIUM SERPL-SCNC: 141 MMOL/L — SIGNIFICANT CHANGE UP (ref 135–145)
SP GR SPEC: 1.02 — SIGNIFICANT CHANGE UP (ref 1.01–1.02)
UROBILINOGEN FLD QL: 4
WBC # BLD: 7.82 K/UL — SIGNIFICANT CHANGE UP (ref 3.8–10.5)
WBC # BLD: 8.38 K/UL — SIGNIFICANT CHANGE UP (ref 3.8–10.5)
WBC # FLD AUTO: 7.82 K/UL — SIGNIFICANT CHANGE UP (ref 3.8–10.5)
WBC # FLD AUTO: 8.38 K/UL — SIGNIFICANT CHANGE UP (ref 3.8–10.5)

## 2023-01-23 PROCEDURE — 99232 SBSQ HOSP IP/OBS MODERATE 35: CPT

## 2023-01-23 PROCEDURE — 85027 COMPLETE CBC AUTOMATED: CPT

## 2023-01-23 PROCEDURE — 93005 ELECTROCARDIOGRAM TRACING: CPT

## 2023-01-23 PROCEDURE — 99285 EMERGENCY DEPT VISIT HI MDM: CPT

## 2023-01-23 PROCEDURE — 85025 COMPLETE CBC W/AUTO DIFF WBC: CPT

## 2023-01-23 PROCEDURE — 0241U: CPT

## 2023-01-23 PROCEDURE — 80048 BASIC METABOLIC PNL TOTAL CA: CPT

## 2023-01-23 PROCEDURE — 83735 ASSAY OF MAGNESIUM: CPT

## 2023-01-23 PROCEDURE — 84100 ASSAY OF PHOSPHORUS: CPT

## 2023-01-23 PROCEDURE — 74177 CT ABD & PELVIS W/CONTRAST: CPT | Mod: 26,MA

## 2023-01-23 PROCEDURE — 85730 THROMBOPLASTIN TIME PARTIAL: CPT

## 2023-01-23 PROCEDURE — 85610 PROTHROMBIN TIME: CPT

## 2023-01-23 PROCEDURE — 99253 IP/OBS CNSLTJ NEW/EST LOW 45: CPT

## 2023-01-23 PROCEDURE — 99223 1ST HOSP IP/OBS HIGH 75: CPT

## 2023-01-23 PROCEDURE — 84443 ASSAY THYROID STIM HORMONE: CPT

## 2023-01-23 PROCEDURE — 36415 COLL VENOUS BLD VENIPUNCTURE: CPT

## 2023-01-23 PROCEDURE — 93306 TTE W/DOPPLER COMPLETE: CPT

## 2023-01-23 PROCEDURE — 93010 ELECTROCARDIOGRAM REPORT: CPT

## 2023-01-23 PROCEDURE — 80076 HEPATIC FUNCTION PANEL: CPT

## 2023-01-23 PROCEDURE — 80053 COMPREHEN METABOLIC PANEL: CPT

## 2023-01-23 RX ORDER — SODIUM CHLORIDE 9 MG/ML
1000 INJECTION INTRAMUSCULAR; INTRAVENOUS; SUBCUTANEOUS ONCE
Refills: 0 | Status: COMPLETED | OUTPATIENT
Start: 2023-01-23 | End: 2023-01-23

## 2023-01-23 RX ORDER — ENOXAPARIN SODIUM 100 MG/ML
120 INJECTION SUBCUTANEOUS EVERY 24 HOURS
Refills: 0 | Status: DISCONTINUED | OUTPATIENT
Start: 2023-01-23 | End: 2023-01-27

## 2023-01-23 RX ORDER — ONDANSETRON 8 MG/1
4 TABLET, FILM COATED ORAL ONCE
Refills: 0 | Status: COMPLETED | OUTPATIENT
Start: 2023-01-23 | End: 2023-01-23

## 2023-01-23 RX ADMIN — ONDANSETRON 4 MILLIGRAM(S): 8 TABLET, FILM COATED ORAL at 15:25

## 2023-01-23 RX ADMIN — SODIUM CHLORIDE 1000 MILLILITER(S): 9 INJECTION INTRAMUSCULAR; INTRAVENOUS; SUBCUTANEOUS at 17:20

## 2023-01-23 RX ADMIN — SODIUM CHLORIDE 2000 MILLILITER(S): 9 INJECTION INTRAMUSCULAR; INTRAVENOUS; SUBCUTANEOUS at 15:25

## 2023-01-23 NOTE — ED PROVIDER NOTE - OBJECTIVE STATEMENT
62 year old male with PMHx of colon CA metastasis to liver presents to the ED c/o nausea, vomiting and lower abd pain x few days, sent by Oncologist to R/O SBO. Pt has had 6 chemo treatments, last treatment 2 weeks ago. But pt has been constantly vomiting ever since. Was scheduled for the 7th treatment today but due to the vomiting, pt came to the ED. Unable to tolerate PO. Pt recently put on injection bloodthinner.

## 2023-01-23 NOTE — ED PROVIDER NOTE - PROGRESS NOTE DETAILS
Pt with obstruction at level of known malignancy.  Is passing stools.  Surgery consulted and they recommend medical admission as there will not be surgical intervention.  Dr. Salmeron to follow on the floor.  HR improved.  Lactate improved.  PT well appearing.  Further care per inpatient treatment team.

## 2023-01-23 NOTE — CONSULT NOTE ADULT - SUBJECTIVE AND OBJECTIVE BOX
Mr. FAUSTINA GUTIERREZ is a 62 year-old gentleman, with a PMHx of stage 4 adenoCA of the descending colon with liver and lung mets (Dx on August 2022, on FOLFIRI, finished cycle #6 2 weeks ago), presented with 3-4 days of nausea with non-bilious vomiting. CT scan finding was concerning of LBO, thus colorectal surgery team was consulted. Patient endorses having 2 BMs today with a consistency of paste, had a BM with same consistency yesterday, passing gases regularly, and endorsed not having an issue with passing bowel movements so far. Patient did mention that sometimes his stool caliber is thinner than usual. Patient denies fevers or chills, feculant vomiting or obstipation. Patient has no other complaints at this time. Of note, patient is currently on therapeutic lovenox due to thrombosis of chemoport.      CT: No small bowel obstruction. Appendix is not visualized. No evidence of inflammation in the pericecal region. The large bowel is now moderately dilated and filled with air and fluid with relative decompression of the distal sigmoid colon and the rectum secondary to probable cortical lesion in the distal descending colon. Again there is extensive stranding in the fat at the level of the tumor          -    PHYSICAL EXAM:  - GENERAL: Alert and oriented x 3. No acute distress   - EYES: EOMI. Anicteric.  - HENT: Moist mucous membranes. No scleral icterus. No cervical lymphadenopathy.  - LUNGS: Clear to auscultation bilaterally. No accessory muscle use.  - CARDIOVASCULAR: Regular rate and rhythm. No murmur. No JVD.  - ABDOMEN: moderately Distended, tympanic, non-tender in all four quadrants. No palpable masses.  - EXTREMITIES: No edema. Non-tender.?- SKIN: No rashes or lesions. Warm.  - NEUROLOGIC: No focal neurological deficits. CN II-XII grossly intact, but not individually tested.  - PSYCHIATRIC: Cooperative. Appropriate mood and affect.    -  Intestinal obstruction    C78.7    Dehydration    Displaced bicondylar fracture of left tibia, initial encounter for closed fracture    Encounter for antineoplastic chemotherapy    Encounter for other specified aftercare    Headache, unspecified    Malignant neoplasm of colon, unspecified    Nausea    Nausea with vomiting, unspecified    OTH COMPLICATION OF VASCULAR PROSTH DEV/GRFT, INIT    Other pericardial effusion (noninflammatory)    Pain in unspecified limb    UNSP FRACTURE OF SHAFT OF UNSP TIBIA, INIT FOR CLOS FX    Unsp intestnl obst, unsp as to partial versus complete obst    Marital Status::    Occupation::    No pertinent family history in first degree relatives    MEWS Score    No pertinent past medical history    Colon cancer    No pertinent past medical history    Large bowel obstruction    No significant past surgical history    sent from cancer center    90+    Colon cancer    [email or web address]        -    T(C): 36.8 (01-24-23 @ 02:50), Max: 36.8 (01-23-23 @ 13:40)  HR: 73 (01-24-23 @ 02:50) (73 - 138)  BP: 147/95 (01-24-23 @ 02:50) (120/88 - 181/100)  RR: 18 (01-24-23 @ 02:50) (18 - 18)  SpO2: 99% (01-24-23 @ 02:50) (98% - 99%)        -            -

## 2023-01-23 NOTE — H&P ADULT - HISTORY OF PRESENT ILLNESS
63 y/o M w/ PMH of colon cancer w/ mets to liver on chemo, catheter tip thrombus (on Lovenox), p/w nausea / vomiting. States patient had vomiting starting about 2 weeks ago. Then vomiting resolved transiently, and then started again on Wednesday. States last episode of vomiting was this morning. Patient also had associated abdominal pain in lower abdomen. States presently, abdominal pain is well controlled, and he only feels it with movement. Denies fever, chills. Patient had a BM earlier in the day. Denies CP / SOB /cough /runny nose/ sore throat.     PSH: L tibia ORIF     Social Hx: Denies tobacco / etoh / drugs     Family Hx: Mother-   at 53 y/o w/ leukemia

## 2023-01-23 NOTE — ED PROVIDER NOTE - CLINICAL SUMMARY MEDICAL DECISION MAKING FREE TEXT BOX
Likely dehydration, medication side effect, or result of underlying known PE. Plan for CT A/P and eval for SBO. Dispo pending labs and reassessment. Likely dehydration, medication side effect, or result of underlying known PE. Plan for CT A/P and eval for SBO/LBO. Dispo pending labs and reassessment.

## 2023-01-23 NOTE — ED PROVIDER NOTE - PHYSICAL EXAMINATION
Constitutional: NAD, well appearing  HEENT: no rhinorrhea, PERRL, no oropharyngeal erythema or exudates, midline uvula.  TMs clear.  CVS:  RRR, no m/r/g  Resp:  CTAB  GI: +mild ttp of b/l quadrants  MSK:  no restriction to rom, full ROM to all extremities  Neuro:  A&Ox3, 5/5 strength to all extremities,  SILT to all extremities  Skin: no rash  psych: clear thought content  Heme/lymph:  No LAD
home

## 2023-01-23 NOTE — CONSULT NOTE ADULT - ASSESSMENT
62 year old male Stage IV left sided sigmoid colon cancer metastatic to liver, lung, adrenals, p/w nausea, vomiting x few days. Was sent by oncologist to r/o bowel obstruction.    Oncology Hx  Oncologist-Dr. Harrison    -8/18/22 Colonoscopy showed a frond-like/villous and ulcerated non-obstructing large mass, measuring 5 cm in the descending colon. The mass was circumferential.   -Path: Invasive adenocarcinoma, moderately differentiated. No loss of nuclear expression of MMR proteins (MLH1, MSH2, MSH6, and PMS2). These results show low probability of microsatellite instability-high (MSI-H)  -8/16/22 CEA = 633  -8/19/22 Path: Liver, core biopsy: Metastatic adenocarcinoma, consistent with metastasis from colonic primary  -He was started on FOLFIRI- 10/19 (the delay was due to fall 9/22--->he underwent  ORIF on 9/29/22 of the left tibial plateau)  -1/9/23 CT C/A/P: Findings c/w descending colon cancer with extension through the wall with extensive mesenteric confluent tumor and/or ivelisse disease. Also noted diffuse metastatic disease identified throughout the liver. Small pericardial effusion and areas of thickening of questionable significance. Small amount of pericatheter clot near the tip of the catheter in the superior vena cava.  - He received Chemo last on 1/18/23 in C7D1 and is on Onpro pump.  # Metastasis Colon Cancer to the liver  -            62 year old male Stage IV left sided sigmoid colon cancer metastatic to liver, lung, adrenals, p/w nausea, vomiting x few days. Was sent by oncologist to r/o bowel obstruction. reports vomiting seen oral contrast for CT abd-1/9    Oncology Hx  Oncologist-Dr. Harrison    -8/18/22 Colonoscopy showed a frond-like/villous and ulcerated non-obstructing large mass, measuring 5 cm in the descending colon. The mass was circumferential.   -Path: Invasive adenocarcinoma, moderately differentiated. No loss of nuclear expression of MMR proteins (MLH1, MSH2, MSH6, and PMS2). These results show low probability of microsatellite instability-high (MSI-H)  -8/16/22 CEA = 633  -8/19/22 Path: Liver, core biopsy: Metastatic adenocarcinoma, consistent with metastasis from colonic primary  -He was started on FOLFIRI- 10/19 (the delay was due to fall 9/22--->he underwent  ORIF on 9/29/22 of the left tibial plateau)  -1/9/23 CT C/A/P: Findings c/w descending colon cancer with extension through the wall with extensive mesenteric confluent tumor and/or ivelisse disease. Also noted diffuse metastatic disease identified throughout the liver. Small pericardial effusion and areas of thickening of questionable significance. Small amount of pericatheter clot near the tip of the catheter in the superior vena cava.  - He received Chemo last on 1/4/23 completing C6.  # Metastasis Colon Cancer to the liver  -            62 year old male Stage IV left sided sigmoid colon cancer metastatic to liver, lung, adrenals, p/w nausea, vomiting x few days. Was sent by oncologist to r/o bowel obstruction. reports vomiting seen oral contrast for CT abd-1/9    Oncology   Oncologist-Dr. Harrison    -8/18/22 Colonoscopy showed a frond-like/villous and ulcerated non-obstructing large mass, measuring 5 cm in the descending colon. The mass was circumferential.   -Path: Invasive adenocarcinoma, moderately differentiated. No loss of nuclear expression of MMR proteins (MLH1, MSH2, MSH6, and PMS2). These results show low probability of microsatellite instability-high (MSI-H)  -8/16/22 CEA = 633  -8/19/22 Path: Liver, core biopsy: Metastatic adenocarcinoma, consistent with metastasis from colonic primary  -He was started on FOLFIRI- 10/19 (the delay was due to fall 9/22--->he underwent  ORIF on 9/29/22 of the left tibial plateau)  -1/9/23 CT C/A/P: Findings c/w descending colon cancer with extension through the wall with extensive mesenteric confluent tumor and/or ivelisse disease. Also noted diffuse metastatic disease identified throughout the liver. Small pericardial effusion and areas of thickening of questionable significance. Small amount of pericatheter clot near the tip of the catheter in the superior vena cava.  - He received Chemo last on 1/4/23 completing C6.  # Metastasis Colon Cancer to the liver  - CT-A/P with PO contrast           62 year old male Stage IV sided sigmoid colon cancer metastatic to liver, lung, adrenals, p/w nausea, vomiting x few days. Was sent by oncologist to r/o bowel obstruction.     Oncology Hx  Oncologist-Dr. Harrison    -8/18/22 Colonoscopy showed a frond-like/villous and ulcerated non-obstructing large mass, measuring 5 cm in the descending colon. The mass was circumferential.   -Path: Invasive adenocarcinoma, moderately differentiated. No loss of nuclear expression of MMR proteins (MLH1, MSH2, MSH6, and PMS2). These results show low probability of microsatellite instability-high (MSI-H)  -8/16/22 CEA = 633  -8/19/22 Path: Liver, core biopsy: Metastatic adenocarcinoma, consistent with metastasis from colonic primary  -He was started on FOLFIRI- 10/19 (the delay was due to fall 9/22--->he underwent  ORIF on 9/29/22 of the left tibial plateau)  -1/9/23 CT C/A/P: Findings c/w descending colon cancer with extension through the wall with extensive mesenteric confluent tumor and/or ivelisse disease. Also noted diffuse metastatic disease identified throughout the liver. Small pericardial effusion and areas of thickening of questionable significance. Small amount of pericatheter clot near the tip of the catheter in the superior vena cava.  - He received Chemo last on 1/4/23 completing C6.  Hem Onc Attending :  Case reviewed, reviewed CT images, reviewed office records.    61 y/o  male diagnosed with metastatic sigmoid colon cancer in August 2022, extensive liver mets, adrenal mets, small lung mets.   Started palliative chemotherapy FOLFIRI    Last chemotx Folfiri cycle 6 on 1/4/23   CT scans early Jan 2023 showed partial response to chemo- liver mets slightly smaller.  He was found to have a clot at the tip of mediport catheter and she was started on AC - full dose lovenox by Dr Harrison.    Now admitted with several  days hx of n/v/ unable to keep food down. Mild abd discomfort.  CT abd pelvis- concern re possible LBO due to tumor.     Plan NPO          Conservative management for now         Surgical oncology evaluation  Chemo Folfiri cycle 7 was scheduled 1/23 - on hold.

## 2023-01-23 NOTE — ED ADULT NURSE NOTE - OBJECTIVE STATEMENT
Patient presents to the ED with complaints of nausea/vomiting; patient is currently on chemotherapy for colon CA with mets to liver; patient was due to get his chemotherapy today but was unable to begin treatment due to him not feeling well; patient with no appetite; no abdominal pain at this time but does intermittently complain of lower abdominal pain; no pain with urination; abdomen is soft, non-distended, non-tender; also with occasional diarrhea (1 to 2 episdoes) - SHARLA Levy RN

## 2023-01-23 NOTE — ED ADULT NURSE NOTE - BREATHING, MLM
Acute bacterial conjunctivitis of both eyes.  This is a new problem.  Patient with crusting on his eyelids and redness of the conjunctiva.  Will prescribe eyedrops to be used 4 times a day for 7 days.   Spontaneous, unlabored and symmetrical

## 2023-01-23 NOTE — ED PROVIDER NOTE - NS ED ROS FT
Constitutional: nad, well appearing  HEENT:  no nasal congestion, eye drainage or ear pain.    CVS:  no cp  Resp:  No sob, no cough  GI:  +abdominal pain, nausea, vomiting  :  no dysuria  MSK: no joint pain or limited ROM  Skin: no rash  Neuro: no change in mental status or level of consciousness  Heme/lymph: no bleeding

## 2023-01-23 NOTE — ED ADULT TRIAGE NOTE - CHIEF COMPLAINT QUOTE
pt was at Cancer center today for chemo treatment for vomiting. + nausea and vomiting x couple of days. c/o lower abd pain. hx colon cancer.

## 2023-01-23 NOTE — CONSULT NOTE ADULT - ASSESSMENT
A 62 year-old gentleman, stage 4 adenoCA of the descending colon with liver and lung mets, presented with 3-4 days of vomiting, with CT findings concerning for LBO. Patient currently does not have any signs/symptoms of true bowel obstruction.      Recommendations:  - No urgent surgical intervention warranted at this time  - 62 year-old gentleman, stage 4 adenoCA of the descending colon with liver and lung mets, presented with 3-4 days of vomiting, with CT findings concerning for LBO. Patient currently does not have any signs/symptoms of true bowel obstruction.    Discussed palliative colonic stent with Dr. Hills, GI, but depending on patient's expected survival would lean more towards palliative colectomy, possible ostomy.    Recommendations:  - serial abdominal exams  - monitor bowel function  - may need palliative colectomy for bowel obstruction this admission  - management per primary team  - colorectal surgery will follow    Discussed with Dr. Salmeron.

## 2023-01-23 NOTE — H&P ADULT - ASSESSMENT
63 y/o M w/ PMH of colon cancer w/ mets to liver on chemo, catheter tip thrombus (on Lovenox), p/w nausea / vomiting    *New Partial large bowel obstruction 2/2 known apple core lesion in distal descending colon  -Spoke to surgery - No intervention at this time, will f/u consult note   -NPO  -Surgery consult  -Pain control  -IVF    *Colon cancer w/ mets  -Other CT findings: pulmonary nodule, scattered hepatic lesions, L adrenal nodule   -Heme/onc  -Lactic acidosis resolved     *Small pericardial effusion  -Echo for further evaluation    *Hypokalemia  -Replete and recheck     *Anemia  -F/u outpatient     *DVT ppx  -On Lovenox  61 y/o M w/ PMH of colon cancer w/ mets to liver on chemo, catheter tip thrombus (on Lovenox), p/w nausea / vomiting    *New Partial large bowel obstruction 2/2 known apple core lesion in distal descending colon  -Spoke to surgery - No intervention at this time, will f/u consult note   -NPO  -Surgery consult  -Pain control  -IVF  -Lactic acidosis resolved     *Colon cancer w/ mets  -Other CT findings: pulmonary nodule, scattered hepatic lesions, L adrenal nodule   -Heme/onc    *Small pericardial effusion  -Echo for further evaluation    *Hypokalemia  -Replete and recheck     *Anemia  -F/u outpatient     *DVT ppx  -On Lovenox

## 2023-01-23 NOTE — ED ADULT TRIAGE NOTE - WILL THE PATIENT ACCEPT THE PFIZER COVID-19 VACCINE IF ELIGIBLE AND IT IS AVAILABLE?
MD Low called writer back from earlier page - writer explained MURIEL output, amylase level, and pt's current status of stable vitals but diaphoretic and not feeling well. Writer received orders to make pt NPO, increase IVF to 100 mL/hr, start pt on 100mcg Octreotide q8h, and to draw 2 blood cultures if pt has temp > 101 degrees F. Nursing will continue to monitor.   No

## 2023-01-23 NOTE — PHARMACOTHERAPY INTERVENTION NOTE - COMMENTS
Medication reconciliation completed.  Patient was unable to provide medication information, spoke to daughter Clarice at bedside and they provided current medication list; confirmed with Dr. First [facility].  Pt was scheduled for his 7th round of chemo on 1-18-23 but a clot was found, so pt was put on Lovenox 150mg daily and chemo was postponed until today 1-23-23; pt had Lovenox 40mg supply at home from a surgery in October so he was given 3 shots of 40mg (120mg total) at home.

## 2023-01-23 NOTE — CONSULT NOTE ADULT - SUBJECTIVE AND OBJECTIVE BOX
HPI:  62 year old male with PMH of colon CA metastasis to liver presents to the ED c/o nausea, vomiting and lower abd pain x few days, sent by Oncologist to R/O SBO. Pt has had 6 chemo treatments, last treatment 2 weeks ago. But pt has been constantly vomiting ever since. Was scheduled for the 7th treatment today but due to the vomiting, pt came to the ED. Unable to tolerate PO. Pt recently put on injection blood thinner.    PAST MEDICAL & SURGICAL HISTORY:  No pertinent past medical history  Colon cancer  ORIF-left tibia    FAMILY HISTORY:  No pertinent family history in first degree relatives    MEDICATIONS  (STANDING):  ondansetron Injectable 4 milliGRAM(s) IV Push once  sodium chloride 0.9% Bolus 1000 milliLiter(s) IV Bolus once    MEDICATIONS  (PRN):      Allergies  No Known Allergies    Review of Systems  Constitutional, Eyes, ENT, Cardiovascular, Respiratory, Gastrointestinal, Genitourinary, Musculoskeletal, Integumentary, Neurological, Psychiatric, Endocrine, Heme/Lymph and Allergic/Immunologic review of systems are otherwise negative except as noted in HPI.     Vital Signs  T(C): 36.8 (23 Jan 2023 13:40), Max: 36.8 (23 Jan 2023 13:40)  T(F): 98.3 (23 Jan 2023 13:40), Max: 98.3 (23 Jan 2023 13:40)  HR: 138 (23 Jan 2023 13:40) (135 - 138)  BP: 142/107 (23 Jan 2023 13:40) (120/88 - 142/107)  BP(mean): 119 (23 Jan 2023 13:40) (119 - 119)  RR: 18 (23 Jan 2023 13:40) (18 - 18)  SpO2: 98% (23 Jan 2023 13:40) (98% - 98%)    Parameters below as of 23 Jan 2023 13:40  Patient On (Oxygen Delivery Method): room air    Physical Exam  GENERAL: no acute distress  HEENT: EOMI, neck supple  RESPIRATORY: LCTAB/L, no rhonchi, rales, or wheezing  CARDIOVASCULAR: RRR, no murmurs, gallops, rubs  ABDOMINAL: soft, non-tender, non-distended, positive bowel sounds   EXTREMITIES: no clubbing, cyanosis, or edema  NEUROLOGICAL: alert and oriented x 3, non-focal  SKIN: no rashes or lesions   MUSCULOSKELETAL: no gross joint deformity    LABS:  CBC Full  -  ( 23 Jan 2023 13:02 )  WBC Count : 7.82 K/uL  RBC Count : 4.56 M/uL  Hemoglobin : 11.0 g/dL  Hematocrit : 36.9 %  Platelet Count - Automated : 400 K/uL  Mean Cell Volume : 80.9 fl  Mean Cell Hemoglobin : 24.1 pg  Mean Cell Hemoglobin Concentration : 29.8 gm/dL  Auto Neutrophil # : 5.47 K/uL  Auto Lymphocyte # : 1.25 K/uL  Auto Monocyte # : 0.83 K/uL  Auto Eosinophil # : 0.01 K/uL  Auto Basophil # : 0.06 K/uL  Auto Neutrophil % : 69.9 %  Auto Lymphocyte % : 16.0 %  Auto Monocyte % : 10.6 %  Auto Eosinophil % : 0.1 %  Auto Basophil % : 0.8 %                RADIOLOGY & ADDITIONAL STUDIES:     HPI:  62 year old male with PMH of colon CA metastasis to liver presents to the ED c/o nausea, vomiting and lower abd pain x few days, sent by Oncologist to R/O SBO. Pt has had 6 chemo treatments, last treatment 2 weeks ago. But pt has been constantly vomiting ever since. Was scheduled for the 7th treatment today but due to the vomiting, pt came to the ED. Unable to tolerate PO. Pt recently put on injection blood thinner.    PAST MEDICAL & SURGICAL HISTORY:  No pertinent past medical history  Colon cancer  ORIF-left tibia    FAMILY HISTORY:  No pertinent family history in first degree relatives    MEDICATIONS  (STANDING):  ondansetron Injectable 4 milliGRAM(s) IV Push once  sodium chloride 0.9% Bolus 1000 milliLiter(s) IV Bolus once    MEDICATIONS  (PRN):      Allergies  No Known Allergies    Review of Systems  Constitutional, Eyes, ENT, Cardiovascular, Respiratory, Gastrointestinal, Genitourinary, Musculoskeletal, Integumentary, Neurological, Psychiatric, Endocrine, Heme/Lymph and Allergic/Immunologic review of systems are otherwise negative except as noted in HPI.     Vital Signs  T(C): 36.8 (23 Jan 2023 13:40), Max: 36.8 (23 Jan 2023 13:40)  T(F): 98.3 (23 Jan 2023 13:40), Max: 98.3 (23 Jan 2023 13:40)  HR: 138 (23 Jan 2023 13:40) (135 - 138)  BP: 142/107 (23 Jan 2023 13:40) (120/88 - 142/107)  BP(mean): 119 (23 Jan 2023 13:40) (119 - 119)  RR: 18 (23 Jan 2023 13:40) (18 - 18)  SpO2: 98% (23 Jan 2023 13:40) (98% - 98%)    Parameters below as of 23 Jan 2023 13:40  Patient On (Oxygen Delivery Method): room air    Physical Exam  GENERAL: no acute distress  HEENT: EOMI, neck supple  RESPIRATORY: LCTAB/L, no rhonchi, rales, or wheezing  CARDIOVASCULAR: RRR, no murmurs, gallops, rubs  ABDOMINAL: soft, non-tender, non-distended, positive bowel sounds   EXTREMITIES: no clubbing, cyanosis, or edema  NEUROLOGICAL: alert and oriented x 3, non-focal  SKIN: no rashes or lesions   MUSCULOSKELETAL: no gross joint deformity    LABS:  CBC Full  -  ( 23 Jan 2023 13:02 )  WBC Count : 7.82 K/uL  RBC Count : 4.56 M/uL  Hemoglobin : 11.0 g/dL  Hematocrit : 36.9 %  Platelet Count - Automated : 400 K/uL  Mean Cell Volume : 80.9 fl  Mean Cell Hemoglobin : 24.1 pg  Mean Cell Hemoglobin Concentration : 29.8 gm/dL  Auto Neutrophil # : 5.47 K/uL  Auto Lymphocyte # : 1.25 K/uL  Auto Monocyte # : 0.83 K/uL  Auto Eosinophil # : 0.01 K/uL  Auto Basophil # : 0.06 K/uL  Auto Neutrophil % : 69.9 %  Auto Lymphocyte % : 16.0 %  Auto Monocyte % : 10.6 %  Auto Eosinophil % : 0.1 %  Auto Basophil % : 0.8 %    RADIOLOGY & ADDITIONAL STUDIES:    ACC: [id number] EXAM:  CT ABDOMEN AND PELVIS OC IC   ORDERED BY: JOSE BURKS     PROCEDURE DATE:  01/23/2023          INTERPRETATION:  CLINICAL INFORMATION: Nausea vomiting. Rule out small   bowel obstruction. Colon cancer with liver metastases    COMPARISON: 1/9/2023    CONTRAST/COMPLICATIONS:  IV Contrast: Omnipaque 350  90 cc administered   10 cc discarded  Oral Contrast: Omnipaque 300  Complications: None reported at time of study completion    PROCEDURE:  CT of the Abdomen and Pelvis was performed.  Sagittal and coronal reformats were performed.    FINDINGS:  LOWER CHEST: Minimal pericardial effusion, unchanged. Tiny pulmonary   nodule in the right middle lobe on image 6 of series 2 also seen on the   prior study    LIVER: Scattered low-attenuation lesions throughout the right hepatic   lobe similar to the prior study with lesion in the hepatic dome again   demonstrating peripheral focus of high attenuation likely representing   calcification  BILE DUCTS: Normal caliber.  GALLBLADDER: Within normal limits.  SPLEEN: Within normal limits.  PANCREAS: Within normal limits.  ADRENALS: Right adrenal gland is unremarkable. There is a well-defined   large left adrenal nodule measuring approximately 2.7 x 2.9 cm.   Differential diagnosis includes metastasis versus adenoma.  KIDNEYS/URETERS: No hydronephrosis or urolithiasis. Small right renal   hypodensities too small to characterize    BLADDER: Within normal limits.  REPRODUCTIVE ORGANS: Prostate within normal limits.    BOWEL: No small bowel obstruction. Appendix is not visualized. No   evidence of inflammation in the pericecal region. The large bowel is now   moderately dilated and filled with air and fluid with relative   decompression of the distal sigmoid colon and the rectum secondary to   probable cortical lesion in the distal descending colon. Again there is   extensive stranding in the fat at the level of the tumor  PERITONEUM: No ascites.  VESSELS: Within normal limits.  RETROPERITONEUM/LYMPH NODES: No lymphadenopathy.  ABDOMINAL WALL: Small bilateral fat-containing inguinal hernias with   small fat-containing umbilical hernia.  BONES: Degenerative changes.    IMPRESSION: New partial large bowel obstruction secondary to known apple   core lesion in the distaldescending colon. Again extensive adjacent   mesenteric abnormality is seen which may represent conglomerate lymph   nodes.  Redemonstrated hepatic metastases  No change large left adrenal nodule  Small pericardial effusion is again seen.    --- End of Report ---    DENISE HERNANDEZ MD; Attending Radiologist  This document has been electronically signed. Jan 23 2023  6:51PM

## 2023-01-23 NOTE — ED ADULT NURSE REASSESSMENT NOTE - NS ED NURSE REASSESS COMMENT FT1
Patient awaiting CT; no acute distress noted; patient with no complaints of pain at this time; awaiting further orders and disposition; no nausea/vomiting in ED; will continue to monitor, comfort and safety mukul Levy RN Patient awaiting CT; no acute distress noted; patient with no complaints of pain at this time; awaiting further orders and disposition; no nausea/vomiting in ED; ED attending notified of patient's hypertension in ED with no history of hypertension; will continue to monitor, comfort and safety tej- SHARLA Levy RN

## 2023-01-24 LAB
ALBUMIN SERPL ELPH-MCNC: 2.5 G/DL — LOW (ref 3.3–5)
ALP SERPL-CCNC: 97 U/L — SIGNIFICANT CHANGE UP (ref 40–120)
ALT FLD-CCNC: 31 U/L — SIGNIFICANT CHANGE UP (ref 12–78)
ANION GAP SERPL CALC-SCNC: 4 MMOL/L — LOW (ref 5–17)
ANISOCYTOSIS BLD QL: SLIGHT — SIGNIFICANT CHANGE UP
APTT BLD: 25.5 SEC — LOW (ref 27.5–35.5)
AST SERPL-CCNC: 27 U/L — SIGNIFICANT CHANGE UP (ref 15–37)
BASOPHILS # BLD AUTO: 0.07 K/UL — SIGNIFICANT CHANGE UP (ref 0–0.2)
BASOPHILS NFR BLD AUTO: 1.1 % — SIGNIFICANT CHANGE UP (ref 0–2)
BILIRUB SERPL-MCNC: 0.2 MG/DL — SIGNIFICANT CHANGE UP (ref 0.2–1.2)
BUN SERPL-MCNC: 8 MG/DL — SIGNIFICANT CHANGE UP (ref 7–23)
CALCIUM SERPL-MCNC: 8.8 MG/DL — SIGNIFICANT CHANGE UP (ref 8.5–10.1)
CHLORIDE SERPL-SCNC: 107 MMOL/L — SIGNIFICANT CHANGE UP (ref 96–108)
CO2 SERPL-SCNC: 29 MMOL/L — SIGNIFICANT CHANGE UP (ref 22–31)
CREAT SERPL-MCNC: 0.72 MG/DL — SIGNIFICANT CHANGE UP (ref 0.5–1.3)
CULTURE RESULTS: SIGNIFICANT CHANGE UP
EGFR: 103 ML/MIN/1.73M2 — SIGNIFICANT CHANGE UP
ELLIPTOCYTES BLD QL SMEAR: SLIGHT — SIGNIFICANT CHANGE UP
EOSINOPHIL # BLD AUTO: 0.06 K/UL — SIGNIFICANT CHANGE UP (ref 0–0.5)
EOSINOPHIL NFR BLD AUTO: 0.9 % — SIGNIFICANT CHANGE UP (ref 0–6)
GLUCOSE SERPL-MCNC: 87 MG/DL — SIGNIFICANT CHANGE UP (ref 70–99)
HCT VFR BLD CALC: 33.3 % — LOW (ref 39–50)
HGB BLD-MCNC: 10 G/DL — LOW (ref 13–17)
IMM GRANULOCYTES NFR BLD AUTO: 2.7 % — HIGH (ref 0–0.9)
INR BLD: 1.28 RATIO — HIGH (ref 0.88–1.16)
LYMPHOCYTES # BLD AUTO: 1.84 K/UL — SIGNIFICANT CHANGE UP (ref 1–3.3)
LYMPHOCYTES # BLD AUTO: 29.1 % — SIGNIFICANT CHANGE UP (ref 13–44)
MANUAL SMEAR VERIFICATION: SIGNIFICANT CHANGE UP
MCHC RBC-ENTMCNC: 24.7 PG — LOW (ref 27–34)
MCHC RBC-ENTMCNC: 30 GM/DL — LOW (ref 32–36)
MCV RBC AUTO: 82.2 FL — SIGNIFICANT CHANGE UP (ref 80–100)
MICROCYTES BLD QL: SLIGHT — SIGNIFICANT CHANGE UP
MONOCYTES # BLD AUTO: 0.65 K/UL — SIGNIFICANT CHANGE UP (ref 0–0.9)
MONOCYTES NFR BLD AUTO: 10.3 % — SIGNIFICANT CHANGE UP (ref 2–14)
NEUTROPHILS # BLD AUTO: 3.54 K/UL — SIGNIFICANT CHANGE UP (ref 1.8–7.4)
NEUTROPHILS NFR BLD AUTO: 55.9 % — SIGNIFICANT CHANGE UP (ref 43–77)
PLAT MORPH BLD: NORMAL — SIGNIFICANT CHANGE UP
PLATELET # BLD AUTO: 276 K/UL — SIGNIFICANT CHANGE UP (ref 150–400)
POIKILOCYTOSIS BLD QL AUTO: SLIGHT — SIGNIFICANT CHANGE UP
POTASSIUM SERPL-MCNC: 3.2 MMOL/L — LOW (ref 3.5–5.3)
POTASSIUM SERPL-SCNC: 3.2 MMOL/L — LOW (ref 3.5–5.3)
PROT SERPL-MCNC: 6.9 GM/DL — SIGNIFICANT CHANGE UP (ref 6–8.3)
PROTHROM AB SERPL-ACNC: 14.9 SEC — HIGH (ref 10.5–13.4)
RBC # BLD: 4.05 M/UL — LOW (ref 4.2–5.8)
RBC # FLD: 19.3 % — HIGH (ref 10.3–14.5)
RBC BLD AUTO: ABNORMAL
SODIUM SERPL-SCNC: 140 MMOL/L — SIGNIFICANT CHANGE UP (ref 135–145)
SPECIMEN SOURCE: SIGNIFICANT CHANGE UP
WBC # BLD: 6.33 K/UL — SIGNIFICANT CHANGE UP (ref 3.8–10.5)
WBC # FLD AUTO: 6.33 K/UL — SIGNIFICANT CHANGE UP (ref 3.8–10.5)

## 2023-01-24 PROCEDURE — 99233 SBSQ HOSP IP/OBS HIGH 50: CPT

## 2023-01-24 PROCEDURE — 99232 SBSQ HOSP IP/OBS MODERATE 35: CPT

## 2023-01-24 PROCEDURE — 99254 IP/OBS CNSLTJ NEW/EST MOD 60: CPT

## 2023-01-24 PROCEDURE — 93306 TTE W/DOPPLER COMPLETE: CPT | Mod: 26

## 2023-01-24 PROCEDURE — 99222 1ST HOSP IP/OBS MODERATE 55: CPT

## 2023-01-24 RX ORDER — POTASSIUM CHLORIDE 20 MEQ
10 PACKET (EA) ORAL
Refills: 0 | Status: COMPLETED | OUTPATIENT
Start: 2023-01-24 | End: 2023-01-24

## 2023-01-24 RX ORDER — POLYETHYLENE GLYCOL 3350 17 G/17G
17 POWDER, FOR SOLUTION ORAL AT BEDTIME
Refills: 0 | Status: DISCONTINUED | OUTPATIENT
Start: 2023-01-24 | End: 2023-01-27

## 2023-01-24 RX ORDER — MORPHINE SULFATE 50 MG/1
2 CAPSULE, EXTENDED RELEASE ORAL EVERY 6 HOURS
Refills: 0 | Status: DISCONTINUED | OUTPATIENT
Start: 2023-01-24 | End: 2023-01-27

## 2023-01-24 RX ORDER — SODIUM CHLORIDE 9 MG/ML
1000 INJECTION INTRAMUSCULAR; INTRAVENOUS; SUBCUTANEOUS
Refills: 0 | Status: DISCONTINUED | OUTPATIENT
Start: 2023-01-24 | End: 2023-01-26

## 2023-01-24 RX ORDER — INFLUENZA VIRUS VACCINE 15; 15; 15; 15 UG/.5ML; UG/.5ML; UG/.5ML; UG/.5ML
0.5 SUSPENSION INTRAMUSCULAR ONCE
Refills: 0 | Status: DISCONTINUED | OUTPATIENT
Start: 2023-01-24 | End: 2023-01-27

## 2023-01-24 RX ADMIN — Medication 100 MILLIEQUIVALENT(S): at 00:33

## 2023-01-24 RX ADMIN — SODIUM CHLORIDE 1000 MILLILITER(S): 9 INJECTION INTRAMUSCULAR; INTRAVENOUS; SUBCUTANEOUS at 00:27

## 2023-01-24 RX ADMIN — Medication 100 MILLIEQUIVALENT(S): at 12:46

## 2023-01-24 RX ADMIN — Medication 100 MILLIEQUIVALENT(S): at 11:35

## 2023-01-24 RX ADMIN — SODIUM CHLORIDE 100 MILLILITER(S): 9 INJECTION INTRAMUSCULAR; INTRAVENOUS; SUBCUTANEOUS at 00:33

## 2023-01-24 RX ADMIN — Medication 100 MILLIEQUIVALENT(S): at 01:50

## 2023-01-24 RX ADMIN — POLYETHYLENE GLYCOL 3350 17 GRAM(S): 17 POWDER, FOR SOLUTION ORAL at 21:19

## 2023-01-24 RX ADMIN — ENOXAPARIN SODIUM 120 MILLIGRAM(S): 100 INJECTION SUBCUTANEOUS at 21:19

## 2023-01-24 RX ADMIN — Medication 100 MILLIEQUIVALENT(S): at 13:49

## 2023-01-24 RX ADMIN — Medication 100 MILLIEQUIVALENT(S): at 02:52

## 2023-01-24 NOTE — PATIENT PROFILE ADULT - FALL HARM RISK - HARM RISK INTERVENTIONS

## 2023-01-24 NOTE — PROGRESS NOTE ADULT - SUBJECTIVE AND OBJECTIVE BOX
FAUSTINA GUTIERREZ  62y  Male      Patient is a 62y old  Male who presents with a chief complaint of vomiting (2023 11:47)      INTERVAL HPI/OVERNIGHT EVENTS:  Seen and examined, sitting up in chair.  utilized.   Denies overt abd pain, no n/v  Plan for colonic stent later today    REVIEW OF SYSTEMS:  CONSTITUTIONAL: No fever, weight loss, or fatigue  EYES: No eye pain, visual disturbances, or discharge  ENMT:  No difficulty hearing, tinnitus, vertigo; No sinus or throat pain  NECK: No pain or stiffness  BREASTS: No pain, masses, or nipple discharge  RESPIRATORY: No cough, wheezing, chills or hemoptysis; No shortness of breath  CARDIOVASCULAR: No chest pain, palpitations, dizziness, or leg swelling  GASTROINTESTINAL: No abdominal or epigastric pain. No nausea, vomiting, or hematemesis; No diarrhea or constipation. No melena or hematochezia.  GENITOURINARY: No dysuria, frequency, hematuria, or incontinence  NEUROLOGICAL: No headaches, memory loss, loss of strength, numbness, or tremors  SKIN: No itching, burning, rashes, or lesions   LYMPH NODES: No enlarged glands  ENDOCRINE: No heat or cold intolerance; No hair loss  MUSCULOSKELETAL: No joint pain or swelling; No muscle, back, or extremity pain  PSYCHIATRIC: No depression, anxiety, mood swings, or difficulty sleeping  HEME/LYMPH: No easy bruising, or bleeding gums  ALLERY AND IMMUNOLOGIC: No hives or eczema    T(C): 36.8 (23 @ 08:09), Max: 36.8 (23 @ 13:40)  HR: 91 (23 @ 09:30) (73 - 138)  BP: 166/96 (23 @ 09:30) (142/107 - 181/100)  RR: 18 (23 @ 09:30) (18 - 18)  SpO2: 100% (23 @ 09:30) (98% - 100%)  Wt(kg): --Vital Signs Last 24 Hrs  T(C): 36.8 (2023 08:09), Max: 36.8 (2023 13:40)  T(F): 98.3 (2023 08:09), Max: 98.3 (2023 13:40)  HR: 91 (2023 09:30) (73 - 138)  BP: 166/96 (2023 09:30) (142/107 - 181/100)  BP(mean): 124 (2023 00:16) (119 - 124)  RR: 18 (2023 09:30) (18 - 18)  SpO2: 100% (2023 09:30) (98% - 100%)    Parameters below as of 2023 09:30  Patient On (Oxygen Delivery Method): room air        PHYSICAL EXAM:  GENERAL: NAD, well-groomed, well-developed  HEAD:  Atraumatic, Normocephalic  EYES: EOMI, PERRLA, conjunctiva and sclera clear  ENMT: No tonsillar erythema, exudates, or enlargement; Moist mucous membranes, Good dentition, No lesions  NECK: Supple, No JVD, Normal thyroid  NERVOUS SYSTEM:  Alert & Oriented X3, Good concentration; Motor Strength 5/5 B/L upper and lower extremities; DTRs 2+ intact and symmetric  CHEST/LUNG: Clear to percussion bilaterally; No rales, rhonchi, wheezing, or rubs  HEART: Regular rate and rhythm; No murmurs, rubs, or gallops  ABDOMEN:+Mild distension, non tender  EXTREMITIES:  2+ Peripheral Pulses, No clubbing, cyanosis, or edema  LYMPH: No lymphadenopathy noted  SKIN: No rashes or lesions    Consultant(s) Notes Reviewed:  [x ] YES  [ ] NO  Care Discussed with Consultants/Other Providers [ x] YES  [ ] NO    LABS:                        10.0   6.33  )-----------( 276      ( 2023 08:01 )             33.3     -    140  |  107  |  8   ----------------------------<  87  3.2<L>   |  29  |  0.72    Ca    8.8      2023 08:01    TPro  6.9  /  Alb  2.5<L>  /  TBili  0.2  /  DBili  x   /  AST  27  /  ALT  31  /  AlkPhos  97  -    PT/INR - ( 2023 08:01 )   PT: 14.9 sec;   INR: 1.28 ratio         PTT - ( 2023 08:01 )  PTT:25.5 sec  Urinalysis Basic - ( 2023 15:27 )    Color: Brown / Appearance: very cloudy / S.020 / pH: x  Gluc: x / Ketone: Trace  / Bili: Small / Urobili: 4   Blood: x / Protein: 100 / Nitrite: Negative   Leuk Esterase: Trace / RBC: 6-10 /HPF / WBC 6-10 /HPF   Sq Epi: x / Non Sq Epi: Few / Bacteria: Few      CAPILLARY BLOOD GLUCOSE            Urinalysis Basic - ( 2023 15:27 )    Color: Brown / Appearance: very cloudy / S.020 / pH: x  Gluc: x / Ketone: Trace  / Bili: Small / Urobili: 4   Blood: x / Protein: 100 / Nitrite: Negative   Leuk Esterase: Trace / RBC: 6-10 /HPF / WBC 6-10 /HPF   Sq Epi: x / Non Sq Epi: Few / Bacteria: Few        RADIOLOGY & ADDITIONAL TESTS:    Imaging Personally Reviewed:  [ ] YES  [ ] NO    HEALTH ISSUES - PROBLEM Dx:

## 2023-01-24 NOTE — PROGRESS NOTE ADULT - SUBJECTIVE AND OBJECTIVE BOX
SURGERY DAILY PROGRESS NOTE:     Subjective:  Patient seen and examined at bedside during am rounds. AVSS. Denies any fevers, chills, n/v/d, chest pain or shortness of breath    Objective:  Vital Signs Last 24 Hrs  T(C): 36.8 (2023 08:09), Max: 36.8 (2023 13:40)  T(F): 98.3 (2023 08:09), Max: 98.3 (2023 13:40)  HR: 91 (2023 09:30) (73 - 138)  BP: 166/96 (2023 09:30) (120/88 - 181/100)  BP(mean): 124 (2023 00:16) (119 - 124)  RR: 18 (2023 09:30) (18 - 18)  SpO2: 100% (2023 09:30) (98% - 100%)    Parameters below as of 2023 09:30  Patient On (Oxygen Delivery Method): room air        I&O's Detail      Daily Height in cm: 180.34 (2023 13:23)    Daily     PHYSICAL EXAM   Gen: well-appearing, in no acute distress  CV: pulse regularly present   Resp: airway patent, non-labored breathing  Abd: soft, NTND; no rebound or guarding. Incision c/d/i    MEDICATIONS  (STANDING):  enoxaparin Injectable 150 milliGRAM(s) SubCutaneous every 24 hours  potassium chloride  10 mEq/100 mL IVPB 10 milliEquivalent(s) IV Intermittent every 1 hour  sodium chloride 0.9%. 1000 milliLiter(s) (100 mL/Hr) IV Continuous <Continuous>    MEDICATIONS  (PRN):  morphine  - Injectable 2 milliGRAM(s) IV Push every 6 hours PRN Moderate Pain (4 - 6)        LABS:                        10.0   6.33  )-----------( 276      ( 2023 08:01 )             33.3         140  |  107  |  8   ----------------------------<  87  3.2<L>   |  29  |  0.72    Ca    8.8      2023 08:01    TPro  6.9  /  Alb  2.5<L>  /  TBili  0.2  /  DBili  x   /  AST  27  /  ALT  31  /  AlkPhos  97  -24    PT/INR - ( 2023 08:01 )   PT: 14.9 sec;   INR: 1.28 ratio         PTT - ( 2023 08:01 )  PTT:25.5 sec  Urinalysis Basic - ( 2023 15:27 )    Color: Brown / Appearance: very cloudy / S.020 / pH: x  Gluc: x / Ketone: Trace  / Bili: Small / Urobili: 4   Blood: x / Protein: 100 / Nitrite: Negative   Leuk Esterase: Trace / RBC: 6-10 /HPF / WBC 6-10 /HPF   Sq Epi: x / Non Sq Epi: Few / Bacteria: Few

## 2023-01-24 NOTE — PROGRESS NOTE ADULT - ASSESSMENT
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 Was sent by oncologist to r/o bowel obstruction.     Oncology Hx  Oncologist-Dr. Harrison    -8/18/22 Colonoscopy showed a frond-like/villous and ulcerated non-obstructing large mass, measuring 5 cm in the descending colon. The mass was circumferential.   -Path: Invasive adenocarcinoma, moderately differentiated. No loss of nuclear expression of MMR proteins (MLH1, MSH2, MSH6, and PMS2). These results show low probability of microsatellite instability-high (MSI-H)  -8/16/22 CEA = 633  -8/19/22 Path: Liver, core biopsy: Metastatic adenocarcinoma, consistent with metastasis from colonic primary  -He was started on FOLFIRI- 10/19 (the delay was due to fall 9/22--->he underwent  ORIF on 9/29/22 of the left tibial plateau)  -1/9/23 CT C/A/P: Findings c/w descending colon cancer with extension through the wall with extensive mesenteric confluent tumor and/or ivelisse disease. Also noted diffuse metastatic disease identified throughout the liver. Small pericardial effusion and areas of thickening of questionable significance. Small amount of pericatheter clot near the tip of the catheter in the superior vena cava.  - He received Chemo last on 1/4/23 completing C6.  # Stage IV Metastasis Colon CA to liver  -CT- A/P-1/23- New partial large bowel obstruction secondary to known apple core lesion in the distal descending colon.  -Surgery Onc following- plan under discussion for either colonic stent Vs. 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 Was sent by oncologist to r/o bowel obstruction.     Oncology Hx  Oncologist-Dr. Harrison    -8/18/22 Colonoscopy showed a frond-like/villous and ulcerated non-obstructing large mass, measuring 5 cm in the descending colon. The mass was circumferential.   -Path: Invasive adenocarcinoma, moderately differentiated. No loss of nuclear expression of MMR proteins (MLH1, MSH2, MSH6, and PMS2). These results show low probability of microsatellite instability-high (MSI-H)  -8/16/22 CEA = 633  -8/19/22 Path: Liver, core biopsy: Metastatic adenocarcinoma, consistent with metastasis from colonic primary  -He was started on FOLFIRI- 10/19 (the delay was due to fall 9/22--->he underwent  ORIF on 9/29/22 of the left tibial plateau)  -1/9/23 CT C/A/P: Findings c/w descending colon cancer with extension through the wall with extensive mesenteric confluent tumor and/or ivelisse disease. Also noted diffuse metastatic disease identified throughout the liver. Small pericardial effusion and areas of thickening of questionable significance. Small amount of pericatheter clot near the tip of the catheter in the superior vena cava.  - He received Chemo last on 1/4/23 completing C6.  # Stage IV Metastasis Colon CA to liver  -CT- A/P-1/23- New partial large bowel obstruction secondary to known apple core lesion in the distal descending colon.  -Surgery Onc following- plan under discussion with GI for either colonic stent Vs. colectomy   -Chemo FOLFIRI  cycle 7 was scheduled 1/23 - on hold

## 2023-01-24 NOTE — PROGRESS NOTE ADULT - SUBJECTIVE AND OBJECTIVE BOX
HPI:  62 year old male with PMH of colon CA metastasis to liver presents to the ED c/o nausea, vomiting and lower abd pain x few days, sent by Oncologist to R/O SBO. Pt has had 6 chemo treatments, last treatment 2 weeks ago. But pt has been constantly vomiting ever since. Was scheduled for the 7th treatment today but due to the vomiting, pt came to the ED. Unable to tolerate PO. Pt recently put on injection blood thinner.    1/24/23-  Seen in Ed this morning. c/o feeling bloated, however passing gas and had a regular BM this morning. Denies N/V, remains NPO since last evening.     PAST MEDICAL & SURGICAL HISTORY:  No pertinent past medical history  Colon cancer  ORIF-left tibia    FAMILY HISTORY:  No pertinent family history in first degree relatives    MEDICATIONS  (STANDING):  ondansetron Injectable 4 milliGRAM(s) IV Push once  sodium chloride 0.9% Bolus 1000 milliLiter(s) IV Bolus once    MEDICATIONS  (PRN):      Allergies  No Known Allergies    Review of Systems  Constitutional, Eyes, ENT, Cardiovascular, Respiratory, Gastrointestinal, Genitourinary, Musculoskeletal, Integumentary, Neurological, Psychiatric, Endocrine, Heme/Lymph and Allergic/Immunologic review of systems are otherwise negative except as noted in HPI.     Vital Signs  Vital Signs Last 24 Hrs  T(C): 36.8 (24 Jan 2023 08:09), Max: 36.8 (23 Jan 2023 13:40)  T(F): 98.3 (24 Jan 2023 08:09), Max: 98.3 (23 Jan 2023 13:40)  HR: 91 (24 Jan 2023 09:30) (73 - 138)  BP: 166/96 (24 Jan 2023 09:30) (120/88 - 181/100)  BP(mean): 124 (24 Jan 2023 00:16) (119 - 124)  RR: 18 (24 Jan 2023 09:30) (18 - 18)  SpO2: 100% (24 Jan 2023 09:30) (98% - 100%)    Parameters below as of 24 Jan 2023 09:30  Patient On (Oxygen Delivery Method): room air    Physical Exam  GENERAL: no acute distress  HEENT: EOMI, neck supple  RESPIRATORY: LCTAB/L, no rhonchi, rales, or wheezing  CARDIOVASCULAR: RRR, no murmurs, gallops, rubs  ABDOMINAL: soft, non-tender, non-distended, positive bowel sounds   EXTREMITIES: no clubbing, cyanosis, or edema  NEUROLOGICAL: alert and oriented x 3, non-focal  SKIN: no rashes or lesions   MUSCULOSKELETAL: no gross joint deformity    LABS:  CBC Full  -  ( 24 Jan 2023 08:01 )  WBC Count : 6.33 K/uL  RBC Count : 4.05 M/uL  Hemoglobin : 10.0 g/dL  Hematocrit : 33.3 %  Platelet Count - Automated : 276 K/uL  Mean Cell Volume : 82.2 fl  Mean Cell Hemoglobin : 24.7 pg  Mean Cell Hemoglobin Concentration : 30.0 gm/dL  Auto Neutrophil # : 3.54 K/uL  Auto Lymphocyte # : 1.84 K/uL  Auto Monocyte # : 0.65 K/uL  Auto Eosinophil # : 0.06 K/uL  Auto Basophil # : 0.07 K/uL  Auto Neutrophil % : 55.9 %  Auto Lymphocyte % : 29.1 %  Auto Monocyte % : 10.3 %  Auto Eosinophil % : 0.9 %  Auto Basophil % : 1.1 %    01-24    140  |  107  |  8   ----------------------------<  87  3.2<L>   |  29  |  0.72    Ca    8.8      24 Jan 2023 08:01    TPro  6.9  /  Alb  2.5<L>  /  TBili  0.2  /  DBili  x   /  AST  27  /  ALT  31  /  AlkPhos  97  01-24      RADIOLOGY & ADDITIONAL STUDIES:    EXAM:  CT ABDOMEN AND PELVIS OC IC       PROCEDURE DATE:  01/23/2023      INTERPRETATION:  CLINICAL INFORMATION: Nausea vomiting. Rule out small   bowel obstruction. Colon cancer with liver metastases    COMPARISON: 1/9/2023    CONTRAST/COMPLICATIONS:  IV Contrast: Omnipaque 350  90 cc administered   10 cc discarded  Oral Contrast: Omnipaque 300  Complications: None reported at time of study completion    PROCEDURE:  CT of the Abdomen and Pelvis was performed.  Sagittal and coronal reformats were performed.    FINDINGS:  LOWER CHEST: Minimal pericardial effusion, unchanged. Tiny pulmonary   nodule in the right middle lobe on image 6 of series 2 also seen on the   prior study    LIVER: Scattered low-attenuation lesions throughout the right hepatic   lobe similar to the prior study with lesion in the hepatic dome again   demonstrating peripheral focus of high attenuation likely representing   calcification  BILE DUCTS: Normal caliber.  GALLBLADDER: Within normal limits.  SPLEEN: Within normal limits.  PANCREAS: Within normal limits.  ADRENALS: Right adrenal gland is unremarkable. There is a well-defined   large left adrenal nodule measuring approximately 2.7 x 2.9 cm.   Differential diagnosis includes metastasis versus adenoma.  KIDNEYS/URETERS: No hydronephrosis or urolithiasis. Small right renal   hypo densities too small to characterize    BLADDER: Within normal limits.  REPRODUCTIVE ORGANS: Prostate within normal limits.    BOWEL: No small bowel obstruction. Appendix is not visualized. No   evidence of inflammation in the pericecal region. The large bowel is now   moderately dilated and filled with air and fluid with relative   decompression of the distal sigmoid colon and the rectum secondary to   probable cortical lesion in the distal descending colon. Again there is   extensive stranding in the fat at the level of the tumor  PERITONEUM: No ascites.  VESSELS: Within normal limits.  RETROPERITONEUM/LYMPH NODES: No lymphadenopathy.  ABDOMINAL WALL: Small bilateral fat-containing inguinal hernias with   small fat-containing umbilical hernia.  BONES: Degenerative changes.    IMPRESSION: New partial large bowel obstruction secondary to known apple core lesion in the distal descending colon. Again extensive adjacent mesenteric abnormality is seen which may represent conglomerate lymph nodes. Re demonstrated hepatic metastases. No change large left adrenal nodule. Small pericardial effusion is again seen.

## 2023-01-24 NOTE — CONSULT NOTE ADULT - SUBJECTIVE AND OBJECTIVE BOX
Patient is a 62y old  Male who presents with a chief complaint of vomiting    Patient seen this am. Full consult to follow.    HPI:  61 y/o M w/ PMH of colon cancer w/ mets to liver on chemo, catheter tip thrombus (on Lovenox), p/w nausea / vomiting. States patient had vomiting starting about 2 weeks ago. Then vomiting resolved transiently, and then started again on Wednesday. States last episode of vomiting was this morning. Patient also had associated abdominal pain in lower abdomen. States presently, abdominal pain is well controlled, and he only feels it with movement. Denies fever, chills. Patient had a BM earlier in the day. Denies CP / SOB /cough /runny nose/ sore throat.     PSH: L tibia ORIF     Social Hx: Denies tobacco / etoh / drugs     Family Hx: Mother-   at 53 y/o w/ leukemia  (2023 23:56)      PAST MEDICAL & SURGICAL HISTORY:  No pertinent past medical history      Colon cancer      No significant past surgical history          MEDICATIONS  (STANDING):  enoxaparin Injectable 120 milliGRAM(s) SubCutaneous every 24 hours  influenza   Vaccine 0.5 milliLiter(s) IntraMuscular once  polyethylene glycol 3350 17 Gram(s) Oral at bedtime  sodium chloride 0.9%. 1000 milliLiter(s) (100 mL/Hr) IV Continuous <Continuous>    MEDICATIONS  (PRN):  morphine  - Injectable 2 milliGRAM(s) IV Push every 6 hours PRN Moderate Pain (4 - 6)      Allergies    No Known Allergies    Intolerances        SOCIAL HISTORY:    FAMILY HISTORY:  No pertinent family history in first degree relatives        REVIEW OF SYSTEMS:    CONSTITUTIONAL: No weakness, fevers or chills  EYES/ENT: No visual changes;  No vertigo or throat pain   NECK: No pain or stiffness  RESPIRATORY: No cough, wheezing, hemoptysis; No shortness of breath  CARDIOVASCULAR: No chest pain or palpitations  GASTROINTESTINAL: No abdominal or epigastric pain. No nausea, vomiting, or hematemesis; No diarrhea or constipation. No melena or hematochezia.  GENITOURINARY: No dysuria, frequency or hematuria  NEUROLOGICAL: No numbness or weakness  SKIN: No itching, burning, rashes, or lesions   PSYCH: Normal mood and affect  All other review of systems is negative unless indicated above.    Vital Signs Last 24 Hrs  T(C): 36.8 (2023 15:24), Max: 36.8 (2023 02:50)  T(F): 98.3 (2023 15:24), Max: 98.3 (2023 08:09)  HR: 69 (2023 15:24) (69 - 96)  BP: 151/92 (2023 15:24) (144/100 - 181/100)  BP(mean): 124 (2023 00:16) (124 - 124)  RR: 18 (2023 15:24) (18 - 18)  SpO2: 100% (2023 15:24) (98% - 100%)    Parameters below as of 2023 15:24  Patient On (Oxygen Delivery Method): room air        PHYSICAL EXAM:    Constitutional: No acute distress, well-developed, non-toxic appearing  HEENT: masked, good phonation, not icteric  Neck: supple, no lymphadenopathy  Respiratory: clear to ascultation bilaterally, no wheezing  Cardiovascular: S1 and S2, regular rate and rhythm, no murmurs rubs or gallops  Gastrointestinal: soft, non-tender, non-distended, +bowel sounds, no rebound or guarding, no surgical scars, no drains  Extremities: No peripheral edema, no cyanosis or clubbing  Vascular: 2+ peripheral pulses, no venous stasis  Neurological: A/O x 3, no focal deficits, no asterixis  Psychiatric: Normal mood, normal affect  Skin: No rashes, not jaundiced    LABS:                        10.0   6.33  )-----------( 276      ( 2023 08:01 )             33.3     -    140  |  107  |  8   ----------------------------<  87  3.2<L>   |  29  |  0.72    Ca    8.8      2023 08:01    TPro  6.9  /  Alb  2.5<L>  /  TBili  0.2  /  DBili  x   /  AST  27  /  ALT  31  /  AlkPhos  97  -    PT/INR - ( 2023 08:01 )   PT: 14.9 sec;   INR: 1.28 ratio         PTT - ( 2023 08:01 )  PTT:25.5 sec  LIVER FUNCTIONS - ( 2023 08:01 )  Alb: 2.5 g/dL / Pro: 6.9 gm/dL / ALK PHOS: 97 U/L / ALT: 31 U/L / AST: 27 U/L / GGT: x             RADIOLOGY & ADDITIONAL STUDIES: Patient is a 62y old  Male who presents with a chief complaint of vomiting    62 year old man with metastatic colon cancer, on chemo, admitted with vomiting.     Patient states that two weeks ago began to have persistent nausea and and vomiting. Several times a day. No true abdominal pain but soreness, especially after vomiting. No fevers or chills. No sick contacts. No travel history. No eating raw or undercooked foods. He is passing gas. Stools are looser. Going daily. No overt signs of GI bleeding like hematemesis, melena, hematochezia. This has been an ongoing issue.  Of note, had round of chemo just prior to all of this.       PAST MEDICAL & SURGICAL HISTORY:  metastatic Colon cancer    thrombus at a catheter tip      MEDICATIONS  (STANDING):  enoxaparin Injectable 120 milliGRAM(s) SubCutaneous every 24 hours  influenza   Vaccine 0.5 milliLiter(s) IntraMuscular once  polyethylene glycol 3350 17 Gram(s) Oral at bedtime  sodium chloride 0.9%. 1000 milliLiter(s) (100 mL/Hr) IV Continuous <Continuous>    MEDICATIONS  (PRN):  morphine  - Injectable 2 milliGRAM(s) IV Push every 6 hours PRN Moderate Pain (4 - 6)      Allergies    No Known Allergies    Intolerances    SOCIAL HISTORY:  no smoking, no drinking, no drugs    FAMILY HISTORY:  no colon or stomach cancer    REVIEW OF SYSTEMS:    CONSTITUTIONAL: No weakness, fevers or chills  EYES/ENT: No visual changes;  No vertigo or throat pain   NECK: No pain or stiffness  RESPIRATORY: No cough, wheezing, hemoptysis; No shortness of breath  CARDIOVASCULAR: No chest pain or palpitations  GASTROINTESTINAL:see HPI  GENITOURINARY: No dysuria, frequency or hematuria  NEUROLOGICAL: No numbness or weakness  SKIN: No itching, burning, rashes, or lesions   PSYCH: Normal mood and affect  All other review of systems is negative unless indicated above.    Vital Signs Last 24 Hrs  T(C): 36.8 (24 Jan 2023 15:24), Max: 36.8 (24 Jan 2023 02:50)  T(F): 98.3 (24 Jan 2023 15:24), Max: 98.3 (24 Jan 2023 08:09)  HR: 69 (24 Jan 2023 15:24) (69 - 96)  BP: 151/92 (24 Jan 2023 15:24) (144/100 - 181/100)  BP(mean): 124 (24 Jan 2023 00:16) (124 - 124)  RR: 18 (24 Jan 2023 15:24) (18 - 18)  SpO2: 100% (24 Jan 2023 15:24) (98% - 100%)    Parameters below as of 24 Jan 2023 15:24  Patient On (Oxygen Delivery Method): room air        PHYSICAL EXAM:    Constitutional: No acute distress, well-developed, non-toxic appearing, sitting up in chair in room, eating clear liquid diet  HEENT: masked, good phonation, not icteric  Neck: supple, no lymphadenopathy  Respiratory: clear to ascultation bilaterally, no wheezing  Cardiovascular: S1 and S2, regular rate and rhythm, no murmurs rubs or gallops  Gastrointestinal: soft, non-tender, non-distended, +bowel sounds, no rebound or guarding, no surgical scars, no drains  Extremities: No peripheral edema, no cyanosis or clubbing  Vascular: 2+ peripheral pulses, no venous stasis  Neurological: A/O x 3, no focal deficits, no asterixis  Psychiatric: Normal mood, normal affect  Skin: No rashes, not jaundiced    LABS:                        10.0   6.33  )-----------( 276      ( 24 Jan 2023 08:01 )             33.3     01-24    140  |  107  |  8   ----------------------------<  87  3.2<L>   |  29  |  0.72    Ca    8.8      24 Jan 2023 08:01    TPro  6.9  /  Alb  2.5<L>  /  TBili  0.2  /  DBili  x   /  AST  27  /  ALT  31  /  AlkPhos  97  01-24    PT/INR - ( 24 Jan 2023 08:01 )   PT: 14.9 sec;   INR: 1.28 ratio         PTT - ( 24 Jan 2023 08:01 )  PTT:25.5 sec  LIVER FUNCTIONS - ( 24 Jan 2023 08:01 )  Alb: 2.5 g/dL / Pro: 6.9 gm/dL / ALK PHOS: 97 U/L / ALT: 31 U/L / AST: 27 U/L / GGT: x             RADIOLOGY & ADDITIONAL STUDIES: reviewed CT scan

## 2023-01-24 NOTE — PROGRESS NOTE ADULT - ASSESSMENT
61 y/o M w/ PMH of colon cancer w/ mets to liver on chemo, catheter tip thrombus (on Lovenox), p/w nausea / vomiting    *New Partial large bowel obstruction 2/2 known apple core lesion in distal descending colon  -NPO  -Surgery eval, no acute surgical intervention  -GI- Likely will benefit from colonic stent to be placed later today  -Pain control  -IVF  -Lactic acidosis resolved     *Colon cancer w/ mets  -Other CT findings: pulmonary nodule, scattered hepatic lesions, L adrenal nodule   -Heme/onc    *Small pericardial effusion  -Echo for further evaluation    *Hypokalemia  -Replete and recheck     *Anemia  -F/u outpatient     *DVT ppx  -On Lovenox

## 2023-01-24 NOTE — CONSULT NOTE ADULT - ASSESSMENT
Full consultation to follow  Spoke with Dr. Salmeron, no colonic stent indicated at this time 62 year old man with metastatic colon cancer, on chemo, admitted with vomiting.     Reviewed CT scan, although tumor with ?pLBO, patient is passing stool and gas. Vomiting viral vs chemo induced, not from an obstruction. HIs abdomen is soft, non distended. No current plans for colon stent as it currently is not indicated. Told patient he may need one one day (hopefully not),but not right now.

## 2023-01-24 NOTE — PATIENT PROFILE ADULT - HAVE YOU RECEIVED AT LEAST TWO PFIZER AND/OR MODERNA VACCINATIONS (IN ANY COMBINATION) AND/OR ONE JOHNSON & JOHNSON VACCINATION?
Elevate the injured extremity as instructed./Verbal/written post procedure instructions were given to patient/caregiver./Instructed patient/caregiver to follow-up with primary care physician.
Yes

## 2023-01-24 NOTE — PROGRESS NOTE ADULT - ASSESSMENT
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 Patient currently does not have any signs/symptoms of true bowel obstruction.    Discussed palliative colonic stent with Dr. Hills, GI, but depending on patient's expected survival would lean more towards palliative colectomy, possible ostomy.    Recommendations:  - serial abdominal exams  - monitor bowel function  - may need palliative colectomy for bowel obstruction this admission  - management per primary team  - colorectal surgery will follow    Discussed with Dr. Woods.

## 2023-01-24 NOTE — PROVIDER CONTACT NOTE (OTHER) - SITUATION
new partial large bowel obstruction, apple core lesion in distal descending colon    left message with ans service for dr to see pt in the morning
colon cancer with mets    left message with ans service for dr to see pt in the morning

## 2023-01-24 NOTE — PROGRESS NOTE ADULT - ASSESSMENT
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 Was sent by oncologist to r/o bowel obstruction.     Oncology Hx  Oncologist-Dr. Harrison    -8/18/22 Colonoscopy showed a frond-like/villous and ulcerated non-obstructing large mass, measuring 5 cm in the descending colon. The mass was circumferential.   -Path: Invasive adenocarcinoma, moderately differentiated. No loss of nuclear expression of MMR proteins (MLH1, MSH2, MSH6, and PMS2). These results show low probability of microsatellite instability-high (MSI-H)  -8/16/22 CEA = 633  -8/19/22 Path: Liver, core biopsy: Metastatic adenocarcinoma, consistent with metastasis from colonic primary  -He was started on FOLFIRI- 10/19 (the delay was due to fall 9/22--->he underwent  ORIF on 9/29/22 of the left tibial plateau)  -1/9/23 CT C/A/P: Findings c/w descending colon cancer with extension through the wall with extensive mesenteric confluent tumor and/or ivelisse disease. Also noted diffuse metastatic disease identified throughout the liver. Small pericardial effusion and areas of thickening of questionable significance. Small amount of pericatheter clot near the tip of the catheter in the superior vena cava.  - He received Chemo last on 1/4/23 completing C6.  Hem Onc Attending :  Case reviewed, reviewed CT images, reviewed office records.    63 y/o  male diagnosed with metastatic sigmoid colon cancer in August 2022, extensive liver mets, adrenal mets, small lung mets.   Started palliative chemotherapy FOLFIRI    Last chemotx Folfiri cycle 6 on 1/4/23   CT scans early Jan 2023 showed partial response to chemo- liver mets slightly smaller.  He was found to have a clot at the tip of mediport catheter and she was started on AC - full dose lovenox by Dr Harrison.    Now admitted with several  days hx of n/v/ unable to keep food down. Mild abd discomfort.  CT abd pelvis- concern re possible LBO due to tumor.     Plan NPO          Conservative management for now         Surgical oncology evaluation  Chemo Folfiri cycle 7 was scheduled 1/23 - on hold.           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 Was sent by oncologist to r/o bowel obstruction.     Oncology Hx  Oncologist-Dr. Harrison    -8/18/22 Colonoscopy showed a frond-like/villous and ulcerated non-obstructing large mass, measuring 5 cm in the descending colon. The mass was circumferential.   -Path: Invasive adenocarcinoma, moderately differentiated. No loss of nuclear expression of MMR proteins (MLH1, MSH2, MSH6, and PMS2). These results show low probability of microsatellite instability-high (MSI-H)  -8/16/22 CEA = 633  -8/19/22 Path: Liver, core biopsy: Metastatic adenocarcinoma, consistent with metastasis from colonic primary  -He was started on FOLFIRI- 10/19 (the delay was due to fall 9/22--->he underwent  ORIF on 9/29/22 of the left tibial plateau)  -1/9/23 CT C/A/P: Findings c/w descending colon cancer with extension through the wall with extensive mesenteric confluent tumor and/or ivelisse disease. Also noted diffuse metastatic disease identified throughout the liver. Small pericardial effusion and areas of thickening of questionable significance. Small amount of pericatheter clot near the tip of the catheter in the superior vena cava.  - He received Chemo last on 1/4/23 completing C6.  Hem Onc Attending :  Case reviewed, reviewed CT images, reviewed office records.    63 y/o  male diagnosed with metastatic sigmoid colon cancer in August 2022, extensive liver mets, adrenal mets, small lung mets.   Started palliative chemotherapy FOLFIRI    Last chemotx Folfiri cycle 6 on 1/4/23   CT scans early Jan 2023 showed partial response to chemo- liver mets slightly smaller.  He was found to have a clot at the tip of mediport catheter and she was started on AC - full dose lovenox by Dr Harrison.    Now admitted with several  days hx of n/v/ unable to keep food down. Mild abd discomfort.  CT abd pelvis- concern re possible LBO due to tumor.     Plan NPO          Conservative management for now         Surgical oncology evaluation-plan for palliative colonic stent Vs colectomy, and possible ostomy.  Chemo Folfiri cycle 7 was scheduled 1/23 - on hold.

## 2023-01-25 ENCOUNTER — APPOINTMENT (OUTPATIENT)
Dept: INFUSION THERAPY | Facility: CLINIC | Age: 63
End: 2023-01-25

## 2023-01-25 LAB
ANION GAP SERPL CALC-SCNC: 5 MMOL/L — SIGNIFICANT CHANGE UP (ref 5–17)
BUN SERPL-MCNC: 6 MG/DL — LOW (ref 7–23)
CALCIUM SERPL-MCNC: 8.7 MG/DL — SIGNIFICANT CHANGE UP (ref 8.5–10.1)
CHLORIDE SERPL-SCNC: 105 MMOL/L — SIGNIFICANT CHANGE UP (ref 96–108)
CO2 SERPL-SCNC: 28 MMOL/L — SIGNIFICANT CHANGE UP (ref 22–31)
CREAT SERPL-MCNC: 1.01 MG/DL — SIGNIFICANT CHANGE UP (ref 0.5–1.3)
EGFR: 84 ML/MIN/1.73M2 — SIGNIFICANT CHANGE UP
GLUCOSE SERPL-MCNC: 84 MG/DL — SIGNIFICANT CHANGE UP (ref 70–99)
HCT VFR BLD CALC: 36 % — LOW (ref 39–50)
HGB BLD-MCNC: 10.6 G/DL — LOW (ref 13–17)
MAGNESIUM SERPL-MCNC: 2 MG/DL — SIGNIFICANT CHANGE UP (ref 1.6–2.6)
MCHC RBC-ENTMCNC: 24.8 PG — LOW (ref 27–34)
MCHC RBC-ENTMCNC: 29.4 GM/DL — LOW (ref 32–36)
MCV RBC AUTO: 84.1 FL — SIGNIFICANT CHANGE UP (ref 80–100)
PLATELET # BLD AUTO: 295 K/UL — SIGNIFICANT CHANGE UP (ref 150–400)
POTASSIUM SERPL-MCNC: 3 MMOL/L — LOW (ref 3.5–5.3)
POTASSIUM SERPL-SCNC: 3 MMOL/L — LOW (ref 3.5–5.3)
RBC # BLD: 4.28 M/UL — SIGNIFICANT CHANGE UP (ref 4.2–5.8)
RBC # FLD: 19.9 % — HIGH (ref 10.3–14.5)
SODIUM SERPL-SCNC: 138 MMOL/L — SIGNIFICANT CHANGE UP (ref 135–145)
WBC # BLD: 7.76 K/UL — SIGNIFICANT CHANGE UP (ref 3.8–10.5)
WBC # FLD AUTO: 7.76 K/UL — SIGNIFICANT CHANGE UP (ref 3.8–10.5)

## 2023-01-25 PROCEDURE — 99232 SBSQ HOSP IP/OBS MODERATE 35: CPT

## 2023-01-25 PROCEDURE — 99231 SBSQ HOSP IP/OBS SF/LOW 25: CPT

## 2023-01-25 PROCEDURE — 99233 SBSQ HOSP IP/OBS HIGH 50: CPT

## 2023-01-25 RX ORDER — POTASSIUM CHLORIDE 20 MEQ
40 PACKET (EA) ORAL ONCE
Refills: 0 | Status: COMPLETED | OUTPATIENT
Start: 2023-01-25 | End: 2023-01-25

## 2023-01-25 RX ORDER — POTASSIUM CHLORIDE 20 MEQ
10 PACKET (EA) ORAL
Refills: 0 | Status: COMPLETED | OUTPATIENT
Start: 2023-01-25 | End: 2023-01-25

## 2023-01-25 RX ADMIN — Medication 100 MILLIEQUIVALENT(S): at 17:02

## 2023-01-25 RX ADMIN — POLYETHYLENE GLYCOL 3350 17 GRAM(S): 17 POWDER, FOR SOLUTION ORAL at 21:11

## 2023-01-25 RX ADMIN — Medication 40 MILLIEQUIVALENT(S): at 21:12

## 2023-01-25 RX ADMIN — Medication 100 MILLIEQUIVALENT(S): at 21:12

## 2023-01-25 RX ADMIN — ENOXAPARIN SODIUM 120 MILLIGRAM(S): 100 INJECTION SUBCUTANEOUS at 21:13

## 2023-01-25 RX ADMIN — Medication 100 MILLIEQUIVALENT(S): at 22:24

## 2023-01-25 NOTE — PROGRESS NOTE ADULT - ASSESSMENT
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 Was sent by oncologist to r/o bowel obstruction.     Oncology Hx  Oncologist-Dr. Harrison    -8/18/22 Colonoscopy showed a frond-like/villous and ulcerated non-obstructing large mass, measuring 5 cm in the descending colon. The mass was circumferential.   -Path: Invasive adenocarcinoma, moderately differentiated. No loss of nuclear expression of MMR proteins (MLH1, MSH2, MSH6, and PMS2). These results show low probability of microsatellite instability-high (MSI-H)  -8/16/22 CEA = 633  -8/19/22 Path: Liver, core biopsy: Metastatic adenocarcinoma, consistent with metastasis from colonic primary  -He was started on FOLFIRI- 10/19 (the delay was due to fall 9/22--->he underwent  ORIF on 9/29/22 of the left tibial plateau)  -1/9/23 CT C/A/P: Findings c/w descending colon cancer with extension through the wall with extensive mesenteric confluent tumor and/or ivelisse disease. Also noted diffuse metastatic disease identified throughout the liver. Small pericardial effusion and areas of thickening of questionable significance. Small amount of pericatheter clot near the tip of the catheter in the superior vena cava.  - He received Chemo last on 1/4/23 completing C6.  # Stage IV Metastasis Colon CA to liver  -CT- A/P-1/23- New partial large bowel obstruction secondary to known apple core lesion in the distal descending colon.  -Surgery Onc following- plan under discussion with GI for either colonic stent Vs. 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 Was sent by oncologist to r/o bowel obstruction.     Oncology Hx  Oncologist-Dr. Harrison    -8/18/22 Colonoscopy showed a frond-like/villous and ulcerated non-obstructing large mass, measuring 5 cm in the descending colon. The mass was circumferential.   -Path: Invasive adenocarcinoma, moderately differentiated. No loss of nuclear expression of MMR proteins (MLH1, MSH2, MSH6, and PMS2). low probability of microsatellite instability  -8/16/22 CEA = 633  -8/19/22 Path: Liver, core biopsy: Metastatic adenocarcinoma, consistent with metastasis from colonic primary  -He was started on FOLFIRI- 10/19 (the delay was due to fall 9/22--->he underwent  ORIF on 9/29/22 of the left tibial plateau)  -1/9/23 CT C/A/P: Findings c/w descending colon cancer with extension through the wall with extensive mesenteric confluent tumor and/or ivelisse disease. Diffuse metastatic disease identified throughout the liver stable/ some slightly improved.  Stared on Lovenox 120 mg sc daily per Dr Harrison for small amount of pericatheter clot near the tip of the catheter in the superior vena cava.    Now admitted with nausea/ vomiting for several days- unclear what was main contributor-  chemo related-  ? but he tolerated previous cycles well . Might need dose reduction for future treatment.  CT- A/P-1/23- on admission showed  New partial large bowel obstruction secondary to known apple core lesion in the distal descending colon.  Seen by surgical oncology and GI. Clinically no obvious obstruction. No urgent need for surgical diversion colostomy or for colonic stenting.  Diet being advanced. So far tolerating.  He was due for FOLFIRI cycle 7 this week- on hold. outpt f/u with Dr Harrison to decide when ok to resume chemo.

## 2023-01-25 NOTE — DIETITIAN INITIAL EVALUATION ADULT - NSFNSGIIOFT_GEN_A_CORE
I&O's Detail    24 Jan 2023 07:01  -  25 Jan 2023 07:00  --------------------------------------------------------  IN:  Total IN: 0 mL    OUT:    Voided (mL): 200 mL  Total OUT: 200 mL    Total NET: -200 mL      25 Jan 2023 07:01  -  25 Jan 2023 13:40  --------------------------------------------------------  IN:  Total IN: 0 mL    OUT:    Voided (mL): 400 mL  Total OUT: 400 mL    Total NET: -400 mL

## 2023-01-25 NOTE — PROGRESS NOTE ADULT - ASSESSMENT
63 y/o M w/ PMH of colon cancer w/ mets to liver on chemo, catheter tip thrombus (on Lovenox), p/w nausea / vomiting    *New Partial large bowel obstruction 2/2 known apple core lesion in distal descending colon  -NPO-->Clears-->Low fiber  -Still with diarrhea  -Surgery eval, no acute surgical intervention  -GI- No longer needs colonic stent, may be in the future  -Pain control  -IVF  -Lactic acidosis resolved     *Colon cancer w/ mets  -Other CT findings: pulmonary nodule, scattered hepatic lesions, L adrenal nodule   -Heme/onc    *Small pericardial effusion  -Echo for further evaluation    *Hypokalemia  -Received IV, PO supplementation  -Mg okay  -Replete and recheck     *Anemia  -F/u outpatient     *DVT ppx  -On Lovenox

## 2023-01-25 NOTE — PROGRESS NOTE ADULT - ASSESSMENT
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 Patient currently does not have any signs/symptoms of true bowel obstruction.    Discussed palliative colonic stent with Dr. Hills, GI, but depending on patient's expected survival would lean more towards palliative colectomy, possible ostomy.    Recommendations:  - serial abdominal exams  - monitor bowel function  - GI recs appreciated : Possible stent placement, ONLY if patient develops obstipation.  - management per primary team  - colorectal surgery will follow    will be discussed with colorectal surgery team

## 2023-01-25 NOTE — PROGRESS NOTE ADULT - SUBJECTIVE AND OBJECTIVE BOX
HPI:  62 year old male with PMH of colon CA metastasis to liver presents to the ED c/o nausea, vomiting and lower abd pain x few days, sent by Oncologist to R/O SBO. Pt has had 6 chemo treatments, last treatment 2 weeks ago. But pt has been constantly vomiting ever since. Was scheduled for the 7th treatment today but due to the vomiting, pt came to the ED. Unable to tolerate PO. Pt recently put on injection blood thinner.    1/24/23-  Seen in Ed this morning. c/o feeling bloated, however passing gas and had a regular BM this morning. Denies N/V, remains NPO since last evening.     1/25/23- Seen on the floor at bedside. In no acute distress, reports feeling well, except mild left lower abdomen pain., not requiring any meds.  Denies N/V since admit. Tolerating clears since last evening. Endorsed BM this morning.    PAST MEDICAL & SURGICAL HISTORY:  No pertinent past medical history  Colon cancer  ORIF-left tibia    FAMILY HISTORY:  No pertinent family history in first degree relatives    MEDICATIONS  (STANDING):  ondansetron Injectable 4 milliGRAM(s) IV Push once  sodium chloride 0.9% Bolus 1000 milliLiter(s) IV Bolus once    MEDICATIONS  (PRN):      Allergies  No Known Allergies    Review of Systems  Constitutional, Eyes, ENT, Cardiovascular, Respiratory, Gastrointestinal, Genitourinary, Musculoskeletal, Integumentary, Neurological, Psychiatric, Endocrine, Heme/Lymph and Allergic/Immunologic review of systems are otherwise negative except as noted in HPI.     Vital Signs  Vital Signs Last 24 Hrs  T(C): 36.8 (25 Jan 2023 09:22), Max: 36.9 (25 Jan 2023 05:33)  T(F): 98.3 (25 Jan 2023 09:22), Max: 98.4 (25 Jan 2023 05:33)  HR: 81 (25 Jan 2023 09:22) (69 - 81)  BP: 153/80 (25 Jan 2023 09:22) (151/83 - 159/99)  BP(mean): 99 (25 Jan 2023 05:33) (99 - 99)  RR: 18 (25 Jan 2023 09:22) (18 - 18)  SpO2: 100% (25 Jan 2023 09:22) (99% - 100%)    Parameters below as of 25 Jan 2023 09:22  Patient On (Oxygen Delivery Method): room air    Physical Exam  GENERAL: no acute distress  HEENT: EOMI, neck supple  RESPIRATORY: LCTAB/L, no rhonchi, rales, or wheezing  CARDIOVASCULAR: RRR, no murmurs, gallops, rubs  ABDOMINAL: soft, non-tender, non-distended, positive bowel sounds   EXTREMITIES: no clubbing, cyanosis, or edema  NEUROLOGICAL: alert and oriented x 3, non-focal  SKIN: no rashes or lesions   MUSCULOSKELETAL: no gross joint deformity    LABS:  CBC Full  -  ( 25 Jan 2023 09:14 )  WBC Count : 7.76 K/uL  RBC Count : 4.28 M/uL  Hemoglobin : 10.6 g/dL  Hematocrit : 36.0 %  Platelet Count - Automated : 295 K/uL  Mean Cell Volume : 84.1 fl  Mean Cell Hemoglobin : 24.8 pg  Mean Cell Hemoglobin Concentration : 29.4 gm/dL    01-25    138  |  105  |  6<L>  ----------------------------<  84  3.0<L>   |  28  |  1.01    Ca    8.7      25 Jan 2023 09:14  Mg     2.0     01-25    TPro  6.9  /  Alb  2.5<L>  /  TBili  0.2  /  DBili  x   /  AST  27  /  ALT  31  /  AlkPhos  97  01-24      RADIOLOGY & ADDITIONAL STUDIES:    EXAM:  CT ABDOMEN AND PELVIS OC IC       PROCEDURE DATE:  01/23/2023      INTERPRETATION:  CLINICAL INFORMATION: Nausea vomiting. Rule out small   bowel obstruction. Colon cancer with liver metastases    COMPARISON: 1/9/2023    CONTRAST/COMPLICATIONS:  IV Contrast: Omnipaque 350  90 cc administered   10 cc discarded  Oral Contrast: Omnipaque 300  Complications: None reported at time of study completion    PROCEDURE:  CT of the Abdomen and Pelvis was performed.  Sagittal and coronal reformats were performed.    FINDINGS:  LOWER CHEST: Minimal pericardial effusion, unchanged. Tiny pulmonary   nodule in the right middle lobe on image 6 of series 2 also seen on the   prior study    LIVER: Scattered low-attenuation lesions throughout the right hepatic   lobe similar to the prior study with lesion in the hepatic dome again   demonstrating peripheral focus of high attenuation likely representing   calcification  BILE DUCTS: Normal caliber.  GALLBLADDER: Within normal limits.  SPLEEN: Within normal limits.  PANCREAS: Within normal limits.  ADRENALS: Right adrenal gland is unremarkable. There is a well-defined   large left adrenal nodule measuring approximately 2.7 x 2.9 cm.   Differential diagnosis includes metastasis versus adenoma.  KIDNEYS/URETERS: No hydronephrosis or urolithiasis. Small right renal   hypo densities too small to characterize    BLADDER: Within normal limits.  REPRODUCTIVE ORGANS: Prostate within normal limits.    BOWEL: No small bowel obstruction. Appendix is not visualized. No   evidence of inflammation in the pericecal region. The large bowel is now   moderately dilated and filled with air and fluid with relative   decompression of the distal sigmoid colon and the rectum secondary to   probable cortical lesion in the distal descending colon. Again there is   extensive stranding in the fat at the level of the tumor  PERITONEUM: No ascites.  VESSELS: Within normal limits.  RETROPERITONEUM/LYMPH NODES: No lymphadenopathy.  ABDOMINAL WALL: Small bilateral fat-containing inguinal hernias with   small fat-containing umbilical hernia.  BONES: Degenerative changes.    IMPRESSION: New partial large bowel obstruction secondary to known apple core lesion in the distal descending colon. Again extensive adjacent mesenteric abnormality is seen which may represent conglomerate lymph nodes. Re demonstrated hepatic metastases. No change large left adrenal nodule. Small pericardial effusion is again seen.       HPI:  62 year old male with PMH of colon CA metastasis to liver presents to the ED c/o nausea, vomiting and lower abd pain x few days, sent by Oncologist to [facility]. Pt has had 6 chemo treatments, last treatment 2 weeks ago. But pt has been constantly vomiting ever since. Was scheduled for the 7th treatment today but due to the vomiting, pt came to the ED. Unable to tolerate PO. Pt recently put on injection blood thinner.    1/24/23-  Seen in Ed this morning. c/o feeling bloated, however passing gas and had a regular BM this morning. Denies N/V, remains NPO since last evening.     1/25/23- Seen  today morning.  In no acute distress, reports feeling well, except mild left lower abdomen pain., not requiring any meds.  Denies N/V since admit. Tolerating clears since last evening. Had loose  BM this morning.    PAST MEDICAL & SURGICAL HISTORY:  Colon cancer  ORIF-left tibia    FAMILY HISTORY:  No pertinent family history in first degree relatives      MEDICATIONS  (STANDING):  enoxaparin Injectable 120 milliGRAM(s) SubCutaneous every 24 hours  influenza   Vaccine 0.5 milliLiter(s) IntraMuscular once  polyethylene glycol 3350 17 Gram(s) Oral at bedtime  sodium chloride 0.9%. 1000 milliLiter(s) (100 mL/Hr) IV Continuous <Continuous>    MEDICATIONS  (PRN):  morphine  - Injectable 2 milliGRAM(s) IV Push every 6 hours PRN Moderate Pain (4 - 6)      Allergies  No Known Allergies    Review of Systems  Constitutional, Eyes, ENT, Cardiovascular, Respiratory, Gastrointestinal, Genitourinary, Musculoskeletal, Integumentary, Neurological, Psychiatric, Endocrine, Heme/Lymph and Allergic/Immunologic review of systems are otherwise negative except as noted in HPI.     Vital Signs  Vital Signs Last 24 Hrs  T(C): 36.8 (25 Jan 2023 09:22), Max: 36.9 (25 Jan 2023 05:33)  T(F): 98.3 (25 Jan 2023 09:22), Max: 98.4 (25 Jan 2023 05:33)  HR: 81 (25 Jan 2023 09:22) (69 - 81)  BP: 153/80 (25 Jan 2023 09:22) (151/83 - 159/99)  BP(mean): 99 (25 Jan 2023 05:33) (99 - 99)  RR: 18 (25 Jan 2023 09:22) (18 - 18)  SpO2: 100% (25 Jan 2023 09:22) (99% - 100%)    Parameters below as of 25 Jan 2023 09:22  Patient On (Oxygen Delivery Method): room air    Physical Exam  GENERAL: no acute distress  HEENT: EOMI, neck supple  RESPIRATORY: LCTAB/L, no rhonchi, rales, or wheezing  CARDIOVASCULAR: RRR, no murmurs, gallops, rubs  ABDOMINAL: soft, non-tender, non-distended, positive bowel sounds   EXTREMITIES: no clubbing, cyanosis, or edema  NEUROLOGICAL: alert and oriented x 3, non-focal  SKIN: no rashes or lesions   MUSCULOSKELETAL: no gross joint deformity    LABS:  CBC Full  -  ( 25 Jan 2023 09:14 )  WBC Count : 7.76 K/uL  RBC Count : 4.28 M/uL  Hemoglobin : 10.6 g/dL  Hematocrit : 36.0 %  Platelet Count - Automated : 295 K/uL  Mean Cell Volume : 84.1 fl  Mean Cell Hemoglobin : 24.8 pg  Mean Cell Hemoglobin Concentration : 29.4 gm/dL    01-25    138  |  105  |  6<L>  ----------------------------<  84  3.0<L>   |  28  |  1.01    Ca    8.7      25 Jan 2023 09:14  Mg     2.0     01-25    TPro  6.9  /  Alb  2.5<L>  /  TBili  0.2  /  DBili  x   /  AST  27  /  ALT  31  /  AlkPhos  97  01-24      RADIOLOGY & ADDITIONAL STUDIES:    EXAM:  CT ABDOMEN AND PELVIS OC IC       PROCEDURE DATE:  01/23/2023      INTERPRETATION:  CLINICAL INFORMATION: Nausea vomiting. Rule out small   bowel obstruction. Colon cancer with liver metastases    COMPARISON: 1/9/2023    CONTRAST/COMPLICATIONS:  IV Contrast: Omnipaque 350  90 cc administered   10 cc discarded  Oral Contrast: Omnipaque 300  Complications: None reported at time of study completion    PROCEDURE:  CT of the Abdomen and Pelvis was performed.  Sagittal and coronal reformats were performed.    FINDINGS:  LOWER CHEST: Minimal pericardial effusion, unchanged. Tiny pulmonary   nodule in the right middle lobe on image 6 of series 2 also seen on the   prior study    LIVER: Scattered low-attenuation lesions throughout the right hepatic   lobe similar to the prior study with lesion in the hepatic dome again   demonstrating peripheral focus of high attenuation likely representing   calcification  BILE DUCTS: Normal caliber.  GALLBLADDER: Within normal limits.  SPLEEN: Within normal limits.  PANCREAS: Within normal limits.  ADRENALS: Right adrenal gland is unremarkable. There is a well-defined   large left adrenal nodule measuring approximately 2.7 x 2.9 cm.   Differential diagnosis includes metastasis versus adenoma.  KIDNEYS/URETERS: No hydronephrosis or urolithiasis. Small right renal   hypo densities too small to characterize    BLADDER: Within normal limits.  REPRODUCTIVE ORGANS: Prostate within normal limits.    BOWEL: No small bowel obstruction. Appendix is not visualized. No   evidence of inflammation in the pericecal region. The large bowel is now   moderately dilated and filled with air and fluid with relative   decompression of the distal sigmoid colon and the rectum secondary to   probable cortical lesion in the distal descending colon. Again there is   extensive stranding in the fat at the level of the tumor  PERITONEUM: No ascites.  VESSELS: Within normal limits.  RETROPERITONEUM/LYMPH NODES: No lymphadenopathy.  ABDOMINAL WALL: Small bilateral fat-containing inguinal hernias with   small fat-containing umbilical hernia.  BONES: Degenerative changes.    IMPRESSION: New partial large bowel obstruction secondary to known apple core lesion in the distal descending colon. Again extensive adjacent mesenteric abnormality is seen which may represent conglomerate lymph nodes. Re demonstrated hepatic metastases. No change large left adrenal nodule. Small pericardial effusion is again seen.

## 2023-01-25 NOTE — DIETITIAN INITIAL EVALUATION ADULT - ADD RECOMMEND
1) Add [employer] BID and premier protein once daily to optimize PO intake (provides 325 kcal, 16g protein/ shake; 180 kcal, 30g protein/shake respectively), 2) Encourage protein-rich foods, maximize food preferences, 3) Consider adding appetite stimulant such as Remeron or Marinol 2/2 chronically poor appetite/ PO intake, 4) Check serum zinc level for suspected deficiency. Consider adding 220 mg of zinc sulfate daily 2/2 taste changes, 5) Consider adding thiamine 100 mg daily 2/2 poor PO intake/ malnutrition, 6) MVI w/ minerals daily to ensure 100% RDA met, 7) Obtain vitamin D 25OH level to assess nutriture, 8) Monitor bowel movements, if no BM for >3 days, consider implementing bowel regimen, 9) Confirm goals of care regarding nutrition support - Nutrition support is not recommended due to overall declining medical status which evidenced based studies indicate EN is not effective in prolonging survival and improving quality of life. It can also increase risk of aspiration pneumonia as well as other related issues, 10) monitor lytes/ min and replete prn. RD will continue to monitor PO intake, labs, hydration, and wt prn.

## 2023-01-25 NOTE — DIETITIAN INITIAL EVALUATION ADULT - OTHER INFO
61 y/o M w/ PMH of colon cancer w/ mets to liver on chemo, catheter tip thrombus (on Lovenox), p/w nausea / vomiting. States patient had vomiting starting about 2 weeks ago. Then vomiting resolved transiently, and then started again on Wednesday. States last episode of vomiting was this morning. Patient also had associated abdominal pain in lower abdomen. States presently, abdominal pain is well controlled, and he only feels it with movement. CT findings concerning for LBO. Patient currently does not have any signs/symptoms of true bowel obstruction. As per colorectal surgery note - discussed palliative colonic stent with Dr. Hills, GI, but depending on patient's expected survival would lean more towards palliative colectomy, possible ostomy. As per GI, will only gfet stent if develops obstipation. Diet advanced to low fiber today.    Pt tolerated CLD well this AM - N/V subsided. Reports - 233# x 4 mos ago before breaking leg. States he lost ~20# after that and then believes he lost another ~15# in the last 2 weeks. Last weighed self PTA at 190#. RD took bed scale wt on 1/25 at 191# - unintentional wt loss of 39#/ 17% x 4 months (severe and clinically significant). NFPE reveals moderate muscle/ fat wasting; meets criteria for PCM. RD encouraged protein-rich foods; not willing to drink ensure shakes but trialed [employer] and premier protein shakes and receptive to both. Will add on daily. Would rec'd to Confirm goals of care regarding nutrition support - Nutrition support is not recommended due to overall declining medical status which evidenced based studies indicate EN is not effective in prolonging survival and improving quality of life. It can also increase risk of aspiration pneumonia as well as other related issues. However, will provide nutrition/ hydration within GOC. See other recommendations below.

## 2023-01-25 NOTE — DIETITIAN INITIAL EVALUATION ADULT - ORAL INTAKE PTA/DIET HISTORY
reports fluctuating PO intake 2/2 chemo tx but poor PO intake w/ N/V over the last 2 weeks; meeting <50% ENN  endorses taste changes 2/2 chemo (metallic taste) and has aversions to foods and certain smells  lives alone but currently living w/ DTR who helps w/ cooking and shopping. Pt drinks ensure, but currently having aversions to it  Reports allergy to all seafood

## 2023-01-25 NOTE — DIETITIAN INITIAL EVALUATION ADULT - PERTINENT MEDS FT
MEDICATIONS  (STANDING):  enoxaparin Injectable 120 milliGRAM(s) SubCutaneous every 24 hours  influenza   Vaccine 0.5 milliLiter(s) IntraMuscular once  polyethylene glycol 3350 17 Gram(s) Oral at bedtime  sodium chloride 0.9%. 1000 milliLiter(s) (100 mL/Hr) IV Continuous <Continuous>    MEDICATIONS  (PRN):  morphine  - Injectable 2 milliGRAM(s) IV Push every 6 hours PRN Moderate Pain (4 - 6)

## 2023-01-25 NOTE — PROGRESS NOTE ADULT - ATTENDING COMMENTS
Patient seen and examined at bedside. Patient is tolerating clears at this time, denies N/V and is having bowel function. Abdomen is soft, NT, ND. No clinical signs of obstruction at this time, diet will be advanced as tolerated, patient will be maintained on daily MiraLAX and stool softeners. Should patient worsen it would be reasonable for stent placement. Patient should schedule a short interval follow up with colorectal surgery and his oncologist on discharge.

## 2023-01-25 NOTE — PROGRESS NOTE ADULT - SUBJECTIVE AND OBJECTIVE BOX
SURGERY DAILY PROGRESS NOTE:     Subjective:  Patient seen and examined at bedside during am rounds. AVSS. Denies any fevers, chills, n/v/d, chest pain or shortness of breath. has no other further complaints    Objective:  PHYSICAL EXAM   Gen: well-appearing, in no acute distress  CV: pulse regularly present   Resp: airway patent, non-labored breathing  Abd: soft, NT; no rebound or guarding. mildly distended                Vitals:  T(C): 36.9 (01-25 @ 05:33), Max: 36.9 (01-25 @ 05:33)  HR: 79 (01-25 @ 05:33) (69 - 91)  BP: 151/83 (01-25 @ 05:33) (151/83 - 166/96)  RR: 18 (01-25 @ 05:33) (18 - 18)  SpO2: 99% (01-25 @ 05:33) (99% - 100%)      01-24 @ 07:01  -  01-25 @ 07:00  --------------------------------------------------------  IN:  Total IN: 0 mL    OUT:    Voided (mL): 200 mL  Total OUT: 200 mL    Total NET: -200 mL      01-25 @ 07:01  -  01-25 @ 08:47  --------------------------------------------------------  IN:  Total IN: 0 mL    OUT:    Voided (mL): 400 mL  Total OUT: 400 mL    Total NET: -400 mL          01-24 @ 08:01                    10.0  CBC: 6.33>)-------(<276                     33.3                 140 | 107 | 8    CMP:  ----------------------< 87               3.2 | 29 | 0.72                      Ca:8.8  Phos:-  Mg:-               0.2|      |27        LFTs:  ------|97|-----             -|      |-        Culture - Urine (collected 01-23-23 @ 15:27)  Source: Clean Catch None  Final Report (01-24-23 @ 16:44):    <10,000 CFU/mL Normal Urogenital Yulia          Current Inpatient Medications:  enoxaparin Injectable 120 milliGRAM(s) SubCutaneous every 24 hours  influenza   Vaccine 0.5 milliLiter(s) IntraMuscular once  morphine  - Injectable 2 milliGRAM(s) IV Push every 6 hours PRN  polyethylene glycol 3350 17 Gram(s) Oral at bedtime  sodium chloride 0.9%. 1000 milliLiter(s) (100 mL/Hr) IV Continuous <Continuous>

## 2023-01-25 NOTE — PROGRESS NOTE ADULT - SUBJECTIVE AND OBJECTIVE BOX
FAUSTINA GUTIERREZ  62y  Male      Patient is a 62y old  Male who presents with a chief complaint of Intestinal obstruction     (2023 13:27)      INTERVAL HPI/OVERNIGHT EVENTS:  Seen and examined, resting comfortably. Complains of mild tenderness in LLQ  Had diarrhea this AM with some formed stool  Tolerating clears, advanced to low fiber  Still hypoK despite aggressive repletion      REVIEW OF SYSTEMS:  CONSTITUTIONAL: No fever, weight loss, or fatigue  EYES: No eye pain, visual disturbances, or discharge  ENMT:  No difficulty hearing, tinnitus, vertigo; No sinus or throat pain  NECK: No pain or stiffness  BREASTS: No pain, masses, or nipple discharge  RESPIRATORY: No cough, wheezing, chills or hemoptysis; No shortness of breath  CARDIOVASCULAR: No chest pain, palpitations, dizziness, or leg swelling  GASTROINTESTINAL:+Abd pain, diarrhea  GENITOURINARY: No dysuria, frequency, hematuria, or incontinence  NEUROLOGICAL: No headaches, memory loss, loss of strength, numbness, or tremors  SKIN: No itching, burning, rashes, or lesions   LYMPH NODES: No enlarged glands  ENDOCRINE: No heat or cold intolerance; No hair loss  MUSCULOSKELETAL: No joint pain or swelling; No muscle, back, or extremity pain  PSYCHIATRIC: No depression, anxiety, mood swings, or difficulty sleeping  HEME/LYMPH: No easy bruising, or bleeding gums  ALLERY AND IMMUNOLOGIC: No hives or eczema    T(C): 36.8 (23 @ 09:22), Max: 36.9 (23 @ 05:33)  HR: 81 (23 @ 09:22) (69 - 81)  BP: 153/80 (23 @ 09:22) (151/83 - 159/99)  RR: 18 (23 @ 09:22) (18 - 18)  SpO2: 100% (23 @ 09:22) (99% - 100%)  Wt(kg): --Vital Signs Last 24 Hrs  T(C): 36.8 (2023 09:22), Max: 36.9 (2023 05:33)  T(F): 98.3 (2023 09:22), Max: 98.4 (2023 05:33)  HR: 81 (2023 09:22) (69 - 81)  BP: 153/80 (2023 09:22) (151/83 - 159/99)  BP(mean): 99 (2023 05:33) (99 - 99)  RR: 18 (2023 09:22) (18 - 18)  SpO2: 100% (2023 09:22) (99% - 100%)    Parameters below as of 2023 09:22  Patient On (Oxygen Delivery Method): room air        PHYSICAL EXAM:  GENERAL: NAD, well-groomed, well-developed  HEAD:  Atraumatic, Normocephalic  EYES: EOMI, PERRLA, conjunctiva and sclera clear  ENMT: No tonsillar erythema, exudates, or enlargement; Moist mucous membranes, Good dentition, No lesions  NECK: Supple, No JVD, Normal thyroid  NERVOUS SYSTEM:  Alert & Oriented X3, Good concentration; Motor Strength 5/5 B/L upper and lower extremities; DTRs 2+ intact and symmetric  CHEST/LUNG: Clear to percussion bilaterally; No rales, rhonchi, wheezing, or rubs  HEART: Regular rate and rhythm; No murmurs, rubs, or gallops  ABDOMEN: +Mild tenderness to palpation  EXTREMITIES:  2+ Peripheral Pulses, No clubbing, cyanosis, or edema  LYMPH: No lymphadenopathy noted  SKIN: No rashes or lesions    Consultant(s) Notes Reviewed:  [x ] YES  [ ] NO  Care Discussed with Consultants/Other Providers [ x] YES  [ ] NO    LABS:                        10.6   7.76  )-----------( 295      ( 2023 09:14 )             36.0     -    138  |  105  |  6<L>  ----------------------------<  84  3.0<L>   |  28  |  1.01    Ca    8.7      2023 09:14  Mg     2.0     -    TPro  6.9  /  Alb  2.5<L>  /  TBili  0.2  /  DBili  x   /  AST  27  /  ALT  31  /  AlkPhos  97  01-24    PT/INR - ( 2023 08:01 )   PT: 14.9 sec;   INR: 1.28 ratio         PTT - ( 2023 08:01 )  PTT:25.5 sec  Urinalysis Basic - ( 2023 15:27 )    Color: Brown / Appearance: very cloudy / S.020 / pH: x  Gluc: x / Ketone: Trace  / Bili: Small / Urobili: 4   Blood: x / Protein: 100 / Nitrite: Negative   Leuk Esterase: Trace / RBC: 6-10 /HPF / WBC 6-10 /HPF   Sq Epi: x / Non Sq Epi: Few / Bacteria: Few      CAPILLARY BLOOD GLUCOSE            Urinalysis Basic - ( 2023 15:27 )    Color: Brown / Appearance: very cloudy / S.020 / pH: x  Gluc: x / Ketone: Trace  / Bili: Small / Urobili: 4   Blood: x / Protein: 100 / Nitrite: Negative   Leuk Esterase: Trace / RBC: 6-10 /HPF / WBC 6-10 /HPF   Sq Epi: x / Non Sq Epi: Few / Bacteria: Few        RADIOLOGY & ADDITIONAL TESTS:    Imaging Personally Reviewed:  [ ] YES  [ ] NO    HEALTH ISSUES - PROBLEM Dx:

## 2023-01-25 NOTE — DIETITIAN INITIAL EVALUATION ADULT - ETIOLOGY
r/t decreased ability to meet increased nutrient needs 2/2 N/V/ poor appetite 2/2 chemo tx on mets cancer

## 2023-01-25 NOTE — DIETITIAN INITIAL EVALUATION ADULT - PERTINENT LABORATORY DATA
01-25    138  |  105  |  6<L>  ----------------------------<  84  3.0<L>   |  28  |  1.01    Ca    8.7      25 Jan 2023 09:14  Mg     2.0     01-25    TPro  6.9  /  Alb  2.5<L>  /  TBili  0.2  /  DBili  x   /  AST  27  /  ALT  31  /  AlkPhos  97  01-24  A1C with Estimated Average Glucose Result: 6.0 % (08-16-22 @ 07:47)

## 2023-01-26 ENCOUNTER — TRANSCRIPTION ENCOUNTER (OUTPATIENT)
Age: 63
End: 2023-01-26

## 2023-01-26 LAB
ANION GAP SERPL CALC-SCNC: 4 MMOL/L — LOW (ref 5–17)
ANION GAP SERPL CALC-SCNC: 4 MMOL/L — LOW (ref 5–17)
BUN SERPL-MCNC: 8 MG/DL — SIGNIFICANT CHANGE UP (ref 7–23)
BUN SERPL-MCNC: 9 MG/DL — SIGNIFICANT CHANGE UP (ref 7–23)
CALCIUM SERPL-MCNC: 8.5 MG/DL — SIGNIFICANT CHANGE UP (ref 8.5–10.1)
CALCIUM SERPL-MCNC: 8.7 MG/DL — SIGNIFICANT CHANGE UP (ref 8.5–10.1)
CHLORIDE SERPL-SCNC: 104 MMOL/L — SIGNIFICANT CHANGE UP (ref 96–108)
CHLORIDE SERPL-SCNC: 107 MMOL/L — SIGNIFICANT CHANGE UP (ref 96–108)
CO2 SERPL-SCNC: 27 MMOL/L — SIGNIFICANT CHANGE UP (ref 22–31)
CO2 SERPL-SCNC: 28 MMOL/L — SIGNIFICANT CHANGE UP (ref 22–31)
CREAT SERPL-MCNC: 0.68 MG/DL — SIGNIFICANT CHANGE UP (ref 0.5–1.3)
CREAT SERPL-MCNC: 0.79 MG/DL — SIGNIFICANT CHANGE UP (ref 0.5–1.3)
EGFR: 100 ML/MIN/1.73M2 — SIGNIFICANT CHANGE UP
EGFR: 105 ML/MIN/1.73M2 — SIGNIFICANT CHANGE UP
GLUCOSE SERPL-MCNC: 123 MG/DL — HIGH (ref 70–99)
GLUCOSE SERPL-MCNC: 130 MG/DL — HIGH (ref 70–99)
HCT VFR BLD CALC: 34.7 % — LOW (ref 39–50)
HGB BLD-MCNC: 10.3 G/DL — LOW (ref 13–17)
MAGNESIUM SERPL-MCNC: 2.2 MG/DL — SIGNIFICANT CHANGE UP (ref 1.6–2.6)
MAGNESIUM SERPL-MCNC: 2.2 MG/DL — SIGNIFICANT CHANGE UP (ref 1.6–2.6)
MCHC RBC-ENTMCNC: 25.1 PG — LOW (ref 27–34)
MCHC RBC-ENTMCNC: 29.7 GM/DL — LOW (ref 32–36)
MCV RBC AUTO: 84.4 FL — SIGNIFICANT CHANGE UP (ref 80–100)
PHOSPHATE SERPL-MCNC: 2.1 MG/DL — LOW (ref 2.5–4.5)
PLATELET # BLD AUTO: 262 K/UL — SIGNIFICANT CHANGE UP (ref 150–400)
POTASSIUM SERPL-MCNC: 3.1 MMOL/L — LOW (ref 3.5–5.3)
POTASSIUM SERPL-MCNC: 3.1 MMOL/L — LOW (ref 3.5–5.3)
POTASSIUM SERPL-SCNC: 3.1 MMOL/L — LOW (ref 3.5–5.3)
POTASSIUM SERPL-SCNC: 3.1 MMOL/L — LOW (ref 3.5–5.3)
RBC # BLD: 4.11 M/UL — LOW (ref 4.2–5.8)
RBC # FLD: 20.3 % — HIGH (ref 10.3–14.5)
SODIUM SERPL-SCNC: 135 MMOL/L — SIGNIFICANT CHANGE UP (ref 135–145)
SODIUM SERPL-SCNC: 139 MMOL/L — SIGNIFICANT CHANGE UP (ref 135–145)
WBC # BLD: 7.43 K/UL — SIGNIFICANT CHANGE UP (ref 3.8–10.5)
WBC # FLD AUTO: 7.43 K/UL — SIGNIFICANT CHANGE UP (ref 3.8–10.5)

## 2023-01-26 PROCEDURE — 99232 SBSQ HOSP IP/OBS MODERATE 35: CPT

## 2023-01-26 RX ORDER — POTASSIUM PHOSPHATE, MONOBASIC POTASSIUM PHOSPHATE, DIBASIC 236; 224 MG/ML; MG/ML
15 INJECTION, SOLUTION INTRAVENOUS ONCE
Refills: 0 | Status: COMPLETED | OUTPATIENT
Start: 2023-01-26 | End: 2023-01-26

## 2023-01-26 RX ORDER — POTASSIUM CHLORIDE 20 MEQ
40 PACKET (EA) ORAL ONCE
Refills: 0 | Status: COMPLETED | OUTPATIENT
Start: 2023-01-26 | End: 2023-01-26

## 2023-01-26 RX ORDER — ENOXAPARIN SODIUM 100 MG/ML
120 INJECTION SUBCUTANEOUS
Qty: 30 | Refills: 0
Start: 2023-01-26 | End: 2023-02-24

## 2023-01-26 RX ORDER — MAGNESIUM SULFATE 500 MG/ML
1 VIAL (ML) INJECTION ONCE
Refills: 0 | Status: COMPLETED | OUTPATIENT
Start: 2023-01-26 | End: 2023-01-26

## 2023-01-26 RX ORDER — SODIUM,POTASSIUM PHOSPHATES 278-250MG
1 POWDER IN PACKET (EA) ORAL
Refills: 0 | Status: DISCONTINUED | OUTPATIENT
Start: 2023-01-26 | End: 2023-01-27

## 2023-01-26 RX ORDER — ENOXAPARIN SODIUM 100 MG/ML
3 INJECTION SUBCUTANEOUS
Qty: 0 | Refills: 0 | DISCHARGE

## 2023-01-26 RX ORDER — SODIUM,POTASSIUM PHOSPHATES 278-250MG
1 POWDER IN PACKET (EA) ORAL
Qty: 8 | Refills: 0
Start: 2023-01-26 | End: 2023-01-27

## 2023-01-26 RX ORDER — POLYETHYLENE GLYCOL 3350 17 G/17G
17 POWDER, FOR SOLUTION ORAL
Qty: 510 | Refills: 0
Start: 2023-01-26 | End: 2023-02-24

## 2023-01-26 RX ORDER — POTASSIUM CHLORIDE 20 MEQ
20 PACKET (EA) ORAL
Refills: 0 | Status: COMPLETED | OUTPATIENT
Start: 2023-01-26 | End: 2023-01-26

## 2023-01-26 RX ADMIN — POLYETHYLENE GLYCOL 3350 17 GRAM(S): 17 POWDER, FOR SOLUTION ORAL at 21:13

## 2023-01-26 RX ADMIN — ENOXAPARIN SODIUM 120 MILLIGRAM(S): 100 INJECTION SUBCUTANEOUS at 21:13

## 2023-01-26 RX ADMIN — Medication 40 MILLIEQUIVALENT(S): at 20:00

## 2023-01-26 RX ADMIN — Medication 100 GRAM(S): at 10:46

## 2023-01-26 RX ADMIN — POTASSIUM PHOSPHATE, MONOBASIC POTASSIUM PHOSPHATE, DIBASIC 62.5 MILLIMOLE(S): 236; 224 INJECTION, SOLUTION INTRAVENOUS at 19:18

## 2023-01-26 RX ADMIN — Medication 20 MILLIEQUIVALENT(S): at 12:26

## 2023-01-26 RX ADMIN — Medication 20 MILLIEQUIVALENT(S): at 14:48

## 2023-01-26 RX ADMIN — Medication 1 TABLET(S): at 19:18

## 2023-01-26 RX ADMIN — Medication 20 MILLIEQUIVALENT(S): at 10:37

## 2023-01-26 RX ADMIN — Medication 20 MILLIEQUIVALENT(S): at 16:43

## 2023-01-26 NOTE — PROGRESS NOTE ADULT - ASSESSMENT
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 Patient currently does not have any signs/symptoms of true bowel obstruction.    Discussed palliative colonic stent with Dr. Hills, GI, but depending on patient's expected survival would lean more towards palliative colectomy, possible ostomy.    Recommendations:  - serial abdominal exams  - monitor bowel function  - GI recs appreciated : Possible stent placement, ONLY if patient develops obstipation.  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 Patient currently does not have any signs/symptoms of true bowel obstruction.  GI recs appreciated : Possible stent placement, ONLY if patient develops obstipation.  + BM, tolerating diet    Recommendations:  - management per primary team  - ok for discharge from colorectal standpoint    will be discussed with colorectal surgery team

## 2023-01-26 NOTE — DISCHARGE NOTE PROVIDER - CARE PROVIDERS DIRECT ADDRESSES
[email or web address],DirectAddress_Unknown

## 2023-01-26 NOTE — DISCHARGE NOTE PROVIDER - NSDCFUSCHEDAPPT_GEN_ALL_CORE_FT
Mendoza Martinez  [facility]  ORTHOSURG [address] [facility]  Scheduled Appointment: 01/30/2023    [facility]  [facility]  Scheduled Appointment: 02/06/2023    Matilde Mcdaniels  [facility]  [facility]  Scheduled Appointment: 02/06/2023    [facility]  [facility]  Scheduled Appointment: 02/08/2023    Deonte Kumari  [facility]  CARDIOLOGY [address]  Scheduled Appointment: 02/10/2023    [facility]  [facility]  Scheduled Appointment: 02/21/2023    [facility]  [facility]  Scheduled Appointment: 02/23/2023    [facility]  [facility]  Scheduled Appointment: 03/06/2023    Carmela Harrison  [facility]  [facility]  Scheduled Appointment: 03/08/2023    [facility]  [facility]  Scheduled Appointment: 03/08/2023

## 2023-01-26 NOTE — DISCHARGE NOTE PROVIDER - HOSPITAL COURSE
63 y/o M w/ PMH colon cancer stage 4  w/ mets to liver on chemo, catheter tip thrombus (on Lovenox), recent left tibia ORIF  surgery  admitted on 1/23/23 with  nausea / vomiting for 3-4 days   CT abd concerning for LBO     1/26 - pt seen and examined, passing the gas, denies nausea, vomiting, tolerating diet, denies abdominal pain, + having BM , eager to go home    Review of system- Rest of the review of system are negative except mentioned in HPI    Vital sings reviewed for last 24 h  T(C): 36.9 (01-26-23 @ 15:27), Max: 37.1 (01-25-23 @ 21:15)  T(F): 98.5 (01-26-23 @ 15:27), Max: 98.8 (01-25-23 @ 21:15)  HR: 104 (01-26-23 @ 15:27) (71 - 104)  BP: 161/86 (01-26-23 @ 15:27) (146/82 - 161/86)  RR: 18 (01-26-23 @ 15:27) (18 - 18)  SpO2: 98% (01-26-23 @ 15:27) (97% - 99%)  Wt(kg): --  Daily     Daily   CAPILLARY BLOOD GLUCOSE    PHYSICAL EXAM:    Constitutional: NAD, awake and alert   HEENT: PERR, EOMI, Normal Hearing, MMM  Neck: Soft and supple, No LAD, No JVD  Respiratory: Breath sounds are clear bilaterally, No wheezing, rales or rhonchi  Cardiovascular: S1 and S2, regular rate and rhythm, no Murmurs, gallops or rubs  Gastrointestinal: Bowel Sounds present, soft, nontender, nondistended, no guarding, no rebound  Extremities: No peripheral edema, Left knee healed incision   Vascular: 2+ peripheral pulses  Neurological: A/O x 3, no focal deficits  Musculoskeletal: 5/5 strength b/l upper and lower extremities  Skin: No rashes  rectal : deferred, not indicated    labs K 3.1 , Ph 2.1 , Hb 10 Cr 0.7     New Partial large bowel obstruction 2/2 known apple core lesion in distal descending colon, improving  -NPO-->Clears-->Low fiber  -Surgery eval, no acute surgical intervention  -GI- No longer needs colonic stent, may be in the future  -Pain control  -Lactic acidosis resolved     Metastatic adenocarcinoma, consistent with metastasis from colonic primary with liver and lung mets  -Other CT findings: pulmonary nodule, scattered hepatic lesions, L adrenal nodule   -Heme/onc - o/p chemo with Dr. Harrison     Small pericardial effusion  -Echo - no signs of pericardial effusion EF 55%  - EKG - sinus tach 126 bpm ,, no ischemic changes , repeat ekg     Persistent Hypokalemia with low borderline Mg   -Received IV, PO supplementation  -Replete and recheck in am     Anemia , normocytic -F/u outpatient     Catheter tip thrombus (on Lovenox) 120 mg sq daily    *DVT ppx - on lovenox     Final diagnosis, treatment plan, and follow-up recommendations were discussed and explained to the patient.   The patient was given an opportunity to ask questions concerning the diagnosis and treatment plan.   The patient acknowledged understanding of the diagnosis, treatment, and follow-up recommendations.   The patient was advised to seek urgent care upon discharge if worsening symptoms develop prior to scheduled follow-up.   Time spent on discharge included time with the patient, and also coordinating discharge care as outlined below.  Discharge note faxed to PCP with my contact information to call me back   PCP none - [facility]   Total time spent: 50 min 63 y/o M w/ PMH colon cancer stage 4  w/ mets to liver on chemo, catheter tip thrombus (on Lovenox), recent left tibia ORIF  surgery  admitted on 1/23/23 with  nausea / vomiting for 3-4 days   CT abd concerning for LBO     1/26 - pt seen and examined, passing the gas, denies nausea, vomiting, tolerating diet, denies abdominal pain, + having BM , eager to go home  1/27 - pt seet and examined, + bm, denies cp, dyspnea, mild abdominal discomfort after meals, plan for discharge  today     Review of system- Rest of the review of system are negative except mentioned in HPI    Vital sings reviewed for last 24 h  T(C): 36.8 (01-27-23 @ 09:09), Max: 37 (01-27-23 @ 05:20)  T(F): 98.2 (01-27-23 @ 09:09), Max: 98.6 (01-27-23 @ 05:20)  HR: 91 (01-27-23 @ 09:09) (91 - 104)  BP: 150/89 (01-27-23 @ 09:09) (141/88 - 161/86)  RR: 18 (01-27-23 @ 09:09) (18 - 18)  SpO2: 100% (01-27-23 @ 09:09) (98% - 100%)  Wt(kg): --  Daily     Daily   CAPILLARY BLOOD GLUCOSE      PHYSICAL EXAM:    Constitutional: NAD, awake and alert   HEENT: PERR, EOMI, Normal Hearing, MMM  Neck: Soft and supple, No LAD, No JVD  Respiratory: Breath sounds are clear bilaterally, No wheezing, rales or rhonchi  Cardiovascular: S1 and S2, regular rate and rhythm, no Murmurs, gallops or rubs  Gastrointestinal: Bowel Sounds present, soft, nontender, nondistended, no guarding, no rebound  Extremities: No peripheral edema, Left knee healed incision   Vascular: 2+ peripheral pulses  Neurological: A/O x 3, no focal deficits  Musculoskeletal: 5/5 strength b/l upper and lower extremities  Skin: No rashes  rectal : deferred, not indicated    labs K 3.1 --. 3.4 , Ph 2.1 , Hb 10 Cr 0.7     New Partial large bowel obstruction 2/2 known apple core lesion in distal descending colon, improving  -NPO-->Clears-->Low fiber  -Surgery eval, no acute surgical intervention  -GI- No longer needs colonic stent, may be in the future  -Pain control  -Lactic acidosis resolved     Metastatic adenocarcinoma, consistent with metastasis from colonic primary with liver and lung mets  -Other CT findings: pulmonary nodule, scattered hepatic lesions, L adrenal nodule   -Heme/onc - o/p chemo with Dr. Harrison     Small pericardial effusion  -Echo - no signs of pericardial effusion EF 55%  - EKG - sinus tach 126 bpm ,, no ischemic changes , repeat ekg  NSR, no ischemic changes 1/27/23    Persistent Hypokalemia with low borderline Mg   -Received IV, PO supplementation  -Replete and recheck K 3.4  - 40 mcq KCL x1 now     Anemia , normocytic -F/u outpatient     Catheter tip thrombus (on Lovenox) 120 mg sq daily    DVT ppx - on lovenox     Final diagnosis, treatment plan, and follow-up recommendations were discussed and explained to the patient.   The patient was given an opportunity to ask questions concerning the diagnosis and treatment plan.   The patient acknowledged understanding of the diagnosis, treatment, and follow-up recommendations.   The patient was advised to seek urgent care upon discharge if worsening symptoms develop prior to scheduled follow-up.   Time spent on discharge included time with the patient, and also coordinating discharge care as outlined below.  Discharge note faxed to PCP with my contact information to call me back   PCP none - [facility]   Total time spent: 50 min

## 2023-01-26 NOTE — PROGRESS NOTE ADULT - SUBJECTIVE AND OBJECTIVE BOX
HPI:    62 year old male with PMH of colon CA metastasis to liver presents to the ED c/o nausea, vomiting and lower abd pain x few days, sent by Oncologist to R/O SBO. Pt has had 6 chemo treatments, last treatment 2 weeks ago. But pt has been constantly vomiting ever since. Was scheduled for the 7th treatment today but due to the vomiting, pt came to the ED. Unable to tolerate PO. Pt recently put on injection blood thinner.      1/24/23-  Seen in Ed this morning. c/o feeling bloated, however passing gas and had a regular BM this morning. Denies N/V, remains NPO since last evening.     1/25/23- Seen  today morning.  In no acute distress, reports feeling well, except mild left lower abdomen pain., not requiring any meds.  Denies N/V since admit. Tolerating clears since last evening. Had loose  BM this morning.    01/26/2023: Patient seen at bedside this morning, sitting in chair eating breakfast, no acute distress, feeling well      PAST MEDICAL & SURGICAL HISTORY:    Colon cancer  ORIF-left tibia    FAMILY HISTORY:    No pertinent family history in first degree relatives      MEDICATIONS  (STANDING):    enoxaparin Injectable 120 milliGRAM(s) SubCutaneous every 24 hours  influenza   Vaccine 0.5 milliLiter(s) IntraMuscular once  polyethylene glycol 3350 17 Gram(s) Oral at bedtime  sodium chloride 0.9%. 1000 milliLiter(s) (100 mL/Hr) IV Continuous <Continuous>      MEDICATIONS  (PRN):    morphine  - Injectable 2 milliGRAM(s) IV Push every 6 hours PRN Moderate Pain (4 - 6)        ALLERGIES:    No Known Allergies      REVIEW OF SYSTEMS:    Constitutional, Eyes, ENT, Cardiovascular, Respiratory, Gastrointestinal, Genitourinary, Musculoskeletal, Integumentary, Neurological, Psychiatric, Endocrine, Heme/Lymph and Allergic/Immunologic review of systems are otherwise negative except as noted in HPI.       VITALS:    ICU Vital Signs Last 24 Hrs  T(C): 36.7 (26 Jan 2023 08:42), Max: 37.1 (25 Jan 2023 21:15)  T(F): 98 (26 Jan 2023 08:42), Max: 98.8 (25 Jan 2023 21:15)  HR: 71 (26 Jan 2023 08:42) (71 - 83)  BP: 146/82 (26 Jan 2023 08:42) (146/82 - 154/88)  BP(mean): --  ABP: --  ABP(mean): --  RR: 18 (26 Jan 2023 08:42) (17 - 18)  SpO2: 99% (26 Jan 2023 08:42) (97% - 100%)    O2 Parameters below as of 26 Jan 2023 08:42  Patient On (Oxygen Delivery Method): room air      PHYSICAL EXAM:    GENERAL: no acute distress  HEENT: EOMI, neck supple  RESPIRATORY: LCTAB/L, no rhonchi, rales, or wheezing  CARDIOVASCULAR: RRR, no murmurs, gallops, rubs  ABDOMINAL: soft, non-tender, non-distended, positive bowel sounds   EXTREMITIES: no clubbing, cyanosis, or edema  NEUROLOGICAL: alert and oriented x 3, non-focal  SKIN: no rashes or lesions   MUSCULOSKELETAL: no gross joint deformity      LABS:    CBC Full  -  ( 25 Jan 2023 09:14 )  WBC Count : 7.76 K/uL  RBC Count : 4.28 M/uL  Hemoglobin : 10.6 g/dL  Hematocrit : 36.0 %  Platelet Count - Automated : 295 K/uL  Mean Cell Volume : 84.1 fl  Mean Cell Hemoglobin : 24.8 pg  Mean Cell Hemoglobin Concentration : 29.4 gm/dL  Auto Neutrophil # : x  Auto Lymphocyte # : x  Auto Monocyte # : x  Auto Eosinophil # : x  Auto Basophil # : x  Auto Neutrophil % : x  Auto Lymphocyte % : x  Auto Monocyte % : x  Auto Eosinophil % : x  Auto Basophil % : x                          10.6   7.76  )-----------( 295      ( 25 Jan 2023 09:14 )             36.0     01-25    138  |  105  |  6<L>  ----------------------------<  84  3.0<L>   |  28  |  1.01    Ca    8.7      25 Jan 2023 09:14  Mg     2.0     01-25        RADIOLOGY & ADDITIONAL STUDIES:    EXAM:  CT ABDOMEN AND PELVIS OC IC       PROCEDURE DATE:  01/23/2023      INTERPRETATION:  CLINICAL INFORMATION: Nausea vomiting. Rule out small   bowel obstruction. Colon cancer with liver metastases    COMPARISON: 1/9/2023    CONTRAST/COMPLICATIONS:    IV Contrast: Omnipaque 350  90 cc administered   10 cc discarded  Oral Contrast: Omnipaque 300  Complications: None reported at time of study completion    PROCEDURE:  CT of the Abdomen and Pelvis was performed.  Sagittal and coronal reformats were performed.    FINDINGS:  LOWER CHEST: Minimal pericardial effusion, unchanged. Tiny pulmonary   nodule in the right middle lobe on image 6 of series 2 also seen on the   prior study    LIVER: Scattered low-attenuation lesions throughout the right hepatic   lobe similar to the prior study with lesion in the hepatic dome again   demonstrating peripheral focus of high attenuation likely representing   calcification  BILE DUCTS: Normal caliber.  GALLBLADDER: Within normal limits.  SPLEEN: Within normal limits.  PANCREAS: Within normal limits.  ADRENALS: Right adrenal gland is unremarkable. There is a well-defined   large left adrenal nodule measuring approximately 2.7 x 2.9 cm.   Differential diagnosis includes metastasis versus adenoma.  KIDNEYS/URETERS: No hydronephrosis or urolithiasis. Small right renal   hypo densities too small to characterize    BLADDER: Within normal limits.  REPRODUCTIVE ORGANS: Prostate within normal limits.    BOWEL: No small bowel obstruction. Appendix is not visualized. No   evidence of inflammation in the pericecal region. The large bowel is now   moderately dilated and filled with air and fluid with relative   decompression of the distal sigmoid colon and the rectum secondary to   probable cortical lesion in the distal descending colon. Again there is   extensive stranding in the fat at the level of the tumor  PERITONEUM: No ascites.  VESSELS: Within normal limits.  RETROPERITONEUM/LYMPH NODES: No lymphadenopathy.  ABDOMINAL WALL: Small bilateral fat-containing inguinal hernias with   small fat-containing umbilical hernia.  BONES: Degenerative changes.    IMPRESSION: New partial large bowel obstruction secondary to known apple core lesion in the distal descending colon. Again extensive adjacent mesenteric abnormality is seen which may represent conglomerate lymph nodes. Re demonstrated hepatic metastases. No change large left adrenal nodule. Small pericardial effusion is again seen.

## 2023-01-26 NOTE — PROGRESS NOTE ADULT - NUTRITIONAL ASSESSMENT
This patient has been assessed with a concern for Malnutrition and has been determined to have a diagnosis/diagnoses of Severe protein-calorie malnutrition.    This patient is being managed with:   Diet Low Fiber-  Entered: Jan 25 2023 11:30AM    
This patient has been assessed with a concern for Malnutrition and has been determined to have a diagnosis/diagnoses of Severe protein-calorie malnutrition.    This patient is being managed with:   Diet Low Fiber-  Entered: Jan 25 2023 11:30AM

## 2023-01-26 NOTE — DISCHARGE NOTE PROVIDER - DETAILS OF MALNUTRITION DIAGNOSIS/DIAGNOSES
This patient has been assessed with a concern for Malnutrition and was treated during this hospitalization for the following Nutrition diagnosis/diagnoses:     -  01/25/2023: Severe protein-calorie malnutrition

## 2023-01-26 NOTE — PROGRESS NOTE ADULT - ASSESSMENT
61 y/o M w/ PMH colon cancer stage 4  w/ mets to liver on chemo, catheter tip thrombus (on Lovenox), recent left tibia ORIF  surgery  admitted on 1/23/23 with  nausea / vomiting for 3-4 days   CT abd concerning for LBO     New Partial large bowel obstruction 2/2 known apple core lesion in distal descending colon, improving  -NPO-->Clears-->Low fiber  -Surgery eval, no acute surgical intervention  -GI- No longer needs colonic stent, may be in the future  -Pain control  -Lactic acidosis resolved     Metastatic adenocarcinoma, consistent with metastasis from colonic primary with liver and lung mets  -Other CT findings: pulmonary nodule, scattered hepatic lesions, L adrenal nodule   -Heme/onc - o/p chemo with Dr. Harrison     Small pericardial effusion, sinus tachycardia  -Echo - no signs of pericardial effusion EF 55%  - EKG - sinus tach 126 bpm ,, no ischemic changes , repeat ekg     Persistent Hypokalemia with low borderline Mg   -Received IV, PO supplementation  -Replete and recheck in am     Anemia , normocytic -F/u outpatient     Catheter tip thrombus (on Lovenox) 120 mg sq daily    *DVT ppx - on lovenox     Dispo - replace K, repeat EKG, d/c in am

## 2023-01-26 NOTE — DISCHARGE NOTE PROVIDER - NSDCCPCAREPLAN_GEN_ALL_CORE_FT
PRINCIPAL DISCHARGE DIAGNOSIS  Diagnosis: Large bowel obstruction  Assessment and Plan of Treatment: if no bowel movements for 2 days go to ER or call surgeon Dr. Barrios   take miralax daily      SECONDARY DISCHARGE DIAGNOSES  Diagnosis: Colon cancer  Assessment and Plan of Treatment: follow up with Dr. Hurtado for further management and monitoring    Diagnosis: Hypokalemia  Assessment and Plan of Treatment: take potasium with phosphrus supplementation for 2 dayys, repeat blood test in 3-4 days with PCP or oncologist    Diagnosis: Anemia in neoplastic disease  Assessment and Plan of Treatment: follow up with oncologist within 1 week    Diagnosis: History of blood clots  Assessment and Plan of Treatment: continue lovenox 120 mg daily at the same time  monitor for bleeding, if rectal bleeding or vomiting with blood go to ED for further evaluation       PRINCIPAL DISCHARGE DIAGNOSIS  Diagnosis: Large bowel obstruction  Assessment and Plan of Treatment: if no bowel movements for 2 days go to ER or call surgeon Dr. Barrios   take miralax daily      SECONDARY DISCHARGE DIAGNOSES  Diagnosis: Colon cancer  Assessment and Plan of Treatment: follow up with Dr. Hurtado for further management and monitoring    Diagnosis: Hypokalemia  Assessment and Plan of Treatment: take potasium with phosphrus supplementation for 2 days, repeat blood test in 3-4 days with PCP or oncologist    Diagnosis: Anemia in neoplastic disease  Assessment and Plan of Treatment: follow up with oncologist within 1 week    Diagnosis: History of blood clots  Assessment and Plan of Treatment: continue lovenox 120 mg daily at the same time  monitor for bleeding, if rectal bleeding or vomiting with blood go to ED for further evaluation

## 2023-01-26 NOTE — DISCHARGE NOTE NURSING/CASE MANAGEMENT/SOCIAL WORK - NSDCFUADDAPPT_GEN_ALL_CORE_FT
Dr Emily Gutierrez  Wednesday 2/1/2023 @ 11:20am  [facility]  [address] [place] [address]  phone     [facility] 2/6/2023 11am @ [address] 3/8/2023 @ 9:20am @ [address] Dr Emily Gutierrez  Monday 1/30/2023 @ 11am  [facility]  [address] [place] [address]  phone     [facility] 2/6/2023 11am @ [address] 3/8/2023 @ 9:20am @ [address]

## 2023-01-26 NOTE — PROGRESS NOTE ADULT - SUBJECTIVE AND OBJECTIVE BOX
SURGERY DAILY PROGRESS NOTE:     Subjective:  Patient seen and examined at bedside during am rounds. Still has no problem with Bowel movements & passing gases. AVSS. Denies any fevers, chills, n/v/d, chest pain or shortness of breath. has no other further complaints      Objective:  PHYSICAL EXAM   Gen: well-appearing, in no acute distress  CV: pulse regularly present   Resp: airway patent, non-labored breathing  Abd: soft, NT; no rebound or guarding. mildly distended              Vitals:  T(C): 36.7 ( @ 08:42), Max: 37.1 ( @ 21:15)  HR: 71 ( @ 08:42) (71 - 83)  BP: 146/82 ( @ 08:42) (146/82 - 154/88)  RR: 18 ( @ 08:42) (17 - 18)  SpO2: 99% ( @ 08:42) (97% - 99%)       @ 07:01  -   @ 07:00  --------------------------------------------------------  IN:  Total IN: 0 mL    OUT:    Voided (mL): 900 mL  Total OUT: 900 mL    Total NET: -900 mL           @ 09:14                    10.6  CBC: 7.76>)-------(<295                     36.0                 138 | 105 | 6    CMP:  ----------------------< 84               3.0 | 28 | 1.01                      Ca:8.7  Phos:-  M.0               -|      |-        LFTs:  ------|-|-----             -|      |-        Culture - Urine (collected 23 @ 15:27)  Source: Clean Catch None  Final Report (23 @ 16:44):    <10,000 CFU/mL Normal Urogenital Yulia          Current Inpatient Medications:  enoxaparin Injectable 120 milliGRAM(s) SubCutaneous every 24 hours  influenza   Vaccine 0.5 milliLiter(s) IntraMuscular once  morphine  - Injectable 2 milliGRAM(s) IV Push every 6 hours PRN  polyethylene glycol 3350 17 Gram(s) Oral at bedtime  sodium chloride 0.9%. 1000 milliLiter(s) (100 mL/Hr) IV Continuous <Continuous>

## 2023-01-26 NOTE — DISCHARGE NOTE NURSING/CASE MANAGEMENT/SOCIAL WORK - PATIENT PORTAL LINK FT
You can access the FollowMyHealth Patient Portal offered by [facility] by registering at the following website: [email or web address]. By joining [employer]’s FollowMyHealth portal, you will also be able to view your health information using other applications (apps) compatible with our system.

## 2023-01-26 NOTE — PROGRESS NOTE ADULT - TIME BILLING
Seen and examined.  No complaints.  Reports passing a good amount of gas and stool overnight.  No N/V.  Denies feeling distended.  AFVSS  NAD  soft, NTND  Labs reviewed  CT A/P imaging and report reviewed  A/P - Stage 4 colon cancer, on Folfiri, admitted with N/V, CT with pLBO    No clinical signs of obstruction.  D/W pt at bedside and daughter over the phone.  Will trial clear liquid diet to ensure po tolerance.  If worsening obstipation/constipation, then more reason to proceed with stent placement.  Discussed with Dr. Hills of GI.
Patient seen and examined, agree with above.  Patient is tolerating diet without nausea/vomiting and endorses bowel function.  On exam abdomen is soft, nondistended, and nontender, no rebound/guarding.  OK for low fiber diet as tolerated, agree with nutritional supplements (trying Ensure, Premiere protein, etc).  Stent if obstipation develops but denying LBO related symptoms at this time.  OK for discharge from CRS perspective, advised to contact CRS office or return to hospital of symptoms recur.
Exam reviewed records d/w pt and team

## 2023-01-26 NOTE — DISCHARGE NOTE PROVIDER - PROVIDER TOKENS
PROVIDER:[TOKEN:[[id number]],FOLLOWUP:[1-3 days]],PROVIDER:[TOKEN:[[id number]],FOLLOWUP:[1 week]],FREE:[LAST:[[facility]PHONE:[(   )    -],FAX:[(   )    -],FOLLOWUP:[1-3 days]]

## 2023-01-26 NOTE — DISCHARGE NOTE PROVIDER - NSDCCPTREATMENT_GEN_ALL_CORE_FT
PRINCIPAL PROCEDURE  Procedure: Abdomen CT  Findings and Treatment: LOWER CHEST: Minimal pericardial effusion, unchanged. Tiny pulmonary   nodule in the right middle lobe on image 6 of series 2 also seen on the   prior study  LIVER: Scattered low-attenuation lesions throughout the right hepatic   lobe similar to the prior study with lesion in the hepatic dome again   demonstrating peripheral focus of high attenuation likely representing   calcification  BILE DUCTS: Normal caliber.  GALLBLADDER: Within normal limits.  SPLEEN: Within normal limits.  PANCREAS: Within normal limits.  ADRENALS: Right adrenal gland is unremarkable. There is a well-defined   large left adrenal nodule measuring approximately 2.7 x 2.9 cm.   Differential diagnosis includes metastasis versus adenoma.  KIDNEYS/URETERS: No hydronephrosis or urolithiasis. Small right renal   hypodensities too small to characterize  BLADDER: Within normal limits.  REPRODUCTIVE ORGANS: Prostate within normal limits.  BOWEL: Nosmall bowel obstruction. Appendix is not visualized. No   evidence of inflammation in the pericecal region. The large bowel is now   moderately dilated and filled with air and fluid with relative   decompression of the distal sigmoid colon and the rectum secondary to   probable cortical lesion in the distal descending colon. Again there is   extensive stranding in the fat at the level of the tumor  PERITONEUM: No ascites.  VESSELS: Within normal limits.  RETROPERITONEUM/LYMPH NODES: No lymphadenopathy.  ABDOMINAL WALL: Small bilateral fat-containing inguinal hernias with   small fat-containing umbilical hernia.  BONES: Degenerative changes.  IMPRESSION: New partial large bowel obstruction secondary to known apple   core lesion in the distaldescending colon. Again extensive adjacent   mesenteric abnormality is seen which may represent conglomerate lymph   nodes.  Redemonstrated hepatic metastases  No change large left adrenal nodule  Small pericardial effusion is again seen.        SECONDARY PROCEDURE  Procedure: 2D echo  Findings and Treatment: Pericardial Effusion   No evidence of pericardial effusion.   Pleural Effusion   No evidence of pleural effusion.   Miscellaneous   All visualized extra cardiac structures appears to be normal.   Impression   Summary   The left ventricle is normal in size, wall thickness, wall motion and   contractility.   Estimated left ventricular ejection fraction is 55 %.   Mild (1+) mitral regurgitation.

## 2023-01-26 NOTE — PROGRESS NOTE ADULT - SUBJECTIVE AND OBJECTIVE BOX
61 y/o M w/ PMH colon cancer stage 4  w/ mets to liver on chemo, catheter tip thrombus (on Lovenox), recent left tibia ORIF  surgery  admitted on 1/23/23 with  nausea / vomiting for 3-4 days   CT abd concerning for LBO     1/26 - pt seen and examined, passing the gas, denies nausea, vomiting, tolerating diet, denies abdominal pain, + having BM , eager to go home    Review of system- Rest of the review of system are negative except mentioned in HPI    Vital sings reviewed for last 24 h  T(C): 36.9 (01-26-23 @ 15:27), Max: 37.1 (01-25-23 @ 21:15)  T(F): 98.5 (01-26-23 @ 15:27), Max: 98.8 (01-25-23 @ 21:15)  HR: 104 (01-26-23 @ 15:27) (71 - 104)  BP: 161/86 (01-26-23 @ 15:27) (146/82 - 161/86)  RR: 18 (01-26-23 @ 15:27) (18 - 18)  SpO2: 98% (01-26-23 @ 15:27) (97% - 99%)  Wt(kg): --  Daily     Daily   CAPILLARY BLOOD GLUCOSE    PHYSICAL EXAM:  Constitutional: NAD, awake and alert   HEENT: PERR, EOMI, Normal Hearing, MMM  Neck: Soft and supple, No LAD, No JVD  Respiratory: Breath sounds are clear bilaterally, No wheezing, rales or rhonchi  Cardiovascular: S1 and S2, regular rate and rhythm, no Murmurs, gallops or rubs  Gastrointestinal: Bowel Sounds present, soft, nontender, nondistended, no guarding, no rebound  Extremities: No peripheral edema, Left knee healed incision   Vascular: 2+ peripheral pulses  Neurological: A/O x 3, no focal deficits  Musculoskeletal: 5/5 strength b/l upper and lower extremities  Skin: No rashes  rectal : deferred, not indicated    Lab all reviewed :     01-26    135  |  104  |  9   ----------------------------<  130<H>  3.1<L>   |  27  |  0.79    Ca    8.5      26 Jan 2023 15:03  Phos  2.1     01-26  Mg     2.2     01-26                              10.3   7.43  )-----------( 262      ( 26 Jan 2023 09:33 )             34.7     12 Lead ECG (01.23.23 @ 13:46) >    Ventricular Rate 126 BPM  QTC Calculation(Bazett) 466 ms  Diagnosis Line Sinus tachycardia  Otherwise normal ECG  When compared with ECG of 15-AUG-2022 11:14,  T wave amplitude has increased in Anterior leads    CT Abdomen and Pelvis w/ Oral Cont and w/ IV Cont (01.23.23 @ 18:10) >  LIVER: Scattered low-attenuation lesions throughout the right hepatic   lobe similar to the prior study with lesion in the hepatic dome again   demonstrating peripheral focus of high attenuation likely representing   calcification  BILE DUCTS: Normal caliber.  GALLBLADDER: Within normal limits.  SPLEEN: Within normal limits.  PANCREAS: Within normal limits.  ADRENALS: Right adrenal gland is unremarkable. There is a well-defined   large left adrenal nodule measuring approximately 2.7 x 2.9 cm.   Differential diagnosis includes metastasis versus adenoma.  KIDNEYS/URETERS: No hydronephrosis or urolithiasis. Small right renal   hypodensities too small to characterize    BLADDER: Within normal limits.  REPRODUCTIVE ORGANS: Prostate within normal limits.    BOWEL: Nosmall bowel obstruction. Appendix is not visualized. No   evidence of inflammation in the pericecal region. The large bowel is now   moderately dilated and filled with air and fluid with relative   decompression of the distal sigmoid colon and the rectum secondary to   probable cortical lesion in the distal descending colon. Again there is   extensive stranding in the fat at the level of the tumor  PERITONEUM: No ascites.  VESSELS: Within normal limits.  RETROPERITONEUM/LYMPH NODES: No lymphadenopathy.  ABDOMINAL WALL: Small bilateral fat-containing inguinal hernias with   small fat-containing umbilical hernia.  BONES: Degenerative changes.      IMPRESSION: New partial large bowel obstruction secondary to known apple   core lesion in the distaldescending colon. Again extensive adjacent   mesenteric abnormality is seen which may represent conglomerate lymph   nodes.  Redemonstrated hepatic metastases  No change large left adrenal nodule  Small pericardial effusion is again seen.     TTE Echo Complete w/o Contrast w/ Doppler (01.24.23 @ 20:09) >   Pericardial Effusion   No evidence of pericardial effusion.     Pleural Effusion   No evidence of pleural effusion.     Miscellaneous   All visualized extra cardiac structures appears to be normal.     Impression     Summary     The left ventricle is normal in size, wall thickness, wall motion and   contractility.   Estimated left ventricular ejection fraction is 55 %.   Mild (1+) mitral regurgitation.    Culture - Blood (09.29.22 @ 11:44)    Specimen Source: .Blood Blood-Peripheral    Culture Results:   No Growth Final    MEDICATIONS  (STANDING):  enoxaparin Injectable 120 milliGRAM(s) SubCutaneous every 24 hours  influenza   Vaccine 0.5 milliLiter(s) IntraMuscular once  polyethylene glycol 3350 17 Gram(s) Oral at bedtime  potassium chloride   Powder 40 milliEquivalent(s) Oral once  potassium phosphate / sodium phosphate Tablet (K-PHOS No. 2) 1 Tablet(s) Oral four times a day  potassium phosphate IVPB 15 milliMole(s) IV Intermittent once    MEDICATIONS  (PRN):  morphine  - Injectable 2 milliGRAM(s) IV Push every 6 hours PRN Moderate Pain (4 - 6)

## 2023-01-26 NOTE — DISCHARGE NOTE PROVIDER - NSDCMRMEDTOKEN_GEN_ALL_CORE_FT
enoxaparin 120 mg/0.8 mL injectable solution: 120 milligram(s) subcutaneously once a day   Gas-X 80 mg oral tablet, chewable: 1 tab(s) orally 4 times a day (after meals and at bedtime), As Needed  ondansetron 8 mg oral tablet, disintegratin tab(s) orally 3 times a day, As Needed  polyethylene glycol 3350 oral powder for reconstitution: 17 gram(s) orally once a day (at bedtime)  hold if daily BM  potassium phosphate-sodium phosphate 305 mg-700 mg oral tablet: 1 tab(s) orally 4 times a day

## 2023-01-26 NOTE — DISCHARGE NOTE PROVIDER - CARE PROVIDER_API CALL
Carmela Harrison)  Medical Oncology  [address] [facility] [address] [facility], [place] [address]  Phone: [phone]  Fax: [phone]  Follow Up Time: 1-3 days    Mary Jo Barrios)  ColonRectal Surgery; Surgery  [address] [place] [address]  Phone: [phone]  Fax: [phone]  Follow Up Time: 1 week    new PCP at [facility],   Phone: (   )    -  Fax: (   )    -  Follow Up Time: 1-3 days

## 2023-01-26 NOTE — PROGRESS NOTE ADULT - ASSESSMENT
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 Was sent by oncologist to r/o bowel obstruction.     Oncology Hx:    Primary Oncologist - Dr Harrison    - 08/18/22 Colonoscopy showed a frond-like/villous and ulcerated non-obstructing large mass, measuring 5 cm in the descending colon. The mass was circumferential  - Path: Invasive adenocarcinoma, moderately differentiated. No loss of nuclear expression of MMR proteins (MLH1, MSH2, MSH6, and PMS2). low probability of microsatellite instability  - 08/16/22 CEA = 633  - 08/19/22 Path: Liver, core biopsy: Metastatic adenocarcinoma, consistent with metastasis from colonic primary  - He was started on FOLFIRI- 10/19 (the delay was due to fall 09/22--->he underwent  ORIF on 09/29/22 of the left tibial plateau)  - 01/09/23 CT C/A/P: Findings c/w descending colon cancer with extension through the wall with extensive mesenteric confluent tumor and/or ivelisse disease. Diffuse metastatic disease identified throughout the liver stable/ some slightly improved  - Stared on Lovenox 120 mg sc daily per Dr Harrison for small amount of pericatheter clot near the tip of the catheter in the superior vena cava    # GI Distress Including Nausea & Vomiting  - Etiology unclear, but resolved as of 01/26/2023  - Questionable if chemo related as he tolerated previous cycles well, might need dose reduction for future treatment  - CT A/P (01/23) on admission showed new partial large bowel obstruction secondary to known apple core lesion in the distal descending colon  - Seen by SurgOnc and GI; Clinically no obvious obstruction. No urgent need for surgical diversion colostomy or for colonic stenting  - Continues to tolerate advancing diet ---> currently low fiber  - He was due for FOLFIRI C7 this week, on hold  - Outpatient f/u with Dr Harrison to decide when ok to resume chemo; coordinate with daughter Ana     62 year old male Stage IV sided sigmoid colon cancer metastatic to liver, lung, adrenals, p/w nausea, vomiting x few days. Was sent by oncologist to r/o bowel obstruction.     Oncology Hx:    Primary Oncologist - Dr Harrison    - 08/18/22 Colonoscopy showed a frond-like/villous and ulcerated non-obstructing large mass, measuring 5 cm in the descending colon. The mass was circumferential  - Path: Invasive adenocarcinoma, moderately differentiated. No loss of nuclear expression of MMR proteins (MLH1, MSH2, MSH6, and PMS2). low probability of microsatellite instability  - 08/16/22 CEA = 633  - 08/19/22 Path: Liver, core biopsy: Metastatic adenocarcinoma, consistent with metastasis from colonic primary  - He was started on FOLFIRI- 10/19 (the delay was due to fall 09/22--->he underwent  ORIF on 09/29/22 of the left tibial plateau)  - 01/09/23 CT C/A/P: Findings c/w descending colon cancer with extension through the wall with extensive mesenteric confluent tumor and/or ivelisse disease. Diffuse metastatic disease identified throughout the liver stable/ some slightly improved  - Stared on Lovenox 120 mg sc daily per Dr Harrison for small amount of pericatheter clot near the tip of the catheter in the superior vena cava    # GI Distress Including Nausea & Vomiting  - Etiology unclear, but resolved as of 01/26/2023  - Questionable if chemo related as he tolerated previous cycles well, might need dose reduction for future treatment  - CT A/P (01/23) on admission showed new partial large bowel obstruction secondary to known apple core lesion in the distal descending colon  - Seen by SurgOnc and GI; Clinically no obvious obstruction. No urgent need for surgical diversion colostomy or for colonic stenting  - Continues to tolerate advancing diet ---> currently low fiber  - He was due for FOLFIRI C7 this week, on hold  - Outpatient f/u with Dr Harrison to decide when ok to resume chemo- he  is already scheduled for  chemo on 2/6 - will check with Dr Harrison if need to move up. Coordinate with daughter Ana

## 2023-01-26 NOTE — DISCHARGE NOTE PROVIDER - NSDCFUADDAPPT_GEN_ALL_CORE_FT
Dr Emily Gutierrez  Wednesday 2/1/2023 @ 11:20am  [facility]  [address] [place] [address]  phone     [facility] 2/6/2023 11am @ [address] 3/8/2023 @ 9:20am @ [address] Dr Emily Gutierrez  Monday 1/30/2023 @ 11:00am  [facility]  [address] [place] [address]  phone     [facility] 2/6/2023 11am @ [address] 3/8/2023 @ 9:20am @ [address]

## 2023-01-27 VITALS
HEART RATE: 91 BPM | RESPIRATION RATE: 18 BRPM | SYSTOLIC BLOOD PRESSURE: 150 MMHG | DIASTOLIC BLOOD PRESSURE: 89 MMHG | TEMPERATURE: 98 F | OXYGEN SATURATION: 100 %

## 2023-01-27 LAB
ALBUMIN SERPL ELPH-MCNC: 2.5 G/DL — LOW (ref 3.3–5)
ALP SERPL-CCNC: 94 U/L — SIGNIFICANT CHANGE UP (ref 40–120)
ALT FLD-CCNC: 22 U/L — SIGNIFICANT CHANGE UP (ref 12–78)
ANION GAP SERPL CALC-SCNC: 4 MMOL/L — LOW (ref 5–17)
AST SERPL-CCNC: 19 U/L — SIGNIFICANT CHANGE UP (ref 15–37)
BILIRUB DIRECT SERPL-MCNC: <0.1 MG/DL — SIGNIFICANT CHANGE UP (ref 0–0.3)
BILIRUB INDIRECT FLD-MCNC: >0.1 MG/DL — LOW (ref 0.2–1)
BILIRUB SERPL-MCNC: 0.2 MG/DL — SIGNIFICANT CHANGE UP (ref 0.2–1.2)
BUN SERPL-MCNC: 7 MG/DL — SIGNIFICANT CHANGE UP (ref 7–23)
CALCIUM SERPL-MCNC: 8.5 MG/DL — SIGNIFICANT CHANGE UP (ref 8.5–10.1)
CHLORIDE SERPL-SCNC: 105 MMOL/L — SIGNIFICANT CHANGE UP (ref 96–108)
CO2 SERPL-SCNC: 27 MMOL/L — SIGNIFICANT CHANGE UP (ref 22–31)
CREAT SERPL-MCNC: 0.68 MG/DL — SIGNIFICANT CHANGE UP (ref 0.5–1.3)
EGFR: 105 ML/MIN/1.73M2 — SIGNIFICANT CHANGE UP
GLUCOSE SERPL-MCNC: 95 MG/DL — SIGNIFICANT CHANGE UP (ref 70–99)
HCT VFR BLD CALC: 31.9 % — LOW (ref 39–50)
HGB BLD-MCNC: 9.5 G/DL — LOW (ref 13–17)
MAGNESIUM SERPL-MCNC: 2.1 MG/DL — SIGNIFICANT CHANGE UP (ref 1.6–2.6)
MCHC RBC-ENTMCNC: 24.9 PG — LOW (ref 27–34)
MCHC RBC-ENTMCNC: 29.8 GM/DL — LOW (ref 32–36)
MCV RBC AUTO: 83.7 FL — SIGNIFICANT CHANGE UP (ref 80–100)
PHOSPHATE SERPL-MCNC: 3.7 MG/DL — SIGNIFICANT CHANGE UP (ref 2.5–4.5)
PLATELET # BLD AUTO: 226 K/UL — SIGNIFICANT CHANGE UP (ref 150–400)
POTASSIUM SERPL-MCNC: 3.4 MMOL/L — LOW (ref 3.5–5.3)
POTASSIUM SERPL-SCNC: 3.4 MMOL/L — LOW (ref 3.5–5.3)
PROT SERPL-MCNC: 7 GM/DL — SIGNIFICANT CHANGE UP (ref 6–8.3)
RBC # BLD: 3.81 M/UL — LOW (ref 4.2–5.8)
RBC # FLD: 20.4 % — HIGH (ref 10.3–14.5)
SODIUM SERPL-SCNC: 136 MMOL/L — SIGNIFICANT CHANGE UP (ref 135–145)
TSH SERPL-MCNC: 3.08 UU/ML — SIGNIFICANT CHANGE UP (ref 0.34–4.82)
WBC # BLD: 7.61 K/UL — SIGNIFICANT CHANGE UP (ref 3.8–10.5)
WBC # FLD AUTO: 7.61 K/UL — SIGNIFICANT CHANGE UP (ref 3.8–10.5)

## 2023-01-27 PROCEDURE — 99239 HOSP IP/OBS DSCHRG MGMT >30: CPT

## 2023-01-27 PROCEDURE — 93010 ELECTROCARDIOGRAM REPORT: CPT

## 2023-01-27 RX ORDER — POTASSIUM CHLORIDE 20 MEQ
40 PACKET (EA) ORAL ONCE
Refills: 0 | Status: COMPLETED | OUTPATIENT
Start: 2023-01-27 | End: 2023-01-27

## 2023-01-27 RX ADMIN — Medication 1 TABLET(S): at 00:33

## 2023-01-27 RX ADMIN — Medication 1 TABLET(S): at 05:28

## 2023-01-27 RX ADMIN — Medication 40 MILLIEQUIVALENT(S): at 12:56

## 2023-01-27 NOTE — PROGRESS NOTE ADULT - SUBJECTIVE AND OBJECTIVE BOX
Patient seen and examined at bedside this morning. Patient is tolerating a diet at this time, denies N/V and is having bowel function. Abdomen is soft, NT, ND. No clinical signs of obstruction at this time, patient will be maintained on daily MiraLAX and stool softeners. Should patient worsen it would be reasonable for stent placement. Patient should schedule a short interval follow up with colorectal surgery and his oncologist on discharge. Advised to contact CRS office or return to hospital of symptoms recur.

## 2023-01-27 NOTE — PROGRESS NOTE ADULT - PROVIDER SPECIALTY LIST ADULT
Colorectal Surgery
Heme/Onc
Colorectal Surgery
Colorectal Surgery
Family Medicine
Family Medicine
Heme/Onc
Colorectal Surgery
Heme/Onc
Heme/Onc
Hospitalist

## 2023-01-30 ENCOUNTER — APPOINTMENT (OUTPATIENT)
Dept: ORTHOPEDIC SURGERY | Facility: CLINIC | Age: 63
End: 2023-01-30
Payer: COMMERCIAL

## 2023-01-30 PROCEDURE — 73562 X-RAY EXAM OF KNEE 3: CPT | Mod: LT

## 2023-01-30 PROCEDURE — 99213 OFFICE O/P EST LOW 20 MIN: CPT

## 2023-02-01 DIAGNOSIS — E87.20 ACIDOSIS, UNSPECIFIED: ICD-10-CM

## 2023-02-01 DIAGNOSIS — I31.39 OTHER PERICARDIAL EFFUSION (NONINFLAMMATORY): ICD-10-CM

## 2023-02-01 DIAGNOSIS — C78.7 SECONDARY MALIGNANT NEOPLASM OF LIVER AND INTRAHEPATIC BILE DUCT: ICD-10-CM

## 2023-02-01 DIAGNOSIS — E87.6 HYPOKALEMIA: ICD-10-CM

## 2023-02-01 DIAGNOSIS — Z80.6 FAMILY HISTORY OF LEUKEMIA: ICD-10-CM

## 2023-02-01 DIAGNOSIS — D63.0 ANEMIA IN NEOPLASTIC DISEASE: ICD-10-CM

## 2023-02-01 DIAGNOSIS — Z79.01 LONG TERM (CURRENT) USE OF ANTICOAGULANTS: ICD-10-CM

## 2023-02-01 DIAGNOSIS — K76.9 LIVER DISEASE, UNSPECIFIED: ICD-10-CM

## 2023-02-01 DIAGNOSIS — C18.6 MALIGNANT NEOPLASM OF DESCENDING COLON: ICD-10-CM

## 2023-02-01 DIAGNOSIS — E43 UNSPECIFIED SEVERE PROTEIN-CALORIE MALNUTRITION: ICD-10-CM

## 2023-02-01 DIAGNOSIS — E27.9 DISORDER OF ADRENAL GLAND, UNSPECIFIED: ICD-10-CM

## 2023-02-01 DIAGNOSIS — K56.690 OTHER PARTIAL INTESTINAL OBSTRUCTION: ICD-10-CM

## 2023-02-01 DIAGNOSIS — C78.00 SECONDARY MALIGNANT NEOPLASM OF UNSPECIFIED LUNG: ICD-10-CM

## 2023-02-03 ENCOUNTER — RESULT REVIEW (OUTPATIENT)
Age: 63
End: 2023-02-03

## 2023-02-03 ENCOUNTER — APPOINTMENT (OUTPATIENT)
Dept: HEMATOLOGY ONCOLOGY | Facility: CLINIC | Age: 63
End: 2023-02-03
Payer: MEDICAID

## 2023-02-03 ENCOUNTER — APPOINTMENT (OUTPATIENT)
Dept: INFUSION THERAPY | Facility: CLINIC | Age: 63
End: 2023-02-03

## 2023-02-03 VITALS
SYSTOLIC BLOOD PRESSURE: 118 MMHG | DIASTOLIC BLOOD PRESSURE: 78 MMHG | BODY MASS INDEX: 27.08 KG/M2 | OXYGEN SATURATION: 99 % | WEIGHT: 194.19 LBS | TEMPERATURE: 98 F | HEART RATE: 106 BPM | RESPIRATION RATE: 18 BRPM

## 2023-02-03 LAB
BASOPHILS # BLD AUTO: 0.03 K/UL — SIGNIFICANT CHANGE UP (ref 0–0.2)
BASOPHILS NFR BLD AUTO: 0.6 % — SIGNIFICANT CHANGE UP (ref 0–2)
EOSINOPHIL # BLD AUTO: 0.02 K/UL — SIGNIFICANT CHANGE UP (ref 0–0.5)
EOSINOPHIL NFR BLD AUTO: 0.4 % — SIGNIFICANT CHANGE UP (ref 0–6)
HCT VFR BLD CALC: 35.6 % — LOW (ref 39–50)
HGB BLD-MCNC: 10.6 G/DL — LOW (ref 13–17)
IMM GRANULOCYTES NFR BLD AUTO: 0.2 % — SIGNIFICANT CHANGE UP (ref 0–0.9)
LYMPHOCYTES # BLD AUTO: 2.09 K/UL — SIGNIFICANT CHANGE UP (ref 1–3.3)
LYMPHOCYTES # BLD AUTO: 38.9 % — SIGNIFICANT CHANGE UP (ref 13–44)
MCHC RBC-ENTMCNC: 24.9 PG — LOW (ref 27–34)
MCHC RBC-ENTMCNC: 29.8 GM/DL — LOW (ref 32–36)
MCV RBC AUTO: 83.8 FL — SIGNIFICANT CHANGE UP (ref 80–100)
MONOCYTES # BLD AUTO: 0.35 K/UL — SIGNIFICANT CHANGE UP (ref 0–0.9)
MONOCYTES NFR BLD AUTO: 6.5 % — SIGNIFICANT CHANGE UP (ref 2–14)
NEUTROPHILS # BLD AUTO: 2.87 K/UL — SIGNIFICANT CHANGE UP (ref 1.8–7.4)
NEUTROPHILS NFR BLD AUTO: 53.4 % — SIGNIFICANT CHANGE UP (ref 43–77)
NRBC # BLD: 0 /100 WBCS — SIGNIFICANT CHANGE UP (ref 0–0)
PLATELET # BLD AUTO: 240 K/UL — SIGNIFICANT CHANGE UP (ref 150–400)
RBC # BLD: 4.25 M/UL — SIGNIFICANT CHANGE UP (ref 4.2–5.8)
RBC # FLD: 20.3 % — HIGH (ref 10.3–14.5)
WBC # BLD: 5.37 K/UL — SIGNIFICANT CHANGE UP (ref 3.8–10.5)
WBC # FLD AUTO: 5.37 K/UL — SIGNIFICANT CHANGE UP (ref 3.8–10.5)

## 2023-02-03 PROCEDURE — 99215 OFFICE O/P EST HI 40 MIN: CPT

## 2023-02-03 NOTE — RESULTS/DATA
[FreeTextEntry1] : 1/23/23 CT:  New partial large bowel obstruction secondary to known apple core lesion in the distal descending colon. Again extensive adjacent mesenteric abnormality is seen which may represent conglomerate lymph nodes.Redemonstrated hepatic metastases.  No change large left adrenal nodule.Small pericardial effusion is again seen.\par \par 1/9/23 CT C/A/P: Findings consistent with descending colon cancer with extension through the wall with extensive mesenteric confluent tumor and/or ivelisse disease mildly worse when compared to the prior examination. Diffuse metastatic disease identified throughout the liver and small lesion most noted within the right middle lobe improved when compared to the prior examination (now measuring 2.7 x 2.5 cm previously measuring 3.7 x 3.2 cm and a lesion in the right lobe measuring 2.1 x by 1.6 cm previously measuring 3.3 x 2.3 cm). Small pericardial effusion and areas of thickening of questionable significance. Small amount of pericatheter clot near the tip of the catheter in the superior vena cava.\par \par 8/19/22 Path: Liver, core biopsy: Metastatic adenocarcinoma, consistent with metastasis from colonic primary. Immunohistochemical stains were performed and results as follows: CDX - 2 is positive. CK 20 is patchy positive. CK 7 is negative. These findings support the diagnosis. No loss of nuclear expression of MMR proteins (MLH1, MSH2,\par MSH6, and PMS2).   These results show low probability of microsatellite instability-high (MSI-H).\par \par 8/18/22 Path: Colon, descending, mass, biopsy. Invasive adenocarcinoma, moderately differentiated. No loss of nuclear expression of MMR proteins (MLH1, MSH2,\par MSH6, and PMS2).   These results show low probability of microsatellite instability-high (MSI-H).\par \par 8/18/22 Colonoscopy: A frond-like/villous and ulcerated non-obstructing large mass was found in the descending colon. The lass was circumferential. The mass measured five cm in length. Oozing was present. Biopsied and tattooed. The scope was able to transverse the mass. The colon was otherwise normal.\par \par 8/18/22 CT C/A/P: There is irregular colonic wall thickening of the proximal sigmoid colon with stranding of the surrounding paracolic fat. Regional lymph nodes are identified adjacent to the colonic mass measuring up to 1.2 cm.  Multiple hypodense masses identified within the hepatic parenchyma is not to 3.9 cm, suspicious for hepatic parenchymal neoplastic disease. 3.4 cm left adrenal mass, suspicious for neoplastic disease. There is a 1.0 cm soft tissue nodule identified along the right lateral wall of the distal trachea, superior to the tracheal bifurcation. Lung nodules suspicious for lung parenchymal neoplastic disease.

## 2023-02-03 NOTE — PHYSICAL EXAM
[Restricted in physically strenuous activity but ambulatory and able to carry out work of a light or sedentary nature] : Status 1- Restricted in physically strenuous activity but ambulatory and able to carry out work of a light or sedentary nature, e.g., light house work, office work [Normal] : affect appropriate [de-identified] : wt stable overall  [de-identified] : rt port accessed, dressing C/D/I

## 2023-02-03 NOTE — REVIEW OF SYSTEMS
[Vomiting] : vomiting [Negative] : Allergic/Immunologic [Recent Change In Weight] : ~T no recent weight change [Chest Pain] : no chest pain [Shortness Of Breath] : no shortness of breath [Abdominal Pain] : no abdominal pain [Constipation] : no constipation [Diarrhea] : no diarrhea [Confused] : no confusion [FreeTextEntry2] : wt stable overall  [FreeTextEntry7] : x2 episodes [FreeTextEntry9] : left leg in ace bandage and leg immobilizer  [de-identified] : right chest wall port palpable accessed

## 2023-02-03 NOTE — HISTORY OF PRESENT ILLNESS
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 On 08/18/22, he had a CT C/A/P which showed  irregular colonic wall thickening of the proximal sigmoid colon with stranding of the surrounding paracolic fat. Regional lymph nodes are identified adjacent to the colonic mass measuring up to 1.2 cm. Multiple masses identified within the hepatic parenchyma noted to be 3.9 cm, suspicious for hepatic parenchymal neoplastic disease. 3.4 cm left adrenal mass, suspicious for neoplastic disease. There is a 1.0 cm soft tissue nodule identified along the right lateral wall of the distal trachea, superior to the tracheal bifurcation. Lung nodules suspicious for lung parenchymal neoplastic disease.  \par \par On 8/18/22, he had a colonoscopy that showed a frond-like/villous and ulcerated non-obstructing large mass was found in the descending colon. The mass was circumferential. The mass measured five cm in length. Pathology showed invasive adenocarcinoma, moderately differentiated. No loss of nuclear expression of MMR proteins (MLH1, MSH2, MSH6, and PMS2). These results show low probability of microsatellite instability-high (MSI-H). On 8/19/22, he had a liver, core biopsy which showed metastatic adenocarcinoma, consistent with metastasis from colonic primary.\par \par  [de-identified] : no MMR deficiency [de-identified] : Medical Hx: DVT 14 years ago (unknown cause) \par Surgical Hx: left eye sx; left torn meniscus repaired \par Family Hx: Mother leukemia\par Social Hx: never smoker; ETOH never; lives alone; single; works PT at [employer]; daughters close by [de-identified] : Pt is C6 was 1/4/23 of FOLFIOXIRI q 2 weeks, with ongoing cycles planned. He denies fatigue, cold sensitivity, eye changes, hair loss, nail / skin changes, he feels related to from leg brace, neuropathy, pain, mouth sores, vomiting for 2 episodes after last chemo; will take meds as needed, heartburn, C/D.

## 2023-02-04 LAB
ALBUMIN SERPL ELPH-MCNC: 3.6 G/DL — SIGNIFICANT CHANGE UP (ref 3.3–5)
ALP SERPL-CCNC: 97 U/L — SIGNIFICANT CHANGE UP (ref 40–120)
ALT FLD-CCNC: 22 U/L — SIGNIFICANT CHANGE UP (ref 10–45)
ANION GAP SERPL CALC-SCNC: 13 MMOL/L — SIGNIFICANT CHANGE UP (ref 5–17)
AST SERPL-CCNC: 27 U/L — SIGNIFICANT CHANGE UP (ref 10–40)
BILIRUB SERPL-MCNC: 0.2 MG/DL — SIGNIFICANT CHANGE UP (ref 0.2–1.2)
BUN SERPL-MCNC: 6 MG/DL — LOW (ref 7–23)
CALCIUM SERPL-MCNC: 9.1 MG/DL — SIGNIFICANT CHANGE UP (ref 8.4–10.5)
CHLORIDE SERPL-SCNC: 102 MMOL/L — SIGNIFICANT CHANGE UP (ref 96–108)
CO2 SERPL-SCNC: 29 MMOL/L — SIGNIFICANT CHANGE UP (ref 22–31)
CREAT SERPL-MCNC: 0.73 MG/DL — SIGNIFICANT CHANGE UP (ref 0.5–1.3)
EGFR: 103 ML/MIN/1.73M2 — SIGNIFICANT CHANGE UP
GLUCOSE SERPL-MCNC: 116 MG/DL — HIGH (ref 70–99)
MAGNESIUM SERPL-MCNC: 2 MG/DL — SIGNIFICANT CHANGE UP (ref 1.6–2.6)
POTASSIUM SERPL-MCNC: 3.4 MMOL/L — LOW (ref 3.5–5.3)
POTASSIUM SERPL-SCNC: 3.4 MMOL/L — LOW (ref 3.5–5.3)
PROT SERPL-MCNC: 7.4 G/DL — SIGNIFICANT CHANGE UP (ref 6–8.3)
SODIUM SERPL-SCNC: 143 MMOL/L — SIGNIFICANT CHANGE UP (ref 135–145)

## 2023-02-06 ENCOUNTER — RESULT REVIEW (OUTPATIENT)
Age: 63
End: 2023-02-06

## 2023-02-06 ENCOUNTER — APPOINTMENT (OUTPATIENT)
Dept: HEMATOLOGY ONCOLOGY | Facility: CLINIC | Age: 63
End: 2023-02-06

## 2023-02-06 ENCOUNTER — APPOINTMENT (OUTPATIENT)
Dept: INFUSION THERAPY | Facility: CLINIC | Age: 63
End: 2023-02-06

## 2023-02-06 VITALS
BODY MASS INDEX: 27.06 KG/M2 | OXYGEN SATURATION: 99 % | RESPIRATION RATE: 18 BRPM | SYSTOLIC BLOOD PRESSURE: 131 MMHG | TEMPERATURE: 97.6 F | HEIGHT: 71 IN | DIASTOLIC BLOOD PRESSURE: 81 MMHG | WEIGHT: 193.25 LBS | HEART RATE: 134 BPM

## 2023-02-06 LAB
ALBUMIN SERPL ELPH-MCNC: 3.5 G/DL — SIGNIFICANT CHANGE UP (ref 3.3–5)
ALP SERPL-CCNC: 98 U/L — SIGNIFICANT CHANGE UP (ref 40–120)
ALT FLD-CCNC: 19 U/L — SIGNIFICANT CHANGE UP (ref 10–45)
ANION GAP SERPL CALC-SCNC: 15 MMOL/L — SIGNIFICANT CHANGE UP (ref 5–17)
AST SERPL-CCNC: 22 U/L — SIGNIFICANT CHANGE UP (ref 10–40)
BASOPHILS # BLD AUTO: 0.04 K/UL — SIGNIFICANT CHANGE UP (ref 0–0.2)
BASOPHILS NFR BLD AUTO: 1 % — SIGNIFICANT CHANGE UP (ref 0–2)
BILIRUB SERPL-MCNC: 0.2 MG/DL — SIGNIFICANT CHANGE UP (ref 0.2–1.2)
BUN SERPL-MCNC: 6 MG/DL — LOW (ref 7–23)
CALCIUM SERPL-MCNC: 9.1 MG/DL — SIGNIFICANT CHANGE UP (ref 8.4–10.5)
CHLORIDE SERPL-SCNC: 104 MMOL/L — SIGNIFICANT CHANGE UP (ref 96–108)
CO2 SERPL-SCNC: 25 MMOL/L — SIGNIFICANT CHANGE UP (ref 22–31)
CREAT SERPL-MCNC: 0.66 MG/DL — SIGNIFICANT CHANGE UP (ref 0.5–1.3)
EGFR: 106 ML/MIN/1.73M2 — SIGNIFICANT CHANGE UP
EOSINOPHIL # BLD AUTO: 0.11 K/UL — SIGNIFICANT CHANGE UP (ref 0–0.5)
EOSINOPHIL NFR BLD AUTO: 2.7 % — SIGNIFICANT CHANGE UP (ref 0–6)
GLUCOSE SERPL-MCNC: 129 MG/DL — HIGH (ref 70–99)
HCT VFR BLD CALC: 34.8 % — LOW (ref 39–50)
HGB BLD-MCNC: 10.5 G/DL — LOW (ref 13–17)
IMM GRANULOCYTES NFR BLD AUTO: 0.2 % — SIGNIFICANT CHANGE UP (ref 0–0.9)
LYMPHOCYTES # BLD AUTO: 1.72 K/UL — SIGNIFICANT CHANGE UP (ref 1–3.3)
LYMPHOCYTES # BLD AUTO: 41.6 % — SIGNIFICANT CHANGE UP (ref 13–44)
MAGNESIUM SERPL-MCNC: 2 MG/DL — SIGNIFICANT CHANGE UP (ref 1.6–2.6)
MCHC RBC-ENTMCNC: 25.4 PG — LOW (ref 27–34)
MCHC RBC-ENTMCNC: 30.2 GM/DL — LOW (ref 32–36)
MCV RBC AUTO: 84.3 FL — SIGNIFICANT CHANGE UP (ref 80–100)
MONOCYTES # BLD AUTO: 0.26 K/UL — SIGNIFICANT CHANGE UP (ref 0–0.9)
MONOCYTES NFR BLD AUTO: 6.3 % — SIGNIFICANT CHANGE UP (ref 2–14)
NEUTROPHILS # BLD AUTO: 1.99 K/UL — SIGNIFICANT CHANGE UP (ref 1.8–7.4)
NEUTROPHILS NFR BLD AUTO: 48.2 % — SIGNIFICANT CHANGE UP (ref 43–77)
NRBC # BLD: 0 /100 WBCS — SIGNIFICANT CHANGE UP (ref 0–0)
PLATELET # BLD AUTO: 284 K/UL — SIGNIFICANT CHANGE UP (ref 150–400)
POTASSIUM SERPL-MCNC: 3.6 MMOL/L — SIGNIFICANT CHANGE UP (ref 3.5–5.3)
POTASSIUM SERPL-SCNC: 3.6 MMOL/L — SIGNIFICANT CHANGE UP (ref 3.5–5.3)
PROT SERPL-MCNC: 7.2 G/DL — SIGNIFICANT CHANGE UP (ref 6–8.3)
RBC # BLD: 4.13 M/UL — LOW (ref 4.2–5.8)
RBC # FLD: 20.1 % — HIGH (ref 10.3–14.5)
SODIUM SERPL-SCNC: 144 MMOL/L — SIGNIFICANT CHANGE UP (ref 135–145)
WBC # BLD: 4.13 K/UL — SIGNIFICANT CHANGE UP (ref 3.8–10.5)
WBC # FLD AUTO: 4.13 K/UL — SIGNIFICANT CHANGE UP (ref 3.8–10.5)

## 2023-02-08 ENCOUNTER — APPOINTMENT (OUTPATIENT)
Dept: INFUSION THERAPY | Facility: CLINIC | Age: 63
End: 2023-02-08

## 2023-02-21 ENCOUNTER — RESULT REVIEW (OUTPATIENT)
Age: 63
End: 2023-02-21

## 2023-02-21 ENCOUNTER — APPOINTMENT (OUTPATIENT)
Dept: INFUSION THERAPY | Facility: CLINIC | Age: 63
End: 2023-02-21

## 2023-02-21 VITALS
TEMPERATURE: 97.8 F | RESPIRATION RATE: 17 BRPM | HEART RATE: 117 BPM | DIASTOLIC BLOOD PRESSURE: 86 MMHG | OXYGEN SATURATION: 98 % | SYSTOLIC BLOOD PRESSURE: 134 MMHG | BODY MASS INDEX: 26.68 KG/M2 | WEIGHT: 191.31 LBS

## 2023-02-21 LAB
BASOPHILS # BLD AUTO: 0.08 K/UL — SIGNIFICANT CHANGE UP (ref 0–0.2)
BASOPHILS NFR BLD AUTO: 1.5 % — SIGNIFICANT CHANGE UP (ref 0–2)
EOSINOPHIL # BLD AUTO: 0.22 K/UL — SIGNIFICANT CHANGE UP (ref 0–0.5)
EOSINOPHIL NFR BLD AUTO: 4.2 % — SIGNIFICANT CHANGE UP (ref 0–6)
HCT VFR BLD CALC: 35 % — LOW (ref 39–50)
HGB BLD-MCNC: 10.6 G/DL — LOW (ref 13–17)
IMM GRANULOCYTES NFR BLD AUTO: 0.9 % — SIGNIFICANT CHANGE UP (ref 0–0.9)
LYMPHOCYTES # BLD AUTO: 1.55 K/UL — SIGNIFICANT CHANGE UP (ref 1–3.3)
LYMPHOCYTES # BLD AUTO: 29.4 % — SIGNIFICANT CHANGE UP (ref 13–44)
MCHC RBC-ENTMCNC: 26 PG — LOW (ref 27–34)
MCHC RBC-ENTMCNC: 30.3 GM/DL — LOW (ref 32–36)
MCV RBC AUTO: 85.8 FL — SIGNIFICANT CHANGE UP (ref 80–100)
MONOCYTES # BLD AUTO: 0.53 K/UL — SIGNIFICANT CHANGE UP (ref 0–0.9)
MONOCYTES NFR BLD AUTO: 10 % — SIGNIFICANT CHANGE UP (ref 2–14)
NEUTROPHILS # BLD AUTO: 2.85 K/UL — SIGNIFICANT CHANGE UP (ref 1.8–7.4)
NEUTROPHILS NFR BLD AUTO: 54 % — SIGNIFICANT CHANGE UP (ref 43–77)
NRBC # BLD: 0 /100 WBCS — SIGNIFICANT CHANGE UP (ref 0–0)
PLATELET # BLD AUTO: 251 K/UL — SIGNIFICANT CHANGE UP (ref 150–400)
RBC # BLD: 4.08 M/UL — LOW (ref 4.2–5.8)
RBC # FLD: 19.7 % — HIGH (ref 10.3–14.5)
WBC # BLD: 5.28 K/UL — SIGNIFICANT CHANGE UP (ref 3.8–10.5)
WBC # FLD AUTO: 5.28 K/UL — SIGNIFICANT CHANGE UP (ref 3.8–10.5)

## 2023-02-22 ENCOUNTER — NON-APPOINTMENT (OUTPATIENT)
Age: 63
End: 2023-02-22

## 2023-02-22 LAB
ALBUMIN SERPL ELPH-MCNC: 3.7 G/DL — SIGNIFICANT CHANGE UP (ref 3.3–5)
ALP SERPL-CCNC: 149 U/L — HIGH (ref 40–120)
ALT FLD-CCNC: 17 U/L — SIGNIFICANT CHANGE UP (ref 10–45)
ANION GAP SERPL CALC-SCNC: 14 MMOL/L — SIGNIFICANT CHANGE UP (ref 5–17)
AST SERPL-CCNC: 20 U/L — SIGNIFICANT CHANGE UP (ref 10–40)
BILIRUB SERPL-MCNC: 0.2 MG/DL — SIGNIFICANT CHANGE UP (ref 0.2–1.2)
BUN SERPL-MCNC: 6 MG/DL — LOW (ref 7–23)
CALCIUM SERPL-MCNC: 9 MG/DL — SIGNIFICANT CHANGE UP (ref 8.4–10.5)
CHLORIDE SERPL-SCNC: 103 MMOL/L — SIGNIFICANT CHANGE UP (ref 96–108)
CO2 SERPL-SCNC: 28 MMOL/L — SIGNIFICANT CHANGE UP (ref 22–31)
CREAT SERPL-MCNC: 0.75 MG/DL — SIGNIFICANT CHANGE UP (ref 0.5–1.3)
EGFR: 102 ML/MIN/1.73M2 — SIGNIFICANT CHANGE UP
GLUCOSE SERPL-MCNC: 130 MG/DL — HIGH (ref 70–99)
MAGNESIUM SERPL-MCNC: 1.8 MG/DL — SIGNIFICANT CHANGE UP (ref 1.6–2.6)
POTASSIUM SERPL-MCNC: 3 MMOL/L — LOW (ref 3.5–5.3)
POTASSIUM SERPL-SCNC: 3 MMOL/L — LOW (ref 3.5–5.3)
PROT SERPL-MCNC: 6.9 G/DL — SIGNIFICANT CHANGE UP (ref 6–8.3)
SODIUM SERPL-SCNC: 144 MMOL/L — SIGNIFICANT CHANGE UP (ref 135–145)

## 2023-02-22 RX ORDER — POTASSIUM CHLORIDE 1500 MG/1
20 TABLET, FILM COATED, EXTENDED RELEASE ORAL
Qty: 30 | Refills: 0 | Status: ACTIVE | COMMUNITY
Start: 2023-02-22 | End: 1900-01-01

## 2023-02-23 ENCOUNTER — APPOINTMENT (OUTPATIENT)
Dept: INFUSION THERAPY | Facility: CLINIC | Age: 63
End: 2023-02-23

## 2023-02-25 ENCOUNTER — OUTPATIENT (OUTPATIENT)
Dept: OUTPATIENT SERVICES | Facility: HOSPITAL | Age: 63
LOS: 1 days | Discharge: HOME | End: 2023-02-25

## 2023-02-25 DIAGNOSIS — C18.9 MALIGNANT NEOPLASM OF COLON, UNSPECIFIED: ICD-10-CM

## 2023-03-02 ENCOUNTER — NON-APPOINTMENT (OUTPATIENT)
Age: 63
End: 2023-03-02

## 2023-03-03 ENCOUNTER — APPOINTMENT (OUTPATIENT)
Dept: CARDIOLOGY | Facility: CLINIC | Age: 63
End: 2023-03-03

## 2023-03-06 ENCOUNTER — RESULT REVIEW (OUTPATIENT)
Age: 63
End: 2023-03-06

## 2023-03-06 ENCOUNTER — APPOINTMENT (OUTPATIENT)
Dept: INFUSION THERAPY | Facility: CLINIC | Age: 63
End: 2023-03-06

## 2023-03-06 VITALS
BODY MASS INDEX: 27.28 KG/M2 | TEMPERATURE: 97.6 F | WEIGHT: 195.56 LBS | OXYGEN SATURATION: 98 % | DIASTOLIC BLOOD PRESSURE: 103 MMHG | HEART RATE: 116 BPM | SYSTOLIC BLOOD PRESSURE: 149 MMHG | RESPIRATION RATE: 18 BRPM

## 2023-03-06 LAB
BASOPHILS # BLD AUTO: 0.11 K/UL — SIGNIFICANT CHANGE UP (ref 0–0.2)
BASOPHILS NFR BLD AUTO: 1.8 % — SIGNIFICANT CHANGE UP (ref 0–2)
EOSINOPHIL # BLD AUTO: 0.16 K/UL — SIGNIFICANT CHANGE UP (ref 0–0.5)
EOSINOPHIL NFR BLD AUTO: 2.6 % — SIGNIFICANT CHANGE UP (ref 0–6)
HCT VFR BLD CALC: 37.4 % — LOW (ref 39–50)
HGB BLD-MCNC: 11.4 G/DL — LOW (ref 13–17)
IMM GRANULOCYTES NFR BLD AUTO: 3.6 % — HIGH (ref 0–0.9)
LYMPHOCYTES # BLD AUTO: 1.62 K/UL — SIGNIFICANT CHANGE UP (ref 1–3.3)
LYMPHOCYTES # BLD AUTO: 26.2 % — SIGNIFICANT CHANGE UP (ref 13–44)
MCHC RBC-ENTMCNC: 26.9 PG — LOW (ref 27–34)
MCHC RBC-ENTMCNC: 30.5 GM/DL — LOW (ref 32–36)
MCV RBC AUTO: 88.2 FL — SIGNIFICANT CHANGE UP (ref 80–100)
MONOCYTES # BLD AUTO: 0.85 K/UL — SIGNIFICANT CHANGE UP (ref 0–0.9)
MONOCYTES NFR BLD AUTO: 13.7 % — SIGNIFICANT CHANGE UP (ref 2–14)
NEUTROPHILS # BLD AUTO: 3.23 K/UL — SIGNIFICANT CHANGE UP (ref 1.8–7.4)
NEUTROPHILS NFR BLD AUTO: 52.1 % — SIGNIFICANT CHANGE UP (ref 43–77)
NRBC # BLD: 0 /100 WBCS — SIGNIFICANT CHANGE UP (ref 0–0)
PLATELET # BLD AUTO: 203 K/UL — SIGNIFICANT CHANGE UP (ref 150–400)
RBC # BLD: 4.24 M/UL — SIGNIFICANT CHANGE UP (ref 4.2–5.8)
RBC # FLD: 20.4 % — HIGH (ref 10.3–14.5)
WBC # BLD: 6.19 K/UL — SIGNIFICANT CHANGE UP (ref 3.8–10.5)
WBC # FLD AUTO: 6.19 K/UL — SIGNIFICANT CHANGE UP (ref 3.8–10.5)

## 2023-03-07 LAB
ALBUMIN SERPL ELPH-MCNC: 4.2 G/DL — SIGNIFICANT CHANGE UP (ref 3.3–5)
ALP SERPL-CCNC: 145 U/L — HIGH (ref 40–120)
ALT FLD-CCNC: 26 U/L — SIGNIFICANT CHANGE UP (ref 10–45)
ANION GAP SERPL CALC-SCNC: 16 MMOL/L — SIGNIFICANT CHANGE UP (ref 5–17)
AST SERPL-CCNC: 26 U/L — SIGNIFICANT CHANGE UP (ref 10–40)
BILIRUB SERPL-MCNC: 0.2 MG/DL — SIGNIFICANT CHANGE UP (ref 0.2–1.2)
BUN SERPL-MCNC: 7 MG/DL — SIGNIFICANT CHANGE UP (ref 7–23)
CALCIUM SERPL-MCNC: 9.3 MG/DL — SIGNIFICANT CHANGE UP (ref 8.4–10.5)
CHLORIDE SERPL-SCNC: 107 MMOL/L — SIGNIFICANT CHANGE UP (ref 96–108)
CO2 SERPL-SCNC: 22 MMOL/L — SIGNIFICANT CHANGE UP (ref 22–31)
CREAT SERPL-MCNC: 0.71 MG/DL — SIGNIFICANT CHANGE UP (ref 0.5–1.3)
EGFR: 104 ML/MIN/1.73M2 — SIGNIFICANT CHANGE UP
GLUCOSE SERPL-MCNC: 88 MG/DL — SIGNIFICANT CHANGE UP (ref 70–99)
MAGNESIUM SERPL-MCNC: 2 MG/DL — SIGNIFICANT CHANGE UP (ref 1.6–2.6)
POTASSIUM SERPL-MCNC: 3.5 MMOL/L — SIGNIFICANT CHANGE UP (ref 3.5–5.3)
POTASSIUM SERPL-SCNC: 3.5 MMOL/L — SIGNIFICANT CHANGE UP (ref 3.5–5.3)
PROT SERPL-MCNC: 7.5 G/DL — SIGNIFICANT CHANGE UP (ref 6–8.3)
SODIUM SERPL-SCNC: 145 MMOL/L — SIGNIFICANT CHANGE UP (ref 135–145)

## 2023-03-08 ENCOUNTER — APPOINTMENT (OUTPATIENT)
Dept: HEMATOLOGY ONCOLOGY | Facility: CLINIC | Age: 63
End: 2023-03-08
Payer: MEDICAID

## 2023-03-08 ENCOUNTER — APPOINTMENT (OUTPATIENT)
Dept: INFUSION THERAPY | Facility: CLINIC | Age: 63
End: 2023-03-08
Payer: MEDICAID

## 2023-03-08 DIAGNOSIS — R11.2 NAUSEA WITH VOMITING, UNSPECIFIED: ICD-10-CM

## 2023-03-08 DIAGNOSIS — Z51.11 ENCOUNTER FOR ANTINEOPLASTIC CHEMOTHERAPY: ICD-10-CM

## 2023-03-08 PROCEDURE — 99214 OFFICE O/P EST MOD 30 MIN: CPT

## 2023-03-09 DIAGNOSIS — Z51.89 ENCOUNTER FOR OTHER SPECIFIED AFTERCARE: ICD-10-CM

## 2023-03-09 NOTE — HISTORY OF PRESENT ILLNESS
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 On 08/18/22, he had a CT C/A/P which showed  irregular colonic wall thickening of the proximal sigmoid colon with stranding of the surrounding paracolic fat. Regional lymph nodes are identified adjacent to the colonic mass measuring up to 1.2 cm. Multiple masses identified within the hepatic parenchyma noted to be 3.9 cm, suspicious for hepatic parenchymal neoplastic disease. 3.4 cm left adrenal mass, suspicious for neoplastic disease. There is a 1.0 cm soft tissue nodule identified along the right lateral wall of the distal trachea, superior to the tracheal bifurcation. Lung nodules suspicious for lung parenchymal neoplastic disease.  \par \par On 8/18/22, he had a colonoscopy that showed a frond-like/villous and ulcerated non-obstructing large mass was found in the descending colon. The mass was circumferential. The mass measured five cm in length. Pathology showed invasive adenocarcinoma, moderately differentiated. No loss of nuclear expression of MMR proteins (MLH1, MSH2, MSH6, and PMS2). These results show low probability of microsatellite instability-high (MSI-H). On 8/19/22, he had a liver, core biopsy which showed metastatic adenocarcinoma, consistent with metastasis from colonic primary.\par \par  [de-identified] : no MMR deficiency [de-identified] : Medical Hx: DVT 14 years ago (unknown cause) \par Surgical Hx: left eye sx; left torn meniscus repaired \par Family Hx: Mother leukemia\par Social Hx: never smoker; ETOH never; lives alone; single; works PT at [employer]; daughters close by [de-identified] : Pt is C9D3 of FOLFIOXIRI q 2 weeks, with ongoing cycles planned. Today is the first day with neuropathy in the feet and hands today. He denies fatigue, cold sensitivity (unless drinks something cold), eye changes, hair loss, nail / skin changes, pain, mouth sores, vomiting, heartburn, C/D. Continues with cane as per PT for balance.

## 2023-03-09 NOTE — PHYSICAL EXAM
[Restricted in physically strenuous activity but ambulatory and able to carry out work of a light or sedentary nature] : Status 1- Restricted in physically strenuous activity but ambulatory and able to carry out work of a light or sedentary nature, e.g., light house work, office work [Normal] : affect appropriate [de-identified] : wt stable overall  [de-identified] : rt port accessed, dressing C/D/I

## 2023-03-09 NOTE — REVIEW OF SYSTEMS
[Vomiting] : vomiting [Negative] : Allergic/Immunologic [Recent Change In Weight] : ~T no recent weight change [Chest Pain] : no chest pain [Shortness Of Breath] : no shortness of breath [Abdominal Pain] : no abdominal pain [Constipation] : no constipation [Diarrhea] : no diarrhea [Confused] : no confusion [FreeTextEntry2] : wt stable overall  [FreeTextEntry7] : x2 episodes [FreeTextEntry9] : left leg in ace bandage and leg immobilizer  [de-identified] : right chest wall port palpable accessed

## 2023-03-10 DIAGNOSIS — R11.0 NAUSEA: ICD-10-CM

## 2023-03-10 DIAGNOSIS — T82.898A OTHER SPECIFIED COMPLICATION OF VASCULAR PROSTHETIC DEVICES, IMPLANTS AND GRAFTS, INITIAL ENCOUNTER: ICD-10-CM

## 2023-03-10 DIAGNOSIS — R51.9 HEADACHE, UNSPECIFIED: ICD-10-CM

## 2023-03-20 ENCOUNTER — RESULT REVIEW (OUTPATIENT)
Age: 63
End: 2023-03-20

## 2023-03-20 ENCOUNTER — APPOINTMENT (OUTPATIENT)
Dept: INFUSION THERAPY | Facility: CLINIC | Age: 63
End: 2023-03-20

## 2023-03-20 VITALS
HEIGHT: 71 IN | TEMPERATURE: 97 F | DIASTOLIC BLOOD PRESSURE: 99 MMHG | SYSTOLIC BLOOD PRESSURE: 167 MMHG | BODY MASS INDEX: 27.31 KG/M2 | WEIGHT: 195.06 LBS | RESPIRATION RATE: 18 BRPM | OXYGEN SATURATION: 100 %

## 2023-03-20 LAB
ALBUMIN SERPL ELPH-MCNC: 4 G/DL — SIGNIFICANT CHANGE UP (ref 3.3–5)
ALP SERPL-CCNC: 158 U/L — HIGH (ref 40–120)
ALT FLD-CCNC: 27 U/L — SIGNIFICANT CHANGE UP (ref 10–45)
ANION GAP SERPL CALC-SCNC: 13 MMOL/L — SIGNIFICANT CHANGE UP (ref 5–17)
ANISOCYTOSIS BLD QL: SLIGHT — SIGNIFICANT CHANGE UP
AST SERPL-CCNC: 27 U/L — SIGNIFICANT CHANGE UP (ref 10–40)
BASO STIPL BLD QL SMEAR: PRESENT — SIGNIFICANT CHANGE UP
BASOPHILS # BLD AUTO: 0 K/UL — SIGNIFICANT CHANGE UP (ref 0–0.2)
BASOPHILS NFR BLD AUTO: 0 % — SIGNIFICANT CHANGE UP (ref 0–2)
BILIRUB SERPL-MCNC: <0.2 MG/DL — SIGNIFICANT CHANGE UP (ref 0.2–1.2)
BUN SERPL-MCNC: 7 MG/DL — SIGNIFICANT CHANGE UP (ref 7–23)
CALCIUM SERPL-MCNC: 9.1 MG/DL — SIGNIFICANT CHANGE UP (ref 8.4–10.5)
CEA SERPL-MCNC: 24 NG/ML — HIGH (ref 0–3.8)
CHLORIDE SERPL-SCNC: 106 MMOL/L — SIGNIFICANT CHANGE UP (ref 96–108)
CO2 SERPL-SCNC: 24 MMOL/L — SIGNIFICANT CHANGE UP (ref 22–31)
CREAT SERPL-MCNC: 0.63 MG/DL — SIGNIFICANT CHANGE UP (ref 0.5–1.3)
DACRYOCYTES BLD QL SMEAR: SLIGHT — SIGNIFICANT CHANGE UP
DOHLE BOD BLD QL SMEAR: PRESENT — SIGNIFICANT CHANGE UP
EGFR: 108 ML/MIN/1.73M2 — SIGNIFICANT CHANGE UP
ELLIPTOCYTES BLD QL SMEAR: SLIGHT — SIGNIFICANT CHANGE UP
EOSINOPHIL # BLD AUTO: 0.08 K/UL — SIGNIFICANT CHANGE UP (ref 0–0.5)
EOSINOPHIL NFR BLD AUTO: 1 % — SIGNIFICANT CHANGE UP (ref 0–6)
GIANT PLATELETS BLD QL SMEAR: PRESENT — SIGNIFICANT CHANGE UP
GLUCOSE SERPL-MCNC: 153 MG/DL — HIGH (ref 70–99)
HCT VFR BLD CALC: 34 % — LOW (ref 39–50)
HGB BLD-MCNC: 10.6 G/DL — LOW (ref 13–17)
LG PLATELETS BLD QL AUTO: SLIGHT — SIGNIFICANT CHANGE UP
LYMPHOCYTES # BLD AUTO: 1.61 K/UL — SIGNIFICANT CHANGE UP (ref 1–3.3)
LYMPHOCYTES # BLD AUTO: 19 % — SIGNIFICANT CHANGE UP (ref 13–44)
MAGNESIUM SERPL-MCNC: 1.8 MG/DL — SIGNIFICANT CHANGE UP (ref 1.6–2.6)
MCHC RBC-ENTMCNC: 27.1 PG — SIGNIFICANT CHANGE UP (ref 27–34)
MCHC RBC-ENTMCNC: 31.2 GM/DL — LOW (ref 32–36)
MCV RBC AUTO: 87 FL — SIGNIFICANT CHANGE UP (ref 80–100)
METAMYELOCYTES # FLD: 2 % — HIGH (ref 0–0)
MONOCYTES # BLD AUTO: 0.59 K/UL — SIGNIFICANT CHANGE UP (ref 0–0.9)
MONOCYTES NFR BLD AUTO: 7 % — SIGNIFICANT CHANGE UP (ref 2–14)
MYELOCYTES NFR BLD: 1 % — HIGH (ref 0–0)
NEUTROPHILS # BLD AUTO: 5.94 K/UL — SIGNIFICANT CHANGE UP (ref 1.8–7.4)
NEUTROPHILS NFR BLD AUTO: 68 % — SIGNIFICANT CHANGE UP (ref 43–77)
NEUTS BAND # BLD: 2 % — SIGNIFICANT CHANGE UP (ref 0–8)
NRBC # BLD: 0 /100 — SIGNIFICANT CHANGE UP (ref 0–0)
NRBC # BLD: SIGNIFICANT CHANGE UP /100 WBCS (ref 0–0)
PLAT MORPH BLD: NORMAL — SIGNIFICANT CHANGE UP
PLATELET # BLD AUTO: 140 K/UL — LOW (ref 150–400)
POIKILOCYTOSIS BLD QL AUTO: SLIGHT — SIGNIFICANT CHANGE UP
POLYCHROMASIA BLD QL SMEAR: SLIGHT — SIGNIFICANT CHANGE UP
POTASSIUM SERPL-MCNC: 3.3 MMOL/L — LOW (ref 3.5–5.3)
POTASSIUM SERPL-SCNC: 3.3 MMOL/L — LOW (ref 3.5–5.3)
PROT SERPL-MCNC: 6.6 G/DL — SIGNIFICANT CHANGE UP (ref 6–8.3)
RBC # BLD: 3.91 M/UL — LOW (ref 4.2–5.8)
RBC # FLD: 19.6 % — HIGH (ref 10.3–14.5)
RBC BLD AUTO: ABNORMAL
SODIUM SERPL-SCNC: 142 MMOL/L — SIGNIFICANT CHANGE UP (ref 135–145)
TOXIC GRANULES BLD QL SMEAR: PRESENT — SIGNIFICANT CHANGE UP
WBC # BLD: 8.48 K/UL — SIGNIFICANT CHANGE UP (ref 3.8–10.5)
WBC # FLD AUTO: 8.48 K/UL — SIGNIFICANT CHANGE UP (ref 3.8–10.5)

## 2023-03-22 ENCOUNTER — APPOINTMENT (OUTPATIENT)
Dept: INFUSION THERAPY | Facility: CLINIC | Age: 63
End: 2023-03-22

## 2023-04-03 ENCOUNTER — RESULT REVIEW (OUTPATIENT)
Age: 63
End: 2023-04-03

## 2023-04-03 ENCOUNTER — APPOINTMENT (OUTPATIENT)
Dept: INFUSION THERAPY | Facility: CLINIC | Age: 63
End: 2023-04-03

## 2023-04-03 LAB
ALBUMIN SERPL ELPH-MCNC: 3.8 G/DL — SIGNIFICANT CHANGE UP (ref 3.3–5)
ALP SERPL-CCNC: 168 U/L — HIGH (ref 40–120)
ALT FLD-CCNC: 27 U/L — SIGNIFICANT CHANGE UP (ref 10–45)
ANION GAP SERPL CALC-SCNC: 13 MMOL/L — SIGNIFICANT CHANGE UP (ref 5–17)
ANISOCYTOSIS BLD QL: SLIGHT — SIGNIFICANT CHANGE UP
AST SERPL-CCNC: 28 U/L — SIGNIFICANT CHANGE UP (ref 10–40)
BASO STIPL BLD QL SMEAR: PRESENT — SIGNIFICANT CHANGE UP
BASOPHILS # BLD AUTO: 0 K/UL — SIGNIFICANT CHANGE UP (ref 0–0.2)
BASOPHILS NFR BLD AUTO: 0 % — SIGNIFICANT CHANGE UP (ref 0–2)
BILIRUB SERPL-MCNC: 0.2 MG/DL — SIGNIFICANT CHANGE UP (ref 0.2–1.2)
BUN SERPL-MCNC: 9 MG/DL — SIGNIFICANT CHANGE UP (ref 7–23)
CALCIUM SERPL-MCNC: 8.9 MG/DL — SIGNIFICANT CHANGE UP (ref 8.4–10.5)
CHLORIDE SERPL-SCNC: 107 MMOL/L — SIGNIFICANT CHANGE UP (ref 96–108)
CO2 SERPL-SCNC: 23 MMOL/L — SIGNIFICANT CHANGE UP (ref 22–31)
CREAT SERPL-MCNC: 0.61 MG/DL — SIGNIFICANT CHANGE UP (ref 0.5–1.3)
DACRYOCYTES BLD QL SMEAR: SLIGHT — SIGNIFICANT CHANGE UP
DOHLE BOD BLD QL SMEAR: PRESENT — SIGNIFICANT CHANGE UP
EGFR: 109 ML/MIN/1.73M2 — SIGNIFICANT CHANGE UP
ELLIPTOCYTES BLD QL SMEAR: SLIGHT — SIGNIFICANT CHANGE UP
EOSINOPHIL # BLD AUTO: 0 K/UL — SIGNIFICANT CHANGE UP (ref 0–0.5)
EOSINOPHIL NFR BLD AUTO: 0 % — SIGNIFICANT CHANGE UP (ref 0–6)
GIANT PLATELETS BLD QL SMEAR: PRESENT — SIGNIFICANT CHANGE UP
GLUCOSE SERPL-MCNC: 74 MG/DL — SIGNIFICANT CHANGE UP (ref 70–99)
HCT VFR BLD CALC: 33.3 % — LOW (ref 39–50)
HGB BLD-MCNC: 10.3 G/DL — LOW (ref 13–17)
LG PLATELETS BLD QL AUTO: SLIGHT — SIGNIFICANT CHANGE UP
LYMPHOCYTES # BLD AUTO: 2.29 K/UL — SIGNIFICANT CHANGE UP (ref 1–3.3)
LYMPHOCYTES # BLD AUTO: 24 % — SIGNIFICANT CHANGE UP (ref 13–44)
MAGNESIUM SERPL-MCNC: 1.9 MG/DL — SIGNIFICANT CHANGE UP (ref 1.6–2.6)
MCHC RBC-ENTMCNC: 28.1 PG — SIGNIFICANT CHANGE UP (ref 27–34)
MCHC RBC-ENTMCNC: 30.9 GM/DL — LOW (ref 32–36)
MCV RBC AUTO: 91 FL — SIGNIFICANT CHANGE UP (ref 80–100)
METAMYELOCYTES # FLD: 2 % — HIGH (ref 0–0)
MONOCYTES # BLD AUTO: 1.43 K/UL — HIGH (ref 0–0.9)
MONOCYTES NFR BLD AUTO: 15 % — HIGH (ref 2–14)
MYELOCYTES NFR BLD: 1 % — HIGH (ref 0–0)
NEUTROPHILS # BLD AUTO: 5.45 K/UL — SIGNIFICANT CHANGE UP (ref 1.8–7.4)
NEUTROPHILS NFR BLD AUTO: 53 % — SIGNIFICANT CHANGE UP (ref 43–77)
NEUTS BAND # BLD: 4 % — SIGNIFICANT CHANGE UP (ref 0–8)
NRBC # BLD: 1 /100 — HIGH (ref 0–0)
NRBC # BLD: SIGNIFICANT CHANGE UP /100 WBCS (ref 0–0)
PLAT MORPH BLD: NORMAL — SIGNIFICANT CHANGE UP
PLATELET # BLD AUTO: 134 K/UL — LOW (ref 150–400)
POIKILOCYTOSIS BLD QL AUTO: SLIGHT — SIGNIFICANT CHANGE UP
POLYCHROMASIA BLD QL SMEAR: SIGNIFICANT CHANGE UP
POTASSIUM SERPL-MCNC: 3.8 MMOL/L — SIGNIFICANT CHANGE UP (ref 3.5–5.3)
POTASSIUM SERPL-SCNC: 3.8 MMOL/L — SIGNIFICANT CHANGE UP (ref 3.5–5.3)
PROT SERPL-MCNC: 6.6 G/DL — SIGNIFICANT CHANGE UP (ref 6–8.3)
RBC # BLD: 3.66 M/UL — LOW (ref 4.2–5.8)
RBC # FLD: 19.7 % — HIGH (ref 10.3–14.5)
RBC BLD AUTO: ABNORMAL
SODIUM SERPL-SCNC: 143 MMOL/L — SIGNIFICANT CHANGE UP (ref 135–145)
TOXIC GRANULES BLD QL SMEAR: PRESENT — SIGNIFICANT CHANGE UP
VARIANT LYMPHS # BLD: 1 % — SIGNIFICANT CHANGE UP (ref 0–6)
WBC # BLD: 9.56 K/UL — SIGNIFICANT CHANGE UP (ref 3.8–10.5)
WBC # FLD AUTO: 9.56 K/UL — SIGNIFICANT CHANGE UP (ref 3.8–10.5)

## 2023-04-04 ENCOUNTER — APPOINTMENT (OUTPATIENT)
Dept: ORTHOPEDIC SURGERY | Facility: CLINIC | Age: 63
End: 2023-04-04
Payer: COMMERCIAL

## 2023-04-04 DIAGNOSIS — S82.142A DISPLACED BICONDYLAR FRACTURE OF LEFT TIBIA, INITIAL ENCOUNTER FOR CLOSED FRACTURE: ICD-10-CM

## 2023-04-04 PROCEDURE — 73562 X-RAY EXAM OF KNEE 3: CPT | Mod: LT

## 2023-04-04 PROCEDURE — 99213 OFFICE O/P EST LOW 20 MIN: CPT

## 2023-04-05 ENCOUNTER — APPOINTMENT (OUTPATIENT)
Dept: INFUSION THERAPY | Facility: CLINIC | Age: 63
End: 2023-04-05

## 2023-04-09 PROBLEM — S82.142A CLOSED BICONDYLAR FRACTURE OF LEFT TIBIAL PLATEAU: Status: ACTIVE | Noted: 2022-10-17

## 2023-04-10 ENCOUNTER — APPOINTMENT (OUTPATIENT)
Dept: CT IMAGING | Facility: CLINIC | Age: 63
End: 2023-04-10
Payer: COMMERCIAL

## 2023-04-10 PROCEDURE — 71260 CT THORAX DX C+: CPT

## 2023-04-10 PROCEDURE — 74177 CT ABD & PELVIS W/CONTRAST: CPT

## 2023-04-13 PROBLEM — R11.0 CHEMOTHERAPY-INDUCED NAUSEA: Status: ACTIVE | Noted: 2022-11-04

## 2023-04-13 PROBLEM — R51.9 BILATERAL HEADACHES: Status: ACTIVE | Noted: 2022-11-04

## 2023-04-17 ENCOUNTER — RESULT REVIEW (OUTPATIENT)
Age: 63
End: 2023-04-17

## 2023-04-17 ENCOUNTER — APPOINTMENT (OUTPATIENT)
Dept: HEMATOLOGY ONCOLOGY | Facility: CLINIC | Age: 63
End: 2023-04-17
Payer: COMMERCIAL

## 2023-04-17 ENCOUNTER — APPOINTMENT (OUTPATIENT)
Dept: INFUSION THERAPY | Facility: CLINIC | Age: 63
End: 2023-04-17
Payer: COMMERCIAL

## 2023-04-17 DIAGNOSIS — R11.0 NAUSEA: ICD-10-CM

## 2023-04-17 DIAGNOSIS — T45.1X5A NAUSEA: ICD-10-CM

## 2023-04-17 DIAGNOSIS — R51.9 HEADACHE, UNSPECIFIED: ICD-10-CM

## 2023-04-17 LAB
BASOPHILS # BLD AUTO: 0.13 K/UL — SIGNIFICANT CHANGE UP (ref 0–0.2)
BASOPHILS NFR BLD AUTO: 2.2 % — HIGH (ref 0–2)
EOSINOPHIL # BLD AUTO: 0.14 K/UL — SIGNIFICANT CHANGE UP (ref 0–0.5)
EOSINOPHIL NFR BLD AUTO: 2.4 % — SIGNIFICANT CHANGE UP (ref 0–6)
HCT VFR BLD CALC: 32.9 % — LOW (ref 39–50)
HGB BLD-MCNC: 10.3 G/DL — LOW (ref 13–17)
IMM GRANULOCYTES NFR BLD AUTO: 1.4 % — HIGH (ref 0–0.9)
LYMPHOCYTES # BLD AUTO: 1.6 K/UL — SIGNIFICANT CHANGE UP (ref 1–3.3)
LYMPHOCYTES # BLD AUTO: 27.6 % — SIGNIFICANT CHANGE UP (ref 13–44)
MCHC RBC-ENTMCNC: 28.2 PG — SIGNIFICANT CHANGE UP (ref 27–34)
MCHC RBC-ENTMCNC: 31.3 GM/DL — LOW (ref 32–36)
MCV RBC AUTO: 90.1 FL — SIGNIFICANT CHANGE UP (ref 80–100)
MONOCYTES # BLD AUTO: 0.89 K/UL — SIGNIFICANT CHANGE UP (ref 0–0.9)
MONOCYTES NFR BLD AUTO: 15.3 % — HIGH (ref 2–14)
NEUTROPHILS # BLD AUTO: 2.96 K/UL — SIGNIFICANT CHANGE UP (ref 1.8–7.4)
NEUTROPHILS NFR BLD AUTO: 51.1 % — SIGNIFICANT CHANGE UP (ref 43–77)
NRBC # BLD: 0 /100 WBCS — SIGNIFICANT CHANGE UP (ref 0–0)
PLATELET # BLD AUTO: 184 K/UL — SIGNIFICANT CHANGE UP (ref 150–400)
RBC # BLD: 3.65 M/UL — LOW (ref 4.2–5.8)
RBC # FLD: 19.2 % — HIGH (ref 10.3–14.5)
WBC # BLD: 5.8 K/UL — SIGNIFICANT CHANGE UP (ref 3.8–10.5)
WBC # FLD AUTO: 5.8 K/UL — SIGNIFICANT CHANGE UP (ref 3.8–10.5)

## 2023-04-17 PROCEDURE — 99215 OFFICE O/P EST HI 40 MIN: CPT

## 2023-04-17 NOTE — PHYSICAL EXAM
[Restricted in physically strenuous activity but ambulatory and able to carry out work of a light or sedentary nature] : Status 1- Restricted in physically strenuous activity but ambulatory and able to carry out work of a light or sedentary nature, e.g., light house work, office work [Normal] : affect appropriate [de-identified] : wt stable overall  [de-identified] : rt port accessed, dressing C/D/I

## 2023-04-17 NOTE — RESULTS/DATA
[FreeTextEntry1] : 4/10/23 CT C/A/P: Distal descending colon mass has decreased in size since prior with decreased associated colonic dilatation. Large amount of stool again noted in the colon and rectum. Hepatic metastases are slightly decreased in size, largest decrease 2.3 x 1.4 cm, previously 3.0 x 1.8 cm. Unchanged left adrenal mass and small lung nodule.\par \par 1/23/23 CT:  New partial large bowel obstruction secondary to known apple core lesion in the distal descending colon. Again extensive adjacent mesenteric abnormality is seen which may represent conglomerate lymph nodes.Redemonstrated hepatic metastases.  No change large left adrenal nodule.Small pericardial effusion is again seen.\par \par 1/9/23 CT C/A/P: Findings consistent with descending colon cancer with extension through the wall with extensive mesenteric confluent tumor and/or ivelisse disease mildly worse when compared to the prior examination. Diffuse metastatic disease identified throughout the liver and small lesion most noted within the right middle lobe improved when compared to the prior examination (now measuring 2.7 x 2.5 cm previously measuring 3.7 x 3.2 cm and a lesion in the right lobe measuring 2.1 x by 1.6 cm previously measuring 3.3 x 2.3 cm). Small pericardial effusion and areas of thickening of questionable significance. Small amount of pericatheter clot near the tip of the catheter in the superior vena cava.\par \par 8/19/22 Path: Liver, core biopsy: Metastatic adenocarcinoma, consistent with metastasis from colonic primary. Immunohistochemical stains were performed and results as follows: CDX - 2 is positive. CK 20 is patchy positive. CK 7 is negative. These findings support the diagnosis. No loss of nuclear expression of MMR proteins (MLH1, MSH2,\par MSH6, and PMS2).   These results show low probability of microsatellite instability-high (MSI-H).\par \par 8/18/22 Path: Colon, descending, mass, biopsy. Invasive adenocarcinoma, moderately differentiated. No loss of nuclear expression of MMR proteins (MLH1, MSH2,\par MSH6, and PMS2).   These results show low probability of microsatellite instability-high (MSI-H).\par \par 8/18/22 Colonoscopy: A frond-like/villous and ulcerated non-obstructing large mass was found in the descending colon. The lass was circumferential. The mass measured five cm in length. Oozing was present. Biopsied and tattooed. The scope was able to transverse the mass. The colon was otherwise normal.\par \par 8/18/22 CT C/A/P: There is irregular colonic wall thickening of the proximal sigmoid colon with stranding of the surrounding paracolic fat. Regional lymph nodes are identified adjacent to the colonic mass measuring up to 1.2 cm.  Multiple hypodense masses identified within the hepatic parenchyma is not to 3.9 cm, suspicious for hepatic parenchymal neoplastic disease. 3.4 cm left adrenal mass, suspicious for neoplastic disease. There is a 1.0 cm soft tissue nodule identified along the right lateral wall of the distal trachea, superior to the tracheal bifurcation. Lung nodules suspicious for lung parenchymal neoplastic disease.

## 2023-04-17 NOTE — REVIEW OF SYSTEMS
[Vomiting] : vomiting [Negative] : Allergic/Immunologic [Recent Change In Weight] : ~T no recent weight change [Chest Pain] : no chest pain [Shortness Of Breath] : no shortness of breath [Abdominal Pain] : no abdominal pain [Constipation] : no constipation [Diarrhea] : no diarrhea [Confused] : no confusion [FreeTextEntry2] : wt stable overall  [FreeTextEntry7] : x2 episodes [FreeTextEntry9] : left leg in ace bandage and leg immobilizer  [de-identified] : right chest wall port palpable accessed

## 2023-04-17 NOTE — HISTORY OF PRESENT ILLNESS
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 On 08/18/22, he had a CT C/A/P which showed  irregular colonic wall thickening of the proximal sigmoid colon with stranding of the surrounding paracolic fat. Regional lymph nodes are identified adjacent to the colonic mass measuring up to 1.2 cm. Multiple masses identified within the hepatic parenchyma noted to be 3.9 cm, suspicious for hepatic parenchymal neoplastic disease. 3.4 cm left adrenal mass, suspicious for neoplastic disease. There is a 1.0 cm soft tissue nodule identified along the right lateral wall of the distal trachea, superior to the tracheal bifurcation. Lung nodules suspicious for lung parenchymal neoplastic disease.  \par \par On 8/18/22, he had a colonoscopy that showed a frond-like/villous and ulcerated non-obstructing large mass was found in the descending colon. The mass was circumferential. The mass measured five cm in length. Pathology showed invasive adenocarcinoma, moderately differentiated. No loss of nuclear expression of MMR proteins (MLH1, MSH2, MSH6, and PMS2). These results show low probability of microsatellite instability-high (MSI-H). On 8/19/22, he had a liver, core biopsy which showed metastatic adenocarcinoma, consistent with metastasis from colonic primary.\par \par  [de-identified] : no MMR deficiency [de-identified] : Medical Hx: DVT 14 years ago (unknown cause) \par Surgical Hx: left eye sx; left torn meniscus repaired \par Family Hx: Mother leukemia\par Social Hx: never smoker; ETOH never; lives alone; single; works PT at [employer]; daughters close by [de-identified] : Pt is C12D1 of FOLFIOXIRI q 2 weeks, with ongoing cycles planned. Today is the first day with neuropathy in the feet and hands today. He denies fatigue, cold sensitivity (unless drinks something cold), eye changes, hair loss, nail / skin changes, pain, mouth sores, vomiting, heartburn, C/D. Continues with cane as per PT for balance.

## 2023-04-18 LAB
ALBUMIN SERPL ELPH-MCNC: 4.1 G/DL — SIGNIFICANT CHANGE UP (ref 3.3–5)
ALP SERPL-CCNC: 155 U/L — HIGH (ref 40–120)
ALT FLD-CCNC: 19 U/L — SIGNIFICANT CHANGE UP (ref 10–45)
ANION GAP SERPL CALC-SCNC: 14 MMOL/L — SIGNIFICANT CHANGE UP (ref 5–17)
AST SERPL-CCNC: 19 U/L — SIGNIFICANT CHANGE UP (ref 10–40)
BILIRUB SERPL-MCNC: <0.2 MG/DL — SIGNIFICANT CHANGE UP (ref 0.2–1.2)
BUN SERPL-MCNC: 12 MG/DL — SIGNIFICANT CHANGE UP (ref 7–23)
CALCIUM SERPL-MCNC: 9 MG/DL — SIGNIFICANT CHANGE UP (ref 8.4–10.5)
CHLORIDE SERPL-SCNC: 106 MMOL/L — SIGNIFICANT CHANGE UP (ref 96–108)
CO2 SERPL-SCNC: 22 MMOL/L — SIGNIFICANT CHANGE UP (ref 22–31)
CREAT SERPL-MCNC: 0.68 MG/DL — SIGNIFICANT CHANGE UP (ref 0.5–1.3)
EGFR: 105 ML/MIN/1.73M2 — SIGNIFICANT CHANGE UP
GLUCOSE SERPL-MCNC: 87 MG/DL — SIGNIFICANT CHANGE UP (ref 70–99)
MAGNESIUM SERPL-MCNC: 2.1 MG/DL — SIGNIFICANT CHANGE UP (ref 1.6–2.6)
POTASSIUM SERPL-MCNC: 4.4 MMOL/L — SIGNIFICANT CHANGE UP (ref 3.5–5.3)
POTASSIUM SERPL-SCNC: 4.4 MMOL/L — SIGNIFICANT CHANGE UP (ref 3.5–5.3)
PROT SERPL-MCNC: 6.9 G/DL — SIGNIFICANT CHANGE UP (ref 6–8.3)
SODIUM SERPL-SCNC: 141 MMOL/L — SIGNIFICANT CHANGE UP (ref 135–145)

## 2023-04-19 ENCOUNTER — APPOINTMENT (OUTPATIENT)
Dept: INFUSION THERAPY | Facility: CLINIC | Age: 63
End: 2023-04-19

## 2023-04-24 ENCOUNTER — OUTPATIENT (OUTPATIENT)
Dept: OUTPATIENT SERVICES | Facility: HOSPITAL | Age: 63
LOS: 1 days | Discharge: HOME | End: 2023-04-24

## 2023-04-24 DIAGNOSIS — C18.9 MALIGNANT NEOPLASM OF COLON, UNSPECIFIED: ICD-10-CM

## 2023-05-01 ENCOUNTER — RESULT REVIEW (OUTPATIENT)
Age: 63
End: 2023-05-01

## 2023-05-01 ENCOUNTER — APPOINTMENT (OUTPATIENT)
Dept: INFUSION THERAPY | Facility: CLINIC | Age: 63
End: 2023-05-01

## 2023-05-01 VITALS
TEMPERATURE: 98.6 F | SYSTOLIC BLOOD PRESSURE: 145 MMHG | WEIGHT: 207 LBS | BODY MASS INDEX: 28.98 KG/M2 | RESPIRATION RATE: 18 BRPM | HEART RATE: 104 BPM | HEIGHT: 71 IN | DIASTOLIC BLOOD PRESSURE: 86 MMHG | OXYGEN SATURATION: 99 %

## 2023-05-01 LAB
ALBUMIN SERPL ELPH-MCNC: 3.9 G/DL — SIGNIFICANT CHANGE UP (ref 3.3–5)
ALP SERPL-CCNC: 165 U/L — HIGH (ref 40–120)
ALT FLD-CCNC: 18 U/L — SIGNIFICANT CHANGE UP (ref 10–45)
ANION GAP SERPL CALC-SCNC: 11 MMOL/L — SIGNIFICANT CHANGE UP (ref 5–17)
AST SERPL-CCNC: 24 U/L — SIGNIFICANT CHANGE UP (ref 10–40)
BASOPHILS # BLD AUTO: 0.1 K/UL — SIGNIFICANT CHANGE UP (ref 0–0.2)
BASOPHILS NFR BLD AUTO: 1.5 % — SIGNIFICANT CHANGE UP (ref 0–2)
BILIRUB SERPL-MCNC: 0.2 MG/DL — SIGNIFICANT CHANGE UP (ref 0.2–1.2)
BUN SERPL-MCNC: 7 MG/DL — SIGNIFICANT CHANGE UP (ref 7–23)
CALCIUM SERPL-MCNC: 9.1 MG/DL — SIGNIFICANT CHANGE UP (ref 8.4–10.5)
CHLORIDE SERPL-SCNC: 106 MMOL/L — SIGNIFICANT CHANGE UP (ref 96–108)
CO2 SERPL-SCNC: 24 MMOL/L — SIGNIFICANT CHANGE UP (ref 22–31)
CREAT SERPL-MCNC: 0.7 MG/DL — SIGNIFICANT CHANGE UP (ref 0.5–1.3)
EGFR: 104 ML/MIN/1.73M2 — SIGNIFICANT CHANGE UP
EOSINOPHIL # BLD AUTO: 0.23 K/UL — SIGNIFICANT CHANGE UP (ref 0–0.5)
EOSINOPHIL NFR BLD AUTO: 3.4 % — SIGNIFICANT CHANGE UP (ref 0–6)
GLUCOSE SERPL-MCNC: 139 MG/DL — HIGH (ref 70–99)
HCT VFR BLD CALC: 32.1 % — LOW (ref 39–50)
HGB BLD-MCNC: 10 G/DL — LOW (ref 13–17)
IMM GRANULOCYTES NFR BLD AUTO: 0.7 % — SIGNIFICANT CHANGE UP (ref 0–0.9)
LYMPHOCYTES # BLD AUTO: 1.69 K/UL — SIGNIFICANT CHANGE UP (ref 1–3.3)
LYMPHOCYTES # BLD AUTO: 25.1 % — SIGNIFICANT CHANGE UP (ref 13–44)
MAGNESIUM SERPL-MCNC: 2 MG/DL — SIGNIFICANT CHANGE UP (ref 1.6–2.6)
MCHC RBC-ENTMCNC: 28.2 PG — SIGNIFICANT CHANGE UP (ref 27–34)
MCHC RBC-ENTMCNC: 31.2 GM/DL — LOW (ref 32–36)
MCV RBC AUTO: 90.4 FL — SIGNIFICANT CHANGE UP (ref 80–100)
MONOCYTES # BLD AUTO: 0.63 K/UL — SIGNIFICANT CHANGE UP (ref 0–0.9)
MONOCYTES NFR BLD AUTO: 9.4 % — SIGNIFICANT CHANGE UP (ref 2–14)
NEUTROPHILS # BLD AUTO: 4.02 K/UL — SIGNIFICANT CHANGE UP (ref 1.8–7.4)
NEUTROPHILS NFR BLD AUTO: 59.9 % — SIGNIFICANT CHANGE UP (ref 43–77)
NRBC # BLD: 0 /100 WBCS — SIGNIFICANT CHANGE UP (ref 0–0)
PLATELET # BLD AUTO: 176 K/UL — SIGNIFICANT CHANGE UP (ref 150–400)
POTASSIUM SERPL-MCNC: 4 MMOL/L — SIGNIFICANT CHANGE UP (ref 3.5–5.3)
POTASSIUM SERPL-SCNC: 4 MMOL/L — SIGNIFICANT CHANGE UP (ref 3.5–5.3)
PROT SERPL-MCNC: 6.5 G/DL — SIGNIFICANT CHANGE UP (ref 6–8.3)
RBC # BLD: 3.55 M/UL — LOW (ref 4.2–5.8)
RBC # FLD: 18.7 % — HIGH (ref 10.3–14.5)
SODIUM SERPL-SCNC: 141 MMOL/L — SIGNIFICANT CHANGE UP (ref 135–145)
WBC # BLD: 6.72 K/UL — SIGNIFICANT CHANGE UP (ref 3.8–10.5)
WBC # FLD AUTO: 6.72 K/UL — SIGNIFICANT CHANGE UP (ref 3.8–10.5)

## 2023-05-02 DIAGNOSIS — Z51.11 ENCOUNTER FOR ANTINEOPLASTIC CHEMOTHERAPY: ICD-10-CM

## 2023-05-02 DIAGNOSIS — R11.2 NAUSEA WITH VOMITING, UNSPECIFIED: ICD-10-CM

## 2023-05-03 ENCOUNTER — APPOINTMENT (OUTPATIENT)
Dept: INFUSION THERAPY | Facility: CLINIC | Age: 63
End: 2023-05-03

## 2023-05-04 DIAGNOSIS — Z51.89 ENCOUNTER FOR OTHER SPECIFIED AFTERCARE: ICD-10-CM

## 2023-05-15 ENCOUNTER — RESULT REVIEW (OUTPATIENT)
Age: 63
End: 2023-05-15

## 2023-05-15 ENCOUNTER — APPOINTMENT (OUTPATIENT)
Dept: INFUSION THERAPY | Facility: CLINIC | Age: 63
End: 2023-05-15
Payer: MEDICAID

## 2023-05-15 ENCOUNTER — APPOINTMENT (OUTPATIENT)
Dept: HEMATOLOGY ONCOLOGY | Facility: CLINIC | Age: 63
End: 2023-05-15
Payer: MEDICAID

## 2023-05-15 VITALS
HEART RATE: 92 BPM | TEMPERATURE: 97.8 F | WEIGHT: 212 LBS | SYSTOLIC BLOOD PRESSURE: 162 MMHG | BODY MASS INDEX: 29.57 KG/M2 | RESPIRATION RATE: 18 BRPM | DIASTOLIC BLOOD PRESSURE: 81 MMHG | OXYGEN SATURATION: 99 %

## 2023-05-15 LAB
BASOPHILS # BLD AUTO: 0.08 K/UL — SIGNIFICANT CHANGE UP (ref 0–0.2)
BASOPHILS NFR BLD AUTO: 1.2 % — SIGNIFICANT CHANGE UP (ref 0–2)
EOSINOPHIL # BLD AUTO: 0.26 K/UL — SIGNIFICANT CHANGE UP (ref 0–0.5)
EOSINOPHIL NFR BLD AUTO: 3.9 % — SIGNIFICANT CHANGE UP (ref 0–6)
HCT VFR BLD CALC: 32.7 % — LOW (ref 39–50)
HGB BLD-MCNC: 10.1 G/DL — LOW (ref 13–17)
IMM GRANULOCYTES NFR BLD AUTO: 0.9 % — SIGNIFICANT CHANGE UP (ref 0–0.9)
LYMPHOCYTES # BLD AUTO: 1.71 K/UL — SIGNIFICANT CHANGE UP (ref 1–3.3)
LYMPHOCYTES # BLD AUTO: 25.9 % — SIGNIFICANT CHANGE UP (ref 13–44)
MCHC RBC-ENTMCNC: 28.3 PG — SIGNIFICANT CHANGE UP (ref 27–34)
MCHC RBC-ENTMCNC: 30.9 GM/DL — LOW (ref 32–36)
MCV RBC AUTO: 91.6 FL — SIGNIFICANT CHANGE UP (ref 80–100)
MONOCYTES # BLD AUTO: 0.71 K/UL — SIGNIFICANT CHANGE UP (ref 0–0.9)
MONOCYTES NFR BLD AUTO: 10.7 % — SIGNIFICANT CHANGE UP (ref 2–14)
NEUTROPHILS # BLD AUTO: 3.79 K/UL — SIGNIFICANT CHANGE UP (ref 1.8–7.4)
NEUTROPHILS NFR BLD AUTO: 57.4 % — SIGNIFICANT CHANGE UP (ref 43–77)
NRBC # BLD: 0 /100 WBCS — SIGNIFICANT CHANGE UP (ref 0–0)
PLATELET # BLD AUTO: 182 K/UL — SIGNIFICANT CHANGE UP (ref 150–400)
RBC # BLD: 3.57 M/UL — LOW (ref 4.2–5.8)
RBC # FLD: 18.1 % — HIGH (ref 10.3–14.5)
WBC # BLD: 6.61 K/UL — SIGNIFICANT CHANGE UP (ref 3.8–10.5)
WBC # FLD AUTO: 6.61 K/UL — SIGNIFICANT CHANGE UP (ref 3.8–10.5)

## 2023-05-15 PROCEDURE — 99214 OFFICE O/P EST MOD 30 MIN: CPT

## 2023-05-15 RX ORDER — ENOXAPARIN SODIUM 150 MG/ML
150 INJECTION, SOLUTION SUBCUTANEOUS DAILY
Qty: 30 | Refills: 0 | Status: DISCONTINUED | COMMUNITY
Start: 2023-01-18 | End: 2023-05-15

## 2023-05-15 RX ORDER — ENOXAPARIN SODIUM 100 MG/ML
100 INJECTION, SOLUTION SUBCUTANEOUS DAILY
Qty: 30 | Refills: 0 | Status: DISCONTINUED | COMMUNITY
Start: 2023-01-18 | End: 2023-05-15

## 2023-05-15 RX ORDER — ENOXAPARIN SODIUM 40 MG/.4ML
40 INJECTION, SOLUTION SUBCUTANEOUS DAILY
Qty: 30 | Refills: 0 | Status: DISCONTINUED | COMMUNITY
Start: 2022-10-05 | End: 2023-05-15

## 2023-05-16 LAB
ALBUMIN SERPL ELPH-MCNC: 4.1 G/DL — SIGNIFICANT CHANGE UP (ref 3.3–5)
ALP SERPL-CCNC: 174 U/L — HIGH (ref 40–120)
ALT FLD-CCNC: 14 U/L — SIGNIFICANT CHANGE UP (ref 10–45)
ANION GAP SERPL CALC-SCNC: 13 MMOL/L — SIGNIFICANT CHANGE UP (ref 5–17)
AST SERPL-CCNC: 18 U/L — SIGNIFICANT CHANGE UP (ref 10–40)
BILIRUB SERPL-MCNC: <0.2 MG/DL — SIGNIFICANT CHANGE UP (ref 0.2–1.2)
BUN SERPL-MCNC: 9 MG/DL — SIGNIFICANT CHANGE UP (ref 7–23)
CALCIUM SERPL-MCNC: 9.1 MG/DL — SIGNIFICANT CHANGE UP (ref 8.4–10.5)
CEA SERPL-MCNC: 8.7 NG/ML — HIGH (ref 0–3.8)
CHLORIDE SERPL-SCNC: 106 MMOL/L — SIGNIFICANT CHANGE UP (ref 96–108)
CO2 SERPL-SCNC: 24 MMOL/L — SIGNIFICANT CHANGE UP (ref 22–31)
CREAT SERPL-MCNC: 0.7 MG/DL — SIGNIFICANT CHANGE UP (ref 0.5–1.3)
EGFR: 104 ML/MIN/1.73M2 — SIGNIFICANT CHANGE UP
GLUCOSE SERPL-MCNC: 98 MG/DL — SIGNIFICANT CHANGE UP (ref 70–99)
MAGNESIUM SERPL-MCNC: 2 MG/DL — SIGNIFICANT CHANGE UP (ref 1.6–2.6)
POTASSIUM SERPL-MCNC: 4 MMOL/L — SIGNIFICANT CHANGE UP (ref 3.5–5.3)
POTASSIUM SERPL-SCNC: 4 MMOL/L — SIGNIFICANT CHANGE UP (ref 3.5–5.3)
PROT SERPL-MCNC: 6.8 G/DL — SIGNIFICANT CHANGE UP (ref 6–8.3)
SODIUM SERPL-SCNC: 142 MMOL/L — SIGNIFICANT CHANGE UP (ref 135–145)

## 2023-05-17 ENCOUNTER — APPOINTMENT (OUTPATIENT)
Dept: INFUSION THERAPY | Facility: CLINIC | Age: 63
End: 2023-05-17

## 2023-05-19 NOTE — REVIEW OF SYSTEMS
[Negative] : Allergic/Immunologic [Recent Change In Weight] : ~T recent weight change [Diarrhea: Grade 0] : Diarrhea: Grade 0 [Chest Pain] : no chest pain [Shortness Of Breath] : no shortness of breath [Abdominal Pain] : no abdominal pain [Vomiting] : no vomiting [Constipation] : no constipation [Confused] : no confusion [FreeTextEntry2] : wt gain noted  [de-identified] : right chest wall port accessed [de-identified] : neuropathy

## 2023-05-19 NOTE — HISTORY OF PRESENT ILLNESS
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 On 08/18/22, he had a CT C/A/P which showed  irregular colonic wall thickening of the proximal sigmoid colon with stranding of the surrounding paracolic fat. Regional lymph nodes are identified adjacent to the colonic mass measuring up to 1.2 cm. Multiple masses identified within the hepatic parenchyma noted to be 3.9 cm, suspicious for hepatic parenchymal neoplastic disease. 3.4 cm left adrenal mass, suspicious for neoplastic disease. There is a 1.0 cm soft tissue nodule identified along the right lateral wall of the distal trachea, superior to the tracheal bifurcation. Lung nodules suspicious for lung parenchymal neoplastic disease.  \par \par On 8/18/22, he had a colonoscopy that showed a frond-like/villous and ulcerated non-obstructing large mass was found in the descending colon. The mass was circumferential. The mass measured five cm in length. Pathology showed invasive adenocarcinoma, moderately differentiated. No loss of nuclear expression of MMR proteins (MLH1, MSH2, MSH6, and PMS2). These results show low probability of microsatellite instability-high (MSI-H). On 8/19/22, he had a liver, core biopsy which showed metastatic adenocarcinoma, consistent with metastasis from colonic primary.\par \par  [de-identified] : no MMR deficiency [de-identified] : Medical Hx: DVT 14 years ago (unknown cause) \par Surgical Hx: left eye sx; left torn meniscus repaired \par Family Hx: Mother leukemia\par Social Hx: never smoker; ETOH never; lives alone; single; works PT at [employer]; daughters close by [de-identified] : Pt is C14D1 of FOLFIOXIRI q 2 weeks, with ongoing cycles planned. Today has continued pain with neuropathy in the feet and hands, will start Neurontin and will send a Dr Vergara consult request. He denies fatigue, cold sensitivity, resolved, eye changes, hair loss, nail / skin changes, pain, mouth sores, vomiting, heartburn, C/D. Continues with cane as per PT for balance. He has restarted working 4 days a week 4 hours a day and does not need cane at that time. He also has been able to move back into his home, from fall and sx in Aug.

## 2023-05-19 NOTE — PHYSICAL EXAM
[Restricted in physically strenuous activity but ambulatory and able to carry out work of a light or sedentary nature] : Status 1- Restricted in physically strenuous activity but ambulatory and able to carry out work of a light or sedentary nature, e.g., light house work, office work [Normal] : affect appropriate [de-identified] : wt gain noted  [de-identified] : rt port accessed, dressing C/D/I

## 2023-05-22 DIAGNOSIS — G62.9 POLYNEUROPATHY, UNSPECIFIED: ICD-10-CM

## 2023-05-30 ENCOUNTER — RESULT REVIEW (OUTPATIENT)
Age: 63
End: 2023-05-30

## 2023-05-30 ENCOUNTER — APPOINTMENT (OUTPATIENT)
Dept: INFUSION THERAPY | Facility: CLINIC | Age: 63
End: 2023-05-30

## 2023-05-30 LAB
ALBUMIN SERPL ELPH-MCNC: 4 G/DL — SIGNIFICANT CHANGE UP (ref 3.3–5)
ALP SERPL-CCNC: 174 U/L — HIGH (ref 40–120)
ALT FLD-CCNC: 16 U/L — SIGNIFICANT CHANGE UP (ref 10–45)
ANION GAP SERPL CALC-SCNC: 12 MMOL/L — SIGNIFICANT CHANGE UP (ref 5–17)
AST SERPL-CCNC: 19 U/L — SIGNIFICANT CHANGE UP (ref 10–40)
BASOPHILS # BLD AUTO: 0.06 K/UL — SIGNIFICANT CHANGE UP (ref 0–0.2)
BASOPHILS NFR BLD AUTO: 1 % — SIGNIFICANT CHANGE UP (ref 0–2)
BILIRUB SERPL-MCNC: <0.2 MG/DL — SIGNIFICANT CHANGE UP (ref 0.2–1.2)
BUN SERPL-MCNC: 13 MG/DL — SIGNIFICANT CHANGE UP (ref 7–23)
CALCIUM SERPL-MCNC: 8.8 MG/DL — SIGNIFICANT CHANGE UP (ref 8.4–10.5)
CHLORIDE SERPL-SCNC: 110 MMOL/L — HIGH (ref 96–108)
CO2 SERPL-SCNC: 21 MMOL/L — LOW (ref 22–31)
CREAT SERPL-MCNC: 0.78 MG/DL — SIGNIFICANT CHANGE UP (ref 0.5–1.3)
EGFR: 101 ML/MIN/1.73M2 — SIGNIFICANT CHANGE UP
EOSINOPHIL # BLD AUTO: 0.19 K/UL — SIGNIFICANT CHANGE UP (ref 0–0.5)
EOSINOPHIL NFR BLD AUTO: 3.3 % — SIGNIFICANT CHANGE UP (ref 0–6)
GLUCOSE SERPL-MCNC: 148 MG/DL — HIGH (ref 70–99)
HCT VFR BLD CALC: 33.7 % — LOW (ref 39–50)
HGB BLD-MCNC: 10.2 G/DL — LOW (ref 13–17)
IMM GRANULOCYTES NFR BLD AUTO: 1 % — HIGH (ref 0–0.9)
LYMPHOCYTES # BLD AUTO: 1.38 K/UL — SIGNIFICANT CHANGE UP (ref 1–3.3)
LYMPHOCYTES # BLD AUTO: 24 % — SIGNIFICANT CHANGE UP (ref 13–44)
MAGNESIUM SERPL-MCNC: 2.1 MG/DL — SIGNIFICANT CHANGE UP (ref 1.6–2.6)
MCHC RBC-ENTMCNC: 27.6 PG — SIGNIFICANT CHANGE UP (ref 27–34)
MCHC RBC-ENTMCNC: 30.3 GM/DL — LOW (ref 32–36)
MCV RBC AUTO: 91.3 FL — SIGNIFICANT CHANGE UP (ref 80–100)
MONOCYTES # BLD AUTO: 0.27 K/UL — SIGNIFICANT CHANGE UP (ref 0–0.9)
MONOCYTES NFR BLD AUTO: 4.7 % — SIGNIFICANT CHANGE UP (ref 2–14)
NEUTROPHILS # BLD AUTO: 3.79 K/UL — SIGNIFICANT CHANGE UP (ref 1.8–7.4)
NEUTROPHILS NFR BLD AUTO: 66 % — SIGNIFICANT CHANGE UP (ref 43–77)
NRBC # BLD: 0 /100 WBCS — SIGNIFICANT CHANGE UP (ref 0–0)
PLATELET # BLD AUTO: 185 K/UL — SIGNIFICANT CHANGE UP (ref 150–400)
POTASSIUM SERPL-MCNC: 4.1 MMOL/L — SIGNIFICANT CHANGE UP (ref 3.5–5.3)
POTASSIUM SERPL-SCNC: 4.1 MMOL/L — SIGNIFICANT CHANGE UP (ref 3.5–5.3)
PROT SERPL-MCNC: 6.6 G/DL — SIGNIFICANT CHANGE UP (ref 6–8.3)
RBC # BLD: 3.69 M/UL — LOW (ref 4.2–5.8)
RBC # FLD: 17.8 % — HIGH (ref 10.3–14.5)
SODIUM SERPL-SCNC: 143 MMOL/L — SIGNIFICANT CHANGE UP (ref 135–145)
WBC # BLD: 5.75 K/UL — SIGNIFICANT CHANGE UP (ref 3.8–10.5)
WBC # FLD AUTO: 5.75 K/UL — SIGNIFICANT CHANGE UP (ref 3.8–10.5)

## 2023-06-01 ENCOUNTER — APPOINTMENT (OUTPATIENT)
Dept: INFUSION THERAPY | Facility: CLINIC | Age: 63
End: 2023-06-01

## 2023-06-05 ENCOUNTER — APPOINTMENT (OUTPATIENT)
Dept: PHYSICAL MEDICINE AND REHAB | Facility: CLINIC | Age: 63
End: 2023-06-05
Payer: COMMERCIAL

## 2023-06-05 DIAGNOSIS — R29.898 OTHER SYMPTOMS AND SIGNS INVOLVING THE MUSCULOSKELETAL SYSTEM: ICD-10-CM

## 2023-06-05 DIAGNOSIS — Z78.9 OTHER SPECIFIED HEALTH STATUS: ICD-10-CM

## 2023-06-05 DIAGNOSIS — Z85.038 PERSONAL HISTORY OF OTHER MALIGNANT NEOPLASM OF LARGE INTESTINE: ICD-10-CM

## 2023-06-05 PROCEDURE — 99204 OFFICE O/P NEW MOD 45 MIN: CPT

## 2023-06-05 RX ORDER — GABAPENTIN 100 MG/1
100 CAPSULE ORAL TWICE DAILY
Qty: 60 | Refills: 0 | Status: COMPLETED | COMMUNITY
Start: 2023-05-15 | End: 2023-06-05

## 2023-06-05 NOTE — DATA REVIEWED
[FreeTextEntry1] :     -8/18/22 Colonoscopy : A frond-like/villous and ulcerated non-obstructing large mass\par     was found in the descending colon. The mass was circumferential. The mass\par     measured five cm in length. \par \par   -4/10/23 CT C/A/P: Distal descending colon mass has decreased in size since prior with\par     decreased associated colonic dilatation. Large amount of stool again noted in the colon\par     and rectum. Hepatic metastases are slightly decreased in size, largest decrease 2.3 x\par     1.4 cm, previously 3.0 x 1.8 cm. Unchanged left adrenal mass and small lung nodule.

## 2023-06-05 NOTE — HISTORY OF PRESENT ILLNESS
[FreeTextEntry1] : Mr. Griffith is a 62 year old male with history of Stage IV left sided sigmoid colon cancer metastatic to liver, lung, adrenals.  Hospitalized 1/23-1/27 for SBO and improved with medical management with no stent placed.  Treatment with FOLFOXIRI.  \par \par  used for evaluation.  \par \par He reports he has numbness and tingling in his hands and feet.  Reports is worse after chemotherapy.  Started on gabapentin 100 mg twice a day.  Thinks it is helping a little bit denies any side effects.  Denies any overt weakness.  Currently in physical therapy ambulating with single-point cane.\par \par \par \par

## 2023-06-05 NOTE — ASSESSMENT
[FreeTextEntry1] : 62 year old male presenting for evaluation.\par \par #Neuropathy:\par -Increase gabapentin to 300mg TID\par \par #Impaired dexterity:\par -Discussed starting OT, he would like to defer\par \par #Impaired mobility:\par -Single point cane\par -Continue PT\par -Following with orthopedics s/p fracture, precautions per orthopedics \par \par Follow up in 1 month.

## 2023-06-05 NOTE — PHYSICAL EXAM
[FreeTextEntry1] : Gen: Patient is A&O x 3, NAD\par HEENT: EOMI, hearing grossly normal\par Resp: regular, non - labored\par CV: pulses regular\par Skin: no rashes, erythema\par Lymph: no clubbing, cyanosis, edema\par Inspection: no instability \par ROM: full throughout\par Palpation:no tenderness to palpation\par Sensation: decreased to light touch in bilateral hands and feet \par Reflexes: 1+ and symmetric throughout\par Strength: 5/5 throughout\par Special tests: -Hoffmans sign, -Phalens sign \par Gait: Antalgic, ambulates with single point \par \par

## 2023-06-09 ENCOUNTER — INPATIENT (INPATIENT)
Facility: HOSPITAL | Age: 63
LOS: 4 days | Discharge: HOME | DRG: 309 | End: 2023-06-14
Attending: INTERNAL MEDICINE | Admitting: FAMILY MEDICINE
Payer: MEDICAID

## 2023-06-09 VITALS
WEIGHT: 220.02 LBS | DIASTOLIC BLOOD PRESSURE: 61 MMHG | HEIGHT: 65 IN | RESPIRATION RATE: 20 BRPM | TEMPERATURE: 98 F | OXYGEN SATURATION: 100 % | SYSTOLIC BLOOD PRESSURE: 113 MMHG | HEART RATE: 84 BPM

## 2023-06-09 DIAGNOSIS — S72.413A DISPLACED UNSPECIFIED CONDYLE FRACTURE OF LOWER END OF UNSPECIFIED FEMUR, INITIAL ENCOUNTER FOR CLOSED FRACTURE: ICD-10-CM

## 2023-06-09 LAB
ANION GAP SERPL CALC-SCNC: 4 MMOL/L — LOW (ref 5–17)
APTT BLD: 25.7 SEC — LOW (ref 27.5–35.5)
BUN SERPL-MCNC: 14 MG/DL — SIGNIFICANT CHANGE UP (ref 7–23)
CALCIUM SERPL-MCNC: 8.3 MG/DL — LOW (ref 8.5–10.1)
CHLORIDE SERPL-SCNC: 111 MMOL/L — HIGH (ref 96–108)
CO2 SERPL-SCNC: 27 MMOL/L — SIGNIFICANT CHANGE UP (ref 22–31)
CREAT SERPL-MCNC: 0.99 MG/DL — SIGNIFICANT CHANGE UP (ref 0.5–1.3)
EGFR: 86 ML/MIN/1.73M2 — SIGNIFICANT CHANGE UP
GLUCOSE SERPL-MCNC: 116 MG/DL — HIGH (ref 70–99)
HCT VFR BLD CALC: 30.8 % — LOW (ref 39–50)
HGB BLD-MCNC: 9.4 G/DL — LOW (ref 13–17)
INR BLD: 1.24 RATIO — HIGH (ref 0.88–1.16)
MCHC RBC-ENTMCNC: 28.4 PG — SIGNIFICANT CHANGE UP (ref 27–34)
MCHC RBC-ENTMCNC: 30.5 GM/DL — LOW (ref 32–36)
MCV RBC AUTO: 93.1 FL — SIGNIFICANT CHANGE UP (ref 80–100)
PLATELET # BLD AUTO: 175 K/UL — SIGNIFICANT CHANGE UP (ref 150–400)
POTASSIUM SERPL-MCNC: 3.8 MMOL/L — SIGNIFICANT CHANGE UP (ref 3.5–5.3)
POTASSIUM SERPL-SCNC: 3.8 MMOL/L — SIGNIFICANT CHANGE UP (ref 3.5–5.3)
PROTHROM AB SERPL-ACNC: 14.4 SEC — HIGH (ref 10.5–13.4)
RBC # BLD: 3.31 M/UL — LOW (ref 4.2–5.8)
RBC # FLD: 18.6 % — HIGH (ref 10.3–14.5)
SODIUM SERPL-SCNC: 142 MMOL/L — SIGNIFICANT CHANGE UP (ref 135–145)
WBC # BLD: 17.46 K/UL — HIGH (ref 3.8–10.5)
WBC # FLD AUTO: 17.46 K/UL — HIGH (ref 3.8–10.5)

## 2023-06-09 PROCEDURE — 73562 X-RAY EXAM OF KNEE 3: CPT | Mod: 26,LT

## 2023-06-09 PROCEDURE — 85730 THROMBOPLASTIN TIME PARTIAL: CPT

## 2023-06-09 PROCEDURE — 97535 SELF CARE MNGMENT TRAINING: CPT | Mod: GO

## 2023-06-09 PROCEDURE — 86923 COMPATIBILITY TEST ELECTRIC: CPT

## 2023-06-09 PROCEDURE — 81003 URINALYSIS AUTO W/O SCOPE: CPT

## 2023-06-09 PROCEDURE — 85025 COMPLETE CBC W/AUTO DIFF WBC: CPT

## 2023-06-09 PROCEDURE — 93010 ELECTROCARDIOGRAM REPORT: CPT

## 2023-06-09 PROCEDURE — 85027 COMPLETE CBC AUTOMATED: CPT

## 2023-06-09 PROCEDURE — C9399: CPT

## 2023-06-09 PROCEDURE — 71045 X-RAY EXAM CHEST 1 VIEW: CPT | Mod: 26

## 2023-06-09 PROCEDURE — 76376 3D RENDER W/INTRP POSTPROCES: CPT | Mod: 26

## 2023-06-09 PROCEDURE — 82306 VITAMIN D 25 HYDROXY: CPT

## 2023-06-09 PROCEDURE — 73700 CT LOWER EXTREMITY W/O DYE: CPT | Mod: 26,LT,MA

## 2023-06-09 PROCEDURE — 76000 FLUOROSCOPY <1 HR PHYS/QHP: CPT

## 2023-06-09 PROCEDURE — 36430 TRANSFUSION BLD/BLD COMPNT: CPT

## 2023-06-09 PROCEDURE — P9016: CPT

## 2023-06-09 PROCEDURE — 71045 X-RAY EXAM CHEST 1 VIEW: CPT

## 2023-06-09 PROCEDURE — 84100 ASSAY OF PHOSPHORUS: CPT

## 2023-06-09 PROCEDURE — 99285 EMERGENCY DEPT VISIT HI MDM: CPT

## 2023-06-09 PROCEDURE — 73552 X-RAY EXAM OF FEMUR 2/>: CPT | Mod: 26,LT

## 2023-06-09 PROCEDURE — 80053 COMPREHEN METABOLIC PANEL: CPT

## 2023-06-09 PROCEDURE — 80048 BASIC METABOLIC PNL TOTAL CA: CPT

## 2023-06-09 PROCEDURE — 97162 PT EVAL MOD COMPLEX 30 MIN: CPT | Mod: GP

## 2023-06-09 PROCEDURE — 97116 GAIT TRAINING THERAPY: CPT | Mod: GP

## 2023-06-09 PROCEDURE — 83735 ASSAY OF MAGNESIUM: CPT

## 2023-06-09 PROCEDURE — 86900 BLOOD TYPING SEROLOGIC ABO: CPT

## 2023-06-09 PROCEDURE — 73590 X-RAY EXAM OF LOWER LEG: CPT | Mod: 26,LT

## 2023-06-09 PROCEDURE — C1889: CPT

## 2023-06-09 PROCEDURE — C1776: CPT

## 2023-06-09 PROCEDURE — C1769: CPT

## 2023-06-09 PROCEDURE — 97166 OT EVAL MOD COMPLEX 45 MIN: CPT | Mod: GO

## 2023-06-09 PROCEDURE — 86901 BLOOD TYPING SEROLOGIC RH(D): CPT

## 2023-06-09 PROCEDURE — 97530 THERAPEUTIC ACTIVITIES: CPT | Mod: GP

## 2023-06-09 PROCEDURE — 86850 RBC ANTIBODY SCREEN: CPT

## 2023-06-09 PROCEDURE — 36415 COLL VENOUS BLD VENIPUNCTURE: CPT

## 2023-06-09 PROCEDURE — C1713: CPT

## 2023-06-09 PROCEDURE — 85610 PROTHROMBIN TIME: CPT

## 2023-06-09 RX ORDER — ACETAMINOPHEN 500 MG
1000 TABLET ORAL ONCE
Refills: 0 | Status: COMPLETED | OUTPATIENT
Start: 2023-06-09 | End: 2023-06-10

## 2023-06-09 RX ORDER — MORPHINE SULFATE 50 MG/1
4 CAPSULE, EXTENDED RELEASE ORAL ONCE
Refills: 0 | Status: DISCONTINUED | OUTPATIENT
Start: 2023-06-09 | End: 2023-06-09

## 2023-06-09 RX ORDER — HEPARIN SODIUM 5000 [USP'U]/ML
5000 INJECTION INTRAVENOUS; SUBCUTANEOUS ONCE
Refills: 0 | Status: COMPLETED | OUTPATIENT
Start: 2023-06-09 | End: 2023-06-09

## 2023-06-09 RX ORDER — TRANEXAMIC ACID 100 MG/ML
1000 INJECTION, SOLUTION INTRAVENOUS ONCE
Refills: 0 | Status: COMPLETED | OUTPATIENT
Start: 2023-06-10 | End: 2023-06-10

## 2023-06-09 RX ORDER — SODIUM CHLORIDE 9 MG/ML
1000 INJECTION INTRAMUSCULAR; INTRAVENOUS; SUBCUTANEOUS
Refills: 0 | Status: DISCONTINUED | OUTPATIENT
Start: 2023-06-09 | End: 2023-06-12

## 2023-06-09 RX ADMIN — MORPHINE SULFATE 4 MILLIGRAM(S): 50 CAPSULE, EXTENDED RELEASE ORAL at 18:12

## 2023-06-09 RX ADMIN — HEPARIN SODIUM 5000 UNIT(S): 5000 INJECTION INTRAVENOUS; SUBCUTANEOUS at 23:49

## 2023-06-09 NOTE — ED PROVIDER NOTE - OBJECTIVE STATEMENT
62y male with a PMHx of colon cancer on chemo and left knee surgery s/p broken knee arrives to the ED BIBEMS c/o left knee pain s/p mechanical slip and fall. Per EMS, patient was alert and oriented, hypotensive, and pale.

## 2023-06-09 NOTE — PATIENT PROFILE ADULT - LANGUAGE ASSISTANCE NEEDED
no need for  at this time, able to understand and verbalize understanding in english thus far.  will use  if needed./Yes-Patient/Caregiver accepts free interpretation services...

## 2023-06-09 NOTE — ED ADULT NURSE NOTE - INTERPRETATION SERVICES DECLINED
Son is coming and speak Angolan and english.Pt is able to communicate in English./Patient/Caregiver requests family/friend to interpret.

## 2023-06-09 NOTE — ED PROVIDER NOTE - MUSCULOSKELETAL, MLM
Spine appears normal, range of motion is not limited, no muscle or joint tenderness Tenderness on the left knee and left patella present. Left knee ROM is limited due to swelling.

## 2023-06-09 NOTE — PATIENT PROFILE ADULT - FUNCTIONAL ASSESSMENT - BASIC MOBILITY 2.
1048: Dr. Peter paged.    1049: Dr. Peter returned page. Updated on pt. Elevated BP's during epidural (150/103, 148/103, 147/99). Reported recent BP's (110/58, 112/75). Variables x1. Unable to start pitocin due to contractions every 2-3 minutes. MD states that she will order additional labs including PCR and place orders for hypertensive protocols in case of increased BP's. Will continue to monitor.    1340: Pt delivered baby girl via  at 1203 on 3/21/2019. APGARS 8/9. Thu maneuver, woodscrew (Dr. Peter), and suprapubic pressure applied for shoulder dystocia. Baby moves all extremities well, and no crepitance noted per Harmony RN (nursery). Pt currently attempting to breastfeed. Lactation consulted. Last BP's 120/66, 137/79. Denies headache, blurred vision, upper gastric pain, N/V. VSS. Will continue to monitor.    1411: Dr. Peter called. Okay to modify ibuprofen order to include now.           3 = A little assistance

## 2023-06-09 NOTE — PATIENT PROFILE ADULT - NSPROHMSYMPCOND_GEN_A_NUR
[address] [facility]   Physician Orders and Discharge Instructions  [address] [facility], [address]  Telephone: [phone]    NAME:  Bree Trujillo OF BIRTH:  1953  MEDICAL RECORD NUMBER:  [id number]  DATE:  1/16/2023    Discharge condition: Stable    Discharge to: Home    Left via:Private automobile    Accompanied by:  self    ECF/[facility]: [facility]     Dressing Orders:  Left hand  Wash with soap and water. Apply Aquacel Ag to wound bed cut to size of the wound, cover with Mepilex Border dressing. Change dressing daily and as needed if dressing saturated. May use ace wrap, but wrap loosely to help hold dressing in place    Right lateral forearm:   Wash with soap and water. Saline moistened gauze to wound bed, cover with dry gauze, roll gauze and medipore tape. May use stretchnet to help hold in place. Change dressing twice daily. Incision lines on bilateral arms wash with soap and water, cover with mepilex silcone border dressings. Change daily . Treatment Orders:Protein rich diet (unless restricted by your physician)  Multivitamin daily  Elevate left hand 3-4 times per day above level of the heart     May shower     [facility] follow up visit ______________1 week with Patricia Elizabeth_______________  (Please note your next appointment above and if you are unable to keep, kindly give a 24 hour notice. Thank you.)          If you experience any of the following, please call the [facility] during business hours:    * Increase in Pain  * Temperature over 101  * Increase in drainage from your wound  * Drainage with a foul odor  * Bleeding  * Increase in swelling  * Need for compression bandage changes due to slippage, breakthrough drainage. If you need medical attention outside of the business hours of the [facility] please contact your PCP or go to the nearest emergency room.
cancer

## 2023-06-09 NOTE — ED ADULT TRIAGE NOTE - CHIEF COMPLAINT QUOTE
Pt arrives to ED s/p fall. Pt states he had mechanical slip and fall. Upon EMS arrival pt was awake, alert hypotensive, pale. Pt complains of left knee pain. Hx colon cancer on chemo.

## 2023-06-09 NOTE — PATIENT PROFILE ADULT - FALL HARM RISK - HARM RISK INTERVENTIONS

## 2023-06-09 NOTE — ED ADULT NURSE NOTE - LANGUAGE ASSISTANCE NEEDED
Pt awake and eating breakfast in bed. Morning medications administered. Shift
assessment completed. Telemetry on with NSR. NS infusing @100mL/hr in L
forearm intact; no edema or redness. ACE wrap on L inner thigh CDI.
Call light within reach. No-Patient/Caregiver offered and refused free interpretation services.

## 2023-06-09 NOTE — ED ADULT NURSE NOTE - NSFALLHARMRISKINTERV_ED_ALL_ED

## 2023-06-09 NOTE — ED ADULT NURSE NOTE - OBJECTIVE STATEMENT
Pt arrives to ED s/p fall. Pt states he had mechanical slip and fall and bent his left knee. Left knee swelling present. Pt states pain when he moves the left knee.

## 2023-06-10 ENCOUNTER — TRANSCRIPTION ENCOUNTER (OUTPATIENT)
Age: 63
End: 2023-06-10

## 2023-06-10 LAB
24R-OH-CALCIDIOL SERPL-MCNC: 9.1 NG/ML — LOW (ref 30–80)
ALBUMIN SERPL ELPH-MCNC: 3 G/DL — LOW (ref 3.3–5)
ALP SERPL-CCNC: 163 U/L — HIGH (ref 40–120)
ALT FLD-CCNC: 25 U/L — SIGNIFICANT CHANGE UP (ref 12–78)
ANION GAP SERPL CALC-SCNC: 5 MMOL/L — SIGNIFICANT CHANGE UP (ref 5–17)
APTT BLD: 27.1 SEC — LOW (ref 27.5–35.5)
AST SERPL-CCNC: 14 U/L — LOW (ref 15–37)
BASOPHILS # BLD AUTO: 0.05 K/UL — SIGNIFICANT CHANGE UP (ref 0–0.2)
BASOPHILS NFR BLD AUTO: 0.4 % — SIGNIFICANT CHANGE UP (ref 0–2)
BILIRUB SERPL-MCNC: 0.4 MG/DL — SIGNIFICANT CHANGE UP (ref 0.2–1.2)
BUN SERPL-MCNC: 14 MG/DL — SIGNIFICANT CHANGE UP (ref 7–23)
CALCIUM SERPL-MCNC: 8 MG/DL — LOW (ref 8.5–10.1)
CHLORIDE SERPL-SCNC: 110 MMOL/L — HIGH (ref 96–108)
CO2 SERPL-SCNC: 26 MMOL/L — SIGNIFICANT CHANGE UP (ref 22–31)
CREAT SERPL-MCNC: 0.82 MG/DL — SIGNIFICANT CHANGE UP (ref 0.5–1.3)
EGFR: 99 ML/MIN/1.73M2 — SIGNIFICANT CHANGE UP
EOSINOPHIL # BLD AUTO: 0.03 K/UL — SIGNIFICANT CHANGE UP (ref 0–0.5)
EOSINOPHIL NFR BLD AUTO: 0.2 % — SIGNIFICANT CHANGE UP (ref 0–6)
GLUCOSE SERPL-MCNC: 116 MG/DL — HIGH (ref 70–99)
HCT VFR BLD CALC: 27.2 % — LOW (ref 39–50)
HCT VFR BLD CALC: 30 % — LOW (ref 39–50)
HGB BLD-MCNC: 8.2 G/DL — LOW (ref 13–17)
HGB BLD-MCNC: 9.6 G/DL — LOW (ref 13–17)
IMM GRANULOCYTES NFR BLD AUTO: 0.9 % — SIGNIFICANT CHANGE UP (ref 0–0.9)
INR BLD: 1.34 RATIO — HIGH (ref 0.88–1.16)
LYMPHOCYTES # BLD AUTO: 1.62 K/UL — SIGNIFICANT CHANGE UP (ref 1–3.3)
LYMPHOCYTES # BLD AUTO: 11.8 % — LOW (ref 13–44)
MAGNESIUM SERPL-MCNC: 2.1 MG/DL — SIGNIFICANT CHANGE UP (ref 1.6–2.6)
MCHC RBC-ENTMCNC: 27.4 PG — SIGNIFICANT CHANGE UP (ref 27–34)
MCHC RBC-ENTMCNC: 28.7 PG — SIGNIFICANT CHANGE UP (ref 27–34)
MCHC RBC-ENTMCNC: 30.1 GM/DL — LOW (ref 32–36)
MCHC RBC-ENTMCNC: 32 GM/DL — SIGNIFICANT CHANGE UP (ref 32–36)
MCV RBC AUTO: 89.8 FL — SIGNIFICANT CHANGE UP (ref 80–100)
MCV RBC AUTO: 91 FL — SIGNIFICANT CHANGE UP (ref 80–100)
MONOCYTES # BLD AUTO: 1.13 K/UL — HIGH (ref 0–0.9)
MONOCYTES NFR BLD AUTO: 8.2 % — SIGNIFICANT CHANGE UP (ref 2–14)
NEUTROPHILS # BLD AUTO: 10.8 K/UL — HIGH (ref 1.8–7.4)
NEUTROPHILS NFR BLD AUTO: 78.5 % — HIGH (ref 43–77)
PHOSPHATE SERPL-MCNC: 3.7 MG/DL — SIGNIFICANT CHANGE UP (ref 2.5–4.5)
PLATELET # BLD AUTO: 143 K/UL — LOW (ref 150–400)
PLATELET # BLD AUTO: 161 K/UL — SIGNIFICANT CHANGE UP (ref 150–400)
POTASSIUM SERPL-MCNC: 4.1 MMOL/L — SIGNIFICANT CHANGE UP (ref 3.5–5.3)
POTASSIUM SERPL-SCNC: 4.1 MMOL/L — SIGNIFICANT CHANGE UP (ref 3.5–5.3)
PROT SERPL-MCNC: 6.4 GM/DL — SIGNIFICANT CHANGE UP (ref 6–8.3)
PROTHROM AB SERPL-ACNC: 15.6 SEC — HIGH (ref 10.5–13.4)
RBC # BLD: 2.99 M/UL — LOW (ref 4.2–5.8)
RBC # BLD: 3.34 M/UL — LOW (ref 4.2–5.8)
RBC # FLD: 17.8 % — HIGH (ref 10.3–14.5)
RBC # FLD: 18.6 % — HIGH (ref 10.3–14.5)
SODIUM SERPL-SCNC: 141 MMOL/L — SIGNIFICANT CHANGE UP (ref 135–145)
WBC # BLD: 13.75 K/UL — HIGH (ref 3.8–10.5)
WBC # BLD: 17.98 K/UL — HIGH (ref 3.8–10.5)
WBC # FLD AUTO: 13.75 K/UL — HIGH (ref 3.8–10.5)
WBC # FLD AUTO: 17.98 K/UL — HIGH (ref 3.8–10.5)

## 2023-06-10 PROCEDURE — 99223 1ST HOSP IP/OBS HIGH 75: CPT

## 2023-06-10 RX ORDER — ENOXAPARIN SODIUM 100 MG/ML
40 INJECTION SUBCUTANEOUS EVERY 24 HOURS
Refills: 0 | Status: DISCONTINUED | OUTPATIENT
Start: 2023-06-10 | End: 2023-06-10

## 2023-06-10 RX ORDER — ONDANSETRON 8 MG/1
4 TABLET, FILM COATED ORAL ONCE
Refills: 0 | Status: DISCONTINUED | OUTPATIENT
Start: 2023-06-10 | End: 2023-06-10

## 2023-06-10 RX ORDER — HYDROMORPHONE HYDROCHLORIDE 2 MG/ML
0.5 INJECTION INTRAMUSCULAR; INTRAVENOUS; SUBCUTANEOUS
Refills: 0 | Status: DISCONTINUED | OUTPATIENT
Start: 2023-06-10 | End: 2023-06-10

## 2023-06-10 RX ORDER — CEFAZOLIN SODIUM 1 G
2000 VIAL (EA) INJECTION EVERY 8 HOURS
Refills: 0 | Status: COMPLETED | OUTPATIENT
Start: 2023-06-10 | End: 2023-06-11

## 2023-06-10 RX ORDER — POLYETHYLENE GLYCOL 3350 17 G/17G
17 POWDER, FOR SOLUTION ORAL AT BEDTIME
Refills: 0 | Status: DISCONTINUED | OUTPATIENT
Start: 2023-06-10 | End: 2023-06-14

## 2023-06-10 RX ORDER — SODIUM CHLORIDE 9 MG/ML
1000 INJECTION INTRAMUSCULAR; INTRAVENOUS; SUBCUTANEOUS
Refills: 0 | Status: DISCONTINUED | OUTPATIENT
Start: 2023-06-10 | End: 2023-06-12

## 2023-06-10 RX ORDER — OXYCODONE HYDROCHLORIDE 5 MG/1
5 TABLET ORAL
Refills: 0 | Status: DISCONTINUED | OUTPATIENT
Start: 2023-06-10 | End: 2023-06-14

## 2023-06-10 RX ORDER — FOLIC ACID 0.8 MG
1 TABLET ORAL DAILY
Refills: 0 | Status: DISCONTINUED | OUTPATIENT
Start: 2023-06-10 | End: 2023-06-14

## 2023-06-10 RX ORDER — SODIUM CHLORIDE 9 MG/ML
1000 INJECTION, SOLUTION INTRAVENOUS
Refills: 0 | Status: DISCONTINUED | OUTPATIENT
Start: 2023-06-10 | End: 2023-06-12

## 2023-06-10 RX ORDER — ONDANSETRON 8 MG/1
4 TABLET, FILM COATED ORAL EVERY 6 HOURS
Refills: 0 | Status: DISCONTINUED | OUTPATIENT
Start: 2023-06-10 | End: 2023-06-14

## 2023-06-10 RX ORDER — SIMETHICONE 80 MG/1
1 TABLET, CHEWABLE ORAL
Qty: 0 | Refills: 0 | DISCHARGE

## 2023-06-10 RX ORDER — OXYCODONE HYDROCHLORIDE 5 MG/1
2.5 TABLET ORAL EVERY 4 HOURS
Refills: 0 | Status: DISCONTINUED | OUTPATIENT
Start: 2023-06-10 | End: 2023-06-10

## 2023-06-10 RX ORDER — ONDANSETRON 8 MG/1
1 TABLET, FILM COATED ORAL
Qty: 0 | Refills: 0 | DISCHARGE

## 2023-06-10 RX ORDER — MORPHINE SULFATE 50 MG/1
1 CAPSULE, EXTENDED RELEASE ORAL
Refills: 0 | Status: DISCONTINUED | OUTPATIENT
Start: 2023-06-10 | End: 2023-06-14

## 2023-06-10 RX ORDER — OXYCODONE HYDROCHLORIDE 5 MG/1
10 TABLET ORAL
Refills: 0 | Status: DISCONTINUED | OUTPATIENT
Start: 2023-06-10 | End: 2023-06-14

## 2023-06-10 RX ORDER — ENOXAPARIN SODIUM 100 MG/ML
40 INJECTION SUBCUTANEOUS EVERY 24 HOURS
Refills: 0 | Status: DISCONTINUED | OUTPATIENT
Start: 2023-06-11 | End: 2023-06-14

## 2023-06-10 RX ORDER — MAGNESIUM HYDROXIDE 400 MG/1
30 TABLET, CHEWABLE ORAL DAILY
Refills: 0 | Status: DISCONTINUED | OUTPATIENT
Start: 2023-06-10 | End: 2023-06-14

## 2023-06-10 RX ORDER — OXYCODONE HYDROCHLORIDE 5 MG/1
5 TABLET ORAL ONCE
Refills: 0 | Status: DISCONTINUED | OUTPATIENT
Start: 2023-06-10 | End: 2023-06-10

## 2023-06-10 RX ORDER — SENNA PLUS 8.6 MG/1
2 TABLET ORAL AT BEDTIME
Refills: 0 | Status: DISCONTINUED | OUTPATIENT
Start: 2023-06-10 | End: 2023-06-14

## 2023-06-10 RX ORDER — FENTANYL CITRATE 50 UG/ML
50 INJECTION INTRAVENOUS
Refills: 0 | Status: DISCONTINUED | OUTPATIENT
Start: 2023-06-10 | End: 2023-06-10

## 2023-06-10 RX ORDER — ACETAMINOPHEN 500 MG
650 TABLET ORAL EVERY 6 HOURS
Refills: 0 | Status: DISCONTINUED | OUTPATIENT
Start: 2023-06-10 | End: 2023-06-14

## 2023-06-10 RX ORDER — GABAPENTIN 400 MG/1
300 CAPSULE ORAL THREE TIMES A DAY
Refills: 0 | Status: DISCONTINUED | OUTPATIENT
Start: 2023-06-10 | End: 2023-06-14

## 2023-06-10 RX ORDER — SODIUM CHLORIDE 9 MG/ML
1000 INJECTION, SOLUTION INTRAVENOUS
Refills: 0 | Status: DISCONTINUED | OUTPATIENT
Start: 2023-06-10 | End: 2023-06-10

## 2023-06-10 RX ADMIN — SENNA PLUS 2 TABLET(S): 8.6 TABLET ORAL at 22:10

## 2023-06-10 RX ADMIN — OXYCODONE HYDROCHLORIDE 5 MILLIGRAM(S): 5 TABLET ORAL at 19:00

## 2023-06-10 RX ADMIN — SODIUM CHLORIDE 75 MILLILITER(S): 9 INJECTION INTRAMUSCULAR; INTRAVENOUS; SUBCUTANEOUS at 10:19

## 2023-06-10 RX ADMIN — MORPHINE SULFATE 1 MILLIGRAM(S): 50 CAPSULE, EXTENDED RELEASE ORAL at 13:18

## 2023-06-10 RX ADMIN — MORPHINE SULFATE 1 MILLIGRAM(S): 50 CAPSULE, EXTENDED RELEASE ORAL at 13:03

## 2023-06-10 RX ADMIN — Medication 100 MILLIGRAM(S): at 22:10

## 2023-06-10 RX ADMIN — Medication 400 MILLIGRAM(S): at 05:44

## 2023-06-10 RX ADMIN — TRANEXAMIC ACID 220 MILLIGRAM(S): 100 INJECTION, SOLUTION INTRAVENOUS at 15:05

## 2023-06-10 RX ADMIN — SODIUM CHLORIDE 75 MILLILITER(S): 9 INJECTION INTRAMUSCULAR; INTRAVENOUS; SUBCUTANEOUS at 00:24

## 2023-06-10 RX ADMIN — OXYCODONE HYDROCHLORIDE 5 MILLIGRAM(S): 5 TABLET ORAL at 18:24

## 2023-06-10 RX ADMIN — TRANEXAMIC ACID 220 MILLIGRAM(S): 100 INJECTION, SOLUTION INTRAVENOUS at 17:15

## 2023-06-10 RX ADMIN — HYDROMORPHONE HYDROCHLORIDE 0.5 MILLIGRAM(S): 2 INJECTION INTRAMUSCULAR; INTRAVENOUS; SUBCUTANEOUS at 19:00

## 2023-06-10 RX ADMIN — Medication 1000 MILLIGRAM(S): at 06:01

## 2023-06-10 RX ADMIN — GABAPENTIN 300 MILLIGRAM(S): 400 CAPSULE ORAL at 22:10

## 2023-06-10 RX ADMIN — HYDROMORPHONE HYDROCHLORIDE 0.5 MILLIGRAM(S): 2 INJECTION INTRAMUSCULAR; INTRAVENOUS; SUBCUTANEOUS at 18:24

## 2023-06-10 NOTE — DISCHARGE NOTE PROVIDER - CARE PROVIDER_API CALL
Cameron Fischer  Orthopaedic Surgery  [address] [place] [address], [facility] [address]  [place] [address]  Phone: [phone]  Fax: [phone]  Follow Up Time:

## 2023-06-10 NOTE — DISCHARGE NOTE PROVIDER - NSDCMRMEDTOKEN_GEN_ALL_CORE_FT
gabapentin 300 mg oral tablet: orally 3 times a day   acetaminophen 325 mg oral tablet: 2 tab(s) orally every 6 hours As needed Temp greater or equal to 38C (100.4F), Mild Pain (1 - 3)  enoxaparin 40 mg/0.4 mL injectable solution: 40 milligram(s) subcutaneously once a day take until you f/u with your hematologist to see if you need the treatment dose. MDD: 40mg  ergocalciferol: 50,000 international unit(s) once a week on Mondays  folic acid 1 mg oral tablet: 1 tab(s) orally once a day  gabapentin 300 mg oral tablet: orally 3 times a day  Multiple Vitamins oral tablet: 1 tab(s) orally once a day  oxyCODONE 10 mg oral tablet: 1 tab(s) orally every 3 hours As needed Severe Pain (7 - 10)  oxyCODONE 5 mg oral tablet: 1 tab(s) orally every 3 hours As needed Moderate Pain (4 - 6)  polyethylene glycol 3350 oral powder for reconstitution: 17 gram(s) orally once a day (at bedtime)  senna leaf extract oral tablet: 2 tab(s) orally once a day (at bedtime)   acetaminophen 325 mg oral tablet: 2 tab(s) orally every 6 hours as needed for Temp greater or equal to 38C (100.4F), Mild Pain (1 - 3) obtain over the counter  Drisdol 1.25 mg (50,000 intl units) oral capsule: 1 cap(s) orally once a week on Mondays: please see your doctor for refills  enoxaparin 40 mg/0.4 mL injectable solution: 40 milligram(s) subcutaneously once a day take until you f/u with your hematologist to see if you need the treatment dose. MDD: 40mg  gabapentin 300 mg oral tablet: orally 3 times a day  oxyCODONE 5 mg oral tablet: 1 tab(s) orally every 4 hours as needed for  moderate pain MDD: 6 tabs

## 2023-06-10 NOTE — PROGRESS NOTE ADULT - SUBJECTIVE AND OBJECTIVE BOX
Postop Check s/p L distal femur ORIF    Patient tolerated the procedure well. Patient seen and examined at bedside.  No acute complaints at this time. Pain well controlled. Denies chest pain, shortness of breath, nausea or vomiting. Received a unit preoperatively and a unit postoperatively.    PE:  Vital Signs Last 24 Hrs  T(C): 36.6 (06-10-23 @ 20:50), Max: 37.2 (06-09-23 @ 21:43)  T(F): 97.9 (06-10-23 @ 20:50), Max: 99 (06-09-23 @ 21:43)  HR: 82 (06-10-23 @ 20:50) (77 - 91)  BP: 134/78 (06-10-23 @ 20:50) (117/71 - 154/76)  BP(mean): 88 (06-09-23 @ 22:07) (88 - 102)  RR: 18 (06-10-23 @ 20:50) (12 - 20)  SpO2: 98% (06-10-23 @ 20:50) (97% - 100%)    General: NAD, resting comfortably in bed  LLE:   Knee immobilizer in place  Dressing in place C/D/I  SCDs in place   No calf tenderness   TA/EHL/FHL/GSC+  SILT L2-S1  DP/PT 2+      A/P:  62y m s/p L distal femur ORIF POD#0    -Received 1 unit preoperatively and another unit in PACU postoperatively   -PT/OT   -NWB of LLE with KI in place  -Pain Control as needed  -DVT ppx: Per AC team  -Continue perioperative abx x 24 hours  -FU AM Labs  -Rest, ice, compress and elevate the extremity as we needed  -Incentive Spirometry  -Medical management appreciated  -Dispo pending PT eval  -Discussed above with Dr. Fischer, who agrees with plan

## 2023-06-10 NOTE — CHART NOTE - NSCHARTNOTEFT_GEN_A_CORE
Pt seen and examined. Chart/labs reviewed. Pt admitted earlier today.   62M, pmh of colon ca with mets to liver/lung/adrenal gland on chemo (follows with Dr. 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   -For OR today.   -pain control  -physical therapy post op  -incentive spirometry  -bowel regimen.   -dvt px-start post op. 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 Maximus Cervantes for evaluation of longstanding history of left buttock pain with radiation to left lower extremity which has been going on for the last 6-7 years, worse for the last 6-7 weeks.  There was no history of trauma or any inciting event that he can recall which started symptoms years ago or made it worse more recently.  Majority of his pain is over left buttock which radiates to left posterior proximal thigh and then he feels \"burning\" sensation over the medial aspect of left foot mostly over the plantar surface.  On questioning, he did mention that occasionally he feels some discomfort over left shin bone area.  Sitting is particularly bothersome.  Coughing, sneezing or straining in toilet do not increase his symptoms.  Heavy lifting may increase his symptoms.  He has no significant medical condition other than temporomandibular joint pain and recently had a syncopal attack and he mentioned that he had bladder and bowel incontinence during that incident.  The cause of that one time syncopal attack is not very clear at this point.  He takes naproxen and Flexeril as needed for temporomandibular pain and at times he takes ibuprofen for his left buttock and left lower extremity pain which helps.  Currently he lives alone.  He tells me that his family is in [place].  He denies smoking.  He drinks 1-2 drinks a week.  He denies history of drug or alcohol abuse.  He denies any significant family history of  spine related issues.    · Severity - Current is 7; best is 5/10; worst is 10/10.  · Time of day when pain is usually worse - no difference  · Sitting tolerance - 10-30 minutes  · Standing tolerance - unlimited  · Walking tolerance - unlimited  · Significant recent change with bladder and/or bowel control - No  · Involved in a legal process regarding current symptoms - No  · History of similar symptoms in the past - Yes  · Is the patient on any blood thinners other than aspirin (i.e. Coumadin or Plavix) - No  · Is the patient aware of any contrast dye allergy - No    TREATMENT FOR CURRENT SYMPTOMS:  · Medications -  Yes  · Physical Therapy - No  · Chiropractic - No  · Epidural/spinal injection - No  · Spine surgery - No       REVIEW OF SYSTEMS  Have you been experiencing any of the following lately?: (check all that apply)    GENERAL:  [x]  Fatigue  []  Fever  []  Loss of appetite   []  Unexplained weight loss  HEENT:  []  Blurred vision  []  Hoarseness   ENDOCRINE:  []  Chills  []  Night sweats  GASTROINTESTINAL:  []  Heartburn/acid stomach  []  Abdominal pain  GENITOURINARY:  []  Loss of bladder and/or bowel control  HEMATOLOGICAL:   []  Bleeding disorder  MUSCULOSKELETAL:   []  Joint pain  NEUROLOGICAL:  []  Headache  []  Numbness  PSYCHOLOGICAL:  []  Depression and/or anxiety  INTEGUMENTARY:  []  Rash    PAST MEDICAL HISTORY:  Past Medical History:   Diagnosis Date   • Sciatica    • Thalassemia minor         Past Surgical History:   Procedure Laterality Date   • No past surgeries          FAMILY HISTORY:  Any significant family history of back/spine related issues - No    SOCIAL HISTORY:  Living situation: The patient lives with self  Work status:  Full Time  HABITS:  Does the patient have a history of drug or alcohol abuse - No    Social History     Tobacco Use   Smoking Status Never Smoker   Smokeless Tobacco Never Used        Social History     Substance and Sexual Activity   Alcohol Use Yes   • Alcohol/week:  1.0 standard drinks   • Types: 1 Cans of beer per week   • Frequency: Monthly or less    Comment: 2 beers a month        PHYSICAL EXAMINATION:  GENERAL: Alert and oriented x 3.   The patient is in no acute distress at this time.   The patient has a normal affect.  Body habitus: 23.31kg     LUMBAR SPINE EXAMINATION:  MUSCULOSKELETAL: Lumbar spine range of motions are fairly full without any specific pain at this time.   Sacroiliac joint anterior provocation tests are negative bilaterally.  Hip joint ROMs are normal bilaterally.   There is no tenderness over lumbar spine on PA pressure.  There is moderate tenderness over left lower lumbar paraspinals which possibly corresponds to left L4-5 and/or left L5-S1 facet joint level.   There is mild tenderness over left gluteal area.  There is no tenderness over ischial or trochanteric areas on either side.  There is no obvious deformity noted over the lumbar spine or over lower extremities on inspection.  NEUROLOGIC:  Straight leg raise is negative on the right.  Straight leg raise of left leg reproduced the \"burning\" sensation over left foot and some discomfort over left buttock and proximal posterior thigh.  Femoral stretch test is normal bilaterally.   Light touch and pinprick sensation is slightly diminished over left L4 dermatome.   Knee and ankle reflexes are normal and symmetric bilaterally.   Babinski sign is negative bilaterally.   Ankle clonus is absent bilaterally.   Muscle tone is normal at all extremities.   Manual muscle testing shows normal and equal strength at both lower extremities.   No focal muscle atrophy noted in the lower extremities.     GAIT: Gait is within normal limits.    DATA:  MRI of lumbar spine done on 08/31/2020.  I reviewed the images which showed mild decreased T2 signal intensity at L4-5 where there is a foraminal disc with more prominent far lateral disc on the left at L4-5 involving left L4 nerve root.  There is also changes at L4-5  Pt seen and examined. Chart/labs reviewed. Pt admitted earlier today.   62M, pmh of colon ca with mets to liver/lung/adrenal gland on chemo (follows with Dr. 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   -For OR today.  -s/p 1 unit prbcs for hgb 8.2 this am.   -pain control  -physical therapy post op  -incentive spirometry  -bowel regimen.   -dvt px-start post op. facet joint along with possible small facet joint cyst indenting dorsal thecal sac on the left without nerve root compression.    ASSESSMENT:  Longstanding history of left buttock pain with some radiation and paresthesia over left lower extremity mostly over left medial foot at the plantar surface and some pain over left proximal posterior thigh with occasional discomfort over left shin bone area.  Symptoms have been going on for the last 6-7 years, recently worse for the last 6-7 weeks.  Positive straight leg raise on the left reproducing \"burning\" sensation over left foot, slight decreased light touch and pinprick sensation over left L4 dermatome and the findings on the left at L4-5 at the foramen and extraforaminal area of an extruded disc suggest possible left L4 nerve root involvement causing his symptoms.  There is also some finding over left L4-5 facet joint with possible small facet cyst developing there, though I am not sure if this finding fits with his symptoms very well.  Some localized tenderness over this area which could be from facet joint change on the left at L4-5.    PLAN:  I discussed with Dr. Faria possible etiology of his symptoms and different treatment options.  I shared MRI images with him.  I recommended physical therapy.  I also discussed with him about a course of oral steroid followed by nonsteroidal anti-inflammatory analgesics for short term.  He has naproxen 500 mg and he may take that regularly for next 2 weeks to see if that makes any difference.  He does not want oral steroid at this point.  I also discussed with him about left L4-5 transforaminal selective nerve root injection of local anesthetic and cortisone under fluoroscopic guidance for some symptomatic relief.  Risks and benefits of the procedure including but not limited to infection, bleeding, nerve injury, spinal cord injury were all discussed and he will consider that option.  He understands that the injection is not a  fix.  Considering that his symptoms have been bothering him for many years, I am not sure if the injection will offer any significant long-term relief. I recommended surgical consultation to see if he might benefit from diskectomy for left L4-5 foraminal and extraforaminal extruded disc as well as any intervention for the facet joint changes on the left at L4-5 with possible facet joint cyst.  He will follow up with me after physical therapy.  He is agreeable with this plan.  All the questions were answered and I consider him well informed.    PLAN REFERRED CLINIC: A copy of the consultation note has been sent to the requesting provider Maximus Cervantes MD. Thank you for allowing me to take part in the care of Mr. Faria. If you have any questions, please feel free to contact me.     Program status: Continue in [facility] Program

## 2023-06-10 NOTE — DISCHARGE NOTE PROVIDER - NSDCCPTREATMENT_GEN_ALL_CORE_FT
PRINCIPAL PROCEDURE  Procedure: ORIF, fracture, distal femur, left  Findings and Treatment: Right; Synthes Retrograde nail

## 2023-06-10 NOTE — H&P ADULT - ASSESSMENT
61 y/o M w/ PMH of colon cancer stage IV w/ mets to liver(on chemo), catheter tip thrombus, p/w mechanical fall    *Displaced medial femoral condyle fracture s/p mechanical fall  -Patient evaluated by ortho at bedside, plans for surgery tomorrow AM, and recommends: NPO after MN + knee immobilizer + NWB LLE  -EKG: NSR 85 bpm, QTc = 457 (Avoid QT prolonging meds)  -As per RCRI criteria, patient is considered a low risk for major cardiac complications in perioperative period for intermediate risk surgery. No contraindications from hospitalist perspective  -Pain control   -IVF     *Leukocytosis  -No signs and symptoms of infection at this time  -Recheck in AM    *Colon cancer stage IV  -States he is suppose to get next chemo on Monday  -Onc consult     *Catheter tib thrombus   -States he is off of anticoagulation at this time     *DVT ppx   -SCD 2/2 OR in AM  63 y/o M w/ PMH of colon cancer stage IV w/ mets to liver(on chemo), catheter tip thrombus, p/w mechanical fall    *Displaced medial femoral condyle fracture s/p mechanical fall  -Patient evaluated by ortho at bedside, plans for surgery tomorrow AM, and recommends: NPO after MN + knee immobilizer + NWB LLE  -EKG: NSR 85 bpm, QTc = 457 (Avoid QT prolonging meds)  -As per RCRI criteria, patient is considered a low risk for major cardiac complications in perioperative period for intermediate risk surgery. No contraindications from hospitalist perspective  -Pain control   -IVF     *Leukocytosis  -No signs and symptoms of infection at this time  -Recheck in AM    *Colon cancer stage IV  -States he is suppose to get next chemo on Monday  -Onc consult     *Catheter tib thrombus   -States he is off of anticoagulation at this time     *DVT ppx   -SCD 2/2 OR in AM    61 y/o M w/ PMH of colon cancer stage IV w/ mets to liver(on chemo), catheter tip thrombus, p/w mechanical fall    *Displaced medial femoral condyle fracture s/p mechanical fall  -Patient evaluated by ortho at bedside, plans for surgery tomorrow AM, and recommends: NPO after MN + knee immobilizer + NWB LLE  -EKG: NSR 85 bpm, QTc = 457 (Avoid QT prolonging meds)  -As per RCRI criteria, patient is considered a low risk for major cardiac complications in perioperative period for intermediate risk surgery. No contraindications from hospitalist perspective  -Pain control   -IVF     *Leukocytosis  -WBC = 17  -No signs and symptoms of infection at this time  -Recheck in AM    *Colon cancer stage IV w/ anemia   -States he is suppose to get next chemo on Monday  -Hb = 9.4   -Onc consult     *Catheter tib thrombus   -States he is off of anticoagulation at this time     *DVT ppx   -SCD 2/2 OR in AM

## 2023-06-10 NOTE — DISCHARGE NOTE PROVIDER - REASON FOR ADMISSION
mechanical fall mechanical fall: left femoral condyle fracture mechanical fall left condyle femur fracture

## 2023-06-10 NOTE — DISCHARGE NOTE PROVIDER - ATTENDING DISCHARGE PHYSICAL EXAMINATION:
Patient is seen and examined at bedside. Pt is up and ambulating with walker/ NWB to LLE. HH better post-transfusion. Stable for discharge home today. Has appointment for DR Harrison oncology on Monday

## 2023-06-10 NOTE — BRIEF OPERATIVE NOTE - NSICDXBRIEFPOSTOP_GEN_ALL_CORE_FT
POST-OP DIAGNOSIS:  Fracture of femur, distal, left, closed 10-Barrett-2023 17:51:04  Price Porter

## 2023-06-10 NOTE — CHART NOTE - NSCHARTNOTEFT_GEN_A_CORE
Hospitalist addendum:  pt seen and examined this am. Getting HD.   Admitted earlier today with foot fracture.   Denied pain at time of exam.   d/w podiatry, OR to be postponed.   Hyperkalemia should improve with HD.   rest of plan as outlined in H+P.

## 2023-06-10 NOTE — DISCHARGE NOTE PROVIDER - NSDCFUADDINST_GEN_ALL_CORE_FT
Discharge Instructions:    1. ACTIVITY: No weight bearing on the right leg; keep the knee immobilizer on at all times  2. CALL FOR: fever over 101, wound redness, drainage or open area, calf pain/calf swelling.  3. BANDAGE: Keep dressings clean, dry, and intact. Keep KARIN dressings on until you see Dr. Fischer in the office in several days; otherwise can usually be removed after 7 days.  4. STAPLES: RN Remove Staples POD14 (6/24/23)  5. SHOWER: Okay to shower if you can keep the dressings completely dry. Do not scrub dressing or submerge under water. No baths or hot tubs.   6. DVT PE Prophylaxis: per anticoagulation team recommendations, follow med reconciliation recommendations.  7.  FOLLOW UP: Dr. Fischer in 1 week. Call to schedule.  8. MEDICATION: eRX sent to your pharmacy for  if you go home.   9.**Call office if medications not covered under your insurance, especially BLOOD CLOT PREVENTION/anticoagulant medication.   Discharge Instructions for Left Distal Femur IMN and ORIF:    1. Pain Control.  2. Non-Weight Bearing Left Lower Extremity, with assistive device/rolling walker.  3. Elevate and ICE the extremity as much as possible  4. Continue DVT/PE Prophylaxis as recommended by the Anticoagulation Team. See Med Rec.   6. Out of Bed Daily, try to keep moving.  7. Remove KARIN NO SOONER than POD7 (6/17/23) and place a Mepilex Ag bandage over incision. May change sooner ONLY if KARIN leaks or falls off. DO NOT REMOVE BANDAGE TO CHECK WOUND ON INTAKE. Okay to shower so long as battery pack is kept dry.  8. RN to Remove Staples POD14 (6/24/23).  9. Follow up with Orthopedic Surgeon Dr. Fischer in 10-14 Days. Call Office For Appointment.

## 2023-06-10 NOTE — DISCHARGE NOTE PROVIDER - NSDCFUSCHEDAPPT_GEN_ALL_CORE_FT
[facility]  [facility]  Scheduled Appointment: 06/12/2023    Matilde Mcdaniels  [facility]  [facility]  Scheduled Appointment: 06/12/2023    [facility]  [facility]  Scheduled Appointment: 06/14/2023    Mendoza Martinez  [facility]  ORTHOSURG [address] [facility]  Scheduled Appointment: 07/10/2023    Ta Vergara  [facility]  PHYSMED  [place] [facility]  Scheduled Appointment: 07/17/2023    Carmela Harrison  [facility]  [facility]  Scheduled Appointment: 07/17/2023     [facility]  [place] [facility]  Scheduled Appointment: 06/19/2023    Matilde Mcdaniels  [facility]  [facility] [facility]  Scheduled Appointment: 06/19/2023    [facility]  [place] [facility]  Scheduled Appointment: 06/21/2023    Mendoza Martinez  [facility]  ORTHOSURG [address] [facility]  Scheduled Appointment: 07/10/2023    Ta Vergara  [facility]  PHYSMED  [place] [facility]  Scheduled Appointment: 07/17/2023    Carmela Harrison  [facility]  [facility] [facility]  Scheduled Appointment: 07/17/2023

## 2023-06-10 NOTE — DISCHARGE NOTE PROVIDER - HOSPITAL COURSE
Orthopedic Summary  H&P:  Pt is a 62y Male   PAST MEDICAL & SURGICAL HISTORY:  No pertinent past medical history      Colon cancer      No significant past surgical history            Now s/p Left Distal Femur IM Nail and ORIF for fracture. Pt is afebrile with stable vital signs. Pain is controlled. Exam reveals intact EHL FHL TA GS, +DP. Dressing is clean and dry.    Hospital Course:  Patient presented to [facility] ED after a fall, found to have a hip fracture, and admitted to the Medical Service. Pt was medically cleared prior to surgery. Prophylactic antibiotics were started before the procedure and continued for 24 hours. They were admitted after surgery to the orthopedic floor.  There were no orthopedic complications during the hospital stay. All home medications were continued.    Routine consults were obtained from the Anticoagulation Team for DVT/PE prophylaxis, from Physical Therapy, from Heme-onc for hx of Colon Cancer on Chemotherarpy, and followed by Medicine for Co-management. Patient was placed on  anticoagulation.  Pertinent home medications were continued.  Daily labs were followed.      On POD 0 there were no major issues. Pt received PT daily and was Discharged once cleared per Medicine.  The orthopedic Attending is aware and agrees. See addendum to DC summary per medical team below for any additional info or if any changes. Orthopedic Summary  H&P:  Pt is a 62y Male   PAST MEDICAL & SURGICAL HISTORY:  No pertinent past medical history      Colon cancer      No significant past surgical history            Now s/p Left Distal Femur IM Nail and ORIF for fracture. Pt is afebrile with stable vital signs. Pain is controlled. Exam reveals intact EHL FHL TA GS, +DP. Dressing is clean and dry.    Hospital Course:  Patient presented to [facility] ED after a fall, found to have a distal femur fracture, and admitted to the Medical Service. Pt was medically cleared prior to surgery. Prophylactic antibiotics were started before the procedure and continued for 24 hours. They were admitted after surgery to the orthopedic floor.  There were no orthopedic complications during the hospital stay. All home medications were continued.    Routine consults were obtained from the Anticoagulation Team for DVT/PE prophylaxis, from Physical Therapy, from Heme-onc for hx of Colon Cancer on Chemotherarpy, and followed by Medicine for Co-management. Patient was placed on  anticoagulation.  Pertinent home medications were continued.  Daily labs were followed.      On POD 0 there were no major issues. Pt received PT daily and was Discharged once cleared per Medicine.  The orthopedic Attending is aware and agrees. See addendum to DC summary per medical team below for any additional info or if any changes.   62M, PMH of colon cancer stage IV w/ mets to liver, lung, adrenal gland(on chemo), catheter tip thrombus s/p lovenox (taken off 2 mos ago by hematology per pt), Left tibial plateau fracture s/p orif in the past who presented to the er after a  mechanical fall. Patient states he was mopping and then cleaning microwave, then he turned his R foot and he slipped, and twisted L knee.   He was admitted with a femoral condyle fracture. Taken to OR 6/10, underwent retrograde nailing.  Hospital course remarkable for acute blood loss anemia, transfused with 1 PRBC prior to surgery and again on POD#2. Leukocytosis,  with low grade temps, most likely from hematoma at fx site. Pt was seen by his Oncology team< Next chemo to be postponed x 2 weeks/  H/O thrombosis at catheter site and was on therapeutic lovenox, per pt he stopped it 2 months ago per instructions. Oncology to review at next visit and make recommendations at that time.  Pt is NWB on LLE with knee immobilizer on at all times,  seen by Physical therapy with recs made for rehab.  Pt is now ready for discharge from this hospital with transfer to rehab.                          8.4    7.59  )-----------( 226      ( 14 Jun 2023 07:59 )             27.0       06-14    140  |  111<H>  |  12  ----------------------------<  97  3.7   |  27  |  0.66    Ca    8.3<L>      14 Jun 2023 07:59  Phos  2.6     06-13  Mg     2.0     06-13    < from: Xray Chest 1 View- PORTABLE-Urgent (Xray Chest 1 View- PORTABLE-Urgent .) (06.12.23 @ 13:56) >    IMPRESSION:  Clear lungs.      < from: CT Knee No Cont, Left (06.09.23 @ 19:07) >    IMPRESSION:    Comminuted intra-articular and distracted distal femur fracture as   described above.  Complex knee joint effusion.  Chronic fracture deformity of the left proximal tibia and fibula.      < from: Xray Femur 2 Views, Left (06.09.23 @ 17:45) >    IMPRESSION:  OBLIQUE INTRA-ARTICULAR MEDIAL FEMORAL CONDYLE DISPLACED FRACTURE.  Plate-screw fixation devices stabilizes prior tibial plateau fractures.  Remaining visualized osseous structures radiographically intact.            final diagnoses:  1. Mechanical fall/Left femoral condyle fracture  2. Acute blood loss anemia  3. Leukocytosis/low grade fevers  4. H/o metastatic colon ca  5. DVT prophylaxis          time for discharge: 45 minutes  d/w: pt  discharge summary to be faxed to pt's PCP                 62M, PMH of colon cancer stage IV w/ mets to liver, lung, adrenal gland(on chemo), catheter tip thrombus s/p lovenox (taken off 2 mos ago by hematology per pt), Left tibial plateau fracture s/p orif in the past who presented to the er after a  mechanical fall. Patient states he was mopping and then cleaning microwave, then he turned his R foot and he slipped, and twisted L knee.   He was admitted with a femoral condyle fracture. Taken to OR 6/10, underwent retrograde nailing.  Hospital course remarkable for acute blood loss anemia, transfused with 1 PRBC prior to surgery and again on POD#2. Leukocytosis,  with low grade temps, most likely from hematoma at fx site. Pt was seen by his Oncology team< Next chemo to be postponed x 2 weeks/  H/O thrombosis at catheter site and was on therapeutic lovenox, per pt he stopped it 2 months ago per instructions. Oncology to review at next visit and make recommendations at that time.  Pt is NWB on LLE with knee immobilizer on at all times,  seen by Physical therapy with recs made for rehab.  Pt is now ready for discharge to home                          8.4    7.59  )-----------( 226      ( 14 Jun 2023 07:59 )             27.0       06-14    140  |  111<H>  |  12  ----------------------------<  97  3.7   |  27  |  0.66    Ca    8.3<L>      14 Jun 2023 07:59  Phos  2.6     06-13  Mg     2.0     06-13    < from: Xray Chest 1 View- PORTABLE-Urgent (Xray Chest 1 View- PORTABLE-Urgent .) (06.12.23 @ 13:56) >    IMPRESSION:  Clear lungs.      < from: CT Knee No Cont, Left (06.09.23 @ 19:07) >    IMPRESSION:    Comminuted intra-articular and distracted distal femur fracture as   described above.  Complex knee joint effusion.  Chronic fracture deformity of the left proximal tibia and fibula.      < from: Xray Femur 2 Views, Left (06.09.23 @ 17:45) >    IMPRESSION:  OBLIQUE INTRA-ARTICULAR MEDIAL FEMORAL CONDYLE DISPLACED FRACTURE.  Plate-screw fixation devices stabilizes prior tibial plateau fractures.  Remaining visualized osseous structures radiographically intact.            final diagnoses:  1. Mechanical fall/Left femoral condyle fracture  2. Acute blood loss anemia  3. Leukocytosis/low grade fevers  4. H/o metastatic colon ca  5. DVT prophylaxis          time for discharge: 45 minutes  d/w: pt  discharge summary to be faxed to pt's PCP                 62M, PMH of colon cancer stage IV w/ mets to liver, lung, adrenal gland(on chemo), catheter tip thrombus s/p lovenox (taken off 2 mos ago by hematology per pt), Left tibial plateau fracture s/p orif in the past who presented to the er after a  mechanical fall. Patient states he was mopping and then cleaning microwave, then he turned his R foot and he slipped, and twisted L knee.   He was admitted with a femoral condyle fracture. Taken to OR 6/10, underwent retrograde nailing.  Hospital course remarkable for acute blood loss anemia, transfused with 1 PRBC prior to surgery and again on POD#2. Leukocytosis,  with low grade temps, most likely from hematoma at fx site. Pt was seen by his Oncology team< Next chemo to be postponed x 2 weeks/  H/O thrombosis at catheter site and was on therapeutic lovenox, per pt he stopped it 2 months ago per instructions. Oncology to review at next visit and make recommendations at that time.  Pt is NWB on LLE with knee immobilizer on at all times,  seen by Physical therapy with recs made for rehab.  Pt is now ready for discharge to home                          8.4    7.59  )-----------( 226      ( 14 Jun 2023 07:59 )             27.0       06-14    140  |  111<H>  |  12  ----------------------------<  97  3.7   |  27  |  0.66    Ca    8.3<L>      14 Jun 2023 07:59  Phos  2.6     06-13  Mg     2.0     06-13    < from: Xray Chest 1 View- PORTABLE-Urgent (Xray Chest 1 View- PORTABLE-Urgent .) (06.12.23 @ 13:56) >    IMPRESSION:  Clear lungs.      < from: CT Knee No Cont, Left (06.09.23 @ 19:07) >    IMPRESSION:    Comminuted intra-articular and distracted distal femur fracture as   described above.  Complex knee joint effusion.  Chronic fracture deformity of the left proximal tibia and fibula.      < from: Xray Femur 2 Views, Left (06.09.23 @ 17:45) >    IMPRESSION:  OBLIQUE INTRA-ARTICULAR MEDIAL FEMORAL CONDYLE DISPLACED FRACTURE.  Plate-screw fixation devices stabilizes prior tibial plateau fractures.  Remaining visualized osseous structures radiographically intact.            final diagnoses:  1. Mechanical fall/Left femoral condyle fracture s/p retrograde IMN  2. Acute blood loss anemia from fracture/postop  3. Leukocytosis/low grade fevers  4. H/o metastatic colon ca  5. DVT prophylaxis    time for discharge: 45 minutes  d/w: pt  discharge summary to be faxed to pt's PCP

## 2023-06-10 NOTE — DISCHARGE NOTE PROVIDER - NSDCCPCAREPLAN_GEN_ALL_CORE_FT
PRINCIPAL DISCHARGE DIAGNOSIS  Diagnosis: Femoral condyle fracture  Assessment and Plan of Treatment: RIght     PRINCIPAL DISCHARGE DIAGNOSIS  Diagnosis: Femoral condyle fracture  Assessment and Plan of Treatment: left  rehab for physical therapy  follow instructions from orthopedic team  follow up with Orthopedic surgeon when discharged from rehab  take tylenol or oxycodoen depending on pain level  retrun to the ER if any fevers, or signs of infection at incision site such as redness, abnormal drainage from incision site or any fevers      SECONDARY DISCHARGE DIAGNOSES  Diagnosis: Primary colon cancer with metastasis to other site  Assessment and Plan of Treatment: follow up with your oncologist for plan on furhter chemo     PRINCIPAL DISCHARGE DIAGNOSIS  Diagnosis: Femoral condyle fracture  Assessment and Plan of Treatment: left   physical therapy  at home  follow instructions from orthopedic team  follow up with Orthopedic surgeon per recommendations  take tylenol or oxycodone depending on pain level  return to the ER if any fevers, or signs of infection at incision site such as redness, abnormal drainage from incision site or any fevers      SECONDARY DISCHARGE DIAGNOSES  Diagnosis: Primary colon cancer with metastasis to other site  Assessment and Plan of Treatment: follow up with your oncologist for plan on further chemo on Monday  call for appointment

## 2023-06-10 NOTE — H&P ADULT - HISTORY OF PRESENT ILLNESS
63 y/o M w/ PMH of colon cancer stage IV w/ mets to liver(on chemo), catheter tip thrombus, p/w mechanical fall. Patient states he was mopping and then cleaning microwave, then he turned his R foot and he slipped, and twisted L knee. Denies hitting head, LOC, palpitations, CP, dizziness. Patient denies h/o CAD, CKD, CVA, insulin dependent DM.     PSH: L knee surgery    Social Hx: Denies tobacco / etoh / drugs     Family Hx: Denies significant family hx

## 2023-06-10 NOTE — BRIEF OPERATIVE NOTE - NSICDXBRIEFPROCEDURE_GEN_ALL_CORE_FT
PROCEDURES:  ORIF, fracture, distal femur, left 10-Barrett-2023 17:50:32 Synthes Retrograde nail [employer]

## 2023-06-10 NOTE — PROGRESS NOTE ADULT - SUBJECTIVE AND OBJECTIVE BOX
Pt seen and examined at bedside. Endorses L knee pain with mild improvement after pain medications. Otherwise, denies any other complaints at this time including new numbness/tingling, CP, SOB, N/V.    Vital Signs (24 Hrs):  T(C): 36.7 (06-10-23 @ 06:31), Max: 37.2 (06-09-23 @ 21:43)  HR: 83 (06-10-23 @ 06:31) (81 - 91)  BP: 118/65 (06-10-23 @ 06:31) (113/61 - 154/76)  RR: 16 (06-10-23 @ 06:31) (16 - 20)  SpO2: 98% (06-10-23 @ 06:31) (97% - 100%)  Wt(kg): --    LABS:                          8.2    13.75 )-----------( 161      ( 10 Barrett 2023 04:28 )             27.2     06-10    141  |  110<H>  |  14  ----------------------------<  116<H>  4.1   |  26  |  0.82    Ca    8.0<L>      10 Barrett 2023 04:28  Phos  3.7     06-10  Mg     2.1     06-10    TPro  6.4  /  Alb  3.0<L>  /  TBili  0.4  /  DBili  x   /  AST  14<L>  /  ALT  25  /  AlkPhos  163<H>  06-10    LIVER FUNCTIONS - ( 10 Barrett 2023 04:28 )  Alb: 3.0 g/dL / Pro: 6.4 gm/dL / ALK PHOS: 163 U/L / ALT: 25 U/L / AST: 14 U/L / GGT: x           PT/INR - ( 10 Barrett 2023 04:28 )   PT: 15.6 sec;   INR: 1.34 ratio         PTT - ( 10 Barrett 2023 04:28 )  PTT:27.1 sec      Exam  Gen: NAD  LLE:  Skin intact, no erythema or ecchymosis  Mild swelling over knee  No bony tenderness to palpation  +EHL/FHL/TA/GSC  +SILT: +SPN/DPN/Saph/Aruna/Tib  + DP  Compartments soft and compressible  No calf tenderness      Imaging:    Xray L Knee: Displaced medial femoral condyle fracture with prior tibia plateau ORIF      A/P: 62M with displaced medial femoral condyle fracture s/p mechanical fall    Plan for surgery this morning  NPO except medications, IVF while NPO  Hold chemical DVT ppx until after surgery  Medically optimized for surgery per primary team  Analgesia as needed  Bulky Miles knee immobilizer placed   NWB of LLE  Ice and elevate as tolerated  Medical recommendations appreciated  Received 1U of pRBC preoperatively on 6/10  Discussed with Dr. Fischer, who agrees with the above plan

## 2023-06-11 LAB
ANION GAP SERPL CALC-SCNC: 4 MMOL/L — LOW (ref 5–17)
ANION GAP SERPL CALC-SCNC: 5 MMOL/L — SIGNIFICANT CHANGE UP (ref 5–17)
BUN SERPL-MCNC: 10 MG/DL — SIGNIFICANT CHANGE UP (ref 7–23)
BUN SERPL-MCNC: 10 MG/DL — SIGNIFICANT CHANGE UP (ref 7–23)
CALCIUM SERPL-MCNC: 7.7 MG/DL — LOW (ref 8.5–10.1)
CALCIUM SERPL-MCNC: 7.9 MG/DL — LOW (ref 8.5–10.1)
CHLORIDE SERPL-SCNC: 110 MMOL/L — HIGH (ref 96–108)
CHLORIDE SERPL-SCNC: 111 MMOL/L — HIGH (ref 96–108)
CO2 SERPL-SCNC: 25 MMOL/L — SIGNIFICANT CHANGE UP (ref 22–31)
CO2 SERPL-SCNC: 26 MMOL/L — SIGNIFICANT CHANGE UP (ref 22–31)
CREAT SERPL-MCNC: 0.78 MG/DL — SIGNIFICANT CHANGE UP (ref 0.5–1.3)
CREAT SERPL-MCNC: 0.99 MG/DL — SIGNIFICANT CHANGE UP (ref 0.5–1.3)
EGFR: 101 ML/MIN/1.73M2 — SIGNIFICANT CHANGE UP
EGFR: 86 ML/MIN/1.73M2 — SIGNIFICANT CHANGE UP
GLUCOSE SERPL-MCNC: 120 MG/DL — HIGH (ref 70–99)
GLUCOSE SERPL-MCNC: 157 MG/DL — HIGH (ref 70–99)
HCT VFR BLD CALC: 27.8 % — LOW (ref 39–50)
HGB BLD-MCNC: 8.7 G/DL — LOW (ref 13–17)
MCHC RBC-ENTMCNC: 28 PG — SIGNIFICANT CHANGE UP (ref 27–34)
MCHC RBC-ENTMCNC: 31.3 GM/DL — LOW (ref 32–36)
MCV RBC AUTO: 89.4 FL — SIGNIFICANT CHANGE UP (ref 80–100)
PLATELET # BLD AUTO: 142 K/UL — LOW (ref 150–400)
POTASSIUM SERPL-MCNC: 3.9 MMOL/L — SIGNIFICANT CHANGE UP (ref 3.5–5.3)
POTASSIUM SERPL-MCNC: 4.5 MMOL/L — SIGNIFICANT CHANGE UP (ref 3.5–5.3)
POTASSIUM SERPL-SCNC: 3.9 MMOL/L — SIGNIFICANT CHANGE UP (ref 3.5–5.3)
POTASSIUM SERPL-SCNC: 4.5 MMOL/L — SIGNIFICANT CHANGE UP (ref 3.5–5.3)
RBC # BLD: 3.11 M/UL — LOW (ref 4.2–5.8)
RBC # FLD: 17.9 % — HIGH (ref 10.3–14.5)
SODIUM SERPL-SCNC: 140 MMOL/L — SIGNIFICANT CHANGE UP (ref 135–145)
SODIUM SERPL-SCNC: 141 MMOL/L — SIGNIFICANT CHANGE UP (ref 135–145)
WBC # BLD: 17.65 K/UL — HIGH (ref 3.8–10.5)
WBC # FLD AUTO: 17.65 K/UL — HIGH (ref 3.8–10.5)

## 2023-06-11 PROCEDURE — 99232 SBSQ HOSP IP/OBS MODERATE 35: CPT

## 2023-06-11 PROCEDURE — 99222 1ST HOSP IP/OBS MODERATE 55: CPT

## 2023-06-11 RX ORDER — CEFAZOLIN SODIUM 1 G
2000 VIAL (EA) INJECTION EVERY 8 HOURS
Refills: 0 | Status: COMPLETED | OUTPATIENT
Start: 2023-06-11 | End: 2023-06-13

## 2023-06-11 RX ADMIN — Medication 100 MILLIGRAM(S): at 12:46

## 2023-06-11 RX ADMIN — Medication 100 MILLIGRAM(S): at 05:37

## 2023-06-11 RX ADMIN — GABAPENTIN 300 MILLIGRAM(S): 400 CAPSULE ORAL at 12:45

## 2023-06-11 RX ADMIN — Medication 100 MILLIGRAM(S): at 21:10

## 2023-06-11 RX ADMIN — GABAPENTIN 300 MILLIGRAM(S): 400 CAPSULE ORAL at 05:36

## 2023-06-11 RX ADMIN — OXYCODONE HYDROCHLORIDE 10 MILLIGRAM(S): 5 TABLET ORAL at 05:42

## 2023-06-11 RX ADMIN — Medication 1 MILLIGRAM(S): at 11:03

## 2023-06-11 RX ADMIN — OXYCODONE HYDROCHLORIDE 10 MILLIGRAM(S): 5 TABLET ORAL at 13:43

## 2023-06-11 RX ADMIN — Medication 1 TABLET(S): at 12:45

## 2023-06-11 RX ADMIN — OXYCODONE HYDROCHLORIDE 10 MILLIGRAM(S): 5 TABLET ORAL at 12:45

## 2023-06-11 RX ADMIN — ENOXAPARIN SODIUM 40 MILLIGRAM(S): 100 INJECTION SUBCUTANEOUS at 05:37

## 2023-06-11 RX ADMIN — OXYCODONE HYDROCHLORIDE 10 MILLIGRAM(S): 5 TABLET ORAL at 06:42

## 2023-06-11 RX ADMIN — GABAPENTIN 300 MILLIGRAM(S): 400 CAPSULE ORAL at 21:10

## 2023-06-11 RX ADMIN — Medication 1 TABLET(S): at 11:03

## 2023-06-11 NOTE — PHYSICAL THERAPY INITIAL EVALUATION ADULT - GENERAL OBSERVATIONS, REHAB EVAL
Pt was found lying in bed with IV,LLE KI on, flowtrons, pt premedicated with pain meds, Pt is pleasant and willing to participate in PT, c/o 3/10 pain at rest

## 2023-06-11 NOTE — PHYSICAL THERAPY INITIAL EVALUATION ADULT - ADDITIONAL COMMENTS
pt lives in a PH with 3 steps to enter no HRs on front steps +LESLIE HRs on back steps pt's dtr house has 3 steps to enter no HRs on front steps +LESLIE HRs on back steps

## 2023-06-11 NOTE — PHYSICAL THERAPY INITIAL EVALUATION ADULT - PERTINENT HX OF CURRENT PROBLEM, REHAB EVAL
62y male with a PMHx of colon cancer on chemo and left knee surgery s/p broken knee arrives to the ED BIBEMS c/o left knee pain s/p mechanical slip and fall. Patient states he slipped at home causing him to twist his knee, Endorses baseline neuropathy in hands and feet due to current chemotherapy for colon cancer. Per EMS, patient was alert and oriented, hypotensive, and pale. sustained Distal femoral condyle fracture, now s/p L distal femur ORIF, h/o Had a recent tibia plateau fracture on the L with 2 staged Ex fix to ORIF several months at [facility].

## 2023-06-11 NOTE — PHYSICAL THERAPY INITIAL EVALUATION ADULT - LIVES WITH, PROFILE
pt shares an apartment with friend, about 30 steps to his apt.  Pt 's dtr has 3 steps to enter house-pt likes to go there once dc/friend

## 2023-06-11 NOTE — CONSULT NOTE ADULT - ASSESSMENT
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 No MMR deficiency  Primary Oncologist - Dr Harrison    - 08/18/22 Colonoscopy showed a frond-like/villous and ulcerated non-obstructing large mass, measuring 5 cm in the descending colon. The mass was circumferential  - Path: Invasive adenocarcinoma, moderately differentiated. No loss of nuclear expression of MMR proteins (MLH1, MSH2, MSH6, and PMS2). Low probability of microsatellite instability  - 08/16/22 CEA = 633  - 08/19/22 Path: Liver, core biopsy: Metastatic adenocarcinoma, consistent with metastasis from colonic primary  - He was started on FOLFIRI- 10/19/22 (the delay was due to fall 09/22--->he underwent  ORIF on 09/29/22 of the left tibial plateau)  - 01/09/23 CT C/A/P: Findings c/w descending colon cancer with extension through the wall with extensive mesenteric confluent tumor and/or ivelisse disease. Diffuse metastatic disease identified throughout the liver stable/ some slightly improved  - Stared on Lovenox 120 mg sc daily per Dr Harrison for small amount of pericatheter clot near the tip of the catheter in the superior vena cava  -4/10/23 CT C/A/P: Distal descending colon mass has decreased in size since prior with decreased associated colonic dilatation. Large amount of stool again noted in the colon and rectum. Hepatic metastases are slightly decreased in size, largest decrease 2.3 x 1.4 cm, previously 3.0 x 1.8 cm. Unchanged left adrenal mass and small lung nodule.  -s/p [id number] -5/30.   -No plans for in patient chemo    # Mechanical Fall--> s/p L distal femur ORIF -6/10    -Orthopedics/ Surgery- following  -POD # 1  -Received 1 unit preoperatively and another unit in PACU postoperatively  -Hemodynamically stable   -PT-->NWB of LLE   -Supportive measure-Pain control/rest, ice, compress and elevate the extremity.  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 No MMR deficiency  Primary Oncologist - Dr Harrison    - 08/18/22 Colonoscopy showed a frond-like/villous and ulcerated non-obstructing large mass, measuring 5 cm in the descending colon. The mass was circumferential  - Path: Invasive adenocarcinoma, moderately differentiated. No loss of nuclear expression of MMR proteins (MLH1, MSH2, MSH6, and PMS2). Low probability of microsatellite instability  - 08/16/22 CEA = 633  - 08/19/22 Path: Liver, core biopsy: Metastatic adenocarcinoma, consistent with metastasis from colonic primary  - He was started on FOLFIRI- 10/19/22 (the delay was due to fall 09/22--->he underwent  ORIF on 09/29/22 of the left tibial plateau)  - 01/09/23 CT C/A/P: Findings c/w descending colon cancer with extension through the wall with extensive mesenteric confluent tumor and/or ivelisse disease. Diffuse metastatic disease identified throughout the liver stable/ some slightly improved  - Stared on Lovenox 120 mg sc daily per Dr Harrison for small amount of pericatheter clot near the tip of the catheter in the superior vena cava  -4/10/23 CT C/A/P: Distal descending colon mass has decreased in size since prior with decreased associated colonic dilatation. Large amount of stool again noted in the colon and rectum. Hepatic metastases are slightly decreased in size, largest decrease 2.3 x 1.4 cm, previously 3.0 x 1.8 cm. Unchanged left adrenal mass and small lung nodule.  -s/p [id number] -5/30.   -No plans for in patient chemo    # Mechanical Fall--> s/p L distal femur ORIF -6/10    -Orthopedics/ Surgery- following  -POD # 1  -Received 1 unit preoperatively and another unit in PACU postoperatively  -Hemodynamically stable   -PT-->NWB of LLE   -Supportive measure-Pain control/rest, ice, compress and elevate the extremity.  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 No MMR deficiency  Primary Oncologist - Dr Harrison    - 08/18/22 Colonoscopy showed a frond-like/villous and ulcerated non-obstructing large mass, measuring 5 cm in the descending colon. The mass was circumferential  - Path: Invasive adenocarcinoma, moderately differentiated. No loss of nuclear expression of MMR proteins (MLH1, MSH2, MSH6, and PMS2). Low probability of microsatellite instability  - 08/16/22 CEA = 633  - 08/19/22 Path: Liver, core biopsy: Metastatic adenocarcinoma, consistent with metastasis from colonic primary  - He was started on FOLFIRI- 10/19/22 (the delay was due to fall 09/22--->he underwent  ORIF on 09/29/22 of the left tibial plateau)  - 01/09/23 CT C/A/P: Findings c/w descending colon cancer with extension through the wall with extensive mesenteric confluent tumor and/or ivelisse disease. Diffuse metastatic disease identified throughout the liver stable/ some slightly improved  - Stared on Lovenox 120 mg sc daily per Dr Harrison for small amount of pericatheter clot near the tip of the catheter in the superior vena cava  -4/10/23 CT C/A/P: Distal descending colon mass has decreased in size since prior with decreased associated colonic dilatation. Large amount of stool again noted in the colon and rectum. Hepatic metastases are slightly decreased in size, largest decrease 2.3 x 1.4 cm, previously 3.0 x 1.8 cm. Unchanged left adrenal mass and small lung nodule.  -s/p [id number] -5/30.   -No plans for in patient chemo    # Mechanical Fall--> s/p L distal femur ORIF -6/10    -Orthopedics/ Surgery- following  -POD # 1  -Received 1 unit preoperatively and another unit in PACU postoperatively  -Hemodynamically stable   -PT-->NWB of LLE   -Supportive measure-Pain control/rest, ice, compress and elevate the extremity.  -DVT prophylaxis     Addendum Hem Onc Attending.  Patient seen and examined  today on morning rounds together with oncology NP. 63 y/o male with metastatic  to liver colon cancer on palliative chemotherapy- oncologist Dr Harrison. Responding per recent scans. Last chemotherapy- FOLFIRI cycle 15 5/30/23  Tolerating chemo well. Admitted s/p mechanical fall with distal L femur fx s/p surgery.  Next chemo scheduled for 6/12/23 - will postpone by ~ 2 weeks ( will d/w Dr Harrison) Not neutropenic. No recent avastin. Plan as above

## 2023-06-11 NOTE — PROGRESS NOTE ADULT - SUBJECTIVE AND OBJECTIVE BOX
c/c: fall/left knee pain    HPI: 62M, PMH of colon cancer stage IV w/ mets to liver, lung, adrenal gland(on chemo), catheter tip thrombus s/p lovenox (taken off 2 mos ago by hematology per pt), Left tibial plateau fracture s/p orif in the past who presented to the er after a  mechanical fall. Patient states he was mopping and then cleaning microwave, then he turned his R foot and he slipped, and twisted L knee.   He was admitted with a femoral condyle fracture. Taken to OR 6/10, underwent retrograde nailing.     pt seen and examined this am. Felt ok. Minimal pain. no sob/chest pain. Tolerating po. no difficulty voiding.     Review of system- All 10 systems reviewed and is as per HPI otherwise negative.       VITALS  T(C): 37.3 (06-11-23 @ 07:45), Max: 37.4 (06-11-23 @ 04:00)  HR: 105 (06-11-23 @ 07:45) (77 - 105)  BP: 126/80 (06-11-23 @ 07:45) (118/65 - 153/87)  RR: 18 (06-11-23 @ 07:45) (12 - 18)  SpO2: 99% (06-11-23 @ 07:45) (95% - 100%)      PHYSICAL EXAM:    GENERAL: Comfortable, no acute distress  HEAD:  Atraumatic, Normocephalic  EYES: EOMI, PERRLA  HEENT: Moist mucous membranes  NECK: Supple, No JVD  NERVOUS SYSTEM:  Alert & Oriented X3, grossly non focal.   CHEST/LUNG: Clear to auscultation bilaterally  HEART: Regular rate and rhythm  ABDOMEN: Soft, Nontender, Nondistended; Bowel sounds present  GENITOURINARY- Voiding, no palpable bladder  EXTREMITIES:  No clubbing, cyanosis, or edema  MUSCULOSKELETAL- LLE in ace-wrap/immobilizer.  SKIN-no rash        LABS:                        8.7    17.65 )-----------( 142      ( 11 Jun 2023 08:13 )             27.8     06-11    141  |  111<H>  |  10  ----------------------------<  120<H>  3.9   |  25  |  0.78    Ca    7.7<L>      11 Jun 2023 08:13  Phos  3.7     06-10  Mg     2.1     06-10    TPro  6.4  /  Alb  3.0<L>  /  TBili  0.4  /  DBili  x   /  AST  14<L>  /  ALT  25  /  AlkPhos  163<H>  06-10    PT/INR - ( 10 Barrett 2023 04:28 )   PT: 15.6 sec;   INR: 1.34 ratio         PTT - ( 10 Barrett 2023 04:28 )  PTT:27.1 sec        MEDS  acetaminophen     Tablet .. 650 milliGRAM(s) Oral every 6 hours PRN  aluminum hydroxide/magnesium hydroxide/simethicone Suspension 30 milliLiter(s) Oral four times a day PRN  calcium carbonate 1250 mG  + Vitamin D (OsCal 500 + D) 1 Tablet(s) Oral three times a day  ceFAZolin   IVPB 2000 milliGRAM(s) IV Intermittent every 8 hours  enoxaparin Injectable 40 milliGRAM(s) SubCutaneous every 24 hours  folic acid 1 milliGRAM(s) Oral daily  gabapentin 300 milliGRAM(s) Oral three times a day  lactated ringers. 1000 milliLiter(s) IV Continuous <Continuous>  magnesium hydroxide Suspension 30 milliLiter(s) Oral daily PRN  morphine  - Injectable 1 milliGRAM(s) IV Push every 2 hours PRN  multivitamin 1 Tablet(s) Oral daily  ondansetron Injectable 4 milliGRAM(s) IV Push every 6 hours PRN  oxyCODONE    IR 5 milliGRAM(s) Oral every 3 hours PRN  oxyCODONE    IR 10 milliGRAM(s) Oral every 3 hours PRN  polyethylene glycol 3350 17 Gram(s) Oral at bedtime  senna 2 Tablet(s) Oral at bedtime  sodium chloride 0.9%. 1000 milliLiter(s) IV Continuous <Continuous>  sodium chloride 0.9%. 1000 milliLiter(s) IV Continuous <Continuous>    ASSESSMENT AND PLAN:  62m, PMH as above a /w    1. Mechanical fall/Left femoral condyle fracture:  -s/p Retrograde nail POD#1  -pain control  -physical therapy  -incentive spirometry  -bowel regimen.     2. Acute blood loss anemia:  -d/t fracture  -was transfused prior to surgery  -repeat cbc in am.     3. Leukocytosis:  -presumed to be reactive to surgery  -repeat cbc in am   -no s/s of infn at this time     4. H/o metastatic colon ca:  -responsive to treatment.   -plans to postpone next chemo by 2 weeks per hematology.    5. dvt px  -consult a/c services  -on lovenox for now

## 2023-06-11 NOTE — PHYSICAL THERAPY INITIAL EVALUATION ADULT - PRECAUTIONS/LIMITATIONS, REHAB EVAL
Video visit due to covid-19 precautions    Chief complaint: \"I just celebrated my 4 years in August\"    Interval History: Ms. Wells is a 52 year old white female who presents today for her scheduled medication management appointment. She has diagnosis of severe alcohol dependence in remission and is currently taking Trazodone 50 mg nightly. She has reportedly being sober for just over 48 months and continues to attend AA meetings. Reports her mood as \"great\". Energy level, Anxiety, concentration and appetite are \"good\". She denies frequent crying spells or recurrent thoughts of death.  Denies craving for alcohol. Anxiety is well controlled.     ROS:Denies chest pain or SOB.    EXAM:    Vitals: There were no vitals taken for this visit.   Mental Status:  Patient appear her chronological age. Her dressing and appearance are appropriate for today's weather. Speech is fluent and spontaneous.  She is alert and fully oriented. Gait and posture are normal. Eye contact is good. Her thought processes are logical and clear. Thought content and perceptions are devoid of delusions or hallucinations. Insight and judgment are good. Memory and cognition are intact. Affect is bright and congruent with mood. She denies suicidal or homicidal ideations.    Assessment: Alcohol dependence in remission and Insomnia    Medical Decision Making and Plan of Treatment: Patient to continue Trazodone 50 mg nightly. Indications and possible side effects of her medication were discussed. Supportive psychotherapy in the context of active listening, empathic validation and identification of positive coping skills provided. Patient was encouraged to continue attending AA meetings, increasing physical exercise and to avoid known triggers for relapse. She will return to clinic in 6 months for follow-up and understands to call with questions or concerns. Recent lab results were reviewed with patient.   
fall precautions/surgical precautions

## 2023-06-12 ENCOUNTER — APPOINTMENT (OUTPATIENT)
Dept: HEMATOLOGY ONCOLOGY | Facility: CLINIC | Age: 63
End: 2023-06-12
Payer: COMMERCIAL

## 2023-06-12 LAB
ANION GAP SERPL CALC-SCNC: 3 MMOL/L — LOW (ref 5–17)
APPEARANCE UR: CLEAR — SIGNIFICANT CHANGE UP
BASOPHILS # BLD AUTO: 0.08 K/UL — SIGNIFICANT CHANGE UP (ref 0–0.2)
BASOPHILS NFR BLD AUTO: 0.8 % — SIGNIFICANT CHANGE UP (ref 0–2)
BILIRUB UR-MCNC: NEGATIVE — SIGNIFICANT CHANGE UP
BUN SERPL-MCNC: 7 MG/DL — SIGNIFICANT CHANGE UP (ref 7–23)
CALCIUM SERPL-MCNC: 8.1 MG/DL — LOW (ref 8.5–10.1)
CHLORIDE SERPL-SCNC: 108 MMOL/L — SIGNIFICANT CHANGE UP (ref 96–108)
CO2 SERPL-SCNC: 27 MMOL/L — SIGNIFICANT CHANGE UP (ref 22–31)
COLOR SPEC: YELLOW — SIGNIFICANT CHANGE UP
CREAT SERPL-MCNC: 0.73 MG/DL — SIGNIFICANT CHANGE UP (ref 0.5–1.3)
DIFF PNL FLD: NEGATIVE — SIGNIFICANT CHANGE UP
EGFR: 103 ML/MIN/1.73M2 — SIGNIFICANT CHANGE UP
EOSINOPHIL # BLD AUTO: 0.08 K/UL — SIGNIFICANT CHANGE UP (ref 0–0.5)
EOSINOPHIL NFR BLD AUTO: 0.8 % — SIGNIFICANT CHANGE UP (ref 0–6)
GLUCOSE SERPL-MCNC: 107 MG/DL — HIGH (ref 70–99)
GLUCOSE UR QL: NEGATIVE — SIGNIFICANT CHANGE UP
HCT VFR BLD CALC: 25.8 % — LOW (ref 39–50)
HCT VFR BLD CALC: 27.2 % — LOW (ref 39–50)
HGB BLD-MCNC: 8.1 G/DL — LOW (ref 13–17)
HGB BLD-MCNC: 8.4 G/DL — LOW (ref 13–17)
IMM GRANULOCYTES NFR BLD AUTO: 0.9 % — SIGNIFICANT CHANGE UP (ref 0–0.9)
KETONES UR-MCNC: NEGATIVE — SIGNIFICANT CHANGE UP
LEUKOCYTE ESTERASE UR-ACNC: NEGATIVE — SIGNIFICANT CHANGE UP
LYMPHOCYTES # BLD AUTO: 1.18 K/UL — SIGNIFICANT CHANGE UP (ref 1–3.3)
LYMPHOCYTES # BLD AUTO: 11.5 % — LOW (ref 13–44)
MAGNESIUM SERPL-MCNC: 2.1 MG/DL — SIGNIFICANT CHANGE UP (ref 1.6–2.6)
MCHC RBC-ENTMCNC: 28 PG — SIGNIFICANT CHANGE UP (ref 27–34)
MCHC RBC-ENTMCNC: 28.5 PG — SIGNIFICANT CHANGE UP (ref 27–34)
MCHC RBC-ENTMCNC: 30.9 GM/DL — LOW (ref 32–36)
MCHC RBC-ENTMCNC: 31.4 GM/DL — LOW (ref 32–36)
MCV RBC AUTO: 90.7 FL — SIGNIFICANT CHANGE UP (ref 80–100)
MCV RBC AUTO: 90.8 FL — SIGNIFICANT CHANGE UP (ref 80–100)
MONOCYTES # BLD AUTO: 0.95 K/UL — HIGH (ref 0–0.9)
MONOCYTES NFR BLD AUTO: 9.3 % — SIGNIFICANT CHANGE UP (ref 2–14)
NEUTROPHILS # BLD AUTO: 7.87 K/UL — HIGH (ref 1.8–7.4)
NEUTROPHILS NFR BLD AUTO: 76.7 % — SIGNIFICANT CHANGE UP (ref 43–77)
NITRITE UR-MCNC: NEGATIVE — SIGNIFICANT CHANGE UP
PH UR: 7 — SIGNIFICANT CHANGE UP (ref 5–8)
PHOSPHATE SERPL-MCNC: 2.2 MG/DL — LOW (ref 2.5–4.5)
PLATELET # BLD AUTO: 162 K/UL — SIGNIFICANT CHANGE UP (ref 150–400)
PLATELET # BLD AUTO: 182 K/UL — SIGNIFICANT CHANGE UP (ref 150–400)
POTASSIUM SERPL-MCNC: 3.8 MMOL/L — SIGNIFICANT CHANGE UP (ref 3.5–5.3)
POTASSIUM SERPL-SCNC: 3.8 MMOL/L — SIGNIFICANT CHANGE UP (ref 3.5–5.3)
PROT UR-MCNC: NEGATIVE — SIGNIFICANT CHANGE UP
RBC # BLD: 2.84 M/UL — LOW (ref 4.2–5.8)
RBC # BLD: 3 M/UL — LOW (ref 4.2–5.8)
RBC # FLD: 17.6 % — HIGH (ref 10.3–14.5)
RBC # FLD: 17.7 % — HIGH (ref 10.3–14.5)
SODIUM SERPL-SCNC: 138 MMOL/L — SIGNIFICANT CHANGE UP (ref 135–145)
SP GR SPEC: 1.01 — SIGNIFICANT CHANGE UP (ref 1.01–1.02)
UROBILINOGEN FLD QL: NEGATIVE — SIGNIFICANT CHANGE UP
WBC # BLD: 10.25 K/UL — SIGNIFICANT CHANGE UP (ref 3.8–10.5)
WBC # BLD: 12.78 K/UL — HIGH (ref 3.8–10.5)
WBC # FLD AUTO: 10.25 K/UL — SIGNIFICANT CHANGE UP (ref 3.8–10.5)
WBC # FLD AUTO: 12.78 K/UL — HIGH (ref 3.8–10.5)

## 2023-06-12 PROCEDURE — 71045 X-RAY EXAM CHEST 1 VIEW: CPT | Mod: 26

## 2023-06-12 PROCEDURE — 99233 SBSQ HOSP IP/OBS HIGH 50: CPT

## 2023-06-12 PROCEDURE — 99253 IP/OBS CNSLTJ NEW/EST LOW 45: CPT

## 2023-06-12 PROCEDURE — 99232 SBSQ HOSP IP/OBS MODERATE 35: CPT

## 2023-06-12 RX ORDER — SODIUM CHLORIDE 9 MG/ML
1000 INJECTION INTRAMUSCULAR; INTRAVENOUS; SUBCUTANEOUS
Refills: 0 | Status: COMPLETED | OUTPATIENT
Start: 2023-06-12 | End: 2023-06-12

## 2023-06-12 RX ORDER — SODIUM CHLORIDE 9 MG/ML
1000 INJECTION INTRAMUSCULAR; INTRAVENOUS; SUBCUTANEOUS
Refills: 0 | Status: DISCONTINUED | OUTPATIENT
Start: 2023-06-12 | End: 2023-06-14

## 2023-06-12 RX ORDER — CHOLECALCIFEROL (VITAMIN D3) 125 MCG
50000 CAPSULE ORAL
Refills: 0 | Status: DISCONTINUED | OUTPATIENT
Start: 2023-06-12 | End: 2023-06-12

## 2023-06-12 RX ORDER — ERGOCALCIFEROL 1.25 MG/1
50000 CAPSULE ORAL
Refills: 0 | Status: DISCONTINUED | OUTPATIENT
Start: 2023-06-12 | End: 2023-06-14

## 2023-06-12 RX ADMIN — GABAPENTIN 300 MILLIGRAM(S): 400 CAPSULE ORAL at 21:08

## 2023-06-12 RX ADMIN — Medication 1 TABLET(S): at 09:08

## 2023-06-12 RX ADMIN — Medication 100 MILLIGRAM(S): at 21:09

## 2023-06-12 RX ADMIN — Medication 1 TABLET(S): at 13:24

## 2023-06-12 RX ADMIN — Medication 100 MILLIGRAM(S): at 13:25

## 2023-06-12 RX ADMIN — Medication 650 MILLIGRAM(S): at 00:30

## 2023-06-12 RX ADMIN — GABAPENTIN 300 MILLIGRAM(S): 400 CAPSULE ORAL at 13:25

## 2023-06-12 RX ADMIN — GABAPENTIN 300 MILLIGRAM(S): 400 CAPSULE ORAL at 05:23

## 2023-06-12 RX ADMIN — Medication 100 MILLIGRAM(S): at 05:23

## 2023-06-12 RX ADMIN — OXYCODONE HYDROCHLORIDE 10 MILLIGRAM(S): 5 TABLET ORAL at 19:20

## 2023-06-12 RX ADMIN — OXYCODONE HYDROCHLORIDE 10 MILLIGRAM(S): 5 TABLET ORAL at 18:50

## 2023-06-12 RX ADMIN — ERGOCALCIFEROL 50000 UNIT(S): 1.25 CAPSULE ORAL at 11:56

## 2023-06-12 RX ADMIN — Medication 1 MILLIGRAM(S): at 09:08

## 2023-06-12 RX ADMIN — Medication 650 MILLIGRAM(S): at 00:31

## 2023-06-12 RX ADMIN — Medication 1 TABLET(S): at 21:09

## 2023-06-12 RX ADMIN — ENOXAPARIN SODIUM 40 MILLIGRAM(S): 100 INJECTION SUBCUTANEOUS at 05:23

## 2023-06-12 RX ADMIN — SODIUM CHLORIDE 500 MILLILITER(S): 9 INJECTION INTRAMUSCULAR; INTRAVENOUS; SUBCUTANEOUS at 11:38

## 2023-06-12 RX ADMIN — SODIUM CHLORIDE 125 MILLILITER(S): 9 INJECTION INTRAMUSCULAR; INTRAVENOUS; SUBCUTANEOUS at 18:07

## 2023-06-12 NOTE — OCCUPATIONAL THERAPY INITIAL EVALUATION ADULT - ADL RETRAINING, OT EVAL
RUE/RLE paresthesia and weakness
Pt will perform LE dressing and footwear management using compensatory techniques/AE prn with minimal assistance in 2 weeks.

## 2023-06-12 NOTE — OCCUPATIONAL THERAPY INITIAL EVALUATION ADULT - MODALITIES TREATMENT COMMENTS
Pt left seated in bedside recliner chair, LE's elevated, lines intact, needs met, CB/TT/phone IR, RN aware, good safety awareness- instructed not to get up on his own.

## 2023-06-12 NOTE — OCCUPATIONAL THERAPY INITIAL EVALUATION ADULT - ADDITIONAL COMMENTS
pt shares an apartment with friend, about 30 steps to his apt.  Pt 's dtr has 3 steps to enter house no HRs on front steps +LESLIE HRs on back step, pt likes to go there once dc Pt resides in an apartment with a friend, about 30 steps to his apt. Pt reports upon d/c he will stay with his daughter who lives in a PH with 3 steps to enter house no HR's. Pt reports he can stay on the first floor with a full bath (tub/shower combo) and upstairs has a walk in shower stall. Pt reports he owns a commode and shower chair from previous injury. Pt reports he was (I) with ADL and IADL tasks and walked with a SAC as needed (in community). (-) , RHD. Pt resides in an apartment with a friend, about 30 steps to his apt. Pt reports upon d/c he will stay with his daughter who lives in a PH with 3 steps to enter house no HR's. Pt reports he can stay on the first floor with a full bath (tub/shower combo) and upstairs has a walk in shower stall. Pt reports he owns a commode and shower chair from previous injury. Pt reports he was (I) with ADL and IADL tasks and walked with a SAC as needed (in community). (-) , RHD. Pt's son-in-law and daughter both work but can help when home.

## 2023-06-12 NOTE — PROGRESS NOTE ADULT - SUBJECTIVE AND OBJECTIVE BOX
c/c: fall/left knee pain    HPI: 62M, PMH of colon cancer stage IV w/ mets to liver, lung, adrenal gland(on chemo), catheter tip thrombus s/p lovenox (taken off 2 mos ago by hematology per pt), Left tibial plateau fracture s/p orif in the past who presented to the er after a  mechanical fall. Patient states he was mopping and then cleaning microwave, then he turned his R foot and he slipped, and twisted L knee.   He was admitted with a femoral condyle fracture. Taken to OR 6/10, underwent retrograde nailing.     pt seen and examined this am. Intermittent pain at surgical site. not eating or drinking much bc he doen'st want to use the bathroom. Denies cough/sob/chest pain. No dysuria/difficulty voiding. Able to participate with physical therapy.     Review of system- All 10 systems reviewed and is as per HPI otherwise negative.     Vital Signs Last 24 Hrs  T(C): 37.2 (12 Jun 2023 08:01), Max: 38.3 (12 Jun 2023 00:27)  T(F): 98.9 (12 Jun 2023 08:01), Max: 100.9 (12 Jun 2023 00:27)  HR: 96 (12 Jun 2023 12:42) (96 - 109)  BP: 136/79 (12 Jun 2023 12:42) (126/73 - 147/68)  BP(mean): 92 (12 Jun 2023 12:42) (92 - 92)  RR: 18 (12 Jun 2023 12:42) (16 - 18)  SpO2: 100% (12 Jun 2023 12:42) (99% - 100%)    Parameters below as of 12 Jun 2023 12:42  Patient On (Oxygen Delivery Method): room air        PHYSICAL EXAM:    GENERAL: Comfortable, no acute distress  HEAD:  Atraumatic, Normocephalic  EYES: EOMI, PERRLA  HEENT:dry  mucous membranes  NECK: Supple, No JVD  NERVOUS SYSTEM:  Alert & Oriented X3, grossly non focal.   CHEST/LUNG: Clear to auscultation bilaterally  HEART: Regular rate and rhythm  ABDOMEN: Soft, Nontender, Nondistended; Bowel sounds present  GENITOURINARY- Voiding, no palpable bladder  EXTREMITIES:  No clubbing, cyanosis, or edema  MUSCULOSKELETAL- LLE in ace-wrap/immobilizer.  SKIN-no rash      LABS:                        8.4    12.78 )-----------( 182      ( 12 Jun 2023 11:44 )             27.2     06-12    138  |  108  |  7   ----------------------------<  107<H>  3.8   |  27  |  0.73    Ca    8.1<L>      12 Jun 2023 08:03  Phos  2.2     06-12  Mg     2.1     06-12        MEDS  acetaminophen     Tablet .. 650 milliGRAM(s) Oral every 6 hours PRN  aluminum hydroxide/magnesium hydroxide/simethicone Suspension 30 milliLiter(s) Oral four times a day PRN  calcium carbonate 1250 mG  + Vitamin D (OsCal 500 + D) 1 Tablet(s) Oral three times a day  ceFAZolin   IVPB 2000 milliGRAM(s) IV Intermittent every 8 hours  enoxaparin Injectable 40 milliGRAM(s) SubCutaneous every 24 hours  folic acid 1 milliGRAM(s) Oral daily  gabapentin 300 milliGRAM(s) Oral three times a day  lactated ringers. 1000 milliLiter(s) IV Continuous <Continuous>  magnesium hydroxide Suspension 30 milliLiter(s) Oral daily PRN  morphine  - Injectable 1 milliGRAM(s) IV Push every 2 hours PRN  multivitamin 1 Tablet(s) Oral daily  ondansetron Injectable 4 milliGRAM(s) IV Push every 6 hours PRN  oxyCODONE    IR 5 milliGRAM(s) Oral every 3 hours PRN  oxyCODONE    IR 10 milliGRAM(s) Oral every 3 hours PRN  polyethylene glycol 3350 17 Gram(s) Oral at bedtime  senna 2 Tablet(s) Oral at bedtime  sodium chloride 0.9%. 1000 milliLiter(s) IV Continuous <Continuous>  sodium chloride 0.9%. 1000 milliLiter(s) IV Continuous <Continuous>    ASSESSMENT AND PLAN:  62m, PMH as above a /w    1. Mechanical fall/Left femoral condyle fracture:  -s/p Retrograde nail POD#2  -pain control  -physical therapy  -incentive spirometry  -bowel regimen.     2. Acute blood loss anemia:  -d/t fracture  -was transfused prior to surgery  -repeat cbc in am.     3. Leukocytosis/low grade fevers:  -no localizing symptoms.   -could be related to hematoma at surgical site.   -as pt is immunocompromised, check cxr, urinalysis.     4. H/o metastatic colon ca:  -responsive to treatment.   -plans to postpone next chemo by 2 weeks per hematology.    5. dvt px  -pt was previously on therapeutic lovenox for thrombus at catheter site (SVC).   -pt told me he stopped 2 mos ago as he was told he no longer needed it.   -Called hematology to discuss recommendations on a/c at this time, they will review records and get back to me.

## 2023-06-12 NOTE — PROGRESS NOTE ADULT - ASSESSMENT
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 No MMR deficiency  Primary Oncologist - Dr Harrison    - 08/18/22 Colonoscopy showed a frond-like/villous and ulcerated non-obstructing large mass, measuring 5 cm in the descending colon. The mass was circumferential  - Path: Invasive adenocarcinoma, moderately differentiated. No loss of nuclear expression of MMR proteins (MLH1, MSH2, MSH6, and PMS2). Low probability of microsatellite instability  - 08/16/22 CEA = 633  - 08/19/22 Path: Liver, core biopsy: Metastatic adenocarcinoma, consistent with metastasis from colonic primary  - He was started on FOLFIRI- 10/19/22 (the delay was due to fall 09/22--->he underwent  ORIF on 09/29/22 of the left tibial plateau)  - 01/09/23 CT C/A/P: Findings c/w descending colon cancer with extension through the wall with extensive mesenteric confluent tumor and/or ivelisse disease. Diffuse metastatic disease identified throughout the liver stable/ some slightly improved  - Stared on Lovenox 120 mg sc daily per Dr Harrison for small amount of pericatheter clot near the tip of the catheter in the superior vena cava  -4/10/23 CT C/A/P: Distal descending colon mass has decreased in size since prior with decreased associated colonic dilatation. Large amount of stool again noted in the colon and rectum. Hepatic metastases are slightly decreased in size, largest decrease 2.3 x 1.4 cm, previously 3.0 x 1.8 cm. Unchanged left adrenal mass and small lung nodule.  -s/p [id number] -5/30.   -No plans for in patient chemo-->next chemo scheduled for 6/12/23 - will postpone by ~ 2 weeks     # Mechanical Fall--> s/p L distal femur ORIF -6/10    -Orthopedics/ Surgery- following  -POD # 2  -Received 1 unit preoperatively and another unit in PACU postoperatively  -Hemodynamically stable   -PT-->NWB of LLE   -Supportive measure-Pain control/rest, ice, compress and elevate the extremity.  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 No MMR deficiency  Primary Oncologist - Dr Harrison    - 08/18/22 Colonoscopy showed a frond-like/villous and ulcerated non-obstructing large mass, measuring 5 cm in the descending colon. The mass was circumferential  - Path: Invasive adenocarcinoma, moderately differentiated. No loss of nuclear expression of MMR proteins (MLH1, MSH2, MSH6, and PMS2). Low probability of microsatellite instability  - 08/16/22 CEA = 633  - 08/19/22 Path: Liver, core biopsy: Metastatic adenocarcinoma, consistent with metastasis from colonic primary  - He was started on FOLFIRI- 10/19/22 (the delay was due to fall 09/22--->he underwent  ORIF on 09/29/22 of the left tibial plateau)  - 01/09/23 CT C/A/P: Findings c/w descending colon cancer with extension through the wall with extensive mesenteric confluent tumor and/or ivelisse disease. Diffuse metastatic disease identified throughout the liver stable/ some slightly improved  - Stared on Lovenox 120 mg sc daily per Dr Harrison for small amount of pericatheter clot near the tip of the catheter in the superior vena cava  -4/10/23 CT C/A/P: Distal descending colon mass has decreased in size since prior with decreased associated colonic dilatation. Large amount of stool again noted in the colon and rectum. Hepatic metastases are slightly decreased in size, largest decrease 2.3 x 1.4 cm, previously 3.0 x 1.8 cm. Unchanged left adrenal mass and small lung nodule.  -s/p [id number] -5/30.   -No plans for in patient chemo-->next chemo scheduled for 6/12/23 - will postpone by ~ 2 weeks     # Mechanical Fall--> s/p L distal femur ORIF -6/10    -Orthopedics/ Surgery- following  -POD # 2  -Received 1 unit preoperatively and another unit in PACU postoperatively  -Hemodynamically stable   -PT-->NWB of LLE   -Supportive measure-Pain control/rest, ice, compress and elevate the extremity.      Addendum:  He was on therapeutic dose of AC ( LOvenox ) for SVC thrombus mediport related per Dr Harrison but discontinued himself  two months ago. D/w Dr Harrison- she will review and decide/ let me now if he should resume full dose AC versus prophylactic  low dose Lovenox.

## 2023-06-12 NOTE — CONSULT NOTE ADULT - ASSESSMENT
This is a 63 y/o M w/ PMH of colon cancer stage IV w/ mets to liver(on chemo), catheter tip thrombus 1-2023 pt on lovenox tx dose (120mg daily as per hematolgist note), p/w mechanical fall. Pt found to have Comminuted intra-articular and distracted distal femur fracture.  Now s/p 6-.  Pt has high thrombotic risk and requires treatment dose anticoagulation.    6-: Spoke with Dr Walters and she confirms pt was on Lovenox 120mg sq daily by  Dr Harrison for small amount of pericatheter clot near the tip of the catheter in the superior vena cava.  Pt still requires treatment dose when cleared by ortho.. Spoke with ortho resident and he states Dr. Fischer wants pt to remain on prophylactic dose for 5 days post procedure then re evaluate.  Pt made awre of plan.       Plan:  cont on Lovenox 40mg sq daily for five days post procedure today PO day 2 re evaluate on POD 5 6- TO Resume tx dose.  : daily cbc/bmp noted h/h 8.1/25.8   : le venodynes  : increase mobility as per ortho  Thanks for consult will f/u

## 2023-06-12 NOTE — OCCUPATIONAL THERAPY INITIAL EVALUATION ADULT - NSACTIVITYREC_GEN_A_OT
Pt presents with impaired balance, endurance and muscle strength that will benefit from skilled OT to improve independence in ADLs, reduce fall risk and chance for readmission. Pt educated on recommended AE/DME Pt presents with impaired balance, endurance and muscle strength that will benefit from skilled OT to improve independence in ADLs, reduce fall risk and chance for readmission. Pt educated on recommended AE/DME to enhance safety with ADL/IADL/mobility tasks (handouts provided), compensatory dressing techniques and sequencing, and safety when engaging in MrADLs.

## 2023-06-12 NOTE — OCCUPATIONAL THERAPY INITIAL EVALUATION ADULT - PRECAUTIONS/LIMITATIONS, REHAB EVAL
diet: regular, elevate affected limb 2-3 pillows, elevate HOB, notify systolic >140 <90, HR >100 <50, 3L SpO2 <94%/cardiac precautions/fall precautions/surgical precautions

## 2023-06-12 NOTE — OCCUPATIONAL THERAPY INITIAL EVALUATION ADULT - MD ORDER
"OT Evaluate and Treat"- MD orders received. Chart reviewed, contents noted, conferred with NORMA Gloria "OT Evaluate and Treat"- MD orders received. Chart reviewed, contents noted, conferred with RN TAINA Gloria (127/84, 100% on room air), 0/10 pain.

## 2023-06-12 NOTE — OCCUPATIONAL THERAPY INITIAL EVALUATION ADULT - PLANNED THERAPY INTERVENTIONS, OT EVAL
One of the most helpful medications we have thus far for weight loss is a group of medications called  “GLP-1”    Names:  Patrick Sims    Others:  Gomez Baird    Check with your insurance on coverage  If your insurance does not cover, check to see if you qualify for a coupon/reduced cost that is feasible for you  Check GoodRx (I have looked and found cost to be $800-$1,300 per month- ridiculous!)    If this does not work, other medications:    Contrave  If noted covered - wellbutrin and naltrexone    Qsymia  If noted covered- phentermine and topamax    Plenity    Orlistat (over the counter as Chuck)    You have decided to start a weight loss program with me today.    In addition to taking the medication, please follow this guidance to further help with short and long term weight loss success:    Remember, health is the tamayo and we can be healthy at any size. Make healthy choices.   Journal as much as you can- this can be done though apps ([employer], [employer] it, [employer]) or paper.   Meal prep (a good parameter is 2 hours per week to think through the week, shop and prep food)  Eat 5 servings of veggies per day (can substitute 1-2 low carb fruit servings) and variety is key.   Drink at least 64 ounces of water per day  Exercise/move for either 2,000 calories per week or 200 minutes per week. For some, this may be less if you decide to add in weight training.   Set weekly goals and consider barriers that may get in the way of your success and how to modify them  Consider a  to help keep you on track   Come in for monthly weight, BP and pulse with the nursing staff.   See me every 1-3 months     Food smart   [employer]  Weight watchers  [employer]         
ADL retraining/balance training/transfer training

## 2023-06-12 NOTE — OCCUPATIONAL THERAPY INITIAL EVALUATION ADULT - PERTINENT HX OF CURRENT PROBLEM, REHAB EVAL
Per EMR, patient is a 63 y/o male with a PMHx of colon cancer on chemo, recent tibia plateau fracture on the L with 2 staged Ex fix to ORIF several months at [facility] arrives to the ED BIBEMS c/o left knee pain s/p mechanical slip and fall. Patient states he slipped at home, was mopping and then cleaning microwave, then he turned his R foot and he slipped, and twisted L knee. Endorses baseline neuropathy in hands and feet due to current chemotherapy for colon cancer. Per EMS, patient was alert and oriented, hypotensive, and pale. Patient sustained Distal femoral condyle fracture, now s/p L distal femur ORIF. Per EMR, patient is a 61 y/o male with a PMHx of colon cancer on chemo, recent tibia plateau fracture on the L with 2 staged Ex fix to ORIF several months at [facility] arrives to the ED BIBEMS c/o left knee pain s/p mechanical slip and fall. Endorses baseline neuropathy in hands and feet due to current chemotherapy for colon cancer. Per EMS, patient was alert and oriented, hypotensive, and pale. Patient sustained Distal femoral condyle fracture, now s/p L distal femur ORIF.

## 2023-06-12 NOTE — CONSULT NOTE ADULT - SUBJECTIVE AND OBJECTIVE BOX
Patient is a 62yMale community ambulator with cane who presents to [place] [facility] w/ a c/o of L knee pain s/p mechanical fall. Patient states he slipped at home causing him to twist his knee. Denies HH/LOC. States inability to walk immediately following the injury. Endorses baseline neuropathy in hands and feet due to current chemotherapy for colon cancer. Denies having any other pain elsewhere. Had a recent tibia plateau fracture on the L with 2 staged Ex fix to ORIF several months at [facility].  No other orthopedic concerns at this time.    No pertinent past medical history    Colon cancer            No Known Drug Allergies  Seafood (Unknown)      PHYSICAL EXAM:  T(C): 36.4 (06-09-23 @ 16:36), Max: 36.4 (06-09-23 @ 16:36)  HR: 84 (06-09-23 @ 16:36) (84 - 84)  BP: 113/61 (06-09-23 @ 16:36) (113/61 - 113/61)  RR: 20 (06-09-23 @ 16:36) (20 - 20)  SpO2: 100% (06-09-23 @ 16:36) (100% - 100%)    Gen: NAD, Resting comfortably    LLE:  Skin intact, no erythema or ecchymosis  Mild swelling over knee  No bony tenderness to palpation  +EHL/FHL/TA/GSC  +SILT: +SPN/DPN/Saph/Aruna/Tib  + DP  Compartments soft and compressible  No calf tenderness    Secondary Assessment:  NC/AT, NTTP of clavicles, NTTP of C-,T-,L-Spine, NTTP of Pelvis  UEs: NTTP of Shoulders, Elbows, Wrists, Hands; NT with AROM/PROM of Shoulders, Elbows, Wrists, Hands; AIN/PIN/Med/Uln/Msc/Rad/Ax intact  RLE: Able to SLR, NT with Log Roll, NT with Heel Strike, NTTP of Hip, Knee, Ankle, Foot; NT with AROM/PROM of Hip, Knee, Ankle, foot; Q/H/Gsc/TA/EHL/FHL intact    Imaging:    Xray L Knee: Displaced medial femoral condyle fracture with prior tibia plateau ORIF      A/P: 62M with displaced medial femoral condyle fracture s/p mechanical fall    Plan for surgery tomorrow morning  NPO after midnight except medications, IVF while NPO  One dose of Heparin 5000mg SubQ then hold chemical DVT ppx after midnight for surgery  Please document necessary medical optimizations for surgery tomorrow morning   Analgesia as needed  Bulky Miles knee immobilizer placed   NWB of LLE  Ice and elevate as tolerated  Medical recommendations apprecaited  Discussed with Dr. Fischer, who agrees with the above plan  
HPI:    61 y/o M w/ PMH of colon cancer stage IV w/ mets to liver (on chemo), catheter tip thrombus, p/w mechanical fall, while working in kitchen & slipped on Joseph on the floor.      6/11/2023- Seen at bedside with Dr. Walters. OOB to chair, in no acute distress.     PAST MEDICAL & SURGICAL HISTORY:    Colon CA  Left Knee Sx    FAMILY HISTORY:  No pertinent family history in first degree relatives    Social Hx:     Denies tobacco / etoh / drugs     MEDICATIONS  (STANDING):    calcium carbonate 1250 mG  + Vitamin D (OsCal 500 + D) 1 Tablet(s) Oral three times a day  enoxaparin Injectable 40 milliGRAM(s) SubCutaneous every 24 hours  folic acid 1 milliGRAM(s) Oral daily  gabapentin 300 milliGRAM(s) Oral three times a day  lactated ringers. 1000 milliLiter(s) (75 mL/Hr) IV Continuous <Continuous>  multivitamin 1 Tablet(s) Oral daily  polyethylene glycol 3350 17 Gram(s) Oral at bedtime  senna 2 Tablet(s) Oral at bedtime  sodium chloride 0.9%. 1000 milliLiter(s) (75 mL/Hr) IV Continuous <Continuous>  sodium chloride 0.9%. 1000 milliLiter(s) (75 mL/Hr) IV Continuous <Continuous>    MEDICATIONS  (PRN):  acetaminophen     Tablet .. 650 milliGRAM(s) Oral every 6 hours PRN Temp greater or equal to 38C (100.4F), Mild Pain (1 - 3)  aluminum hydroxide/magnesium hydroxide/simethicone Suspension 30 milliLiter(s) Oral four times a day PRN Indigestion  magnesium hydroxide Suspension 30 milliLiter(s) Oral daily PRN Constipation  morphine  - Injectable 1 milliGRAM(s) IV Push every 2 hours PRN Severe Pain (7 - 10)  ondansetron Injectable 4 milliGRAM(s) IV Push every 6 hours PRN Nausea and/or Vomiting  oxyCODONE    IR 10 milliGRAM(s) Oral every 3 hours PRN Severe Pain (7 - 10)  oxyCODONE    IR 5 milliGRAM(s) Oral every 3 hours PRN Moderate Pain (4 - 6)    Allergies    No Known Drug Allergies  Seafood (Unknown)    Review of Systems    Constitutional, Eyes, ENT, Cardiovascular, Respiratory, Gastrointestinal, Genitourinary, Musculoskeletal, Integumentary, Neurological, Psychiatric, Endocrine, Heme/Lymph and Allergic/Immunologic review of systems are otherwise negative except as noted in HPI.     Vital Signs Last 24 Hrs  T(C): 37.3 (11 Jun 2023 07:45), Max: 37.4 (11 Jun 2023 04:00)  T(F): 99.1 (11 Jun 2023 07:45), Max: 99.3 (11 Jun 2023 04:00)  HR: 105 (11 Jun 2023 07:45) (77 - 105)  BP: 126/80 (11 Jun 2023 07:45) (118/65 - 153/87)  BP(mean): --  RR: 18 (11 Jun 2023 07:45) (12 - 18)  SpO2: 99% (11 Jun 2023 07:45) (95% - 100%)    Parameters below as of 11 Jun 2023 07:45  Patient On (Oxygen Delivery Method): room air    Physical Exam    Eyes: no conjunctival infection, anicteric.   ENT: pharynx is unremarkable, moist mucus membrane, no oral lesions.   Neck: supple without JVD, no thyromegaly or masses appreciated.   Pulmonary: clear to auscultation bilaterally, no dullness, no wheezing.   Cardiac: RRR, normal S1S2, no murmurs, rubs, gallops.   Vascular: no JVD, no calf tenderness, venous stasis changes, varices.   Abdomen: normoactive bowel sounds, soft and nontender, no hepatosplenomegaly or masses appreciated.   Lymphatic: no peripheral adenopathy appreciated.   Musculoskeletal: , left leg in ace bandaged.   Skin: normal appearance, no rash, nodules, vesicles, ulcers, erythema.   Neurology: grossly intact.   Psychiatric: affect appropriate.     LABS:    CBC Full  -  ( 11 Jun 2023 08:13 )  WBC Count : 17.65 K/uL  RBC Count : 3.11 M/uL  Hemoglobin : 8.7 g/dL  Hematocrit : 27.8 %  Platelet Count - Automated : 142 K/uL  Mean Cell Volume : 89.4 fl  Mean Cell Hemoglobin : 28.0 pg  Mean Cell Hemoglobin Concentration : 31.3 gm/dL  Auto Neutrophil # : x  Auto Lymphocyte # : x  Auto Monocyte # : x  Auto Eosinophil # : x  Auto Basophil # : x  Auto Neutrophil % : x  Auto Lymphocyte % : x  Auto Monocyte % : x  Auto Eosinophil % : x  Auto Basophil % : x    06-11    141  |  111<H>  |  10  ----------------------------<  120<H>  3.9   |  25  |  0.78    Ca    7.7<L>      11 Jun 2023 08:13  Phos  3.7     06-10  Mg     2.1     06-10    TPro  6.4  /  Alb  3.0<L>  /  TBili  0.4  /  DBili  x   /  AST  14<L>  /  ALT  25  /  AlkPhos  163<H>  06-10    PT/INR - ( 10 Barrett 2023 04:28 )   PT: 15.6 sec;   INR: 1.34 ratio     PTT - ( 10 Barrett 2023 04:28 )  PTT:27.1 sec      RADIOLOGY & ADDITIONAL STUDIES:    < from: CT Knee No Cont, Left (06.09.23 @ 19:07) >    ACC: [id number] EXAM:  CT 3D RECONSTRUCT WO [facility]   ORDERED BY: KYMBERLY PADLILA     ACC: [id number] EXAM:  CT KNEE ONLY LT   ORDERED BY: KYMBERLY PADILLA     PROCEDURE DATE:  06/09/2023          INTERPRETATION:  CT KNEE LEFT, CT 3D RECONSTRUCTION WO WORKSTATION dated   6/9/2023 7:07 PM    INDICATION: Left knee    COMPARISON: Knee radiographs of the same date    TECHNIQUE: CT imaging of the left knee was performed. The data was   reformatted in the axial, coronal, and sagittal planes. Additionally, 3-D   reformatted imaging was created.    FINDINGS:    OSSEOUS STRUCTURES: There is a mildly displaced, impacted intra-articular   fracture with marked comminution of the distal femur. The femur   predominantly involves the trochlea articular surface, the medial femoral   condylar articular surface. There is mild displacement of the medial and   lateral femoral condyles. There is a chronic fracture deformity the   proximal tibial plateau with both medial and lateral tibial plates and   screws. The hardware appears intact. There is a chronic fibular fracture   deformity at the fibular neck.  SYNOVIUM/ JOINT FLUID: Moderate knee joint effusion that appears complex  TENDONS: The tendons are intact  MUSCLES: Mild intramuscular hematoma involving the vastus intermedius   muscle.  NEUROVASCULAR STRUCTURES: Preserved  SUBCUTANEOUS SOFT TISSUES: Soft tissue swelling    3-D reformatted imaging confirms these findings.    IMPRESSION:    Comminuted intra-articular and distracted distal femur fracture as   described above.  Complex knee joint effusion.  Chronic fracture deformity of the left proximal tibia and fibula.      
HPI:  63 y/o M w/ PMH of colon cancer stage IV w/ mets to liver(on chemo), catheter tip thrombus  pt on lovenox tx dose (120mg daily as per hematolgist note), p/w mechanical fall. Patient states he was mopping and then cleaning microwave, then he turned his R foot and he slipped, and twisted L knee. Denies hitting head, LOC, palpitations, CP, dizziness. Patient denies h/o CAD, CKD, CVA, insulin dependent DM.     PSH: L knee surgery    Social Hx: Denies tobacco / etoh / drugs     Family Hx: Denies significant family hx    (10 Barrett 2023 00:31)      Patient is a 62y old  Male who presents with a chief complaint of mechanical fall (2023 07:30)      Consulted by Dr. Cameron Fischer  for VTE prophylaxis, risk stratification, and anticoagulation management.    PAST MEDICAL & SURGICAL HISTORY:  No pertinent past medical history      Colon cancer      No significant past surgical history          FAMILY HISTORY:  No pertinent family history in first degree relatives        Interval Note:  : Pt seen at bedside on 2north oob in chair.  Discussed his Lovenox.  Pt states he has it hat home has been on it since his Thrombotic episode in January.  He can self inj med.  States he will go to his daughters house on discharge.        CAPRINI SCORE  AGE RELATED RISK FACTORS                                                       MOBILITY RELATED FACTORS  [ ] Age 41-60 years                                            (1 Point)                  [ ] Bed rest                                                        (1 Point)  [ x] Age: 61-74 years                                           (2 Points)                [ ] Plaster cast                                                   (2 Points)  [ ] Age= 75 years                                              (3 Points)                 [ ] Bed bound for more than 72 hours                   (2 Points)    DISEASE RELATED RISK FACTORS                                               GENDER SPECIFIC FACTORS  [ ] Edema in the lower extremities                       (1 Point)           [ ] Pregnancy                                                            (1 Point)  [ ] Varicose veins                                               (1 Point)                  [ ] Post-partum < 6 weeks                                      (1 Point)             [ x] BMI > 25 Kg/m2                                            (1 Point)                  [ ] Hormonal therapy or oral contraception       (1 Point)                 [ ] Sepsis (in the previous month)                        (1 Point)             [ ] History of pregnancy complications                (1Point)  [ ] Pneumonia or serious lung disease                                             [ ] Unexplained or recurrent  (=/>3), premature                                 (In the previous month)                               (1 Point)                birth with toxemia or growth-restricted infant (1 Point)  [ ] Abnormal pulmonary function test            (1 Point)                                   SURGERY RELATED RISK FACTORS  [ ] Acute myocardial infarction                       (1 Point)                  [ ]  Section                                         (1 Point)  [ ] Congestive heart failure (in the previous month) (1 Point)   [ ] Minor surgery   lasting <45 minutes       (1 Point)   [ ] Inflammatory bowel disease                             (1 Point)          [ ] Arthroscopic surgery                                  (2 Points)  [ ] Central venous access                                    (2 Points)            [ ] General surgery lasting >45 minutes      (2 Points)       [ ] Stroke (in the previous month)                  (5 Points)            [ ] Elective major lower extremity arthroplasty (5 Points)                                   [ x ] Malignancy (present or past include skin melanoma                                          but exclude  basal skin cell)    (2 points)                                      TRAUMA RELATED RISK FACTORS                HEMATOLOGY RELATED FACTORS                                  [x ] Fracture of the hip, pelvis, or leg                       (5 Points)  [x ] Prior episodes of VTE                                     (3 Points)          [ ] Acute spinal cord injury (in the previous month)  (5 Points)  [ ] Positive family history for VTE                         (3 Points)       [ ] Paralysis (less than 1 month)                          (5 Points)  [ ] Prothrombin 32903 A                                      (3 Points)         [ ] Multiple Trauma (within 1month)                 (5Points)                                                                                                                                                                [ ] Factor V Leiden                                          (3 Points)                                OTHER RISK FACTORS                          [ ] Lupus anticoagulants                                     (3 Points)                       [ ] BMI > 40                          (1 Point)                                                         [ ] Anticardiolipin antibodies                                (3 Points)                   [ ] Smoking                              (1Point)                                                [ ] High homocysteine in the blood                      (3 Points)                [  ] Diabetes requiring insulin (1point)                         [ ] Other congenital or acquired thrombophilia       (3 Points)          [  ] Chemotherapy                   (1 Point)  [ ] Heparin induced thrombocytopenia                  (3 Points)             [  ] Blood Transfusion                (1 point)                                                                                                             Total Score [13 ]                                                                                                                                                                                                                                                                                                                                                                                                                                           IMPROVE Bleeding Risk Score: 4.5    Falls Risk:   High (x  )  Mod (  )  Low (  )  crcl:  137.5       cr:.78          BMI: 36.6            EBL: 200   ml      Denies any personal or familial history of clotting or bleeding disorders.    Allergies    No Known Drug Allergies  Seafood (Unknown)    Intolerances        REVIEW OF SYSTEMS    (  )Fever	     (  )Constipation	(  )SOB				(  )Headache	(  )Dysuria  (  )Chills	     (  )Melena	(  )Dyspnea present on exertion	                    (  )Dizziness                    (  )Polyuria  (  )Nausea	     (  )Hematochezia	(  )Cough			                    (  )Syncope   	(  )Hematuria  (  )Vomiting    (  )Chest Pain	(  )Wheezing			(  )Weakness  (  )Diarrhea     (  )Palpitations	(  )Anorexia			(  )Myalgia  (x) leg pain    Pertinent positives in HPI and daily subjective.  All other ROS negative.    Vital Signs Last 24 Hrs  T(C): 37.2 (2023 08:01), Max: 38.3 (2023 00:27)  T(F): 98.9 (2023 08:01), Max: 100.9 (2023 00:27)  HR: 102 (2023 08:) (100 - 109)  BP: 126/73 (2023 08:) (126/73 - 147/68)  BP(mean): --  RR: 16 (2023 08:01) (16 - 18)  SpO2: 99% (2023 08:) (99% - 100%)    Parameters below as of 2023 08:01  Patient On (Oxygen Delivery Method): room air        PHYSICAL EXAM:    Constitutional: Appears Well    Neurological: A& O x 3    Skin: Warm    Respiratory and Thorax: normal effort; Breath sounds: normal; No rales/wheezing/rhonchi  	  Cardiovascular: S1, S2, regular, NMBR	    Gastrointestinal: BS + x 4Q, nontender	    Genitourinary:  Bladder nondistended, nontender    Musculoskeletal:   General Right:   no muscle/joint tenderness,   normal tone, no joint swelling,   ROM: limited/full	    General Left:   no muscle/joint tenderness,   normal tone, no joint swelling,   ROM: limited/full    Hip:  Left: Dressing CDI, immobilizer noted + swelling.     Lower extrems:   Right: no calf tenderness              negative alycia's sign               + pedal pulses    Left:   no calf tenderness              negative alycia's sign               + pedal pulses                            8.1    10.25 )-----------( 162      ( 2023 08:03 )             25.8       06-12    138  |  108  |  7   ----------------------------<  107<H>  3.8   |  27  |  0.73    Ca    8.1<L>      2023 08:03  Phos  2.2     06-12  Mg     2.1     06-12      < from: CT Knee No Cont, Left (23 @ 19:07) >    IMPRESSION:    Comminuted intra-articular and distracted distal femur fracture as   described above.  Complex knee joint effusion.  Chronic fracture deformity of the left proximal tibia and fibula.    < end of copied text >    				    MEDICATIONS  (STANDING):  calcium carbonate 1250 mG  + Vitamin D (OsCal 500 + D) 1 Tablet(s) Oral three times a day  ceFAZolin   IVPB 2000 milliGRAM(s) IV Intermittent every 8 hours  enoxaparin Injectable 40 milliGRAM(s) SubCutaneous every 24 hours  ergocalciferol 86775 Unit(s) Oral <User Schedule>  folic acid 1 milliGRAM(s) Oral daily  gabapentin 300 milliGRAM(s) Oral three times a day  multivitamin 1 Tablet(s) Oral daily  polyethylene glycol 3350 17 Gram(s) Oral at bedtime  senna 2 Tablet(s) Oral at bedtime  sodium chloride 0.9%. 1000 milliLiter(s) IV Continuous <Continuous>          DVT Prophylaxis:  LMWH                   ( x )  Heparin SQ           (  )  Coumadin             (  )  Xarelto                  (  )  Eliquis                   (  )  Venodynes           ( x )  Ambulation          ( x )  UFH                       (  )  Contraindicated  (  )  EC Aspirin             (  )

## 2023-06-12 NOTE — PROGRESS NOTE ADULT - SUBJECTIVE AND OBJECTIVE BOX
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      6/11/2023- Seen at bedside with Dr. Walters. MARYOB to chair, in no acute distress.     6/12/2023- Seen at bedside, resting, in no acute distress. Pain well managed, ambulates with crutch (non weight bearing on left leg).    PAST MEDICAL & SURGICAL HISTORY:    Colon CA  Left Knee Sx    FAMILY HISTORY:  No pertinent family history in first degree relatives    Social Hx:     Denies tobacco / etoh / drugs     MEDICATIONS  (STANDING):    calcium carbonate 1250 mG  + Vitamin D (OsCal 500 + D) 1 Tablet(s) Oral three times a day  ceFAZolin   IVPB 2000 milliGRAM(s) IV Intermittent every 8 hours  enoxaparin Injectable 40 milliGRAM(s) SubCutaneous every 24 hours  ergocalciferol 99702 Unit(s) Oral <User Schedule>  folic acid 1 milliGRAM(s) Oral daily  gabapentin 300 milliGRAM(s) Oral three times a day  multivitamin 1 Tablet(s) Oral daily  polyethylene glycol 3350 17 Gram(s) Oral at bedtime  senna 2 Tablet(s) Oral at bedtime    MEDICATIONS  (PRN):    acetaminophen     Tablet .. 650 milliGRAM(s) Oral every 6 hours PRN Temp greater or equal to 38C (100.4F), Mild Pain (1 - 3)  aluminum hydroxide/magnesium hydroxide/simethicone Suspension 30 milliLiter(s) Oral four times a day PRN Indigestion  magnesium hydroxide Suspension 30 milliLiter(s) Oral daily PRN Constipation  morphine  - Injectable 1 milliGRAM(s) IV Push every 2 hours PRN Severe Pain (7 - 10)  ondansetron Injectable 4 milliGRAM(s) IV Push every 6 hours PRN Nausea and/or Vomiting  oxyCODONE    IR 10 milliGRAM(s) Oral every 3 hours PRN Severe Pain (7 - 10)  oxyCODONE    IR 5 milliGRAM(s) Oral every 3 hours PRN Moderate Pain (4 - 6)      Allergies    No Known Drug Allergies  Seafood (Unknown)    Review of Systems    Constitutional, Eyes, ENT, Cardiovascular, Respiratory, Gastrointestinal, Genitourinary, Musculoskeletal, Integumentary, Neurological, Psychiatric, Endocrine, Heme/Lymph and Allergic/Immunologic review of systems are otherwise negative except as noted in HPI.     Vital Signs Last 24 Hrs  T(C): 37.2 (12 Jun 2023 08:01), Max: 38.3 (12 Jun 2023 00:27)  T(F): 98.9 (12 Jun 2023 08:01), Max: 100.9 (12 Jun 2023 00:27)  HR: 96 (12 Jun 2023 12:42) (96 - 109)  BP: 136/79 (12 Jun 2023 12:42) (126/73 - 147/68)  BP(mean): 92 (12 Jun 2023 12:42) (92 - 92)  RR: 18 (12 Jun 2023 12:42) (16 - 18)  SpO2: 100% (12 Jun 2023 12:42) (99% - 100%)    Parameters below as of 12 Jun 2023 12:42  Patient On (Oxygen Delivery Method): room air        Physical Exam    Eyes: no conjunctival infection, anicteric.   ENT: pharynx is unremarkable, moist mucus membrane, no oral lesions.   Neck: supple without JVD, no thyromegaly or masses appreciated.   Pulmonary: clear to auscultation bilaterally, no dullness, no wheezing.   Cardiac: RRR, normal S1S2, no murmurs, rubs, gallops.   Vascular: no JVD, no calf tenderness, venous stasis changes, varices.   Abdomen: normoactive bowel sounds, soft and nontender, no hepatosplenomegaly or masses appreciated.   Lymphatic: no peripheral adenopathy appreciated.   Musculoskeletal: , left leg in ace bandaged- elevated on a pillow  Skin: normal appearance, no rash, nodules, vesicles, ulcers, erythema.   Neurology: grossly intact.   Psychiatric: affect appropriate.     LABS:                          8.4    12.78 )-----------( 182      ( 12 Jun 2023 11:44 )             27.2       06-12    138  |  108  |  7   ----------------------------<  107<H>  3.8   |  27  |  0.73    Ca    8.1<L>      12 Jun 2023 08:03  Phos  2.2     06-12  Mg     2.1     06-12        PT/INR - ( 10 Barrett 2023 04:28 )   PT: 15.6 sec;   INR: 1.34 ratio     PTT - ( 10 Barrett 2023 04:28 )  PTT:27.1 sec      RADIOLOGY & ADDITIONAL STUDIES:    EXAM:  CT 3D RECONSTRUCT  [facility]    ACC: [id number] EXAM:  CT KNEE ONLY LT   ORDERED BY: KYMBERLY PADILLA     PROCEDURE DATE:  06/09/2023      INTERPRETATION:  CT KNEE LEFT, CT 3D RECONSTRUCTION WO WORKSTATION dated   6/9/2023 7:07 PM    INDICATION: Left knee    COMPARISON: Knee radiographs of the same date    TECHNIQUE: CT imaging of the left knee was performed. The data was   reformatted in the axial, coronal, and sagittal planes. Additionally, 3-D   reformatted imaging was created.    FINDINGS:    OSSEOUS STRUCTURES: There is a mildly displaced, impacted intra-articular   fracture with marked comminution of the distal femur. The femur   predominantly involves the trochlea articular surface, the medial femoral   condylar articular surface. There is mild displacement of the medial and   lateral femoral condyles. There is a chronic fracture deformity the   proximal tibial plateau with both medial and lateral tibial plates and   screws. The hardware appears intact. There is a chronic fibular fracture   deformity at the fibular neck.  SYNOVIUM/ JOINT FLUID: Moderate knee joint effusion that appears complex  TENDONS: The tendons are intact  MUSCLES: Mild intramuscular hematoma involving the vastus intermedius   muscle.  NEUROVASCULAR STRUCTURES: Preserved  SUBCUTANEOUS SOFT TISSUES: Soft tissue swelling    3-D reformatted imaging confirms these findings.    IMPRESSION:    Comminuted intra-articular and distracted distal femur fracture as   described above.  Complex knee joint effusion.  Chronic fracture deformity of the left proximal tibia and fibula.

## 2023-06-12 NOTE — OCCUPATIONAL THERAPY INITIAL EVALUATION ADULT - BALANCE TRAINING, PT EVAL
Pt will demonstrate improved static/dynamic standing balance by 1/2 grade in order to increase safety and independence in ADLs within 2-3 weeks

## 2023-06-12 NOTE — PROGRESS NOTE ADULT - SUBJECTIVE AND OBJECTIVE BOX
Orthopedics      Patient seen and examined at bedside. Feeling well. Pain controlled. No n/v. Mild tachycardia overnight with low grade temperature elevations, however asymptomatic.     Vital Signs Last 24 Hrs  T(C): 37 (06-12-23 @ 04:23), Max: 38.3 (06-12-23 @ 00:27)  T(F): 98.6 (06-12-23 @ 04:23), Max: 100.9 (06-12-23 @ 00:27)  HR: 102 (06-12-23 @ 04:23) (100 - 109)  BP: 147/68 (06-12-23 @ 04:23) (126/80 - 147/68)  BP(mean): --  RR: 18 (06-12-23 @ 04:23) (18 - 18)  SpO2: 99% (06-12-23 @ 04:23) (99% - 100%)      General: NAD, resting comfortably in bed  LLE:   Knee immobilizer in place  Dressing in place C/D/I  SCDs in place   No calf tenderness   TA/EHL/FHL/GSC+  SILT L2-S1  DP/PT 2+      A/P:  62y m s/p L distal femur ORIF POD#2    -PT/OT   -NWB of LLE with KI in place  -Pain Control as needed  -DVT ppx: Per AC team  -Continue ancef until 6/13  -Follow labs, transfuse < 8.0  -Rest, ice, compress and elevate the extremity as we needed  -Incentive Spirometry  -Medical management appreciated  -Dispo: Home v [facility]  -Discussed above with Dr. Fischer, who agrees with plan

## 2023-06-13 LAB
ANION GAP SERPL CALC-SCNC: 5 MMOL/L — SIGNIFICANT CHANGE UP (ref 5–17)
BLD GP AB SCN SERPL QL: SIGNIFICANT CHANGE UP
BUN SERPL-MCNC: 10 MG/DL — SIGNIFICANT CHANGE UP (ref 7–23)
CALCIUM SERPL-MCNC: 8.2 MG/DL — LOW (ref 8.5–10.1)
CHLORIDE SERPL-SCNC: 109 MMOL/L — HIGH (ref 96–108)
CO2 SERPL-SCNC: 26 MMOL/L — SIGNIFICANT CHANGE UP (ref 22–31)
CREAT SERPL-MCNC: 0.67 MG/DL — SIGNIFICANT CHANGE UP (ref 0.5–1.3)
EGFR: 106 ML/MIN/1.73M2 — SIGNIFICANT CHANGE UP
GLUCOSE SERPL-MCNC: 97 MG/DL — SIGNIFICANT CHANGE UP (ref 70–99)
HCT VFR BLD CALC: 23.9 % — LOW (ref 39–50)
HGB BLD-MCNC: 7.4 G/DL — LOW (ref 13–17)
MAGNESIUM SERPL-MCNC: 2 MG/DL — SIGNIFICANT CHANGE UP (ref 1.6–2.6)
MCHC RBC-ENTMCNC: 28.4 PG — SIGNIFICANT CHANGE UP (ref 27–34)
MCHC RBC-ENTMCNC: 31 GM/DL — LOW (ref 32–36)
MCV RBC AUTO: 91.6 FL — SIGNIFICANT CHANGE UP (ref 80–100)
PHOSPHATE SERPL-MCNC: 2.6 MG/DL — SIGNIFICANT CHANGE UP (ref 2.5–4.5)
PLATELET # BLD AUTO: 187 K/UL — SIGNIFICANT CHANGE UP (ref 150–400)
POTASSIUM SERPL-MCNC: 3.5 MMOL/L — SIGNIFICANT CHANGE UP (ref 3.5–5.3)
POTASSIUM SERPL-SCNC: 3.5 MMOL/L — SIGNIFICANT CHANGE UP (ref 3.5–5.3)
RBC # BLD: 2.61 M/UL — LOW (ref 4.2–5.8)
RBC # FLD: 17.3 % — HIGH (ref 10.3–14.5)
SODIUM SERPL-SCNC: 140 MMOL/L — SIGNIFICANT CHANGE UP (ref 135–145)
WBC # BLD: 9.58 K/UL — SIGNIFICANT CHANGE UP (ref 3.8–10.5)
WBC # FLD AUTO: 9.58 K/UL — SIGNIFICANT CHANGE UP (ref 3.8–10.5)

## 2023-06-13 PROCEDURE — 99232 SBSQ HOSP IP/OBS MODERATE 35: CPT

## 2023-06-13 PROCEDURE — 99233 SBSQ HOSP IP/OBS HIGH 50: CPT

## 2023-06-13 PROCEDURE — 99231 SBSQ HOSP IP/OBS SF/LOW 25: CPT

## 2023-06-13 RX ADMIN — Medication 100 MILLIGRAM(S): at 05:23

## 2023-06-13 RX ADMIN — SODIUM CHLORIDE 125 MILLILITER(S): 9 INJECTION INTRAMUSCULAR; INTRAVENOUS; SUBCUTANEOUS at 10:17

## 2023-06-13 RX ADMIN — SENNA PLUS 2 TABLET(S): 8.6 TABLET ORAL at 21:18

## 2023-06-13 RX ADMIN — GABAPENTIN 300 MILLIGRAM(S): 400 CAPSULE ORAL at 14:45

## 2023-06-13 RX ADMIN — Medication 1 TABLET(S): at 10:16

## 2023-06-13 RX ADMIN — Medication 1 TABLET(S): at 14:45

## 2023-06-13 RX ADMIN — GABAPENTIN 300 MILLIGRAM(S): 400 CAPSULE ORAL at 21:18

## 2023-06-13 RX ADMIN — Medication 1 MILLIGRAM(S): at 10:16

## 2023-06-13 RX ADMIN — GABAPENTIN 300 MILLIGRAM(S): 400 CAPSULE ORAL at 05:24

## 2023-06-13 RX ADMIN — ENOXAPARIN SODIUM 40 MILLIGRAM(S): 100 INJECTION SUBCUTANEOUS at 05:24

## 2023-06-13 RX ADMIN — Medication 1 TABLET(S): at 05:24

## 2023-06-13 NOTE — PROGRESS NOTE ADULT - ASSESSMENT
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 No MMR deficiency  Primary Oncologist - Dr Harrison    - 08/18/22 Colonoscopy showed a frond-like/villous and ulcerated non-obstructing large mass, measuring 5 cm in the descending colon. The mass was circumferential  - Path: Invasive adenocarcinoma, moderately differentiated. No loss of nuclear expression of MMR proteins (MLH1, MSH2, MSH6, and PMS2). Low probability of microsatellite instability  - 08/16/22 CEA = 633  - 08/19/22 Path: Liver, core biopsy: Metastatic adenocarcinoma, consistent with metastasis from colonic primary  - He was started on FOLFIRI- 10/19/22 (the delay was due to fall 09/22--->he underwent  ORIF on 09/29/22 of the left tibial plateau)  - 01/09/23 CT C/A/P: Findings c/w descending colon cancer with extension through the wall with extensive mesenteric confluent tumor and/or ivelisse disease. Diffuse metastatic disease identified throughout the liver stable/ some slightly improved  - Stared on Lovenox 120 mg sc daily per Dr Harrison for small amount of pericatheter clot near the tip of the catheter in the superior vena cava  -4/10/23 CT C/A/P: Distal descending colon mass has decreased in size since prior with decreased associated colonic dilatation. Large amount of stool again noted in the colon and rectum. Hepatic metastases are slightly decreased in size, largest decrease 2.3 x 1.4 cm, previously 3.0 x 1.8 cm. Unchanged left adrenal mass and small lung nodule.  -s/p [id number] -5/30.   -No plans for in patient chemo-->next chemo scheduled for 6/12/23 - will postpone by ~ 2 weeks     # Mechanical Fall--> s/p L distal femur ORIF -6/10    -Orthopedics/ Surgery- following  -POD # 3  -Received 1 unit preoperatively and another unit in PACU postoperatively  -Hemodynamically stable   -PT-->NWB of LLE   -Supportive measure-Pain control/rest, ice, compress and elevate the extremity.    # SVC thrombus Mediport related     -pt was on therapeutic dose of AC (Lovenox). Dr. Walters spoke with Dr Harrison and per her patient discontinued himself two months ago. Dr Harrison- to review and decide/ and make a decision and let the team know if he should resume full dose AC versus prophylactic low dose Lovenox.                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 No MMR deficiency  Primary Oncologist - Dr Harrison    - 08/18/22 Colonoscopy showed a frond-like/villous and ulcerated non-obstructing large mass, measuring 5 cm in the descending colon. The mass was circumferential  - Path: Invasive adenocarcinoma, moderately differentiated. No loss of nuclear expression of MMR proteins (MLH1, MSH2, MSH6, and PMS2). Low probability of microsatellite instability  - 08/16/22 CEA = 633  - 08/19/22 Path: Liver, core biopsy: Metastatic adenocarcinoma, consistent with metastasis from colonic primary  - He was started on FOLFIRI- 10/19/22 (the delay was due to fall 09/22--->he underwent  ORIF on 09/29/22 of the left tibial plateau)  - 01/09/23 CT C/A/P: Findings c/w descending colon cancer with extension through the wall with extensive mesenteric confluent tumor and/or ivelisse disease. Diffuse metastatic disease identified throughout the liver stable/ some slightly improved  - Stared on Lovenox 120 mg sc daily per Dr Harrison for small amount of pericatheter clot near the tip of the catheter in the superior vena cava  -4/10/23 CT C/A/P: Distal descending colon mass has decreased in size since prior with decreased associated colonic dilatation. Large amount of stool again noted in the colon and rectum. Hepatic metastases are slightly decreased in size, largest decrease 2.3 x 1.4 cm, previously 3.0 x 1.8 cm. Unchanged left adrenal mass and small lung nodule.  -s/p [id number] -5/30.   -No plans for in patient chemo-->next chemo scheduled for 6/12/23 - will postpone by ~ 2 weeks     # Mechanical Fall--> s/p L distal femur ORIF -6/10    -Orthopedics/ Surgery- following  -POD # 3  -Received 1 unit preoperatively and another unit in PACU postoperatively  -Hemodynamically stable   -PT-->NWB of LLE   -Supportive measure-Pain control/rest, ice, compress and elevate the extremity.    # SVC thrombus Mediport related     -pt was on therapeutic dose of AC (Lovenox). Dr. Walters spoke with Dr Harrison and per her patient discontinued himself two months ago. Dr Harrison- to review and decide/ and make a decision and let the team know if he should resume full dose AC versus prophylactic low dose Lovenox.   Completed 3 months of AC - would favor  ongoing AC with reduce dose   DOAC for secondary prophylaxis.

## 2023-06-13 NOTE — PROGRESS NOTE ADULT - ASSESSMENT
This is a 63 y/o M w/ PMH of colon cancer stage IV w/ mets to liver(on chemo), catheter tip thrombus 1-2023 pt on lovenox tx dose (120mg daily as per hematolgist note), p/w mechanical fall. Pt found to have Comminuted intra-articular and distracted distal femur fracture.  Now s/p 6-.  Pt has high thrombotic risk and requires treatment dose anticoagulation.    6-: Spoke with Dr Walters and she confirms pt was on Lovenox 120mg sq daily by  Dr Harrison for small amount of pericatheter clot near the tip of the catheter in the superior vena cava.  Pt still requires treatment dose when cleared by ortho.. Spoke with ortho resident and he states Dr. Fischer wants pt to remain on prophylactic dose for 5 days post procedure then re evaluate.  Pt made awre of plan.       Plan:  cont on Lovenox 40mg sq daily for five days post procedure today PO day 2 re evaluate on POD 5 6- TO Resume tx dose.  : daily cbc/bmp noted h/h 8.1/25.8 --->7.4/23.9 pt to get 1 unit of PRBC  : le venodynes  : increase mobility as per ortho  will f/u

## 2023-06-13 NOTE — PROGRESS NOTE ADULT - SUBJECTIVE AND OBJECTIVE BOX
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      6/11/2023- Seen at bedside with Dr. Walters. MARYOB to chair, in no acute distress.     6/12/2023- Seen at bedside, resting, in no acute distress. Pain well managed, ambulates with crutch (non weight bearing on left leg).    6/13/2023- In no acute distress    PAST MEDICAL & SURGICAL HISTORY:    Colon CA  Left Knee Sx    FAMILY HISTORY:  No pertinent family history in first degree relatives    Social Hx:     Denies tobacco / etoh / drugs     MEDICATIONS  (STANDING):    calcium carbonate 1250 mG  + Vitamin D (OsCal 500 + D) 1 Tablet(s) Oral three times a day  enoxaparin Injectable 40 milliGRAM(s) SubCutaneous every 24 hours  ergocalciferol 89554 Unit(s) Oral <User Schedule>  folic acid 1 milliGRAM(s) Oral daily  gabapentin 300 milliGRAM(s) Oral three times a day  multivitamin 1 Tablet(s) Oral daily  polyethylene glycol 3350 17 Gram(s) Oral at bedtime  senna 2 Tablet(s) Oral at bedtime  sodium chloride 0.9%. 1000 milliLiter(s) (125 mL/Hr) IV Continuous <Continuous>    MEDICATIONS  (PRN):    acetaminophen     Tablet .. 650 milliGRAM(s) Oral every 6 hours PRN Temp greater or equal to 38C (100.4F), Mild Pain (1 - 3)  aluminum hydroxide/magnesium hydroxide/simethicone Suspension 30 milliLiter(s) Oral four times a day PRN Indigestion  magnesium hydroxide Suspension 30 milliLiter(s) Oral daily PRN Constipation  morphine  - Injectable 1 milliGRAM(s) IV Push every 2 hours PRN Severe Pain (7 - 10)  ondansetron Injectable 4 milliGRAM(s) IV Push every 6 hours PRN Nausea and/or Vomiting  oxyCODONE IR 10 milliGRAM(s) Oral every 3 hours PRN Severe Pain (7 - 10)  oxyCODONE IR 5 milliGRAM(s) Oral every 3 hours PRN Moderate Pain (4 - 6)    Allergies    No Known Drug Allergies  Seafood (Unknown)    Review of Systems    Constitutional, Eyes, ENT, Cardiovascular, Respiratory, Gastrointestinal, Genitourinary, Musculoskeletal, Integumentary, Neurological, Psychiatric, Endocrine, Heme/Lymph and Allergic/Immunologic review of systems are otherwise negative except as noted in HPI.     Vital Signs Last 24 Hrs    T(C): 37.2 (13 Jun 2023 11:37), Max: 37.2 (13 Jun 2023 11:37)  T(F): 99 (13 Jun 2023 11:37), Max: 99 (13 Jun 2023 11:37)  HR: 104 (13 Jun 2023 11:37) (92 - 107)  BP: 130/76 (13 Jun 2023 11:37) (118/58 - 133/70)  BP(mean): 91 (13 Jun 2023 11:37) (83 - 91)  RR: 18 (13 Jun 2023 11:37) (17 - 18)  SpO2: 99% (13 Jun 2023 11:37) (99% - 100%)    Parameters below as of 13 Jun 2023 11:37  Patient On (Oxygen Delivery Method): room air    Physical Exam    Eyes: no conjunctival infection, anicteric.   ENT: pharynx is unremarkable, moist mucus membrane, no oral lesions.   Neck: supple without JVD, no thyromegaly or masses appreciated.   Pulmonary: clear to auscultation bilaterally, no dullness, no wheezing.   Cardiac: RRR, normal S1S2, no murmurs, rubs, gallops.   Vascular: no JVD, no calf tenderness, venous stasis changes, varices.   Abdomen: normoactive bowel sounds, soft and nontender, no hepatosplenomegaly or masses appreciated.   Lymphatic: no peripheral adenopathy appreciated.   Musculoskeletal: , left leg in ace bandaged- elevated on a pillow  Skin: normal appearance, no rash, nodules, vesicles, ulcers, erythema.   Neurology: grossly intact.   Psychiatric: affect appropriate.     LABS:                          7.4    9.58  )-----------( 187      ( 13 Jun 2023 08:23 )             23.9     06-13    140  |  109<H>  |  10  ----------------------------<  97  3.5   |  26  |  0.67    Ca    8.2<L>      13 Jun 2023 08:23  Phos  2.6     06-13  Mg     2.0     06-13            PT/INR - ( 10 Barrett 2023 04:28 )   PT: 15.6 sec;   INR: 1.34 ratio     PTT - ( 10 Barrett 2023 04:28 )  PTT:27.1 sec      RADIOLOGY & ADDITIONAL STUDIES:    EXAM:  CT 3D RECONSTRUCT  JODY    ACC: [id number] EXAM:  CT KNEE ONLY LT   ORDERED BY: KYMBERLY PADILLA     PROCEDURE DATE:  06/09/2023      INTERPRETATION:  CT KNEE LEFT, CT 3D RECONSTRUCTION WO WORKSTATION dated   6/9/2023 7:07 PM    INDICATION: Left knee    COMPARISON: Knee radiographs of the same date    TECHNIQUE: CT imaging of the left knee was performed. The data was   reformatted in the axial, coronal, and sagittal planes. Additionally, 3-D   reformatted imaging was created.    FINDINGS:    OSSEOUS STRUCTURES: There is a mildly displaced, impacted intra-articular   fracture with marked comminution of the distal femur. The femur   predominantly involves the trochlea articular surface, the medial femoral   condylar articular surface. There is mild displacement of the medial and   lateral femoral condyles. There is a chronic fracture deformity the   proximal tibial plateau with both medial and lateral tibial plates and   screws. The hardware appears intact. There is a chronic fibular fracture   deformity at the fibular neck.  SYNOVIUM/ JOINT FLUID: Moderate knee joint effusion that appears complex  TENDONS: The tendons are intact  MUSCLES: Mild intramuscular hematoma involving the vastus intermedius   muscle.  NEUROVASCULAR STRUCTURES: Preserved  SUBCUTANEOUS SOFT TISSUES: Soft tissue swelling    3-D reformatted imaging confirms these findings.    IMPRESSION:    Comminuted intra-articular and distracted distal femur fracture as   described above.  Complex knee joint effusion.  Chronic fracture deformity of the left proximal tibia and fibula.

## 2023-06-13 NOTE — PROGRESS NOTE ADULT - SUBJECTIVE AND OBJECTIVE BOX
Patient seen and examined at bedside. Pain well controlled with medication. Patient denies any numbness, tingling, weakness, or any other orthopaedic complaint. Denies N/V/CP/SOB.       VITAL SIGNS:  T(C): 37.1 (23 @ 00:03), Max: 37.2 (23 @ 08:01)  HR: 100 (23 @ 00:03) (96 - 102)  BP: 133/70 (23 @ 00:03) (119/71 - 136/79)  RR: 17 (23 @ 00:03) (16 - 18)  SpO2: 100% (23 @ 00:03) (99% - 100%)      LABS:                        8.4    12.78 )-----------( 182      ( 2023 11:44 )             27.2     12    138  |  108  |  7   ----------------------------<  107<H>  3.8   |  27  |  0.73    Ca    8.1<L>      2023 08:03  Phos  2.2       Mg     2.1     12        Urinalysis Basic - ( 2023 14:35 )    Color: Yellow / Appearance: Clear / S.010 / pH: x  Gluc: x / Ketone: Negative  / Bili: Negative / Urobili: Negative   Blood: x / Protein: Negative / Nitrite: Negative   Leuk Esterase: Negative / RBC: x / WBC x   Sq Epi: x / Non Sq Epi: x / Bacteria: x            General: NAD, resting comfortably in bed  LLE:   Knee immobilizer in place  Dressing in place C/D/I  SCDs in place   No calf tenderness   TA/EHL/FHL/GSC+  SILT L2-S1  DP/PT 2+      A/P:  62y m s/p L distal femur ORIF POD#3    -PT/OT   -NWB of LLE with KI in place  -Pain Control as needed  -DVT ppx: Per AC team  -Continue ancef until   -Follow labs, transfuse < 8.0  -Rest, ice, compress and elevate the extremity as we needed  -Incentive Spirometry  -Medical management appreciated  -Dispo: Home v [facility] pending PT rec  -Discussed above with Dr. Fischer, who agrees with plan

## 2023-06-13 NOTE — PROGRESS NOTE ADULT - SUBJECTIVE AND OBJECTIVE BOX
c/c: fall/left knee pain    HPI: 62M, PMH of colon cancer stage IV w/ mets to liver, lung, adrenal gland(on chemo), catheter tip thrombus s/p lovenox (taken off 2 mos ago by hematology per pt), Left tibial plateau fracture s/p orif in the past who presented to the er after a  mechanical fall. Patient states he was mopping and then cleaning microwave, then he turned his R foot and he slipped, and twisted L knee.   He was admitted with a femoral condyle fracture. Taken to OR 6/10, underwent retrograde nailing.     pt seen and examined this am. Having more pain at surgical site. HGB 7.4.   No sob/chest pain. Tolerating po. No n/v. no cough/sputum. no dysuria.     Review of system- All 10 systems reviewed and is as per HPI otherwise negative.     Vital Signs Last 24 Hrs  T(C): 37 (13 Jun 2023 08:00), Max: 37.1 (13 Jun 2023 00:03)  T(F): 98.6 (13 Jun 2023 08:00), Max: 98.8 (13 Jun 2023 00:03)  HR: 92 (13 Jun 2023 08:00) (92 - 100)  BP: 118/58 (13 Jun 2023 08:00) (118/58 - 136/79)  BP(mean): 85 (12 Jun 2023 16:00) (85 - 92)  RR: 17 (13 Jun 2023 08:00) (17 - 18)  SpO2: 100% (13 Jun 2023 08:00) (100% - 100%)    Parameters below as of 13 Jun 2023 08:00  Patient On (Oxygen Delivery Method): room air        PHYSICAL EXAM:    GENERAL: Comfortable, no acute distress  HEAD:  Atraumatic, Normocephalic  EYES: EOMI, PERRLA  HEENT:dry  mucous membranes  NECK: Supple, No JVD  NERVOUS SYSTEM:  Alert & Oriented X3, grossly non focal.   CHEST/LUNG: Clear to auscultation bilaterally  HEART: Regular rate and rhythm  ABDOMEN: Soft, Nontender, Nondistended; Bowel sounds present  GENITOURINARY- Voiding, no palpable bladder  EXTREMITIES:  No clubbing, cyanosis, or edema  MUSCULOSKELETAL- LLE in ace-wrap/immobilizer.  SKIN-no rash    LABS:                        7.4    9.58  )-----------( 187      ( 13 Jun 2023 08:23 )             23.9     06-13    140  |  109<H>  |  10  ----------------------------<  97  3.5   |  26  |  0.67    Ca    8.2<L>      13 Jun 2023 08:23  Phos  2.6     06-13  Mg     2.0     06-13        MEDS  acetaminophen     Tablet .. 650 milliGRAM(s) Oral every 6 hours PRN  aluminum hydroxide/magnesium hydroxide/simethicone Suspension 30 milliLiter(s) Oral four times a day PRN  calcium carbonate 1250 mG  + Vitamin D (OsCal 500 + D) 1 Tablet(s) Oral three times a day  ceFAZolin   IVPB 2000 milliGRAM(s) IV Intermittent every 8 hours  enoxaparin Injectable 40 milliGRAM(s) SubCutaneous every 24 hours  folic acid 1 milliGRAM(s) Oral daily  gabapentin 300 milliGRAM(s) Oral three times a day  lactated ringers. 1000 milliLiter(s) IV Continuous <Continuous>  magnesium hydroxide Suspension 30 milliLiter(s) Oral daily PRN  morphine  - Injectable 1 milliGRAM(s) IV Push every 2 hours PRN  multivitamin 1 Tablet(s) Oral daily  ondansetron Injectable 4 milliGRAM(s) IV Push every 6 hours PRN  oxyCODONE    IR 5 milliGRAM(s) Oral every 3 hours PRN  oxyCODONE    IR 10 milliGRAM(s) Oral every 3 hours PRN  polyethylene glycol 3350 17 Gram(s) Oral at bedtime  senna 2 Tablet(s) Oral at bedtime  sodium chloride 0.9%. 1000 milliLiter(s) IV Continuous <Continuous>  sodium chloride 0.9%. 1000 milliLiter(s) IV Continuous <Continuous>    ASSESSMENT AND PLAN:  62m, PMH as above a /w    1. Mechanical fall/Left femoral condyle fracture:  -s/p Retrograde nail POD#3  -pain control  -physical therapy  -incentive spirometry  -bowel regimen.     2. Acute blood loss anemia:  -d/t fracture  -was transfused prior to surgery  -getting 1 more unit today.     3. Leukocytosis/low grade fevers:  -no localizing symptoms.   -could be related to hematoma at surgical site.   -cxr, urinalysis negative    4. H/o metastatic colon ca:  -responsive to treatment.   -plans to postpone next chemo by 2 weeks per hematology.    5. dvt px  -pt was previously on therapeutic lovenox for thrombus at catheter site (SVC).   -pt told me he stopped 2 mos ago as he was told he no longer needed it.   -Dr. Harrison to decide on anticoagulation (d/w Dr. Walters).   -anticoag following.     dispo:  dc plannign tomorrow if h/h stable.   heme to decide on anticoag

## 2023-06-13 NOTE — PROGRESS NOTE ADULT - SUBJECTIVE AND OBJECTIVE BOX
HPI:  61 y/o M w/ PMH of colon cancer stage IV w/ mets to liver(on chemo), catheter tip thrombus  pt on lovenox tx dose (120mg daily as per hematolgist note), p/w mechanical fall. Patient states he was mopping and then cleaning microwave, then he turned his R foot and he slipped, and twisted L knee. Denies hitting head, LOC, palpitations, CP, dizziness. Patient denies h/o CAD, CKD, CVA, insulin dependent DM.     PSH: L knee surgery    Social Hx: Denies tobacco / etoh / drugs     Family Hx: Denies significant family hx    (10 Barrett 2023 00:31)      Patient is a 62y old  Male who presents with a chief complaint of mechanical fall (2023 07:30)      Consulted by Dr. Cameron Fischer  for VTE prophylaxis, risk stratification, and anticoagulation management.    PAST MEDICAL & SURGICAL HISTORY:  No pertinent past medical history      Colon cancer      No significant past surgical history          FAMILY HISTORY:  No pertinent family history in first degree relatives        Interval Note:  : Pt seen at bedside on [facility] in chair.  Discussed his Lovenox.  Pt states he has it hat home has been on it since his Thrombotic episode in January.  He can self inj med.  States he will go to his daughters house on discharge.    2023: Pt seen at bedside on [facility].  Discussed him  cont on Lovenox 40mg daily until he f/u with hematologist.  Notd decrease in h/h 7.4/23.9.  Pt to receive one unit of PRBC    CAPRINI SCORE  AGE RELATED RISK FACTORS                                                       MOBILITY RELATED FACTORS  [ ] Age 41-60 years                                            (1 Point)                  [ ] Bed rest                                                        (1 Point)  [ x] Age: 61-74 years                                           (2 Points)                [ ] Plaster cast                                                   (2 Points)  [ ] Age= 75 years                                              (3 Points)                 [ ] Bed bound for more than 72 hours                   (2 Points)    DISEASE RELATED RISK FACTORS                                               GENDER SPECIFIC FACTORS  [ ] Edema in the lower extremities                       (1 Point)           [ ] Pregnancy                                                            (1 Point)  [ ] Varicose veins                                               (1 Point)                  [ ] Post-partum < 6 weeks                                      (1 Point)             [ x] BMI > 25 Kg/m2                                            (1 Point)                  [ ] Hormonal therapy or oral contraception       (1 Point)                 [ ] Sepsis (in the previous month)                        (1 Point)             [ ] History of pregnancy complications                (1Point)  [ ] Pneumonia or serious lung disease                                             [ ] Unexplained or recurrent  (=/>3), premature                                 (In the previous month)                               (1 Point)                birth with toxemia or growth-restricted infant (1 Point)  [ ] Abnormal pulmonary function test            (1 Point)                                   SURGERY RELATED RISK FACTORS  [ ] Acute myocardial infarction                       (1 Point)                  [ ]  Section                                         (1 Point)  [ ] Congestive heart failure (in the previous month) (1 Point)   [ ] Minor surgery   lasting <45 minutes       (1 Point)   [ ] Inflammatory bowel disease                             (1 Point)          [ ] Arthroscopic surgery                                  (2 Points)  [ ] Central venous access                                    (2 Points)            [ ] General surgery lasting >45 minutes      (2 Points)       [ ] Stroke (in the previous month)                  (5 Points)            [ ] Elective major lower extremity arthroplasty (5 Points)                                   [ x ] Malignancy (present or past include skin melanoma                                          but exclude  basal skin cell)    (2 points)                                      TRAUMA RELATED RISK FACTORS                HEMATOLOGY RELATED FACTORS                                  [x ] Fracture of the hip, pelvis, or leg                       (5 Points)  [x ] Prior episodes of VTE                                     (3 Points)          [ ] Acute spinal cord injury (in the previous month)  (5 Points)  [ ] Positive family history for VTE                         (3 Points)       [ ] Paralysis (less than 1 month)                          (5 Points)  [ ] Prothrombin 42145 A                                      (3 Points)         [ ] Multiple Trauma (within 1month)                 (5Points)                                                                                                                                                                [ ] Factor V Leiden                                          (3 Points)                                OTHER RISK FACTORS                          [ ] Lupus anticoagulants                                     (3 Points)                       [ ] BMI > 40                          (1 Point)                                                         [ ] Anticardiolipin antibodies                                (3 Points)                   [ ] Smoking                              (1Point)                                                [ ] High homocysteine in the blood                      (3 Points)                [  ] Diabetes requiring insulin (1point)                         [ ] Other congenital or acquired thrombophilia       (3 Points)          [  ] Chemotherapy                   (1 Point)  [ ] Heparin induced thrombocytopenia                  (3 Points)             [  ] Blood Transfusion                (1 point)                                                                                                             Total Score [13 ]                                                                                                                                                                                                                                                                                                                                                                                                                                           IMPROVE Bleeding Risk Score: 4.5    Falls Risk:   High (x  )  Mod (  )  Low (  )  crcl:  137.5       cr:.78          BMI: 36.6            EBL: 200   ml      Denies any personal or familial history of clotting or bleeding disorders.    Allergies    No Known Drug Allergies  Seafood (Unknown)    Intolerances        REVIEW OF SYSTEMS    (  )Fever	     (  )Constipation	(  )SOB				(  )Headache	(  )Dysuria  (  )Chills	     (  )Melena	(  )Dyspnea present on exertion	                    (  )Dizziness                    (  )Polyuria  (  )Nausea	     (  )Hematochezia	(  )Cough			                    (  )Syncope   	(  )Hematuria  (  )Vomiting    (  )Chest Pain	(  )Wheezing			(  )Weakness  (  )Diarrhea     (  )Palpitations	(  )Anorexia			(  )Myalgia  (x) leg pain    Pertinent positives in HPI and daily subjective.  All other ROS negative.    Vital Signs Last 24 Hrs  T(C): 37.2 (23 @ 11:37), Max: 37.2 (23 @ 11:37)  T(F): 99 (23 @ 11:37), Max: 99 (23 @ 11:37)  HR: 104 (23 @ 11:37) (92 - 107)  BP: 130/76 (23 @ 11:37) (118/58 - 136/79)  BP(mean): 91 (23 @ 11:37) (83 - 92)  RR: 18 (23 @ :37) (17 - 18)  SpO2: 99% (23 @ 11:37) (99% - 100%)    Parameters below as of 2023 08:01  Patient On (Oxygen Delivery Method): room air        PHYSICAL EXAM:    Constitutional: Appears Well    Neurological: A& O x 3    Skin: Warm    Respiratory and Thorax: normal effort; Breath sounds: normal; No rales/wheezing/rhonchi  	  Cardiovascular: S1, S2, regular, NMBR	    Gastrointestinal: BS + x 4Q, nontender	    Genitourinary:  Bladder nondistended, nontender    Musculoskeletal:   General Right:   no muscle/joint tenderness,   normal tone, no joint swelling,   ROM: limited/full	    General Left:   no muscle/joint tenderness,   normal tone, no joint swelling,   ROM: limited/full    Hip:  Left: Dressing CDI, immobilizer noted + swelling.     Lower extrems:   Right: no calf tenderness              negative alycia's sign               + pedal pulses    Left:   no calf tenderness              negative alycia's sign               + pedal pulses                          7.4    9.58  )-----------( 187      ( 2023 08:23 )             23.9       06-13    140  |  109<H>  |  10  ----------------------------<  97  3.5   |  26  |  0.67    Ca    8.2<L>      2023 08:23  Phos  2.6     06-13  Mg     2.0     06-13                                8.1    10.25 )-----------( 162      ( 2023 08:03 )             25.8       06-12    138  |  108  |  7   ----------------------------<  107<H>  3.8   |  27  |  0.73    Ca    8.1<L>      2023 08:03  Phos  2.2     06-12  Mg     2.1     06-12      < from: CT Knee No Cont, Left (23 @ 19:07) >    IMPRESSION:    Comminuted intra-articular and distracted distal femur fracture as   described above.  Complex knee joint effusion.  Chronic fracture deformity of the left proximal tibia and fibula.    < end of copied text >  MEDICATIONS  (STANDING):  calcium carbonate 1250 mG  + Vitamin D (OsCal 500 + D) 1 Tablet(s) Oral three times a day  enoxaparin Injectable 40 milliGRAM(s) SubCutaneous every 24 hours  ergocalciferol 78907 Unit(s) Oral <User Schedule>  folic acid 1 milliGRAM(s) Oral daily  gabapentin 300 milliGRAM(s) Oral three times a day  multivitamin 1 Tablet(s) Oral daily  polyethylene glycol 3350 17 Gram(s) Oral at bedtime  senna 2 Tablet(s) Oral at bedtime  sodium chloride 0.9%. 1000 milliLiter(s) IV Continuous <Continuous>            DVT Prophylaxis:  LMWH                   ( x )  Heparin SQ           (  )  Coumadin             (  )  Xarelto                  (  )  Eliquis                   (  )  Venodynes           ( x )  Ambulation          ( x )  UFH                       (  )  Contraindicated  (  )  EC Aspirin             (  )

## 2023-06-14 ENCOUNTER — TRANSCRIPTION ENCOUNTER (OUTPATIENT)
Age: 63
End: 2023-06-14

## 2023-06-14 VITALS
HEART RATE: 96 BPM | OXYGEN SATURATION: 100 % | TEMPERATURE: 98 F | RESPIRATION RATE: 18 BRPM | SYSTOLIC BLOOD PRESSURE: 155 MMHG | DIASTOLIC BLOOD PRESSURE: 70 MMHG

## 2023-06-14 LAB
ANION GAP SERPL CALC-SCNC: 2 MMOL/L — LOW (ref 5–17)
BUN SERPL-MCNC: 12 MG/DL — SIGNIFICANT CHANGE UP (ref 7–23)
CALCIUM SERPL-MCNC: 8.3 MG/DL — LOW (ref 8.5–10.1)
CHLORIDE SERPL-SCNC: 111 MMOL/L — HIGH (ref 96–108)
CO2 SERPL-SCNC: 27 MMOL/L — SIGNIFICANT CHANGE UP (ref 22–31)
CREAT SERPL-MCNC: 0.66 MG/DL — SIGNIFICANT CHANGE UP (ref 0.5–1.3)
EGFR: 106 ML/MIN/1.73M2 — SIGNIFICANT CHANGE UP
GLUCOSE SERPL-MCNC: 97 MG/DL — SIGNIFICANT CHANGE UP (ref 70–99)
HCT VFR BLD CALC: 27 % — LOW (ref 39–50)
HGB BLD-MCNC: 8.4 G/DL — LOW (ref 13–17)
MCHC RBC-ENTMCNC: 28.4 PG — SIGNIFICANT CHANGE UP (ref 27–34)
MCHC RBC-ENTMCNC: 31.1 GM/DL — LOW (ref 32–36)
MCV RBC AUTO: 91.2 FL — SIGNIFICANT CHANGE UP (ref 80–100)
PLATELET # BLD AUTO: 226 K/UL — SIGNIFICANT CHANGE UP (ref 150–400)
POTASSIUM SERPL-MCNC: 3.7 MMOL/L — SIGNIFICANT CHANGE UP (ref 3.5–5.3)
POTASSIUM SERPL-SCNC: 3.7 MMOL/L — SIGNIFICANT CHANGE UP (ref 3.5–5.3)
RBC # BLD: 2.96 M/UL — LOW (ref 4.2–5.8)
RBC # FLD: 16.7 % — HIGH (ref 10.3–14.5)
SODIUM SERPL-SCNC: 140 MMOL/L — SIGNIFICANT CHANGE UP (ref 135–145)
WBC # BLD: 7.59 K/UL — SIGNIFICANT CHANGE UP (ref 3.8–10.5)
WBC # FLD AUTO: 7.59 K/UL — SIGNIFICANT CHANGE UP (ref 3.8–10.5)

## 2023-06-14 PROCEDURE — 99239 HOSP IP/OBS DSCHRG MGMT >30: CPT

## 2023-06-14 PROCEDURE — 99231 SBSQ HOSP IP/OBS SF/LOW 25: CPT

## 2023-06-14 RX ORDER — FOLIC ACID 0.8 MG
1 TABLET ORAL
Qty: 0 | Refills: 0 | DISCHARGE
Start: 2023-06-14

## 2023-06-14 RX ORDER — POLYETHYLENE GLYCOL 3350 17 G/17G
17 POWDER, FOR SOLUTION ORAL
Qty: 0 | Refills: 0 | DISCHARGE
Start: 2023-06-14

## 2023-06-14 RX ORDER — SENNA PLUS 8.6 MG/1
2 TABLET ORAL
Qty: 0 | Refills: 0 | DISCHARGE
Start: 2023-06-14

## 2023-06-14 RX ORDER — ACETAMINOPHEN 500 MG
2 TABLET ORAL
Qty: 0 | Refills: 0 | DISCHARGE
Start: 2023-06-14

## 2023-06-14 RX ORDER — OXYCODONE HYDROCHLORIDE 5 MG/1
1 TABLET ORAL
Qty: 30 | Refills: 0
Start: 2023-06-14 | End: 2023-06-18

## 2023-06-14 RX ORDER — OXYCODONE HYDROCHLORIDE 5 MG/1
1 TABLET ORAL
Qty: 0 | Refills: 0 | DISCHARGE
Start: 2023-06-14

## 2023-06-14 RX ORDER — ERGOCALCIFEROL 1.25 MG/1
1 CAPSULE ORAL
Qty: 4 | Refills: 0
Start: 2023-06-14

## 2023-06-14 RX ORDER — ERGOCALCIFEROL 1.25 MG/1
50000 CAPSULE ORAL
Qty: 0 | Refills: 0 | DISCHARGE
Start: 2023-06-14

## 2023-06-14 RX ORDER — ENOXAPARIN SODIUM 100 MG/ML
40 INJECTION SUBCUTANEOUS
Qty: 14 | Refills: 0
Start: 2023-06-14 | End: 2023-06-27

## 2023-06-14 RX ADMIN — Medication 1 TABLET(S): at 14:49

## 2023-06-14 RX ADMIN — OXYCODONE HYDROCHLORIDE 10 MILLIGRAM(S): 5 TABLET ORAL at 01:41

## 2023-06-14 RX ADMIN — GABAPENTIN 300 MILLIGRAM(S): 400 CAPSULE ORAL at 14:48

## 2023-06-14 RX ADMIN — Medication 1 TABLET(S): at 11:19

## 2023-06-14 RX ADMIN — GABAPENTIN 300 MILLIGRAM(S): 400 CAPSULE ORAL at 05:23

## 2023-06-14 RX ADMIN — Medication 1 MILLIGRAM(S): at 11:19

## 2023-06-14 RX ADMIN — OXYCODONE HYDROCHLORIDE 10 MILLIGRAM(S): 5 TABLET ORAL at 00:41

## 2023-06-14 RX ADMIN — ENOXAPARIN SODIUM 40 MILLIGRAM(S): 100 INJECTION SUBCUTANEOUS at 05:23

## 2023-06-14 NOTE — PROGRESS NOTE ADULT - REASON FOR ADMISSION
mechanical fall

## 2023-06-14 NOTE — PROGRESS NOTE ADULT - PROVIDER SPECIALTY LIST ADULT
Orthopedics
Anticoag Management
Hospitalist
Hospitalist
Orthopedics
Anticoag Management
Heme/Onc
Heme/Onc
Hospitalist
Orthopedics

## 2023-06-14 NOTE — DISCHARGE NOTE NURSING/CASE MANAGEMENT/SOCIAL WORK - PATIENT PORTAL LINK FT
You can access the FollowMyHealth Patient Portal offered by [facility] by registering at the following website: [email or web address]. By joining [employer]’s FollowMyHealth portal, you will also be able to view your health information using other applications (apps) compatible with our system.

## 2023-06-14 NOTE — PROGRESS NOTE ADULT - SUBJECTIVE AND OBJECTIVE BOX
HPI:  61 y/o M w/ PMH of colon cancer stage IV w/ mets to liver(on chemo), catheter tip thrombus  pt on lovenox tx dose (120mg daily as per hematolgist note), p/w mechanical fall. Patient states he was mopping and then cleaning microwave, then he turned his R foot and he slipped, and twisted L knee. Denies hitting head, LOC, palpitations, CP, dizziness. Patient denies h/o CAD, CKD, CVA, insulin dependent DM.     PSH: L knee surgery    Social Hx: Denies tobacco / etoh / drugs     Family Hx: Denies significant family hx    (10 Barrett 2023 00:31)      Patient is a 62y old  Male who presents with a chief complaint of mechanical fall (2023 07:30)      Consulted by Dr. Cameron Fischer  for VTE prophylaxis, risk stratification, and anticoagulation management.    PAST MEDICAL & SURGICAL HISTORY:  No pertinent past medical history      Colon cancer      No significant past surgical history          FAMILY HISTORY:  No pertinent family history in first degree relatives        Interval Note:  : Pt seen at bedside on [facility] in chair.  Discussed his Lovenox.  Pt states he has it hat home has been on it since his Thrombotic episode in January.  He can self inj med.  States he will go to his daughters house on discharge.    2023: Pt seen at bedside on [facility].  Discussed him  cont on Lovenox 40mg daily until he f/u with hematologist.  Notd decrease in h/h 7.4/23.9.  Pt to receive one unit of PRBC  2023: Pt seen at bedside on [facility].  Discussed his anticoagulation with Lovenox 40mg daily for now.  Will f/u with Dr. Harrison, hematologist on  to determine if he should resume tx dose of remain on prophylactic dose.     CAPRINI SCORE  AGE RELATED RISK FACTORS                                                       MOBILITY RELATED FACTORS  [ ] Age 41-60 years                                            (1 Point)                  [ ] Bed rest                                                        (1 Point)  [ x] Age: 61-74 years                                           (2 Points)                [ ] Plaster cast                                                   (2 Points)  [ ] Age= 75 years                                              (3 Points)                 [ ] Bed bound for more than 72 hours                   (2 Points)    DISEASE RELATED RISK FACTORS                                               GENDER SPECIFIC FACTORS  [ ] Edema in the lower extremities                       (1 Point)           [ ] Pregnancy                                                            (1 Point)  [ ] Varicose veins                                               (1 Point)                  [ ] Post-partum < 6 weeks                                      (1 Point)             [ x] BMI > 25 Kg/m2                                            (1 Point)                  [ ] Hormonal therapy or oral contraception       (1 Point)                 [ ] Sepsis (in the previous month)                        (1 Point)             [ ] History of pregnancy complications                (1Point)  [ ] Pneumonia or serious lung disease                                             [ ] Unexplained or recurrent  (=/>3), premature                                 (In the previous month)                               (1 Point)                birth with toxemia or growth-restricted infant (1 Point)  [ ] Abnormal pulmonary function test            (1 Point)                                   SURGERY RELATED RISK FACTORS  [ ] Acute myocardial infarction                       (1 Point)                  [ ]  Section                                         (1 Point)  [ ] Congestive heart failure (in the previous month) (1 Point)   [ ] Minor surgery   lasting <45 minutes       (1 Point)   [ ] Inflammatory bowel disease                             (1 Point)          [ ] Arthroscopic surgery                                  (2 Points)  [ ] Central venous access                                    (2 Points)            [ ] General surgery lasting >45 minutes      (2 Points)       [ ] Stroke (in the previous month)                  (5 Points)            [ ] Elective major lower extremity arthroplasty (5 Points)                                   [ x ] Malignancy (present or past include skin melanoma                                          but exclude  basal skin cell)    (2 points)                                      TRAUMA RELATED RISK FACTORS                HEMATOLOGY RELATED FACTORS                                  [x ] Fracture of the hip, pelvis, or leg                       (5 Points)  [x ] Prior episodes of VTE                                     (3 Points)          [ ] Acute spinal cord injury (in the previous month)  (5 Points)  [ ] Positive family history for VTE                         (3 Points)       [ ] Paralysis (less than 1 month)                          (5 Points)  [ ] Prothrombin 72322 A                                      (3 Points)         [ ] Multiple Trauma (within 1month)                 (5Points)                                                                                                                                                                [ ] Factor V Leiden                                          (3 Points)                                OTHER RISK FACTORS                          [ ] Lupus anticoagulants                                     (3 Points)                       [ ] BMI > 40                          (1 Point)                                                         [ ] Anticardiolipin antibodies                                (3 Points)                   [ ] Smoking                              (1Point)                                                [ ] High homocysteine in the blood                      (3 Points)                [  ] Diabetes requiring insulin (1point)                         [ ] Other congenital or acquired thrombophilia       (3 Points)          [  ] Chemotherapy                   (1 Point)  [ ] Heparin induced thrombocytopenia                  (3 Points)             [  ] Blood Transfusion                (1 point)                                                                                                             Total Score [13 ]                                                                                                                                                                                                                                                                                                                                                                                                                                           IMPROVE Bleeding Risk Score: 4.5    Falls Risk:   High (x  )  Mod (  )  Low (  )  crcl:  137.5       cr:.78          BMI: 36.6            EBL: 200   ml      Denies any personal or familial history of clotting or bleeding disorders.    Allergies    No Known Drug Allergies  Seafood (Unknown)    Intolerances        REVIEW OF SYSTEMS    (  )Fever	     (  )Constipation	(  )SOB				(  )Headache	(  )Dysuria  (  )Chills	     (  )Melena	(  )Dyspnea present on exertion	                    (  )Dizziness                    (  )Polyuria  (  )Nausea	     (  )Hematochezia	(  )Cough			                    (  )Syncope   	(  )Hematuria  (  )Vomiting    (  )Chest Pain	(  )Wheezing			(  )Weakness  (  )Diarrhea     (  )Palpitations	(  )Anorexia			(  )Myalgia  (x) leg pain    Pertinent positives in HPI and daily subjective.  All other ROS negative.    Vital Signs Last 24 Hrs  T(C): 36.9 (23 @ 07:35), Max: 37.8 (23 @ 16:04)  T(F): 98.5 (23 @ 07:35), Max: 100 (23 @ 16:04)  HR: 96 (23 @ 07:35) (96 - 107)  BP: 155/70 (23 @ 07:35) (124/67 - 160/79)  BP(mean): 91 (23 @ 11:37) (83 - 91)  RR: 18 (23 @ 07:35) (18 - 18)  SpO2: 100% (23 @ 07:35) (99% - 100%)    Parameters below as of 2023 08:01  Patient On (Oxygen Delivery Method): room air        PHYSICAL EXAM:    Constitutional: Appears Well    Neurological: A& O x 3    Skin: Warm    Respiratory and Thorax: normal effort; Breath sounds: normal; No rales/wheezing/rhonchi  	  Cardiovascular: S1, S2, regular, NMBR	    Gastrointestinal: BS + x 4Q, nontender	    Genitourinary:  Bladder nondistended, nontender    Musculoskeletal:   General Right:   no muscle/joint tenderness,   normal tone, no joint swelling,   ROM: limited/full	    General Left:   no muscle/joint tenderness,   normal tone, no joint swelling,   ROM: limited/full    Hip:  Left: Dressing CDI, immobilizer noted + swelling.     Lower extrems:   Right: no calf tenderness              negative alycia's sign               + pedal pulses    Left:   no calf tenderness              negative alycia's sign               + pedal pulses                                   8.4    7.59  )-----------( 226      ( 2023 07:59 )             27.0       06-14    140  |  111<H>  |  12  ----------------------------<  97  3.7   |  27  |  0.66    Ca    8.3<L>      2023 07:59  Phos  2.6     06-13  Mg     2.0     06-13                     7.4    9.58  )-----------( 187      ( 2023 08:23 )             23.9       06-13    140  |  109<H>  |  10  ----------------------------<  97  3.5   |  26  |  0.67    Ca    8.2<L>      2023 08:23  Phos  2.6     06-13  Mg     2.0     06-13                                8.1    10.25 )-----------( 162      ( 2023 08:03 )             25.8       06-12    138  |  108  |  7   ----------------------------<  107<H>  3.8   |  27  |  0.73    Ca    8.1<L>      2023 08:03  Phos  2.2     06-12  Mg     2.1     06-12      < from: CT Knee No Cont, Left (23 @ 19:07) >    IMPRESSION:    Comminuted intra-articular and distracted distal femur fracture as   described above.  Complex knee joint effusion.  Chronic fracture deformity of the left proximal tibia and fibula.    < end of copied text >  MEDICATIONS  (STANDING):  calcium carbonate 1250 mG  + Vitamin D (OsCal 500 + D) 1 Tablet(s) Oral three times a day  enoxaparin Injectable 40 milliGRAM(s) SubCutaneous every 24 hours  ergocalciferol 95543 Unit(s) Oral <User Schedule>  folic acid 1 milliGRAM(s) Oral daily  gabapentin 300 milliGRAM(s) Oral three times a day  multivitamin 1 Tablet(s) Oral daily  polyethylene glycol 3350 17 Gram(s) Oral at bedtime  senna 2 Tablet(s) Oral at bedtime  sodium chloride 0.9%. 1000 milliLiter(s) IV Continuous <Continuous>              DVT Prophylaxis:  LMWH                   ( x )  Heparin SQ           (  )  Coumadin             (  )  Xarelto                  (  )  Eliquis                   (  )  Venodynes           ( x )  Ambulation          ( x )  UFH                       (  )  Contraindicated  (  )  EC Aspirin             (  )

## 2023-06-14 NOTE — PROGRESS NOTE ADULT - ASSESSMENT
This is a 61 y/o M w/ PMH of colon cancer stage IV w/ mets to liver(on chemo), catheter tip thrombus 1-2023 pt on lovenox tx dose (120mg daily as per hematolgist note), p/w mechanical fall. Pt found to have Comminuted intra-articular and distracted distal femur fracture.  Now s/p 6-.  Pt has high thrombotic risk and requires treatment dose anticoagulation.    6-: Spoke with Dr Walters and she confirms pt was on Lovenox 120mg sq daily by  Dr Harrison for small amount of pericatheter clot near the tip of the catheter in the superior vena cava.  Pt still requires treatment dose when cleared by ortho.. Spoke with ortho resident and he states Dr. Fischer wants pt to remain on prophylactic dose for 5 days post procedure then re evaluate.  Pt made aware of plan.       Plan:  cont on Lovenox 40mg sq daily will f/u with Dr. Harrison to determine if he needs to resume treatment dose.   : daily cbc/bmp noted h/h 8.1/25.8 --->7.4/23.9 received  1 unit of PRBC --->8.4/27.0  : le venodynes  : increase mobility as per ortho  will f/u  dospo home

## 2023-06-14 NOTE — PROGRESS NOTE ADULT - SUBJECTIVE AND OBJECTIVE BOX
Patient seen and examined at bedside. Pain well controlled with medication. Patient denies any numbness, tingling, weakness, or any other orthopaedic complaint. Denies N/V/CP/SOB. No acute events overnight     Vital Signs Last 24 Hrs  T(C): 37.1 (14 Jun 2023 00:28), Max: 37.8 (13 Jun 2023 16:04)  T(F): 98.8 (14 Jun 2023 00:28), Max: 100 (13 Jun 2023 16:04)  HR: 96 (14 Jun 2023 00:28) (92 - 107)  BP: 133/77 (14 Jun 2023 00:28) (118/58 - 160/79)  BP(mean): 91 (13 Jun 2023 11:37) (83 - 91)  RR: 18 (14 Jun 2023 00:28) (17 - 18)  SpO2: 99% (14 Jun 2023 00:28) (99% - 100%)    Parameters below as of 14 Jun 2023 00:28  Patient On (Oxygen Delivery Method): room air    LABS:                        7.4    9.58  )-----------( 187      ( 13 Jun 2023 08:23 )             23.9     13 Jun 2023 08:23    140    |  109    |  10     ----------------------------<  97     3.5     |  26     |  0.67     Ca    8.2        13 Jun 2023 08:23  Phos  2.6       13 Jun 2023 08:23  Mg     2.0       13 Jun 2023 08:23        General: NAD, resting comfortably in bed  LLE:   Knee immobilizer in place  Dressing in place C/D/I  SCDs in place   No calf tenderness   TA/EHL/FHL/GSC+  SILT L2-S1  DP/PT 2+      A/P:  62y m s/p L distal femur ORIF POD#4    -PT/OT   -NWB of LLE with KI in place  -Pain Control as needed  -DVT ppx: Per AC team and heme onc team  -Continue ancef until 6/13 -- now complete   -Follow labs, transfuse < 8.0  -Rest, ice, compress and elevate the extremity as we needed  -Incentive Spirometry  -Medical management appreciated  -Dispo: Home v [facility] pending PT rec  -Discussed above with Dr. Fischer, who agrees with plan

## 2023-06-14 NOTE — DISCHARGE NOTE NURSING/CASE MANAGEMENT/SOCIAL WORK - NSDCPEFALRISK_GEN_ALL_CORE
For information on Fall & Injury Prevention, visit: [email or web address]  [email or web address] OR  https://www.cdc.gov/steadi/patient.html

## 2023-06-19 ENCOUNTER — APPOINTMENT (OUTPATIENT)
Dept: HEMATOLOGY ONCOLOGY | Facility: CLINIC | Age: 63
End: 2023-06-19
Payer: COMMERCIAL

## 2023-06-19 ENCOUNTER — RESULT REVIEW (OUTPATIENT)
Age: 63
End: 2023-06-19

## 2023-06-19 ENCOUNTER — APPOINTMENT (OUTPATIENT)
Dept: INFUSION THERAPY | Facility: CLINIC | Age: 63
End: 2023-06-19

## 2023-06-19 VITALS
DIASTOLIC BLOOD PRESSURE: 78 MMHG | HEIGHT: 71 IN | BODY MASS INDEX: 30.1 KG/M2 | SYSTOLIC BLOOD PRESSURE: 135 MMHG | HEART RATE: 105 BPM | TEMPERATURE: 97.7 F | RESPIRATION RATE: 18 BRPM | OXYGEN SATURATION: 97 % | WEIGHT: 215 LBS

## 2023-06-19 LAB
BASOPHILS # BLD AUTO: 0.09 K/UL — SIGNIFICANT CHANGE UP (ref 0–0.2)
BASOPHILS NFR BLD AUTO: 1.1 % — SIGNIFICANT CHANGE UP (ref 0–2)
EOSINOPHIL # BLD AUTO: 0.27 K/UL — SIGNIFICANT CHANGE UP (ref 0–0.5)
EOSINOPHIL NFR BLD AUTO: 3.4 % — SIGNIFICANT CHANGE UP (ref 0–6)
HCT VFR BLD CALC: 30.9 % — LOW (ref 39–50)
HGB BLD-MCNC: 9.6 G/DL — LOW (ref 13–17)
IMM GRANULOCYTES NFR BLD AUTO: 0.9 % — SIGNIFICANT CHANGE UP (ref 0–0.9)
LYMPHOCYTES # BLD AUTO: 1.49 K/UL — SIGNIFICANT CHANGE UP (ref 1–3.3)
LYMPHOCYTES # BLD AUTO: 18.8 % — SIGNIFICANT CHANGE UP (ref 13–44)
MCHC RBC-ENTMCNC: 29 PG — SIGNIFICANT CHANGE UP (ref 27–34)
MCHC RBC-ENTMCNC: 31.1 GM/DL — LOW (ref 32–36)
MCV RBC AUTO: 93.4 FL — SIGNIFICANT CHANGE UP (ref 80–100)
MONOCYTES # BLD AUTO: 0.72 K/UL — SIGNIFICANT CHANGE UP (ref 0–0.9)
MONOCYTES NFR BLD AUTO: 9.1 % — SIGNIFICANT CHANGE UP (ref 2–14)
NEUTROPHILS # BLD AUTO: 5.28 K/UL — SIGNIFICANT CHANGE UP (ref 1.8–7.4)
NEUTROPHILS NFR BLD AUTO: 66.7 % — SIGNIFICANT CHANGE UP (ref 43–77)
NRBC # BLD: 0 /100 WBCS — SIGNIFICANT CHANGE UP (ref 0–0)
PLATELET # BLD AUTO: 348 K/UL — SIGNIFICANT CHANGE UP (ref 150–400)
RBC # BLD: 3.31 M/UL — LOW (ref 4.2–5.8)
RBC # FLD: 17.2 % — HIGH (ref 10.3–14.5)
WBC # BLD: 7.92 K/UL — SIGNIFICANT CHANGE UP (ref 3.8–10.5)
WBC # FLD AUTO: 7.92 K/UL — SIGNIFICANT CHANGE UP (ref 3.8–10.5)

## 2023-06-19 PROCEDURE — 99214 OFFICE O/P EST MOD 30 MIN: CPT

## 2023-06-19 NOTE — REVIEW OF SYSTEMS
[Recent Change In Weight] : ~T recent weight change [Diarrhea: Grade 0] : Diarrhea: Grade 0 [Negative] : Allergic/Immunologic [Chest Pain] : no chest pain [Shortness Of Breath] : no shortness of breath [Abdominal Pain] : no abdominal pain [Vomiting] : no vomiting [Constipation] : no constipation [Confused] : no confusion [FreeTextEntry2] : wt gain noted  [FreeTextEntry9] : left leg in immobilizer and ace wrap  [de-identified] : right chest wall port accessed [de-identified] : neuropathy

## 2023-06-19 NOTE — HISTORY OF PRESENT ILLNESS
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 On 08/18/22, he had a CT C/A/P which showed  irregular colonic wall thickening of the proximal sigmoid colon with stranding of the surrounding paracolic fat. Regional lymph nodes are identified adjacent to the colonic mass measuring up to 1.2 cm. Multiple masses identified within the hepatic parenchyma noted to be 3.9 cm, suspicious for hepatic parenchymal neoplastic disease. 3.4 cm left adrenal mass, suspicious for neoplastic disease. There is a 1.0 cm soft tissue nodule identified along the right lateral wall of the distal trachea, superior to the tracheal bifurcation. Lung nodules suspicious for lung parenchymal neoplastic disease.  \par \par On 8/18/22, he had a colonoscopy that showed a frond-like/villous and ulcerated non-obstructing large mass was found in the descending colon. The mass was circumferential. The mass measured five cm in length. Pathology showed invasive adenocarcinoma, moderately differentiated. No loss of nuclear expression of MMR proteins (MLH1, MSH2, MSH6, and PMS2). These results show low probability of microsatellite instability-high (MSI-H). On 8/19/22, he had a liver, core biopsy which showed metastatic adenocarcinoma, consistent with metastasis from colonic primary.\par \par  [de-identified] : no MMR deficiency [de-identified] : Medical Hx: DVT 14 years ago (unknown cause) \par Surgical Hx: left eye sx; left torn meniscus repaired \par Family Hx: Mother leukemia\par Social Hx: never smoker; ETOH never; lives alone; single; works PT at [employer]; daughters close by [de-identified] : Pt is C16D1 of FOLFIOXIRI q 2 weeks, with ongoing cycles planned. He was recently hospitalized June 9th - 14th due to mechanical fall/Left femoral condyle fracture s/p retrograde IMN. He is in an immobilizer and ace wrap. No pain noted, took oxy prior to treatment today. Today has continued pain with neuropathy in the feet and hands, he is now following with Dr Vergara. He denies fatigue, cold sensitivity, resolved, eye changes, hair loss, nail / skin changes, pain, mouth sores, vomiting, heartburn, C/D. He now is living with his daughter again.

## 2023-06-19 NOTE — PHYSICAL EXAM
[Restricted in physically strenuous activity but ambulatory and able to carry out work of a light or sedentary nature] : Status 1- Restricted in physically strenuous activity but ambulatory and able to carry out work of a light or sedentary nature, e.g., light house work, office work [Normal] : affect appropriate [de-identified] : wt gain noted  [de-identified] : left leg in immobilizer and ace wrap  [de-identified] : rt port accessed, dressing C/D/I

## 2023-06-20 DIAGNOSIS — S72.432A DISPLACED FRACTURE OF MEDIAL CONDYLE OF LEFT FEMUR, INITIAL ENCOUNTER FOR CLOSED FRACTURE: ICD-10-CM

## 2023-06-20 DIAGNOSIS — C79.70 SECONDARY MALIGNANT NEOPLASM OF UNSPECIFIED ADRENAL GLAND: ICD-10-CM

## 2023-06-20 DIAGNOSIS — C78.7 SECONDARY MALIGNANT NEOPLASM OF LIVER AND INTRAHEPATIC BILE DUCT: ICD-10-CM
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
## 2023-06-20 DIAGNOSIS — L76.32 POSTPROCEDURAL HEMATOMA OF SKIN AND SUBCUTANEOUS TISSUE FOLLOWING OTHER PROCEDURE: ICD-10-CM

## 2023-06-20 DIAGNOSIS — M84.462A PATHOLOGICAL FRACTURE, LEFT TIBIA, INITIAL ENCOUNTER FOR FRACTURE: ICD-10-CM
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
## 2023-06-20 DIAGNOSIS — D62 ACUTE POSTHEMORRHAGIC ANEMIA: ICD-10-CM

## 2023-06-20 DIAGNOSIS — C18.7 MALIGNANT NEOPLASM OF SIGMOID COLON: ICD-10-CM

## 2023-06-20 DIAGNOSIS — T85.868A THROMBOSIS DUE TO OTHER INTERNAL PROSTHETIC DEVICES, IMPLANTS AND GRAFTS, INITIAL ENCOUNTER: ICD-10-CM

## 2023-06-20 DIAGNOSIS — M25.462 EFFUSION, LEFT KNEE: ICD-10-CM
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
## 2023-06-20 DIAGNOSIS — M84.464A: ICD-10-CM
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
## 2023-06-20 DIAGNOSIS — D72.829 ELEVATED WHITE BLOOD CELL COUNT, UNSPECIFIED: ICD-10-CM

## 2023-06-20 DIAGNOSIS — D63.0 ANEMIA IN NEOPLASTIC DISEASE: ICD-10-CM

## 2023-06-20 DIAGNOSIS — C78.00 SECONDARY MALIGNANT NEOPLASM OF UNSPECIFIED LUNG: ICD-10-CM

## 2023-06-20 DIAGNOSIS — Z92.21 PERSONAL HISTORY OF ANTINEOPLASTIC CHEMOTHERAPY: ICD-10-CM
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
## 2023-06-20 LAB
ALBUMIN SERPL ELPH-MCNC: 3.8 G/DL — SIGNIFICANT CHANGE UP (ref 3.3–5)
ALP SERPL-CCNC: 146 U/L — HIGH (ref 40–120)
ALT FLD-CCNC: 36 U/L — SIGNIFICANT CHANGE UP (ref 10–45)
ANION GAP SERPL CALC-SCNC: 12 MMOL/L — SIGNIFICANT CHANGE UP (ref 5–17)
AST SERPL-CCNC: 32 U/L — SIGNIFICANT CHANGE UP (ref 10–40)
BILIRUB SERPL-MCNC: 0.4 MG/DL — SIGNIFICANT CHANGE UP (ref 0.2–1.2)
BUN SERPL-MCNC: 11 MG/DL — SIGNIFICANT CHANGE UP (ref 7–23)
CALCIUM SERPL-MCNC: 8.7 MG/DL — SIGNIFICANT CHANGE UP (ref 8.4–10.5)
CHLORIDE SERPL-SCNC: 104 MMOL/L — SIGNIFICANT CHANGE UP (ref 96–108)
CO2 SERPL-SCNC: 24 MMOL/L — SIGNIFICANT CHANGE UP (ref 22–31)
CREAT SERPL-MCNC: 0.73 MG/DL — SIGNIFICANT CHANGE UP (ref 0.5–1.3)
EGFR: 103 ML/MIN/1.73M2 — SIGNIFICANT CHANGE UP
GLUCOSE SERPL-MCNC: 89 MG/DL — SIGNIFICANT CHANGE UP (ref 70–99)
MAGNESIUM SERPL-MCNC: 2.1 MG/DL — SIGNIFICANT CHANGE UP (ref 1.6–2.6)
POTASSIUM SERPL-MCNC: 4.5 MMOL/L — SIGNIFICANT CHANGE UP (ref 3.5–5.3)
POTASSIUM SERPL-SCNC: 4.5 MMOL/L — SIGNIFICANT CHANGE UP (ref 3.5–5.3)
PROT SERPL-MCNC: 6.9 G/DL — SIGNIFICANT CHANGE UP (ref 6–8.3)
SODIUM SERPL-SCNC: 139 MMOL/L — SIGNIFICANT CHANGE UP (ref 135–145)

## 2023-06-21 ENCOUNTER — LABORATORY RESULT (OUTPATIENT)
Age: 63
End: 2023-06-21

## 2023-06-21 ENCOUNTER — APPOINTMENT (OUTPATIENT)
Dept: INFUSION THERAPY | Facility: CLINIC | Age: 63
End: 2023-06-21

## 2023-06-21 VITALS
WEIGHT: 215.44 LBS | TEMPERATURE: 98.3 F | OXYGEN SATURATION: 98 % | RESPIRATION RATE: 18 BRPM | DIASTOLIC BLOOD PRESSURE: 68 MMHG | SYSTOLIC BLOOD PRESSURE: 105 MMHG | HEART RATE: 101 BPM | BODY MASS INDEX: 30.05 KG/M2

## 2023-06-22 ENCOUNTER — LABORATORY RESULT (OUTPATIENT)
Age: 63
End: 2023-06-22

## 2023-06-28 ENCOUNTER — OUTPATIENT (OUTPATIENT)
Dept: OUTPATIENT SERVICES | Facility: HOSPITAL | Age: 63
LOS: 1 days | Discharge: HOME | End: 2023-06-28

## 2023-06-28 DIAGNOSIS — C18.9 MALIGNANT NEOPLASM OF COLON, UNSPECIFIED: ICD-10-CM

## 2023-07-03 ENCOUNTER — RESULT REVIEW (OUTPATIENT)
Age: 63
End: 2023-07-03

## 2023-07-03 ENCOUNTER — APPOINTMENT (OUTPATIENT)
Dept: INFUSION THERAPY | Facility: CLINIC | Age: 63
End: 2023-07-03

## 2023-07-03 VITALS
OXYGEN SATURATION: 98 % | TEMPERATURE: 98.4 F | SYSTOLIC BLOOD PRESSURE: 137 MMHG | RESPIRATION RATE: 18 BRPM | HEART RATE: 87 BPM | DIASTOLIC BLOOD PRESSURE: 82 MMHG

## 2023-07-03 LAB
ALBUMIN SERPL ELPH-MCNC: 4.3 G/DL — SIGNIFICANT CHANGE UP (ref 3.3–5)
ALP SERPL-CCNC: 218 U/L — HIGH (ref 40–120)
ALT FLD-CCNC: 21 U/L — SIGNIFICANT CHANGE UP (ref 10–45)
ANION GAP SERPL CALC-SCNC: 12 MMOL/L — SIGNIFICANT CHANGE UP (ref 5–17)
AST SERPL-CCNC: 21 U/L — SIGNIFICANT CHANGE UP (ref 10–40)
BASOPHILS # BLD AUTO: 0.07 K/UL — SIGNIFICANT CHANGE UP (ref 0–0.2)
BASOPHILS NFR BLD AUTO: 1 % — SIGNIFICANT CHANGE UP (ref 0–2)
BILIRUB SERPL-MCNC: 0.4 MG/DL — SIGNIFICANT CHANGE UP (ref 0.2–1.2)
BUN SERPL-MCNC: 15 MG/DL — SIGNIFICANT CHANGE UP (ref 7–23)
CALCIUM SERPL-MCNC: 9.2 MG/DL — SIGNIFICANT CHANGE UP (ref 8.4–10.5)
CHLORIDE SERPL-SCNC: 104 MMOL/L — SIGNIFICANT CHANGE UP (ref 96–108)
CO2 SERPL-SCNC: 24 MMOL/L — SIGNIFICANT CHANGE UP (ref 22–31)
CREAT SERPL-MCNC: 0.75 MG/DL — SIGNIFICANT CHANGE UP (ref 0.5–1.3)
EGFR: 102 ML/MIN/1.73M2 — SIGNIFICANT CHANGE UP
EOSINOPHIL # BLD AUTO: 0.22 K/UL — SIGNIFICANT CHANGE UP (ref 0–0.5)
EOSINOPHIL NFR BLD AUTO: 3.1 % — SIGNIFICANT CHANGE UP (ref 0–6)
GLUCOSE SERPL-MCNC: 102 MG/DL — HIGH (ref 70–99)
HCT VFR BLD CALC: 37 % — LOW (ref 39–50)
HGB BLD-MCNC: 11.3 G/DL — LOW (ref 13–17)
IMM GRANULOCYTES NFR BLD AUTO: 0.4 % — SIGNIFICANT CHANGE UP (ref 0–0.9)
LYMPHOCYTES # BLD AUTO: 1.61 K/UL — SIGNIFICANT CHANGE UP (ref 1–3.3)
LYMPHOCYTES # BLD AUTO: 22.6 % — SIGNIFICANT CHANGE UP (ref 13–44)
MAGNESIUM SERPL-MCNC: 2 MG/DL — SIGNIFICANT CHANGE UP (ref 1.6–2.6)
MCHC RBC-ENTMCNC: 28.3 PG — SIGNIFICANT CHANGE UP (ref 27–34)
MCHC RBC-ENTMCNC: 30.5 GM/DL — LOW (ref 32–36)
MCV RBC AUTO: 92.5 FL — SIGNIFICANT CHANGE UP (ref 80–100)
MONOCYTES # BLD AUTO: 0.62 K/UL — SIGNIFICANT CHANGE UP (ref 0–0.9)
MONOCYTES NFR BLD AUTO: 8.7 % — SIGNIFICANT CHANGE UP (ref 2–14)
NEUTROPHILS # BLD AUTO: 4.58 K/UL — SIGNIFICANT CHANGE UP (ref 1.8–7.4)
NEUTROPHILS NFR BLD AUTO: 64.2 % — SIGNIFICANT CHANGE UP (ref 43–77)
NRBC # BLD: 0 /100 WBCS — SIGNIFICANT CHANGE UP (ref 0–0)
PLATELET # BLD AUTO: 210 K/UL — SIGNIFICANT CHANGE UP (ref 150–400)
POTASSIUM SERPL-MCNC: 4.2 MMOL/L — SIGNIFICANT CHANGE UP (ref 3.5–5.3)
POTASSIUM SERPL-SCNC: 4.2 MMOL/L — SIGNIFICANT CHANGE UP (ref 3.5–5.3)
PROT SERPL-MCNC: 7.4 G/DL — SIGNIFICANT CHANGE UP (ref 6–8.3)
RBC # BLD: 4 M/UL — LOW (ref 4.2–5.8)
RBC # FLD: 17 % — HIGH (ref 10.3–14.5)
SODIUM SERPL-SCNC: 140 MMOL/L — SIGNIFICANT CHANGE UP (ref 135–145)
WBC # BLD: 7.13 K/UL — SIGNIFICANT CHANGE UP (ref 3.8–10.5)
WBC # FLD AUTO: 7.13 K/UL — SIGNIFICANT CHANGE UP (ref 3.8–10.5)

## 2023-07-05 ENCOUNTER — APPOINTMENT (OUTPATIENT)
Dept: INFUSION THERAPY | Facility: CLINIC | Age: 63
End: 2023-07-05

## 2023-07-05 DIAGNOSIS — R11.2 NAUSEA WITH VOMITING, UNSPECIFIED: ICD-10-CM

## 2023-07-05 DIAGNOSIS — Z51.11 ENCOUNTER FOR ANTINEOPLASTIC CHEMOTHERAPY: ICD-10-CM

## 2023-07-06 DIAGNOSIS — Z51.89 ENCOUNTER FOR OTHER SPECIFIED AFTERCARE: ICD-10-CM

## 2023-07-10 ENCOUNTER — APPOINTMENT (OUTPATIENT)
Dept: ORTHOPEDIC SURGERY | Facility: CLINIC | Age: 63
End: 2023-07-10

## 2023-07-10 NOTE — PATIENT PROFILE ADULT - ...
Fluconazole Counseling:  Patient counseled regarding adverse effects of fluconazole including but not limited to headache, diarrhea, nausea, upset stomach, liver function test abnormalities, taste disturbance, and stomach pain.  There is a rare possibility of liver failure that can occur when taking fluconazole.  The patient understands that monitoring of LFTs and kidney function test may be required, especially at baseline. The patient verbalized understanding of the proper use and possible adverse effects of fluconazole.  All of the patient's questions and concerns were addressed. 28-Sep-2022 20:54:18

## 2023-07-11 ENCOUNTER — APPOINTMENT (OUTPATIENT)
Dept: CT IMAGING | Facility: CLINIC | Age: 63
End: 2023-07-11
Payer: COMMERCIAL

## 2023-07-11 PROCEDURE — 71260 CT THORAX DX C+: CPT

## 2023-07-11 PROCEDURE — 74177 CT ABD & PELVIS W/CONTRAST: CPT

## 2023-07-17 ENCOUNTER — RESULT REVIEW (OUTPATIENT)
Age: 63
End: 2023-07-17

## 2023-07-17 ENCOUNTER — APPOINTMENT (OUTPATIENT)
Dept: HEMATOLOGY ONCOLOGY | Facility: CLINIC | Age: 63
End: 2023-07-17
Payer: MEDICAID

## 2023-07-17 ENCOUNTER — APPOINTMENT (OUTPATIENT)
Dept: PHYSICAL MEDICINE AND REHAB | Facility: CLINIC | Age: 63
End: 2023-07-17
Payer: MEDICAID

## 2023-07-17 ENCOUNTER — APPOINTMENT (OUTPATIENT)
Dept: INFUSION THERAPY | Facility: CLINIC | Age: 63
End: 2023-07-17
Payer: MEDICAID

## 2023-07-17 VITALS
HEART RATE: 101 BPM | DIASTOLIC BLOOD PRESSURE: 78 MMHG | WEIGHT: 209 LBS | BODY MASS INDEX: 29.15 KG/M2 | OXYGEN SATURATION: 97 % | SYSTOLIC BLOOD PRESSURE: 156 MMHG | RESPIRATION RATE: 18 BRPM | TEMPERATURE: 98.4 F

## 2023-07-17 DIAGNOSIS — R41.3 OTHER AMNESIA: ICD-10-CM

## 2023-07-17 DIAGNOSIS — Z74.09 OTHER REDUCED MOBILITY: ICD-10-CM

## 2023-07-17 LAB
ALBUMIN SERPL ELPH-MCNC: 4.4 G/DL — SIGNIFICANT CHANGE UP (ref 3.3–5)
ALP SERPL-CCNC: 194 U/L — HIGH (ref 40–120)
ALT FLD-CCNC: 24 U/L — SIGNIFICANT CHANGE UP (ref 10–45)
ANION GAP SERPL CALC-SCNC: 12 MMOL/L — SIGNIFICANT CHANGE UP (ref 5–17)
AST SERPL-CCNC: 22 U/L — SIGNIFICANT CHANGE UP (ref 10–40)
BASOPHILS # BLD AUTO: 0.11 K/UL — SIGNIFICANT CHANGE UP (ref 0–0.2)
BASOPHILS NFR BLD AUTO: 1.4 % — SIGNIFICANT CHANGE UP (ref 0–2)
BILIRUB SERPL-MCNC: 0.2 MG/DL — SIGNIFICANT CHANGE UP (ref 0.2–1.2)
BUN SERPL-MCNC: 9 MG/DL — SIGNIFICANT CHANGE UP (ref 7–23)
CALCIUM SERPL-MCNC: 9.3 MG/DL — SIGNIFICANT CHANGE UP (ref 8.4–10.5)
CEA SERPL-MCNC: 4.2 NG/ML — HIGH (ref 0–3.8)
CHLORIDE SERPL-SCNC: 105 MMOL/L — SIGNIFICANT CHANGE UP (ref 96–108)
CO2 SERPL-SCNC: 24 MMOL/L — SIGNIFICANT CHANGE UP (ref 22–31)
CREAT SERPL-MCNC: 0.76 MG/DL — SIGNIFICANT CHANGE UP (ref 0.5–1.3)
EGFR: 102 ML/MIN/1.73M2 — SIGNIFICANT CHANGE UP
EOSINOPHIL # BLD AUTO: 0.27 K/UL — SIGNIFICANT CHANGE UP (ref 0–0.5)
EOSINOPHIL NFR BLD AUTO: 3.6 % — SIGNIFICANT CHANGE UP (ref 0–6)
GLUCOSE SERPL-MCNC: 85 MG/DL — SIGNIFICANT CHANGE UP (ref 70–99)
HCT VFR BLD CALC: 39.3 % — SIGNIFICANT CHANGE UP (ref 39–50)
HGB BLD-MCNC: 12.2 G/DL — LOW (ref 13–17)
IMM GRANULOCYTES NFR BLD AUTO: 0.5 % — SIGNIFICANT CHANGE UP (ref 0–0.9)
LYMPHOCYTES # BLD AUTO: 1.74 K/UL — SIGNIFICANT CHANGE UP (ref 1–3.3)
LYMPHOCYTES # BLD AUTO: 22.9 % — SIGNIFICANT CHANGE UP (ref 13–44)
MAGNESIUM SERPL-MCNC: 2.1 MG/DL — SIGNIFICANT CHANGE UP (ref 1.6–2.6)
MCHC RBC-ENTMCNC: 28.8 PG — SIGNIFICANT CHANGE UP (ref 27–34)
MCHC RBC-ENTMCNC: 31 GM/DL — LOW (ref 32–36)
MCV RBC AUTO: 92.9 FL — SIGNIFICANT CHANGE UP (ref 80–100)
MONOCYTES # BLD AUTO: 0.58 K/UL — SIGNIFICANT CHANGE UP (ref 0–0.9)
MONOCYTES NFR BLD AUTO: 7.6 % — SIGNIFICANT CHANGE UP (ref 2–14)
NEUTROPHILS # BLD AUTO: 4.86 K/UL — SIGNIFICANT CHANGE UP (ref 1.8–7.4)
NEUTROPHILS NFR BLD AUTO: 64 % — SIGNIFICANT CHANGE UP (ref 43–77)
NRBC # BLD: 0 /100 WBCS — SIGNIFICANT CHANGE UP (ref 0–0)
PLATELET # BLD AUTO: 170 K/UL — SIGNIFICANT CHANGE UP (ref 150–400)
POTASSIUM SERPL-MCNC: 4.2 MMOL/L — SIGNIFICANT CHANGE UP (ref 3.5–5.3)
POTASSIUM SERPL-SCNC: 4.2 MMOL/L — SIGNIFICANT CHANGE UP (ref 3.5–5.3)
PROT SERPL-MCNC: 7.4 G/DL — SIGNIFICANT CHANGE UP (ref 6–8.3)
RBC # BLD: 4.23 M/UL — SIGNIFICANT CHANGE UP (ref 4.2–5.8)
RBC # FLD: 17.2 % — HIGH (ref 10.3–14.5)
SODIUM SERPL-SCNC: 141 MMOL/L — SIGNIFICANT CHANGE UP (ref 135–145)
WBC # BLD: 7.6 K/UL — SIGNIFICANT CHANGE UP (ref 3.8–10.5)
WBC # FLD AUTO: 7.6 K/UL — SIGNIFICANT CHANGE UP (ref 3.8–10.5)

## 2023-07-17 PROCEDURE — 99214 OFFICE O/P EST MOD 30 MIN: CPT

## 2023-07-17 PROCEDURE — 99215 OFFICE O/P EST HI 40 MIN: CPT

## 2023-07-17 RX ORDER — GABAPENTIN 300 MG/1
300 CAPSULE ORAL 3 TIMES DAILY
Qty: 90 | Refills: 1 | Status: ACTIVE | COMMUNITY
Start: 2023-06-05 | End: 1900-01-01

## 2023-07-17 NOTE — REASON FOR VISIT
[Initial Evaluation] : an initial evaluation [Follow-Up] : a follow-up visit [FreeTextEntry1] : Neuropathy, impaired mobility

## 2023-07-17 NOTE — HISTORY OF PRESENT ILLNESS
[FreeTextEntry1] : Mr. Griffith is a 62 year old male with history of Stage IV left sided sigmoid colon cancer metastatic to liver, lung, adrenals.  Hospitalized 1/23-1/27 for SBO and improved with medical management with no stent placed.  Treatment with FOLFOXIRI.  \par \par He reports he has numbness and tingling in his hands and numbness and burning of his feet which is worst in the days immediately following his chemo treatment and lessen thereafter. Started on gabapentin 300 mg three times a day.  Thinks it is helping, denies any side effects.  Denies any overt weakness.  Currently in physical therapy for knee surgery since last visit ambulating with crutches, NWB LLE. \par \par \par \par

## 2023-07-17 NOTE — ASSESSMENT
[FreeTextEntry1] : 62 year old male presenting for evaluation.\par \par #Neuropathy:\par -Continue with gabapentin to 300mg TID, patient feels it is helping and does not wish to increase at this time-rx sent\par -CMP reviewed, AST 21, ALT 21\par \par #Impaired dexterity:\par -Improving, mild difficulty with buttons\par \par #Impaired mobility:\par -Crutches\par -Continue PT\par -Following with orthopedics s/p surgery, precautions per orthopedics \par \par Follow up 6 weeks

## 2023-07-17 NOTE — REVIEW OF SYSTEMS
[Chest Pain] : no chest pain [Shortness Of Breath] : no shortness of breath [Abdominal Pain] : no abdominal pain [Vomiting] : no vomiting [Constipation] : no constipation [Confused] : no confusion [FreeTextEntry2] : wt gain noted  [FreeTextEntry9] : left leg in immobilizer and ace wrap  [de-identified] : right chest wall port accessed [de-identified] : neuropathy

## 2023-07-17 NOTE — HISTORY OF PRESENT ILLNESS
[de-identified] : The patient was diagnosed with colon adenocarcinoma, moderately differentiated, with metastatic disease in the lungs, liver and left adrenal gland in Aug 2022 at the age of 61. On 08/18/22, he had a CT C/A/P which showed  irregular colonic wall thickening of the proximal sigmoid colon with stranding of the surrounding paracolic fat. Regional lymph nodes are identified adjacent to the colonic mass measuring up to 1.2 cm. Multiple masses identified within the hepatic parenchyma noted to be 3.9 cm, suspicious for hepatic parenchymal neoplastic disease. 3.4 cm left adrenal mass, suspicious for neoplastic disease. There is a 1.0 cm soft tissue nodule identified along the right lateral wall of the distal trachea, superior to the tracheal bifurcation. Lung nodules suspicious for lung parenchymal neoplastic disease.  \par \par On 8/18/22, he had a colonoscopy that showed a frond-like/villous and ulcerated non-obstructing large mass was found in the descending colon. The mass was circumferential. The mass measured five cm in length. Pathology showed invasive adenocarcinoma, moderately differentiated. No loss of nuclear expression of MMR proteins (MLH1, MSH2, MSH6, and PMS2). These results show low probability of microsatellite instability-high (MSI-H). On 8/19/22, he had a liver, core biopsy which showed metastatic adenocarcinoma, consistent with metastasis from colonic primary.\par \par  [de-identified] : no MMR deficiency [de-identified] : Medical Hx: DVT 14 years ago (unknown cause) \par Surgical Hx: left eye sx; left torn meniscus repaired \par Family Hx: Mother leukemia\par Social Hx: never smoker; ETOH never; lives alone; single; works PT at [employer]; daughters close by [de-identified] : Pt is C18D1 of FOLFIOXIRI q 2 weeks, with ongoing cycles planned. He was recently hospitalized June 9th - 14th due to mechanical fall/Left femoral condyle fracture s/p retrograde IMN. He is in an immobilizer and ace wrap. No pain noted, took oxy prior to treatment today. Today has continued pain with neuropathy in the feet and hands, he is now following with Dr Vergara. He denies fatigue, cold sensitivity, resolved, eye changes, hair loss, nail / skin changes, pain, mouth sores, vomiting, heartburn, C/D. He now is living with his daughter again.

## 2023-07-17 NOTE — PHYSICAL EXAM
[de-identified] : wt gain noted  [de-identified] : left leg in immobilizer and ace wrap  [de-identified] : rt port accessed, dressing C/D/I

## 2023-07-17 NOTE — PHYSICAL EXAM
[FreeTextEntry1] : Gen: Patient is A&O x 3, NAD\par HEENT: EOMI, hearing grossly normal\par Resp: regular, non - labored\par CV: pulses regular\par Skin: no rashes, erythema\par Lymph: no clubbing, cyanosis, edema\par Inspection: no instability \par ROM: full throughout (n/a LLE)\par Palpation:no tenderness to palpation\par Sensation: decreased to light touch in bilateral fingers and toes\par Reflexes: 1+ and symmetric throughout\par Strength: 5/5 throughout\par Gait: Antalgic, ambulates with crutches NWB LLE, wearing L knee brace\par \par

## 2023-07-17 NOTE — RESULTS/DATA
[FreeTextEntry1] : 7/11/23 CT C/A/P: Continued decrease in size of hepatic metastases without evidence of new lesion, 1.2 x 1.1 cm previously measuring 1.7 x 1.3 cm; measures 1.9 x 0.9 cm previously measuring 2.3 x 1.4 cm. Subtle findings consistent with the patient's previous ascending colonic mass identified without evidence of large bowel obstruction. Stable left adrenal mass and punctate pulmonary nodules. Small pleural thickening along the right posterior pleural amenable to follow-up unclear significance.\par \par 4/10/23 CT C/A/P: Distal descending colon mass has decreased in size since prior with decreased associated colonic dilatation. Large amount of stool again noted in the colon and rectum. Hepatic metastases are slightly decreased in size, largest decrease 2.3 x 1.4 cm, previously 3.0 x 1.8 cm. Unchanged left adrenal mass and small lung nodule.\par \par 1/23/23 CT:  New partial large bowel obstruction secondary to known apple core lesion in the distal descending colon. Again extensive adjacent mesenteric abnormality is seen which may represent conglomerate lymph nodes.Redemonstrated hepatic metastases.  No change large left adrenal nodule.Small pericardial effusion is again seen.\par \par 1/9/23 CT C/A/P: Findings consistent with descending colon cancer with extension through the wall with extensive mesenteric confluent tumor and/or ivelisse disease mildly worse when compared to the prior examination. Diffuse metastatic disease identified throughout the liver and small lesion most noted within the right middle lobe improved when compared to the prior examination (now measuring 2.7 x 2.5 cm previously measuring 3.7 x 3.2 cm and a lesion in the right lobe measuring 2.1 x by 1.6 cm previously measuring 3.3 x 2.3 cm). Small pericardial effusion and areas of thickening of questionable significance. Small amount of pericatheter clot near the tip of the catheter in the superior vena cava.\par \par 8/19/22 Path: Liver, core biopsy: Metastatic adenocarcinoma, consistent with metastasis from colonic primary. Immunohistochemical stains were performed and results as follows: CDX - 2 is positive. CK 20 is patchy positive. CK 7 is negative. These findings support the diagnosis. No loss of nuclear expression of MMR proteins (MLH1, MSH2,\par MSH6, and PMS2).   These results show low probability of microsatellite instability-high (MSI-H).\par \par 8/18/22 Path: Colon, descending, mass, biopsy. Invasive adenocarcinoma, moderately differentiated. No loss of nuclear expression of MMR proteins (MLH1, MSH2,\par MSH6, and PMS2).   These results show low probability of microsatellite instability-high (MSI-H).\par \par 8/18/22 Colonoscopy: A frond-like/villous and ulcerated non-obstructing large mass was found in the descending colon. The lass was circumferential. The mass measured five cm in length. Oozing was present. Biopsied and tattooed. The scope was able to transverse the mass. The colon was otherwise normal.\par \par 8/18/22 CT C/A/P: There is irregular colonic wall thickening of the proximal sigmoid colon with stranding of the surrounding paracolic fat. Regional lymph nodes are identified adjacent to the colonic mass measuring up to 1.2 cm.  Multiple hypodense masses identified within the hepatic parenchyma is not to 3.9 cm, suspicious for hepatic parenchymal neoplastic disease. 3.4 cm left adrenal mass, suspicious for neoplastic disease. There is a 1.0 cm soft tissue nodule identified along the right lateral wall of the distal trachea, superior to the tracheal bifurcation. Lung nodules suspicious for lung parenchymal neoplastic disease.

## 2023-07-19 ENCOUNTER — APPOINTMENT (OUTPATIENT)
Dept: INFUSION THERAPY | Facility: CLINIC | Age: 63
End: 2023-07-19

## 2023-07-25 ENCOUNTER — RESULT REVIEW (OUTPATIENT)
Age: 63
End: 2023-07-25

## 2023-07-25 ENCOUNTER — APPOINTMENT (OUTPATIENT)
Dept: INFUSION THERAPY | Facility: CLINIC | Age: 63
End: 2023-07-25

## 2023-07-25 VITALS
WEIGHT: 211.56 LBS | RESPIRATION RATE: 17 BRPM | HEART RATE: 89 BPM | BODY MASS INDEX: 29.51 KG/M2 | OXYGEN SATURATION: 98 % | TEMPERATURE: 98.5 F | DIASTOLIC BLOOD PRESSURE: 82 MMHG | SYSTOLIC BLOOD PRESSURE: 136 MMHG

## 2023-07-25 LAB
ALBUMIN SERPL ELPH-MCNC: 4.2 G/DL — SIGNIFICANT CHANGE UP (ref 3.3–5)
ALP SERPL-CCNC: 138 U/L — HIGH (ref 40–120)
ALT FLD-CCNC: 21 U/L — SIGNIFICANT CHANGE UP (ref 10–45)
ANION GAP SERPL CALC-SCNC: 14 MMOL/L — SIGNIFICANT CHANGE UP (ref 5–17)
AST SERPL-CCNC: 28 U/L — SIGNIFICANT CHANGE UP (ref 10–40)
BASOPHILS # BLD AUTO: 0.11 K/UL — SIGNIFICANT CHANGE UP (ref 0–0.2)
BASOPHILS NFR BLD AUTO: 1.8 % — SIGNIFICANT CHANGE UP (ref 0–2)
BILIRUB SERPL-MCNC: 0.2 MG/DL — SIGNIFICANT CHANGE UP (ref 0.2–1.2)
BUN SERPL-MCNC: 13 MG/DL — SIGNIFICANT CHANGE UP (ref 7–23)
CALCIUM SERPL-MCNC: 9.5 MG/DL — SIGNIFICANT CHANGE UP (ref 8.4–10.5)
CHLORIDE SERPL-SCNC: 106 MMOL/L — SIGNIFICANT CHANGE UP (ref 96–108)
CO2 SERPL-SCNC: 23 MMOL/L — SIGNIFICANT CHANGE UP (ref 22–31)
CREAT SERPL-MCNC: 0.73 MG/DL — SIGNIFICANT CHANGE UP (ref 0.5–1.3)
EGFR: 103 ML/MIN/1.73M2 — SIGNIFICANT CHANGE UP
EOSINOPHIL # BLD AUTO: 0.28 K/UL — SIGNIFICANT CHANGE UP (ref 0–0.5)
EOSINOPHIL NFR BLD AUTO: 4.6 % — SIGNIFICANT CHANGE UP (ref 0–6)
GLUCOSE SERPL-MCNC: 75 MG/DL — SIGNIFICANT CHANGE UP (ref 70–99)
HCT VFR BLD CALC: 37.8 % — LOW (ref 39–50)
HGB BLD-MCNC: 11.9 G/DL — LOW (ref 13–17)
IMM GRANULOCYTES NFR BLD AUTO: 0.5 % — SIGNIFICANT CHANGE UP (ref 0–0.9)
LYMPHOCYTES # BLD AUTO: 1.97 K/UL — SIGNIFICANT CHANGE UP (ref 1–3.3)
LYMPHOCYTES # BLD AUTO: 32.2 % — SIGNIFICANT CHANGE UP (ref 13–44)
MAGNESIUM SERPL-MCNC: 2 MG/DL — SIGNIFICANT CHANGE UP (ref 1.6–2.6)
MCHC RBC-ENTMCNC: 28.6 PG — SIGNIFICANT CHANGE UP (ref 27–34)
MCHC RBC-ENTMCNC: 31.5 GM/DL — LOW (ref 32–36)
MCV RBC AUTO: 90.9 FL — SIGNIFICANT CHANGE UP (ref 80–100)
MONOCYTES # BLD AUTO: 0.58 K/UL — SIGNIFICANT CHANGE UP (ref 0–0.9)
MONOCYTES NFR BLD AUTO: 9.5 % — SIGNIFICANT CHANGE UP (ref 2–14)
NEUTROPHILS # BLD AUTO: 3.14 K/UL — SIGNIFICANT CHANGE UP (ref 1.8–7.4)
NEUTROPHILS NFR BLD AUTO: 51.4 % — SIGNIFICANT CHANGE UP (ref 43–77)
NRBC # BLD: 0 /100 WBCS — SIGNIFICANT CHANGE UP (ref 0–0)
PLATELET # BLD AUTO: 217 K/UL — SIGNIFICANT CHANGE UP (ref 150–400)
POTASSIUM SERPL-MCNC: 4.5 MMOL/L — SIGNIFICANT CHANGE UP (ref 3.5–5.3)
POTASSIUM SERPL-SCNC: 4.5 MMOL/L — SIGNIFICANT CHANGE UP (ref 3.5–5.3)
PROT SERPL-MCNC: 7.1 G/DL — SIGNIFICANT CHANGE UP (ref 6–8.3)
RBC # BLD: 4.16 M/UL — LOW (ref 4.2–5.8)
RBC # FLD: 16.9 % — HIGH (ref 10.3–14.5)
SODIUM SERPL-SCNC: 143 MMOL/L — SIGNIFICANT CHANGE UP (ref 135–145)
WBC # BLD: 6.11 K/UL — SIGNIFICANT CHANGE UP (ref 3.8–10.5)
WBC # FLD AUTO: 6.11 K/UL — SIGNIFICANT CHANGE UP (ref 3.8–10.5)

## 2023-07-27 ENCOUNTER — APPOINTMENT (OUTPATIENT)
Dept: INFUSION THERAPY | Facility: CLINIC | Age: 63
End: 2023-07-27

## 2023-08-08 ENCOUNTER — APPOINTMENT (OUTPATIENT)
Dept: INFUSION THERAPY | Facility: CLINIC | Age: 63
End: 2023-08-08
Payer: MEDICAID

## 2023-08-08 ENCOUNTER — RESULT REVIEW (OUTPATIENT)
Age: 63
End: 2023-08-08

## 2023-08-08 ENCOUNTER — APPOINTMENT (OUTPATIENT)
Dept: HEMATOLOGY ONCOLOGY | Facility: CLINIC | Age: 63
End: 2023-08-08
Payer: MEDICAID

## 2023-08-08 VITALS
WEIGHT: 214.56 LBS | DIASTOLIC BLOOD PRESSURE: 70 MMHG | HEART RATE: 84 BPM | TEMPERATURE: 98.2 F | BODY MASS INDEX: 29.93 KG/M2 | RESPIRATION RATE: 19 BRPM | OXYGEN SATURATION: 98 % | SYSTOLIC BLOOD PRESSURE: 145 MMHG

## 2023-08-08 LAB
ALBUMIN SERPL ELPH-MCNC: 4.3 G/DL — SIGNIFICANT CHANGE UP (ref 3.3–5)
ALP SERPL-CCNC: 173 U/L — HIGH (ref 40–120)
ALT FLD-CCNC: 20 U/L — SIGNIFICANT CHANGE UP (ref 10–45)
ANION GAP SERPL CALC-SCNC: 13 MMOL/L — SIGNIFICANT CHANGE UP (ref 5–17)
AST SERPL-CCNC: 20 U/L — SIGNIFICANT CHANGE UP (ref 10–40)
BASOPHILS # BLD AUTO: 0.09 K/UL — SIGNIFICANT CHANGE UP (ref 0–0.2)
BASOPHILS NFR BLD AUTO: 1.2 % — SIGNIFICANT CHANGE UP (ref 0–2)
BILIRUB SERPL-MCNC: <0.2 MG/DL — SIGNIFICANT CHANGE UP (ref 0.2–1.2)
BUN SERPL-MCNC: 14 MG/DL — SIGNIFICANT CHANGE UP (ref 7–23)
CALCIUM SERPL-MCNC: 9.4 MG/DL — SIGNIFICANT CHANGE UP (ref 8.4–10.5)
CHLORIDE SERPL-SCNC: 103 MMOL/L — SIGNIFICANT CHANGE UP (ref 96–108)
CO2 SERPL-SCNC: 24 MMOL/L — SIGNIFICANT CHANGE UP (ref 22–31)
CREAT SERPL-MCNC: 0.78 MG/DL — SIGNIFICANT CHANGE UP (ref 0.5–1.3)
EGFR: 101 ML/MIN/1.73M2 — SIGNIFICANT CHANGE UP
EOSINOPHIL # BLD AUTO: 0.26 K/UL — SIGNIFICANT CHANGE UP (ref 0–0.5)
EOSINOPHIL NFR BLD AUTO: 3.5 % — SIGNIFICANT CHANGE UP (ref 0–6)
GLUCOSE SERPL-MCNC: 128 MG/DL — HIGH (ref 70–99)
HCT VFR BLD CALC: 39.9 % — SIGNIFICANT CHANGE UP (ref 39–50)
HGB BLD-MCNC: 12.4 G/DL — LOW (ref 13–17)
IMM GRANULOCYTES NFR BLD AUTO: 0.5 % — SIGNIFICANT CHANGE UP (ref 0–0.9)
LYMPHOCYTES # BLD AUTO: 1.47 K/UL — SIGNIFICANT CHANGE UP (ref 1–3.3)
LYMPHOCYTES # BLD AUTO: 19.5 % — SIGNIFICANT CHANGE UP (ref 13–44)
MAGNESIUM SERPL-MCNC: 2 MG/DL — SIGNIFICANT CHANGE UP (ref 1.6–2.6)
MCHC RBC-ENTMCNC: 28.8 PG — SIGNIFICANT CHANGE UP (ref 27–34)
MCHC RBC-ENTMCNC: 31.1 GM/DL — LOW (ref 32–36)
MCV RBC AUTO: 92.6 FL — SIGNIFICANT CHANGE UP (ref 80–100)
MONOCYTES # BLD AUTO: 0.32 K/UL — SIGNIFICANT CHANGE UP (ref 0–0.9)
MONOCYTES NFR BLD AUTO: 4.2 % — SIGNIFICANT CHANGE UP (ref 2–14)
NEUTROPHILS # BLD AUTO: 5.35 K/UL — SIGNIFICANT CHANGE UP (ref 1.8–7.4)
NEUTROPHILS NFR BLD AUTO: 71.1 % — SIGNIFICANT CHANGE UP (ref 43–77)
NRBC # BLD: 0 /100 WBCS — SIGNIFICANT CHANGE UP (ref 0–0)
PLATELET # BLD AUTO: 159 K/UL — SIGNIFICANT CHANGE UP (ref 150–400)
POTASSIUM SERPL-MCNC: 4.1 MMOL/L — SIGNIFICANT CHANGE UP (ref 3.5–5.3)
POTASSIUM SERPL-SCNC: 4.1 MMOL/L — SIGNIFICANT CHANGE UP (ref 3.5–5.3)
PROT SERPL-MCNC: 7 G/DL — SIGNIFICANT CHANGE UP (ref 6–8.3)
RBC # BLD: 4.31 M/UL — SIGNIFICANT CHANGE UP (ref 4.2–5.8)
RBC # FLD: 16.7 % — HIGH (ref 10.3–14.5)
SODIUM SERPL-SCNC: 140 MMOL/L — SIGNIFICANT CHANGE UP (ref 135–145)
WBC # BLD: 7.53 K/UL — SIGNIFICANT CHANGE UP (ref 3.8–10.5)
WBC # FLD AUTO: 7.53 K/UL — SIGNIFICANT CHANGE UP (ref 3.8–10.5)

## 2023-08-08 PROCEDURE — 99214 OFFICE O/P EST MOD 30 MIN: CPT

## 2023-08-08 NOTE — PHYSICAL EXAM
[Restricted in physically strenuous activity but ambulatory and able to carry out work of a light or sedentary nature] : Status 1- Restricted in physically strenuous activity but ambulatory and able to carry out work of a light or sedentary nature, e.g., light house work, office work [Normal] : affect appropriate [de-identified] : wt gain noted  [de-identified] : left leg in ace wrap  [de-identified] : rt port accessed, dressing C/D/I

## 2023-08-08 NOTE — REVIEW OF SYSTEMS
[Recent Change In Weight] : ~T recent weight change [Diarrhea: Grade 0] : Diarrhea: Grade 0 [Negative] : Allergic/Immunologic [Chest Pain] : no chest pain [Shortness Of Breath] : no shortness of breath [Abdominal Pain] : no abdominal pain [Vomiting] : no vomiting [Constipation] : no constipation [Confused] : no confusion [FreeTextEntry2] : wt gain noted  [FreeTextEntry9] : left leg with ace wrap  [de-identified] : right chest wall port accessed [de-identified] : neuropathy

## 2023-08-08 NOTE — HISTORY OF PRESENT ILLNESS
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 On 08/18/22, he had a CT C/A/P which showed  irregular colonic wall thickening of the proximal sigmoid colon with stranding of the surrounding paracolic fat. Regional lymph nodes are identified adjacent to the colonic mass measuring up to 1.2 cm. Multiple masses identified within the hepatic parenchyma noted to be 3.9 cm, suspicious for hepatic parenchymal neoplastic disease. 3.4 cm left adrenal mass, suspicious for neoplastic disease. There is a 1.0 cm soft tissue nodule identified along the right lateral wall of the distal trachea, superior to the tracheal bifurcation. Lung nodules suspicious for lung parenchymal neoplastic disease.  \par  \par  On 8/18/22, he had a colonoscopy that showed a frond-like/villous and ulcerated non-obstructing large mass was found in the descending colon. The mass was circumferential. The mass measured five cm in length. Pathology showed invasive adenocarcinoma, moderately differentiated. No loss of nuclear expression of MMR proteins (MLH1, MSH2, MSH6, and PMS2). These results show low probability of microsatellite instability-high (MSI-H). On 8/19/22, he had a liver, core biopsy which showed metastatic adenocarcinoma, consistent with metastasis from colonic primary.\par  \par   [de-identified] : no MMR deficiency [de-identified] : Medical Hx: DVT 14 years ago (unknown cause) \par  Surgical Hx: left eye sx; left torn meniscus repaired \par  Family Hx: Mother leukemia\par  Social Hx: never smoker; ETOH never; lives alone; single; works PT at [employer]; daughters close by [de-identified] : Pt is C19D1 of FOLFIOXIRI q 2 weeks, with ongoing cycles planned. He was hospitalized June 9th - 14th, 2023 due to mechanical fall/Left femoral condyle fracture s/p retrograde IMN. He is in an ace wrap, walking with crutches. No pain noted. Today has improved neuropathy in the feet and hands, he is now following with Dr Vergara. He denies fatigue, cold sensitivity, resolved, eye changes, hair loss, nail / skin changes, pain, mouth sores, vomiting, heartburn, C/D. He now is living with his daughter again. He feels well overall.

## 2023-08-10 ENCOUNTER — APPOINTMENT (OUTPATIENT)
Dept: INFUSION THERAPY | Facility: CLINIC | Age: 63
End: 2023-08-10

## 2023-08-22 ENCOUNTER — RESULT REVIEW (OUTPATIENT)
Age: 63
End: 2023-08-22

## 2023-08-22 ENCOUNTER — APPOINTMENT (OUTPATIENT)
Dept: INFUSION THERAPY | Facility: CLINIC | Age: 63
End: 2023-08-22

## 2023-08-22 LAB
ALBUMIN SERPL ELPH-MCNC: 4.3 G/DL — SIGNIFICANT CHANGE UP (ref 3.3–5)
ALP SERPL-CCNC: 183 U/L — HIGH (ref 40–120)
ALT FLD-CCNC: 21 U/L — SIGNIFICANT CHANGE UP (ref 10–45)
ANION GAP SERPL CALC-SCNC: 12 MMOL/L — SIGNIFICANT CHANGE UP (ref 5–17)
AST SERPL-CCNC: 17 U/L — SIGNIFICANT CHANGE UP (ref 10–40)
BASOPHILS # BLD AUTO: 0.14 K/UL — SIGNIFICANT CHANGE UP (ref 0–0.2)
BASOPHILS NFR BLD AUTO: 1.9 % — SIGNIFICANT CHANGE UP (ref 0–2)
BILIRUB SERPL-MCNC: 0.2 MG/DL — SIGNIFICANT CHANGE UP (ref 0.2–1.2)
BUN SERPL-MCNC: 10 MG/DL — SIGNIFICANT CHANGE UP (ref 7–23)
CALCIUM SERPL-MCNC: 9.1 MG/DL — SIGNIFICANT CHANGE UP (ref 8.4–10.5)
CHLORIDE SERPL-SCNC: 105 MMOL/L — SIGNIFICANT CHANGE UP (ref 96–108)
CO2 SERPL-SCNC: 24 MMOL/L — SIGNIFICANT CHANGE UP (ref 22–31)
CREAT SERPL-MCNC: 0.84 MG/DL — SIGNIFICANT CHANGE UP (ref 0.5–1.3)
EGFR: 99 ML/MIN/1.73M2 — SIGNIFICANT CHANGE UP
EOSINOPHIL # BLD AUTO: 0.27 K/UL — SIGNIFICANT CHANGE UP (ref 0–0.5)
EOSINOPHIL NFR BLD AUTO: 3.7 % — SIGNIFICANT CHANGE UP (ref 0–6)
GLUCOSE SERPL-MCNC: 81 MG/DL — SIGNIFICANT CHANGE UP (ref 70–99)
HCT VFR BLD CALC: 40.7 % — SIGNIFICANT CHANGE UP (ref 39–50)
HGB BLD-MCNC: 12.6 G/DL — LOW (ref 13–17)
IMM GRANULOCYTES NFR BLD AUTO: 1.1 % — HIGH (ref 0–0.9)
LYMPHOCYTES # BLD AUTO: 1.84 K/UL — SIGNIFICANT CHANGE UP (ref 1–3.3)
LYMPHOCYTES # BLD AUTO: 25 % — SIGNIFICANT CHANGE UP (ref 13–44)
MAGNESIUM SERPL-MCNC: 2 MG/DL — SIGNIFICANT CHANGE UP (ref 1.6–2.6)
MCHC RBC-ENTMCNC: 29 PG — SIGNIFICANT CHANGE UP (ref 27–34)
MCHC RBC-ENTMCNC: 31 GM/DL — LOW (ref 32–36)
MCV RBC AUTO: 93.8 FL — SIGNIFICANT CHANGE UP (ref 80–100)
MONOCYTES # BLD AUTO: 0.42 K/UL — SIGNIFICANT CHANGE UP (ref 0–0.9)
MONOCYTES NFR BLD AUTO: 5.7 % — SIGNIFICANT CHANGE UP (ref 2–14)
NEUTROPHILS # BLD AUTO: 4.62 K/UL — SIGNIFICANT CHANGE UP (ref 1.8–7.4)
NEUTROPHILS NFR BLD AUTO: 62.6 % — SIGNIFICANT CHANGE UP (ref 43–77)
NRBC # BLD: 0 /100 WBCS — SIGNIFICANT CHANGE UP (ref 0–0)
PLATELET # BLD AUTO: 157 K/UL — SIGNIFICANT CHANGE UP (ref 150–400)
POTASSIUM SERPL-MCNC: 4.4 MMOL/L — SIGNIFICANT CHANGE UP (ref 3.5–5.3)
POTASSIUM SERPL-SCNC: 4.4 MMOL/L — SIGNIFICANT CHANGE UP (ref 3.5–5.3)
PROT SERPL-MCNC: 7.1 G/DL — SIGNIFICANT CHANGE UP (ref 6–8.3)
RBC # BLD: 4.34 M/UL — SIGNIFICANT CHANGE UP (ref 4.2–5.8)
RBC # FLD: 16.5 % — HIGH (ref 10.3–14.5)
SODIUM SERPL-SCNC: 141 MMOL/L — SIGNIFICANT CHANGE UP (ref 135–145)
WBC # BLD: 7.37 K/UL — SIGNIFICANT CHANGE UP (ref 3.8–10.5)
WBC # FLD AUTO: 7.37 K/UL — SIGNIFICANT CHANGE UP (ref 3.8–10.5)

## 2023-08-24 ENCOUNTER — APPOINTMENT (OUTPATIENT)
Dept: INFUSION THERAPY | Facility: CLINIC | Age: 63
End: 2023-08-24

## 2023-08-30 ENCOUNTER — OUTPATIENT (OUTPATIENT)
Dept: OUTPATIENT SERVICES | Facility: HOSPITAL | Age: 63
LOS: 1 days | Discharge: HOME | End: 2023-08-30

## 2023-08-30 DIAGNOSIS — C18.9 MALIGNANT NEOPLASM OF COLON, UNSPECIFIED: ICD-10-CM

## 2023-08-31 NOTE — ED PROVIDER NOTE - CLINICAL SUMMARY MEDICAL DECISION MAKING FREE TEXT BOX
No Distal femoral condyle fracture.  Discussed with orthopedics, plan for OR tomorrow.  Discussed with medicine team for admission.

## 2023-09-05 ENCOUNTER — RESULT REVIEW (OUTPATIENT)
Age: 63
End: 2023-09-05

## 2023-09-05 ENCOUNTER — APPOINTMENT (OUTPATIENT)
Dept: HEMATOLOGY ONCOLOGY | Facility: CLINIC | Age: 63
End: 2023-09-05
Payer: COMMERCIAL

## 2023-09-05 ENCOUNTER — APPOINTMENT (OUTPATIENT)
Dept: INFUSION THERAPY | Facility: CLINIC | Age: 63
End: 2023-09-05
Payer: COMMERCIAL

## 2023-09-05 VITALS
TEMPERATURE: 98.1 F | DIASTOLIC BLOOD PRESSURE: 88 MMHG | SYSTOLIC BLOOD PRESSURE: 150 MMHG | BODY MASS INDEX: 29.85 KG/M2 | WEIGHT: 214 LBS | RESPIRATION RATE: 16 BRPM | HEART RATE: 78 BPM | OXYGEN SATURATION: 97 %

## 2023-09-05 DIAGNOSIS — S82.209A UNSPECIFIED FRACTURE OF SHAFT OF UNSPECIFIED TIBIA, INITIAL ENCOUNTER FOR CLOSED FRACTURE: ICD-10-CM

## 2023-09-05 DIAGNOSIS — G62.9 POLYNEUROPATHY, UNSPECIFIED: ICD-10-CM

## 2023-09-05 LAB
ALBUMIN SERPL ELPH-MCNC: 4.2 G/DL — SIGNIFICANT CHANGE UP (ref 3.3–5)
ALP SERPL-CCNC: 194 U/L — HIGH (ref 40–120)
ALT FLD-CCNC: 25 U/L — SIGNIFICANT CHANGE UP (ref 10–45)
ANION GAP SERPL CALC-SCNC: 12 MMOL/L — SIGNIFICANT CHANGE UP (ref 5–17)
AST SERPL-CCNC: 22 U/L — SIGNIFICANT CHANGE UP (ref 10–40)
BASOPHILS # BLD AUTO: 0.13 K/UL — SIGNIFICANT CHANGE UP (ref 0–0.2)
BASOPHILS NFR BLD AUTO: 1.5 % — SIGNIFICANT CHANGE UP (ref 0–2)
BILIRUB SERPL-MCNC: 0.2 MG/DL — SIGNIFICANT CHANGE UP (ref 0.2–1.2)
BUN SERPL-MCNC: 10 MG/DL — SIGNIFICANT CHANGE UP (ref 7–23)
CALCIUM SERPL-MCNC: 9.2 MG/DL — SIGNIFICANT CHANGE UP (ref 8.4–10.5)
CHLORIDE SERPL-SCNC: 106 MMOL/L — SIGNIFICANT CHANGE UP (ref 96–108)
CO2 SERPL-SCNC: 24 MMOL/L — SIGNIFICANT CHANGE UP (ref 22–31)
CREAT SERPL-MCNC: 0.74 MG/DL — SIGNIFICANT CHANGE UP (ref 0.5–1.3)
EGFR: 102 ML/MIN/1.73M2 — SIGNIFICANT CHANGE UP
EOSINOPHIL # BLD AUTO: 0.23 K/UL — SIGNIFICANT CHANGE UP (ref 0–0.5)
EOSINOPHIL NFR BLD AUTO: 2.7 % — SIGNIFICANT CHANGE UP (ref 0–6)
GLUCOSE SERPL-MCNC: 95 MG/DL — SIGNIFICANT CHANGE UP (ref 70–99)
HCT VFR BLD CALC: 38.8 % — LOW (ref 39–50)
HGB BLD-MCNC: 12.3 G/DL — LOW (ref 13–17)
IMM GRANULOCYTES NFR BLD AUTO: 3.4 % — HIGH (ref 0–0.9)
LYMPHOCYTES # BLD AUTO: 1.65 K/UL — SIGNIFICANT CHANGE UP (ref 1–3.3)
LYMPHOCYTES # BLD AUTO: 19.3 % — SIGNIFICANT CHANGE UP (ref 13–44)
MAGNESIUM SERPL-MCNC: 2.1 MG/DL — SIGNIFICANT CHANGE UP (ref 1.6–2.6)
MCHC RBC-ENTMCNC: 28.8 PG — SIGNIFICANT CHANGE UP (ref 27–34)
MCHC RBC-ENTMCNC: 31.7 GM/DL — LOW (ref 32–36)
MCV RBC AUTO: 90.9 FL — SIGNIFICANT CHANGE UP (ref 80–100)
MONOCYTES # BLD AUTO: 0.59 K/UL — SIGNIFICANT CHANGE UP (ref 0–0.9)
MONOCYTES NFR BLD AUTO: 6.9 % — SIGNIFICANT CHANGE UP (ref 2–14)
NEUTROPHILS # BLD AUTO: 5.64 K/UL — SIGNIFICANT CHANGE UP (ref 1.8–7.4)
NEUTROPHILS NFR BLD AUTO: 66.2 % — SIGNIFICANT CHANGE UP (ref 43–77)
NRBC # BLD: 0 /100 WBCS — SIGNIFICANT CHANGE UP (ref 0–0)
PLATELET # BLD AUTO: 169 K/UL — SIGNIFICANT CHANGE UP (ref 150–400)
POTASSIUM SERPL-MCNC: 4.2 MMOL/L — SIGNIFICANT CHANGE UP (ref 3.5–5.3)
POTASSIUM SERPL-SCNC: 4.2 MMOL/L — SIGNIFICANT CHANGE UP (ref 3.5–5.3)
PROT SERPL-MCNC: 6.9 G/DL — SIGNIFICANT CHANGE UP (ref 6–8.3)
RBC # BLD: 4.27 M/UL — SIGNIFICANT CHANGE UP (ref 4.2–5.8)
RBC # FLD: 16.8 % — HIGH (ref 10.3–14.5)
SODIUM SERPL-SCNC: 142 MMOL/L — SIGNIFICANT CHANGE UP (ref 135–145)
WBC # BLD: 8.53 K/UL — SIGNIFICANT CHANGE UP (ref 3.8–10.5)
WBC # FLD AUTO: 8.53 K/UL — SIGNIFICANT CHANGE UP (ref 3.8–10.5)

## 2023-09-05 PROCEDURE — 99213 OFFICE O/P EST LOW 20 MIN: CPT

## 2023-09-05 NOTE — HISTORY OF PRESENT ILLNESS
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 On 08/18/22, he had a CT C/A/P which showed  irregular colonic wall thickening of the proximal sigmoid colon with stranding of the surrounding paracolic fat. Regional lymph nodes are identified adjacent to the colonic mass measuring up to 1.2 cm. Multiple masses identified within the hepatic parenchyma noted to be 3.9 cm, suspicious for hepatic parenchymal neoplastic disease. 3.4 cm left adrenal mass, suspicious for neoplastic disease. There is a 1.0 cm soft tissue nodule identified along the right lateral wall of the distal trachea, superior to the tracheal bifurcation. Lung nodules suspicious for lung parenchymal neoplastic disease.  \par  \par  On 8/18/22, he had a colonoscopy that showed a frond-like/villous and ulcerated non-obstructing large mass was found in the descending colon. The mass was circumferential. The mass measured five cm in length. Pathology showed invasive adenocarcinoma, moderately differentiated. No loss of nuclear expression of MMR proteins (MLH1, MSH2, MSH6, and PMS2). These results show low probability of microsatellite instability-high (MSI-H). On 8/19/22, he had a liver, core biopsy which showed metastatic adenocarcinoma, consistent with metastasis from colonic primary.\par  \par   [de-identified] : no MMR deficiency [de-identified] : Medical Hx: DVT 14 years ago (unknown cause) \par  Surgical Hx: left eye sx; left torn meniscus repaired \par  Family Hx: Mother leukemia\par  Social Hx: never smoker; ETOH never; lives alone; single; works PT at [employer]; daughters close by [de-identified] : Pt is C21D1 of FOLFIOXIRI q 2 weeks, with ongoing cycles planned. He was hospitalized June 9th - 14th, 2023 due to mechanical fall/Left femoral condyle fracture s/p retrograde IMN. He is walking with crutches today, allowed to be wt bearing at this time. No pain today. Neuropathy continues to improve, almost resolved in hands, leg with fx continues with a little neuropathy, but improving, he follows with Dr Vergara. He denies fatigue, cold sensitivity, resolved, eye changes, hair loss, nail / skin changes, pain, mouth sores, vomiting, heartburn, C/D. He continues to live with his daughter. He is ready to go home. He feels well overall.

## 2023-09-05 NOTE — REVIEW OF SYSTEMS
[Diarrhea: Grade 0] : Diarrhea: Grade 0 [Negative] : Allergic/Immunologic [Chest Pain] : no chest pain [Shortness Of Breath] : no shortness of breath [Abdominal Pain] : no abdominal pain [Vomiting] : no vomiting [Constipation] : no constipation [Confused] : no confusion [FreeTextEntry2] : wt stable  [de-identified] : neuropathy

## 2023-09-05 NOTE — PHYSICAL EXAM
[Restricted in physically strenuous activity but ambulatory and able to carry out work of a light or sedentary nature] : Status 1- Restricted in physically strenuous activity but ambulatory and able to carry out work of a light or sedentary nature, e.g., light house work, office work [Normal] : full range of motion and no deformities appreciated [de-identified] : wt stable

## 2023-09-06 DIAGNOSIS — Z51.11 ENCOUNTER FOR ANTINEOPLASTIC CHEMOTHERAPY: ICD-10-CM

## 2023-09-06 DIAGNOSIS — R11.2 NAUSEA WITH VOMITING, UNSPECIFIED: ICD-10-CM

## 2023-09-07 ENCOUNTER — APPOINTMENT (OUTPATIENT)
Dept: INFUSION THERAPY | Facility: CLINIC | Age: 63
End: 2023-09-07

## 2023-09-08 DIAGNOSIS — Z51.89 ENCOUNTER FOR OTHER SPECIFIED AFTERCARE: ICD-10-CM

## 2023-09-19 ENCOUNTER — RESULT REVIEW (OUTPATIENT)
Age: 63
End: 2023-09-19

## 2023-09-19 ENCOUNTER — APPOINTMENT (OUTPATIENT)
Dept: INFUSION THERAPY | Facility: CLINIC | Age: 63
End: 2023-09-19

## 2023-09-19 LAB
ALBUMIN SERPL ELPH-MCNC: 4.5 G/DL — SIGNIFICANT CHANGE UP (ref 3.3–5)
ALP SERPL-CCNC: 199 U/L — HIGH (ref 40–120)
ALT FLD-CCNC: 21 U/L — SIGNIFICANT CHANGE UP (ref 10–45)
ANION GAP SERPL CALC-SCNC: 13 MMOL/L — SIGNIFICANT CHANGE UP (ref 5–17)
AST SERPL-CCNC: 19 U/L — SIGNIFICANT CHANGE UP (ref 10–40)
BASOPHILS # BLD AUTO: 0.07 K/UL — SIGNIFICANT CHANGE UP (ref 0–0.2)
BASOPHILS NFR BLD AUTO: 1.2 % — SIGNIFICANT CHANGE UP (ref 0–2)
BILIRUB SERPL-MCNC: 0.3 MG/DL — SIGNIFICANT CHANGE UP (ref 0.2–1.2)
BUN SERPL-MCNC: 15 MG/DL — SIGNIFICANT CHANGE UP (ref 7–23)
CALCIUM SERPL-MCNC: 9.3 MG/DL — SIGNIFICANT CHANGE UP (ref 8.4–10.5)
CEA SERPL-MCNC: 3 NG/ML — SIGNIFICANT CHANGE UP (ref 0–3.8)
CHLORIDE SERPL-SCNC: 105 MMOL/L — SIGNIFICANT CHANGE UP (ref 96–108)
CO2 SERPL-SCNC: 22 MMOL/L — SIGNIFICANT CHANGE UP (ref 22–31)
CREAT SERPL-MCNC: 0.81 MG/DL — SIGNIFICANT CHANGE UP (ref 0.5–1.3)
EGFR: 100 ML/MIN/1.73M2 — SIGNIFICANT CHANGE UP
EOSINOPHIL # BLD AUTO: 0.22 K/UL — SIGNIFICANT CHANGE UP (ref 0–0.5)
EOSINOPHIL NFR BLD AUTO: 3.8 % — SIGNIFICANT CHANGE UP (ref 0–6)
GLUCOSE SERPL-MCNC: 93 MG/DL — SIGNIFICANT CHANGE UP (ref 70–99)
HCT VFR BLD CALC: 39.3 % — SIGNIFICANT CHANGE UP (ref 39–50)
HGB BLD-MCNC: 12.6 G/DL — LOW (ref 13–17)
IMM GRANULOCYTES NFR BLD AUTO: 0.7 % — SIGNIFICANT CHANGE UP (ref 0–0.9)
LYMPHOCYTES # BLD AUTO: 1.4 K/UL — SIGNIFICANT CHANGE UP (ref 1–3.3)
LYMPHOCYTES # BLD AUTO: 24 % — SIGNIFICANT CHANGE UP (ref 13–44)
MAGNESIUM SERPL-MCNC: 2.1 MG/DL — SIGNIFICANT CHANGE UP (ref 1.6–2.6)
MCHC RBC-ENTMCNC: 29.2 PG — SIGNIFICANT CHANGE UP (ref 27–34)
MCHC RBC-ENTMCNC: 32.1 GM/DL — SIGNIFICANT CHANGE UP (ref 32–36)
MCV RBC AUTO: 91 FL — SIGNIFICANT CHANGE UP (ref 80–100)
MONOCYTES # BLD AUTO: 0.49 K/UL — SIGNIFICANT CHANGE UP (ref 0–0.9)
MONOCYTES NFR BLD AUTO: 8.4 % — SIGNIFICANT CHANGE UP (ref 2–14)
NEUTROPHILS # BLD AUTO: 3.62 K/UL — SIGNIFICANT CHANGE UP (ref 1.8–7.4)
NEUTROPHILS NFR BLD AUTO: 61.9 % — SIGNIFICANT CHANGE UP (ref 43–77)
NRBC # BLD: 0 /100 WBCS — SIGNIFICANT CHANGE UP (ref 0–0)
PLATELET # BLD AUTO: 193 K/UL — SIGNIFICANT CHANGE UP (ref 150–400)
POTASSIUM SERPL-MCNC: 4.2 MMOL/L — SIGNIFICANT CHANGE UP (ref 3.5–5.3)
POTASSIUM SERPL-SCNC: 4.2 MMOL/L — SIGNIFICANT CHANGE UP (ref 3.5–5.3)
PROT SERPL-MCNC: 7.2 G/DL — SIGNIFICANT CHANGE UP (ref 6–8.3)
RBC # BLD: 4.32 M/UL — SIGNIFICANT CHANGE UP (ref 4.2–5.8)
RBC # FLD: 17.2 % — HIGH (ref 10.3–14.5)
SODIUM SERPL-SCNC: 141 MMOL/L — SIGNIFICANT CHANGE UP (ref 135–145)
WBC # BLD: 5.84 K/UL — SIGNIFICANT CHANGE UP (ref 3.8–10.5)
WBC # FLD AUTO: 5.84 K/UL — SIGNIFICANT CHANGE UP (ref 3.8–10.5)

## 2023-09-21 ENCOUNTER — APPOINTMENT (OUTPATIENT)
Dept: INFUSION THERAPY | Facility: CLINIC | Age: 63
End: 2023-09-21

## 2023-10-03 ENCOUNTER — RESULT REVIEW (OUTPATIENT)
Age: 63
End: 2023-10-03

## 2023-10-03 ENCOUNTER — APPOINTMENT (OUTPATIENT)
Dept: INFUSION THERAPY | Facility: CLINIC | Age: 63
End: 2023-10-03

## 2023-10-03 LAB
ALBUMIN SERPL ELPH-MCNC: 4.5 G/DL — SIGNIFICANT CHANGE UP (ref 3.3–5)
ALP SERPL-CCNC: 189 U/L — HIGH (ref 40–120)
ALT FLD-CCNC: 20 U/L — SIGNIFICANT CHANGE UP (ref 10–45)
ANION GAP SERPL CALC-SCNC: 13 MMOL/L — SIGNIFICANT CHANGE UP (ref 5–17)
ANISOCYTOSIS BLD QL: SLIGHT — SIGNIFICANT CHANGE UP
AST SERPL-CCNC: 19 U/L — SIGNIFICANT CHANGE UP (ref 10–40)
BASOPHILS # BLD AUTO: 0.27 K/UL — HIGH (ref 0–0.2)
BASOPHILS NFR BLD AUTO: 3 % — HIGH (ref 0–2)
BILIRUB SERPL-MCNC: 0.2 MG/DL — SIGNIFICANT CHANGE UP (ref 0.2–1.2)
BUN SERPL-MCNC: 13 MG/DL — SIGNIFICANT CHANGE UP (ref 7–23)
CALCIUM SERPL-MCNC: 9.4 MG/DL — SIGNIFICANT CHANGE UP (ref 8.4–10.5)
CHLORIDE SERPL-SCNC: 103 MMOL/L — SIGNIFICANT CHANGE UP (ref 96–108)
CO2 SERPL-SCNC: 24 MMOL/L — SIGNIFICANT CHANGE UP (ref 22–31)
CREAT SERPL-MCNC: 0.83 MG/DL — SIGNIFICANT CHANGE UP (ref 0.5–1.3)
DOHLE BOD BLD QL SMEAR: PRESENT — SIGNIFICANT CHANGE UP
EGFR: 99 ML/MIN/1.73M2 — SIGNIFICANT CHANGE UP
EOSINOPHIL # BLD AUTO: 0.36 K/UL — SIGNIFICANT CHANGE UP (ref 0–0.5)
EOSINOPHIL NFR BLD AUTO: 4 % — SIGNIFICANT CHANGE UP (ref 0–6)
GLUCOSE SERPL-MCNC: 130 MG/DL — HIGH (ref 70–99)
HCT VFR BLD CALC: 40.8 % — SIGNIFICANT CHANGE UP (ref 39–50)
HGB BLD-MCNC: 13.1 G/DL — SIGNIFICANT CHANGE UP (ref 13–17)
LG PLATELETS BLD QL AUTO: SLIGHT — SIGNIFICANT CHANGE UP
LYMPHOCYTES # BLD AUTO: 1.99 K/UL — SIGNIFICANT CHANGE UP (ref 1–3.3)
LYMPHOCYTES # BLD AUTO: 22 % — SIGNIFICANT CHANGE UP (ref 13–44)
MACROCYTES BLD QL: SLIGHT — SIGNIFICANT CHANGE UP
MAGNESIUM SERPL-MCNC: 2.1 MG/DL — SIGNIFICANT CHANGE UP (ref 1.6–2.6)
MCHC RBC-ENTMCNC: 29.7 PG — SIGNIFICANT CHANGE UP (ref 27–34)
MCHC RBC-ENTMCNC: 32.1 GM/DL — SIGNIFICANT CHANGE UP (ref 32–36)
MCV RBC AUTO: 92.5 FL — SIGNIFICANT CHANGE UP (ref 80–100)
METAMYELOCYTES # FLD: 1 % — HIGH (ref 0–0)
MICROCYTES BLD QL: SLIGHT — SIGNIFICANT CHANGE UP
MONOCYTES # BLD AUTO: 0.45 K/UL — SIGNIFICANT CHANGE UP (ref 0–0.9)
MONOCYTES NFR BLD AUTO: 5 % — SIGNIFICANT CHANGE UP (ref 2–14)
NEUTROPHILS # BLD AUTO: 5.88 K/UL — SIGNIFICANT CHANGE UP (ref 1.8–7.4)
NEUTROPHILS NFR BLD AUTO: 55 % — SIGNIFICANT CHANGE UP (ref 43–77)
NEUTS BAND # BLD: 10 % — HIGH (ref 0–8)
NRBC # BLD: 0 /100 — SIGNIFICANT CHANGE UP (ref 0–0)
NRBC # BLD: SIGNIFICANT CHANGE UP /100 WBCS (ref 0–0)
OVALOCYTES BLD QL SMEAR: SLIGHT — SIGNIFICANT CHANGE UP
PLAT MORPH BLD: NORMAL — SIGNIFICANT CHANGE UP
PLATELET # BLD AUTO: 150 K/UL — SIGNIFICANT CHANGE UP (ref 150–400)
POTASSIUM SERPL-MCNC: 3.7 MMOL/L — SIGNIFICANT CHANGE UP (ref 3.5–5.3)
POTASSIUM SERPL-SCNC: 3.7 MMOL/L — SIGNIFICANT CHANGE UP (ref 3.5–5.3)
PROT SERPL-MCNC: 7 G/DL — SIGNIFICANT CHANGE UP (ref 6–8.3)
RBC # BLD: 4.41 M/UL — SIGNIFICANT CHANGE UP (ref 4.2–5.8)
RBC # FLD: 17.1 % — HIGH (ref 10.3–14.5)
RBC BLD AUTO: SIGNIFICANT CHANGE UP
SODIUM SERPL-SCNC: 140 MMOL/L — SIGNIFICANT CHANGE UP (ref 135–145)
TOXIC GRANULES BLD QL SMEAR: PRESENT — SIGNIFICANT CHANGE UP
WBC # BLD: 9.04 K/UL — SIGNIFICANT CHANGE UP (ref 3.8–10.5)
WBC # FLD AUTO: 9.04 K/UL — SIGNIFICANT CHANGE UP (ref 3.8–10.5)

## 2023-10-05 ENCOUNTER — APPOINTMENT (OUTPATIENT)
Dept: INFUSION THERAPY | Facility: CLINIC | Age: 63
End: 2023-10-05

## 2023-10-17 ENCOUNTER — RESULT REVIEW (OUTPATIENT)
Age: 63
End: 2023-10-17

## 2023-10-17 ENCOUNTER — APPOINTMENT (OUTPATIENT)
Dept: INFUSION THERAPY | Facility: CLINIC | Age: 63
End: 2023-10-17

## 2023-10-17 LAB
ALBUMIN SERPL ELPH-MCNC: 4.2 G/DL — SIGNIFICANT CHANGE UP (ref 3.3–5)
ALBUMIN SERPL ELPH-MCNC: 4.2 G/DL — SIGNIFICANT CHANGE UP (ref 3.3–5)
ALP SERPL-CCNC: 191 U/L — HIGH (ref 40–120)
ALP SERPL-CCNC: 191 U/L — HIGH (ref 40–120)
ALT FLD-CCNC: 30 U/L — SIGNIFICANT CHANGE UP (ref 10–45)
ALT FLD-CCNC: 30 U/L — SIGNIFICANT CHANGE UP (ref 10–45)
ANION GAP SERPL CALC-SCNC: 12 MMOL/L — SIGNIFICANT CHANGE UP (ref 5–17)
ANION GAP SERPL CALC-SCNC: 12 MMOL/L — SIGNIFICANT CHANGE UP (ref 5–17)
AST SERPL-CCNC: 22 U/L — SIGNIFICANT CHANGE UP (ref 10–40)
AST SERPL-CCNC: 22 U/L — SIGNIFICANT CHANGE UP (ref 10–40)
BASOPHILS # BLD AUTO: 0.1 K/UL — SIGNIFICANT CHANGE UP (ref 0–0.2)
BASOPHILS # BLD AUTO: 0.1 K/UL — SIGNIFICANT CHANGE UP (ref 0–0.2)
BASOPHILS NFR BLD AUTO: 1.5 % — SIGNIFICANT CHANGE UP (ref 0–2)
BASOPHILS NFR BLD AUTO: 1.5 % — SIGNIFICANT CHANGE UP (ref 0–2)
BILIRUB SERPL-MCNC: 0.2 MG/DL — SIGNIFICANT CHANGE UP (ref 0.2–1.2)
BILIRUB SERPL-MCNC: 0.2 MG/DL — SIGNIFICANT CHANGE UP (ref 0.2–1.2)
BUN SERPL-MCNC: 9 MG/DL — SIGNIFICANT CHANGE UP (ref 7–23)
BUN SERPL-MCNC: 9 MG/DL — SIGNIFICANT CHANGE UP (ref 7–23)
CALCIUM SERPL-MCNC: 9.3 MG/DL — SIGNIFICANT CHANGE UP (ref 8.4–10.5)
CALCIUM SERPL-MCNC: 9.3 MG/DL — SIGNIFICANT CHANGE UP (ref 8.4–10.5)
CHLORIDE SERPL-SCNC: 104 MMOL/L — SIGNIFICANT CHANGE UP (ref 96–108)
CHLORIDE SERPL-SCNC: 104 MMOL/L — SIGNIFICANT CHANGE UP (ref 96–108)
CO2 SERPL-SCNC: 25 MMOL/L — SIGNIFICANT CHANGE UP (ref 22–31)
CO2 SERPL-SCNC: 25 MMOL/L — SIGNIFICANT CHANGE UP (ref 22–31)
CREAT SERPL-MCNC: 0.77 MG/DL — SIGNIFICANT CHANGE UP (ref 0.5–1.3)
CREAT SERPL-MCNC: 0.77 MG/DL — SIGNIFICANT CHANGE UP (ref 0.5–1.3)
EGFR: 101 ML/MIN/1.73M2 — SIGNIFICANT CHANGE UP
EGFR: 101 ML/MIN/1.73M2 — SIGNIFICANT CHANGE UP
EOSINOPHIL # BLD AUTO: 0.22 K/UL — SIGNIFICANT CHANGE UP (ref 0–0.5)
EOSINOPHIL # BLD AUTO: 0.22 K/UL — SIGNIFICANT CHANGE UP (ref 0–0.5)
EOSINOPHIL NFR BLD AUTO: 3.4 % — SIGNIFICANT CHANGE UP (ref 0–6)
EOSINOPHIL NFR BLD AUTO: 3.4 % — SIGNIFICANT CHANGE UP (ref 0–6)
GLUCOSE SERPL-MCNC: 127 MG/DL — HIGH (ref 70–99)
GLUCOSE SERPL-MCNC: 127 MG/DL — HIGH (ref 70–99)
HCT VFR BLD CALC: 39 % — SIGNIFICANT CHANGE UP (ref 39–50)
HCT VFR BLD CALC: 39 % — SIGNIFICANT CHANGE UP (ref 39–50)
HGB BLD-MCNC: 12.5 G/DL — LOW (ref 13–17)
HGB BLD-MCNC: 12.5 G/DL — LOW (ref 13–17)
IMM GRANULOCYTES NFR BLD AUTO: 2 % — HIGH (ref 0–0.9)
IMM GRANULOCYTES NFR BLD AUTO: 2 % — HIGH (ref 0–0.9)
LYMPHOCYTES # BLD AUTO: 1.27 K/UL — SIGNIFICANT CHANGE UP (ref 1–3.3)
LYMPHOCYTES # BLD AUTO: 1.27 K/UL — SIGNIFICANT CHANGE UP (ref 1–3.3)
LYMPHOCYTES # BLD AUTO: 19.5 % — SIGNIFICANT CHANGE UP (ref 13–44)
LYMPHOCYTES # BLD AUTO: 19.5 % — SIGNIFICANT CHANGE UP (ref 13–44)
MAGNESIUM SERPL-MCNC: 2.1 MG/DL — SIGNIFICANT CHANGE UP (ref 1.6–2.6)
MAGNESIUM SERPL-MCNC: 2.1 MG/DL — SIGNIFICANT CHANGE UP (ref 1.6–2.6)
MCHC RBC-ENTMCNC: 30.1 PG — SIGNIFICANT CHANGE UP (ref 27–34)
MCHC RBC-ENTMCNC: 30.1 PG — SIGNIFICANT CHANGE UP (ref 27–34)
MCHC RBC-ENTMCNC: 32.1 GM/DL — SIGNIFICANT CHANGE UP (ref 32–36)
MCHC RBC-ENTMCNC: 32.1 GM/DL — SIGNIFICANT CHANGE UP (ref 32–36)
MCV RBC AUTO: 94 FL — SIGNIFICANT CHANGE UP (ref 80–100)
MCV RBC AUTO: 94 FL — SIGNIFICANT CHANGE UP (ref 80–100)
MONOCYTES # BLD AUTO: 0.48 K/UL — SIGNIFICANT CHANGE UP (ref 0–0.9)
MONOCYTES # BLD AUTO: 0.48 K/UL — SIGNIFICANT CHANGE UP (ref 0–0.9)
MONOCYTES NFR BLD AUTO: 7.4 % — SIGNIFICANT CHANGE UP (ref 2–14)
MONOCYTES NFR BLD AUTO: 7.4 % — SIGNIFICANT CHANGE UP (ref 2–14)
NEUTROPHILS # BLD AUTO: 4.3 K/UL — SIGNIFICANT CHANGE UP (ref 1.8–7.4)
NEUTROPHILS # BLD AUTO: 4.3 K/UL — SIGNIFICANT CHANGE UP (ref 1.8–7.4)
NEUTROPHILS NFR BLD AUTO: 66.2 % — SIGNIFICANT CHANGE UP (ref 43–77)
NEUTROPHILS NFR BLD AUTO: 66.2 % — SIGNIFICANT CHANGE UP (ref 43–77)
NRBC # BLD: 0 /100 WBCS — SIGNIFICANT CHANGE UP (ref 0–0)
NRBC # BLD: 0 /100 WBCS — SIGNIFICANT CHANGE UP (ref 0–0)
PLATELET # BLD AUTO: 142 K/UL — LOW (ref 150–400)
PLATELET # BLD AUTO: 142 K/UL — LOW (ref 150–400)
POTASSIUM SERPL-MCNC: 3.5 MMOL/L — SIGNIFICANT CHANGE UP (ref 3.5–5.3)
POTASSIUM SERPL-MCNC: 3.5 MMOL/L — SIGNIFICANT CHANGE UP (ref 3.5–5.3)
POTASSIUM SERPL-SCNC: 3.5 MMOL/L — SIGNIFICANT CHANGE UP (ref 3.5–5.3)
POTASSIUM SERPL-SCNC: 3.5 MMOL/L — SIGNIFICANT CHANGE UP (ref 3.5–5.3)
PROT SERPL-MCNC: 6.6 G/DL — SIGNIFICANT CHANGE UP (ref 6–8.3)
PROT SERPL-MCNC: 6.6 G/DL — SIGNIFICANT CHANGE UP (ref 6–8.3)
RBC # BLD: 4.15 M/UL — LOW (ref 4.2–5.8)
RBC # BLD: 4.15 M/UL — LOW (ref 4.2–5.8)
RBC # FLD: 17.2 % — HIGH (ref 10.3–14.5)
RBC # FLD: 17.2 % — HIGH (ref 10.3–14.5)
SODIUM SERPL-SCNC: 140 MMOL/L — SIGNIFICANT CHANGE UP (ref 135–145)
SODIUM SERPL-SCNC: 140 MMOL/L — SIGNIFICANT CHANGE UP (ref 135–145)
WBC # BLD: 6.5 K/UL — SIGNIFICANT CHANGE UP (ref 3.8–10.5)
WBC # BLD: 6.5 K/UL — SIGNIFICANT CHANGE UP (ref 3.8–10.5)
WBC # FLD AUTO: 6.5 K/UL — SIGNIFICANT CHANGE UP (ref 3.8–10.5)
WBC # FLD AUTO: 6.5 K/UL — SIGNIFICANT CHANGE UP (ref 3.8–10.5)

## 2023-10-19 ENCOUNTER — APPOINTMENT (OUTPATIENT)
Dept: HEMATOLOGY ONCOLOGY | Facility: CLINIC | Age: 63
End: 2023-10-19

## 2023-10-19 ENCOUNTER — APPOINTMENT (OUTPATIENT)
Dept: INFUSION THERAPY | Facility: CLINIC | Age: 63
End: 2023-10-19

## 2023-10-19 VITALS
SYSTOLIC BLOOD PRESSURE: 143 MMHG | RESPIRATION RATE: 16 BRPM | HEART RATE: 106 BPM | TEMPERATURE: 97.3 F | DIASTOLIC BLOOD PRESSURE: 81 MMHG | WEIGHT: 222 LBS | OXYGEN SATURATION: 96 % | BODY MASS INDEX: 30.96 KG/M2

## 2023-10-25 ENCOUNTER — APPOINTMENT (OUTPATIENT)
Dept: CT IMAGING | Facility: CLINIC | Age: 63
End: 2023-10-25
Payer: COMMERCIAL

## 2023-10-25 PROCEDURE — 74177 CT ABD & PELVIS W/CONTRAST: CPT

## 2023-10-25 PROCEDURE — 71260 CT THORAX DX C+: CPT

## 2023-10-30 ENCOUNTER — OUTPATIENT (OUTPATIENT)
Dept: OUTPATIENT SERVICES | Facility: HOSPITAL | Age: 63
LOS: 1 days | Discharge: HOME | End: 2023-10-30

## 2023-10-30 DIAGNOSIS — C18.9 MALIGNANT NEOPLASM OF COLON, UNSPECIFIED: ICD-10-CM

## 2023-10-31 ENCOUNTER — RESULT REVIEW (OUTPATIENT)
Age: 63
End: 2023-10-31

## 2023-10-31 ENCOUNTER — APPOINTMENT (OUTPATIENT)
Dept: INFUSION THERAPY | Facility: CLINIC | Age: 63
End: 2023-10-31

## 2023-10-31 VITALS
HEART RATE: 89 BPM | HEIGHT: 71 IN | BODY MASS INDEX: 31.23 KG/M2 | SYSTOLIC BLOOD PRESSURE: 160 MMHG | RESPIRATION RATE: 17 BRPM | DIASTOLIC BLOOD PRESSURE: 86 MMHG | TEMPERATURE: 97.1 F | WEIGHT: 223.06 LBS | OXYGEN SATURATION: 98 %

## 2023-10-31 LAB
ALBUMIN SERPL ELPH-MCNC: 4.2 G/DL — SIGNIFICANT CHANGE UP (ref 3.3–5)
ALBUMIN SERPL ELPH-MCNC: 4.2 G/DL — SIGNIFICANT CHANGE UP (ref 3.3–5)
ALP SERPL-CCNC: 201 U/L — HIGH (ref 40–120)
ALP SERPL-CCNC: 201 U/L — HIGH (ref 40–120)
ALT FLD-CCNC: 28 U/L — SIGNIFICANT CHANGE UP (ref 10–45)
ALT FLD-CCNC: 28 U/L — SIGNIFICANT CHANGE UP (ref 10–45)
ANION GAP SERPL CALC-SCNC: 11 MMOL/L — SIGNIFICANT CHANGE UP (ref 5–17)
ANION GAP SERPL CALC-SCNC: 11 MMOL/L — SIGNIFICANT CHANGE UP (ref 5–17)
AST SERPL-CCNC: 21 U/L — SIGNIFICANT CHANGE UP (ref 10–40)
AST SERPL-CCNC: 21 U/L — SIGNIFICANT CHANGE UP (ref 10–40)
BASOPHILS # BLD AUTO: 0.14 K/UL — SIGNIFICANT CHANGE UP (ref 0–0.2)
BASOPHILS # BLD AUTO: 0.14 K/UL — SIGNIFICANT CHANGE UP (ref 0–0.2)
BASOPHILS NFR BLD AUTO: 1.8 % — SIGNIFICANT CHANGE UP (ref 0–2)
BASOPHILS NFR BLD AUTO: 1.8 % — SIGNIFICANT CHANGE UP (ref 0–2)
BILIRUB SERPL-MCNC: 0.3 MG/DL — SIGNIFICANT CHANGE UP (ref 0.2–1.2)
BILIRUB SERPL-MCNC: 0.3 MG/DL — SIGNIFICANT CHANGE UP (ref 0.2–1.2)
BUN SERPL-MCNC: 11 MG/DL — SIGNIFICANT CHANGE UP (ref 7–23)
BUN SERPL-MCNC: 11 MG/DL — SIGNIFICANT CHANGE UP (ref 7–23)
CALCIUM SERPL-MCNC: 9.1 MG/DL — SIGNIFICANT CHANGE UP (ref 8.4–10.5)
CALCIUM SERPL-MCNC: 9.1 MG/DL — SIGNIFICANT CHANGE UP (ref 8.4–10.5)
CHLORIDE SERPL-SCNC: 103 MMOL/L — SIGNIFICANT CHANGE UP (ref 96–108)
CHLORIDE SERPL-SCNC: 103 MMOL/L — SIGNIFICANT CHANGE UP (ref 96–108)
CO2 SERPL-SCNC: 25 MMOL/L — SIGNIFICANT CHANGE UP (ref 22–31)
CO2 SERPL-SCNC: 25 MMOL/L — SIGNIFICANT CHANGE UP (ref 22–31)
CREAT SERPL-MCNC: 0.81 MG/DL — SIGNIFICANT CHANGE UP (ref 0.5–1.3)
CREAT SERPL-MCNC: 0.81 MG/DL — SIGNIFICANT CHANGE UP (ref 0.5–1.3)
EGFR: 99 ML/MIN/1.73M2 — SIGNIFICANT CHANGE UP
EGFR: 99 ML/MIN/1.73M2 — SIGNIFICANT CHANGE UP
EOSINOPHIL # BLD AUTO: 0.26 K/UL — SIGNIFICANT CHANGE UP (ref 0–0.5)
EOSINOPHIL # BLD AUTO: 0.26 K/UL — SIGNIFICANT CHANGE UP (ref 0–0.5)
EOSINOPHIL NFR BLD AUTO: 3.4 % — SIGNIFICANT CHANGE UP (ref 0–6)
EOSINOPHIL NFR BLD AUTO: 3.4 % — SIGNIFICANT CHANGE UP (ref 0–6)
GLUCOSE SERPL-MCNC: 118 MG/DL — HIGH (ref 70–99)
GLUCOSE SERPL-MCNC: 118 MG/DL — HIGH (ref 70–99)
HCT VFR BLD CALC: 40.3 % — SIGNIFICANT CHANGE UP (ref 39–50)
HCT VFR BLD CALC: 40.3 % — SIGNIFICANT CHANGE UP (ref 39–50)
HGB BLD-MCNC: 12.8 G/DL — LOW (ref 13–17)
HGB BLD-MCNC: 12.8 G/DL — LOW (ref 13–17)
IMM GRANULOCYTES NFR BLD AUTO: 3.1 % — HIGH (ref 0–0.9)
IMM GRANULOCYTES NFR BLD AUTO: 3.1 % — HIGH (ref 0–0.9)
LYMPHOCYTES # BLD AUTO: 1.56 K/UL — SIGNIFICANT CHANGE UP (ref 1–3.3)
LYMPHOCYTES # BLD AUTO: 1.56 K/UL — SIGNIFICANT CHANGE UP (ref 1–3.3)
LYMPHOCYTES # BLD AUTO: 20.2 % — SIGNIFICANT CHANGE UP (ref 13–44)
LYMPHOCYTES # BLD AUTO: 20.2 % — SIGNIFICANT CHANGE UP (ref 13–44)
MAGNESIUM SERPL-MCNC: 2.2 MG/DL — SIGNIFICANT CHANGE UP (ref 1.6–2.6)
MAGNESIUM SERPL-MCNC: 2.2 MG/DL — SIGNIFICANT CHANGE UP (ref 1.6–2.6)
MCHC RBC-ENTMCNC: 29.6 PG — SIGNIFICANT CHANGE UP (ref 27–34)
MCHC RBC-ENTMCNC: 29.6 PG — SIGNIFICANT CHANGE UP (ref 27–34)
MCHC RBC-ENTMCNC: 31.8 GM/DL — LOW (ref 32–36)
MCHC RBC-ENTMCNC: 31.8 GM/DL — LOW (ref 32–36)
MCV RBC AUTO: 93.1 FL — SIGNIFICANT CHANGE UP (ref 80–100)
MCV RBC AUTO: 93.1 FL — SIGNIFICANT CHANGE UP (ref 80–100)
MONOCYTES # BLD AUTO: 0.58 K/UL — SIGNIFICANT CHANGE UP (ref 0–0.9)
MONOCYTES # BLD AUTO: 0.58 K/UL — SIGNIFICANT CHANGE UP (ref 0–0.9)
MONOCYTES NFR BLD AUTO: 7.5 % — SIGNIFICANT CHANGE UP (ref 2–14)
MONOCYTES NFR BLD AUTO: 7.5 % — SIGNIFICANT CHANGE UP (ref 2–14)
NEUTROPHILS # BLD AUTO: 4.94 K/UL — SIGNIFICANT CHANGE UP (ref 1.8–7.4)
NEUTROPHILS # BLD AUTO: 4.94 K/UL — SIGNIFICANT CHANGE UP (ref 1.8–7.4)
NEUTROPHILS NFR BLD AUTO: 64 % — SIGNIFICANT CHANGE UP (ref 43–77)
NEUTROPHILS NFR BLD AUTO: 64 % — SIGNIFICANT CHANGE UP (ref 43–77)
NRBC # BLD: 0 /100 WBCS — SIGNIFICANT CHANGE UP (ref 0–0)
NRBC # BLD: 0 /100 WBCS — SIGNIFICANT CHANGE UP (ref 0–0)
PLATELET # BLD AUTO: 183 K/UL — SIGNIFICANT CHANGE UP (ref 150–400)
PLATELET # BLD AUTO: 183 K/UL — SIGNIFICANT CHANGE UP (ref 150–400)
POTASSIUM SERPL-MCNC: 4.1 MMOL/L — SIGNIFICANT CHANGE UP (ref 3.5–5.3)
POTASSIUM SERPL-MCNC: 4.1 MMOL/L — SIGNIFICANT CHANGE UP (ref 3.5–5.3)
POTASSIUM SERPL-SCNC: 4.1 MMOL/L — SIGNIFICANT CHANGE UP (ref 3.5–5.3)
POTASSIUM SERPL-SCNC: 4.1 MMOL/L — SIGNIFICANT CHANGE UP (ref 3.5–5.3)
PROT SERPL-MCNC: 7 G/DL — SIGNIFICANT CHANGE UP (ref 6–8.3)
PROT SERPL-MCNC: 7 G/DL — SIGNIFICANT CHANGE UP (ref 6–8.3)
RBC # BLD: 4.33 M/UL — SIGNIFICANT CHANGE UP (ref 4.2–5.8)
RBC # BLD: 4.33 M/UL — SIGNIFICANT CHANGE UP (ref 4.2–5.8)
RBC # FLD: 17.2 % — HIGH (ref 10.3–14.5)
RBC # FLD: 17.2 % — HIGH (ref 10.3–14.5)
SODIUM SERPL-SCNC: 139 MMOL/L — SIGNIFICANT CHANGE UP (ref 135–145)
SODIUM SERPL-SCNC: 139 MMOL/L — SIGNIFICANT CHANGE UP (ref 135–145)
WBC # BLD: 7.72 K/UL — SIGNIFICANT CHANGE UP (ref 3.8–10.5)
WBC # BLD: 7.72 K/UL — SIGNIFICANT CHANGE UP (ref 3.8–10.5)
WBC # FLD AUTO: 7.72 K/UL — SIGNIFICANT CHANGE UP (ref 3.8–10.5)
WBC # FLD AUTO: 7.72 K/UL — SIGNIFICANT CHANGE UP (ref 3.8–10.5)

## 2023-11-01 DIAGNOSIS — R11.2 NAUSEA WITH VOMITING, UNSPECIFIED: ICD-10-CM

## 2023-11-01 DIAGNOSIS — Z51.11 ENCOUNTER FOR ANTINEOPLASTIC CHEMOTHERAPY: ICD-10-CM

## 2023-11-02 ENCOUNTER — APPOINTMENT (OUTPATIENT)
Dept: HEMATOLOGY ONCOLOGY | Facility: CLINIC | Age: 63
End: 2023-11-02
Payer: COMMERCIAL

## 2023-11-02 ENCOUNTER — APPOINTMENT (OUTPATIENT)
Dept: INFUSION THERAPY | Facility: CLINIC | Age: 63
End: 2023-11-02
Payer: COMMERCIAL

## 2023-11-02 VITALS
HEART RATE: 90 BPM | SYSTOLIC BLOOD PRESSURE: 148 MMHG | DIASTOLIC BLOOD PRESSURE: 87 MMHG | HEIGHT: 71 IN | TEMPERATURE: 98.3 F | BODY MASS INDEX: 31.26 KG/M2 | RESPIRATION RATE: 17 BRPM | WEIGHT: 223.31 LBS | OXYGEN SATURATION: 98 %

## 2023-11-02 DIAGNOSIS — S82.209A UNSPECIFIED FRACTURE OF SHAFT OF UNSPECIFIED TIBIA, INITIAL ENCOUNTER FOR CLOSED FRACTURE: ICD-10-CM

## 2023-11-02 PROCEDURE — 99215 OFFICE O/P EST HI 40 MIN: CPT

## 2023-11-02 RX ORDER — APIXABAN 5 MG/1
5 TABLET, FILM COATED ORAL
Qty: 70 | Refills: 0 | Status: DISCONTINUED | COMMUNITY
Start: 2023-11-01 | End: 2023-11-02

## 2023-11-03 DIAGNOSIS — G62.9 POLYNEUROPATHY, UNSPECIFIED: ICD-10-CM

## 2023-11-03 DIAGNOSIS — Z51.89 ENCOUNTER FOR OTHER SPECIFIED AFTERCARE: ICD-10-CM

## 2023-11-03 DIAGNOSIS — T82.898A OTHER SPECIFIED COMPLICATION OF VASCULAR PROSTHETIC DEVICES, IMPLANTS AND GRAFTS, INITIAL ENCOUNTER: ICD-10-CM

## 2023-11-03 DIAGNOSIS — S82.209A UNSPECIFIED FRACTURE OF SHAFT OF UNSPECIFIED TIBIA, INITIAL ENCOUNTER FOR CLOSED FRACTURE: ICD-10-CM

## 2023-11-14 ENCOUNTER — APPOINTMENT (OUTPATIENT)
Dept: INFUSION THERAPY | Facility: CLINIC | Age: 63
End: 2023-11-14

## 2023-11-14 ENCOUNTER — RESULT REVIEW (OUTPATIENT)
Age: 63
End: 2023-11-14

## 2023-11-14 LAB
ALBUMIN SERPL ELPH-MCNC: 4.3 G/DL — SIGNIFICANT CHANGE UP (ref 3.3–5)
ALBUMIN SERPL ELPH-MCNC: 4.3 G/DL — SIGNIFICANT CHANGE UP (ref 3.3–5)
ALP SERPL-CCNC: 209 U/L — HIGH (ref 40–120)
ALP SERPL-CCNC: 209 U/L — HIGH (ref 40–120)
ALT FLD-CCNC: 27 U/L — SIGNIFICANT CHANGE UP (ref 10–45)
ALT FLD-CCNC: 27 U/L — SIGNIFICANT CHANGE UP (ref 10–45)
ANION GAP SERPL CALC-SCNC: 12 MMOL/L — SIGNIFICANT CHANGE UP (ref 5–17)
ANION GAP SERPL CALC-SCNC: 12 MMOL/L — SIGNIFICANT CHANGE UP (ref 5–17)
AST SERPL-CCNC: 18 U/L — SIGNIFICANT CHANGE UP (ref 10–40)
AST SERPL-CCNC: 18 U/L — SIGNIFICANT CHANGE UP (ref 10–40)
BASOPHILS # BLD AUTO: 0.16 K/UL — SIGNIFICANT CHANGE UP (ref 0–0.2)
BASOPHILS # BLD AUTO: 0.16 K/UL — SIGNIFICANT CHANGE UP (ref 0–0.2)
BASOPHILS NFR BLD AUTO: 1.8 % — SIGNIFICANT CHANGE UP (ref 0–2)
BASOPHILS NFR BLD AUTO: 1.8 % — SIGNIFICANT CHANGE UP (ref 0–2)
BILIRUB SERPL-MCNC: 0.3 MG/DL — SIGNIFICANT CHANGE UP (ref 0.2–1.2)
BILIRUB SERPL-MCNC: 0.3 MG/DL — SIGNIFICANT CHANGE UP (ref 0.2–1.2)
BUN SERPL-MCNC: 8 MG/DL — SIGNIFICANT CHANGE UP (ref 7–23)
BUN SERPL-MCNC: 8 MG/DL — SIGNIFICANT CHANGE UP (ref 7–23)
CALCIUM SERPL-MCNC: 9.3 MG/DL — SIGNIFICANT CHANGE UP (ref 8.4–10.5)
CALCIUM SERPL-MCNC: 9.3 MG/DL — SIGNIFICANT CHANGE UP (ref 8.4–10.5)
CEA SERPL-MCNC: 3.3 NG/ML — SIGNIFICANT CHANGE UP (ref 0–3.8)
CEA SERPL-MCNC: 3.3 NG/ML — SIGNIFICANT CHANGE UP (ref 0–3.8)
CHLORIDE SERPL-SCNC: 105 MMOL/L — SIGNIFICANT CHANGE UP (ref 96–108)
CHLORIDE SERPL-SCNC: 105 MMOL/L — SIGNIFICANT CHANGE UP (ref 96–108)
CO2 SERPL-SCNC: 24 MMOL/L — SIGNIFICANT CHANGE UP (ref 22–31)
CO2 SERPL-SCNC: 24 MMOL/L — SIGNIFICANT CHANGE UP (ref 22–31)
CREAT SERPL-MCNC: 0.8 MG/DL — SIGNIFICANT CHANGE UP (ref 0.5–1.3)
CREAT SERPL-MCNC: 0.8 MG/DL — SIGNIFICANT CHANGE UP (ref 0.5–1.3)
EGFR: 99 ML/MIN/1.73M2 — SIGNIFICANT CHANGE UP
EGFR: 99 ML/MIN/1.73M2 — SIGNIFICANT CHANGE UP
EOSINOPHIL # BLD AUTO: 0.34 K/UL — SIGNIFICANT CHANGE UP (ref 0–0.5)
EOSINOPHIL # BLD AUTO: 0.34 K/UL — SIGNIFICANT CHANGE UP (ref 0–0.5)
EOSINOPHIL NFR BLD AUTO: 3.9 % — SIGNIFICANT CHANGE UP (ref 0–6)
EOSINOPHIL NFR BLD AUTO: 3.9 % — SIGNIFICANT CHANGE UP (ref 0–6)
GLUCOSE SERPL-MCNC: 84 MG/DL — SIGNIFICANT CHANGE UP (ref 70–99)
GLUCOSE SERPL-MCNC: 84 MG/DL — SIGNIFICANT CHANGE UP (ref 70–99)
HCT VFR BLD CALC: 39.6 % — SIGNIFICANT CHANGE UP (ref 39–50)
HCT VFR BLD CALC: 39.6 % — SIGNIFICANT CHANGE UP (ref 39–50)
HGB BLD-MCNC: 12.9 G/DL — LOW (ref 13–17)
HGB BLD-MCNC: 12.9 G/DL — LOW (ref 13–17)
IMM GRANULOCYTES NFR BLD AUTO: 2.7 % — HIGH (ref 0–0.9)
IMM GRANULOCYTES NFR BLD AUTO: 2.7 % — HIGH (ref 0–0.9)
LYMPHOCYTES # BLD AUTO: 1.64 K/UL — SIGNIFICANT CHANGE UP (ref 1–3.3)
LYMPHOCYTES # BLD AUTO: 1.64 K/UL — SIGNIFICANT CHANGE UP (ref 1–3.3)
LYMPHOCYTES # BLD AUTO: 18.7 % — SIGNIFICANT CHANGE UP (ref 13–44)
LYMPHOCYTES # BLD AUTO: 18.7 % — SIGNIFICANT CHANGE UP (ref 13–44)
MAGNESIUM SERPL-MCNC: 2.1 MG/DL — SIGNIFICANT CHANGE UP (ref 1.6–2.6)
MAGNESIUM SERPL-MCNC: 2.1 MG/DL — SIGNIFICANT CHANGE UP (ref 1.6–2.6)
MCHC RBC-ENTMCNC: 30.4 PG — SIGNIFICANT CHANGE UP (ref 27–34)
MCHC RBC-ENTMCNC: 30.4 PG — SIGNIFICANT CHANGE UP (ref 27–34)
MCHC RBC-ENTMCNC: 32.6 GM/DL — SIGNIFICANT CHANGE UP (ref 32–36)
MCHC RBC-ENTMCNC: 32.6 GM/DL — SIGNIFICANT CHANGE UP (ref 32–36)
MCV RBC AUTO: 93.2 FL — SIGNIFICANT CHANGE UP (ref 80–100)
MCV RBC AUTO: 93.2 FL — SIGNIFICANT CHANGE UP (ref 80–100)
MONOCYTES # BLD AUTO: 0.7 K/UL — SIGNIFICANT CHANGE UP (ref 0–0.9)
MONOCYTES # BLD AUTO: 0.7 K/UL — SIGNIFICANT CHANGE UP (ref 0–0.9)
MONOCYTES NFR BLD AUTO: 8 % — SIGNIFICANT CHANGE UP (ref 2–14)
MONOCYTES NFR BLD AUTO: 8 % — SIGNIFICANT CHANGE UP (ref 2–14)
NEUTROPHILS # BLD AUTO: 5.71 K/UL — SIGNIFICANT CHANGE UP (ref 1.8–7.4)
NEUTROPHILS # BLD AUTO: 5.71 K/UL — SIGNIFICANT CHANGE UP (ref 1.8–7.4)
NEUTROPHILS NFR BLD AUTO: 64.9 % — SIGNIFICANT CHANGE UP (ref 43–77)
NEUTROPHILS NFR BLD AUTO: 64.9 % — SIGNIFICANT CHANGE UP (ref 43–77)
NRBC # BLD: 0 /100 WBCS — SIGNIFICANT CHANGE UP (ref 0–0)
NRBC # BLD: 0 /100 WBCS — SIGNIFICANT CHANGE UP (ref 0–0)
PLATELET # BLD AUTO: 171 K/UL — SIGNIFICANT CHANGE UP (ref 150–400)
PLATELET # BLD AUTO: 171 K/UL — SIGNIFICANT CHANGE UP (ref 150–400)
POTASSIUM SERPL-MCNC: 4.1 MMOL/L — SIGNIFICANT CHANGE UP (ref 3.5–5.3)
POTASSIUM SERPL-MCNC: 4.1 MMOL/L — SIGNIFICANT CHANGE UP (ref 3.5–5.3)
POTASSIUM SERPL-SCNC: 4.1 MMOL/L — SIGNIFICANT CHANGE UP (ref 3.5–5.3)
POTASSIUM SERPL-SCNC: 4.1 MMOL/L — SIGNIFICANT CHANGE UP (ref 3.5–5.3)
PROT SERPL-MCNC: 7.1 G/DL — SIGNIFICANT CHANGE UP (ref 6–8.3)
PROT SERPL-MCNC: 7.1 G/DL — SIGNIFICANT CHANGE UP (ref 6–8.3)
RBC # BLD: 4.25 M/UL — SIGNIFICANT CHANGE UP (ref 4.2–5.8)
RBC # BLD: 4.25 M/UL — SIGNIFICANT CHANGE UP (ref 4.2–5.8)
RBC # FLD: 16.9 % — HIGH (ref 10.3–14.5)
RBC # FLD: 16.9 % — HIGH (ref 10.3–14.5)
SODIUM SERPL-SCNC: 142 MMOL/L — SIGNIFICANT CHANGE UP (ref 135–145)
SODIUM SERPL-SCNC: 142 MMOL/L — SIGNIFICANT CHANGE UP (ref 135–145)
WBC # BLD: 8.79 K/UL — SIGNIFICANT CHANGE UP (ref 3.8–10.5)
WBC # BLD: 8.79 K/UL — SIGNIFICANT CHANGE UP (ref 3.8–10.5)
WBC # FLD AUTO: 8.79 K/UL — SIGNIFICANT CHANGE UP (ref 3.8–10.5)
WBC # FLD AUTO: 8.79 K/UL — SIGNIFICANT CHANGE UP (ref 3.8–10.5)

## 2023-11-16 ENCOUNTER — APPOINTMENT (OUTPATIENT)
Dept: INFUSION THERAPY | Facility: CLINIC | Age: 63
End: 2023-11-16

## 2023-11-27 ENCOUNTER — NON-APPOINTMENT (OUTPATIENT)
Age: 63
End: 2023-11-27

## 2023-11-28 ENCOUNTER — RESULT REVIEW (OUTPATIENT)
Age: 63
End: 2023-11-28

## 2023-11-28 ENCOUNTER — APPOINTMENT (OUTPATIENT)
Dept: INFUSION THERAPY | Facility: CLINIC | Age: 63
End: 2023-11-28

## 2023-11-28 LAB
ALBUMIN SERPL ELPH-MCNC: 4.4 G/DL — SIGNIFICANT CHANGE UP (ref 3.3–5)
ALBUMIN SERPL ELPH-MCNC: 4.4 G/DL — SIGNIFICANT CHANGE UP (ref 3.3–5)
ALP SERPL-CCNC: 176 U/L — HIGH (ref 40–120)
ALP SERPL-CCNC: 176 U/L — HIGH (ref 40–120)
ALT FLD-CCNC: 26 U/L — SIGNIFICANT CHANGE UP (ref 10–45)
ALT FLD-CCNC: 26 U/L — SIGNIFICANT CHANGE UP (ref 10–45)
ANION GAP SERPL CALC-SCNC: 12 MMOL/L — SIGNIFICANT CHANGE UP (ref 5–17)
ANION GAP SERPL CALC-SCNC: 12 MMOL/L — SIGNIFICANT CHANGE UP (ref 5–17)
AST SERPL-CCNC: 19 U/L — SIGNIFICANT CHANGE UP (ref 10–40)
AST SERPL-CCNC: 19 U/L — SIGNIFICANT CHANGE UP (ref 10–40)
BASOPHILS # BLD AUTO: 0.1 K/UL — SIGNIFICANT CHANGE UP (ref 0–0.2)
BASOPHILS # BLD AUTO: 0.1 K/UL — SIGNIFICANT CHANGE UP (ref 0–0.2)
BASOPHILS NFR BLD AUTO: 1.6 % — SIGNIFICANT CHANGE UP (ref 0–2)
BASOPHILS NFR BLD AUTO: 1.6 % — SIGNIFICANT CHANGE UP (ref 0–2)
BILIRUB SERPL-MCNC: 0.2 MG/DL — SIGNIFICANT CHANGE UP (ref 0.2–1.2)
BILIRUB SERPL-MCNC: 0.2 MG/DL — SIGNIFICANT CHANGE UP (ref 0.2–1.2)
BUN SERPL-MCNC: 11 MG/DL — SIGNIFICANT CHANGE UP (ref 7–23)
BUN SERPL-MCNC: 11 MG/DL — SIGNIFICANT CHANGE UP (ref 7–23)
CALCIUM SERPL-MCNC: 9 MG/DL — SIGNIFICANT CHANGE UP (ref 8.4–10.5)
CALCIUM SERPL-MCNC: 9 MG/DL — SIGNIFICANT CHANGE UP (ref 8.4–10.5)
CHLORIDE SERPL-SCNC: 107 MMOL/L — SIGNIFICANT CHANGE UP (ref 96–108)
CHLORIDE SERPL-SCNC: 107 MMOL/L — SIGNIFICANT CHANGE UP (ref 96–108)
CO2 SERPL-SCNC: 24 MMOL/L — SIGNIFICANT CHANGE UP (ref 22–31)
CO2 SERPL-SCNC: 24 MMOL/L — SIGNIFICANT CHANGE UP (ref 22–31)
CREAT SERPL-MCNC: 0.84 MG/DL — SIGNIFICANT CHANGE UP (ref 0.5–1.3)
CREAT SERPL-MCNC: 0.84 MG/DL — SIGNIFICANT CHANGE UP (ref 0.5–1.3)
EGFR: 98 ML/MIN/1.73M2 — SIGNIFICANT CHANGE UP
EGFR: 98 ML/MIN/1.73M2 — SIGNIFICANT CHANGE UP
EOSINOPHIL # BLD AUTO: 0.23 K/UL — SIGNIFICANT CHANGE UP (ref 0–0.5)
EOSINOPHIL # BLD AUTO: 0.23 K/UL — SIGNIFICANT CHANGE UP (ref 0–0.5)
EOSINOPHIL NFR BLD AUTO: 3.6 % — SIGNIFICANT CHANGE UP (ref 0–6)
EOSINOPHIL NFR BLD AUTO: 3.6 % — SIGNIFICANT CHANGE UP (ref 0–6)
GLUCOSE SERPL-MCNC: 88 MG/DL — SIGNIFICANT CHANGE UP (ref 70–99)
GLUCOSE SERPL-MCNC: 88 MG/DL — SIGNIFICANT CHANGE UP (ref 70–99)
HCT VFR BLD CALC: 39.4 % — SIGNIFICANT CHANGE UP (ref 39–50)
HCT VFR BLD CALC: 39.4 % — SIGNIFICANT CHANGE UP (ref 39–50)
HGB BLD-MCNC: 12.9 G/DL — LOW (ref 13–17)
HGB BLD-MCNC: 12.9 G/DL — LOW (ref 13–17)
IMM GRANULOCYTES NFR BLD AUTO: 1.4 % — HIGH (ref 0–0.9)
IMM GRANULOCYTES NFR BLD AUTO: 1.4 % — HIGH (ref 0–0.9)
LYMPHOCYTES # BLD AUTO: 1.29 K/UL — SIGNIFICANT CHANGE UP (ref 1–3.3)
LYMPHOCYTES # BLD AUTO: 1.29 K/UL — SIGNIFICANT CHANGE UP (ref 1–3.3)
LYMPHOCYTES # BLD AUTO: 20.4 % — SIGNIFICANT CHANGE UP (ref 13–44)
LYMPHOCYTES # BLD AUTO: 20.4 % — SIGNIFICANT CHANGE UP (ref 13–44)
MAGNESIUM SERPL-MCNC: 2.2 MG/DL — SIGNIFICANT CHANGE UP (ref 1.6–2.6)
MAGNESIUM SERPL-MCNC: 2.2 MG/DL — SIGNIFICANT CHANGE UP (ref 1.6–2.6)
MCHC RBC-ENTMCNC: 30.7 PG — SIGNIFICANT CHANGE UP (ref 27–34)
MCHC RBC-ENTMCNC: 30.7 PG — SIGNIFICANT CHANGE UP (ref 27–34)
MCHC RBC-ENTMCNC: 32.7 GM/DL — SIGNIFICANT CHANGE UP (ref 32–36)
MCHC RBC-ENTMCNC: 32.7 GM/DL — SIGNIFICANT CHANGE UP (ref 32–36)
MCV RBC AUTO: 93.8 FL — SIGNIFICANT CHANGE UP (ref 80–100)
MCV RBC AUTO: 93.8 FL — SIGNIFICANT CHANGE UP (ref 80–100)
MONOCYTES # BLD AUTO: 0.47 K/UL — SIGNIFICANT CHANGE UP (ref 0–0.9)
MONOCYTES # BLD AUTO: 0.47 K/UL — SIGNIFICANT CHANGE UP (ref 0–0.9)
MONOCYTES NFR BLD AUTO: 7.4 % — SIGNIFICANT CHANGE UP (ref 2–14)
MONOCYTES NFR BLD AUTO: 7.4 % — SIGNIFICANT CHANGE UP (ref 2–14)
NEUTROPHILS # BLD AUTO: 4.14 K/UL — SIGNIFICANT CHANGE UP (ref 1.8–7.4)
NEUTROPHILS # BLD AUTO: 4.14 K/UL — SIGNIFICANT CHANGE UP (ref 1.8–7.4)
NEUTROPHILS NFR BLD AUTO: 65.6 % — SIGNIFICANT CHANGE UP (ref 43–77)
NEUTROPHILS NFR BLD AUTO: 65.6 % — SIGNIFICANT CHANGE UP (ref 43–77)
NRBC # BLD: 0 /100 WBCS — SIGNIFICANT CHANGE UP (ref 0–0)
NRBC # BLD: 0 /100 WBCS — SIGNIFICANT CHANGE UP (ref 0–0)
PLATELET # BLD AUTO: 176 K/UL — SIGNIFICANT CHANGE UP (ref 150–400)
PLATELET # BLD AUTO: 176 K/UL — SIGNIFICANT CHANGE UP (ref 150–400)
POTASSIUM SERPL-MCNC: 4.2 MMOL/L — SIGNIFICANT CHANGE UP (ref 3.5–5.3)
POTASSIUM SERPL-MCNC: 4.2 MMOL/L — SIGNIFICANT CHANGE UP (ref 3.5–5.3)
POTASSIUM SERPL-SCNC: 4.2 MMOL/L — SIGNIFICANT CHANGE UP (ref 3.5–5.3)
POTASSIUM SERPL-SCNC: 4.2 MMOL/L — SIGNIFICANT CHANGE UP (ref 3.5–5.3)
PROT SERPL-MCNC: 6.9 G/DL — SIGNIFICANT CHANGE UP (ref 6–8.3)
PROT SERPL-MCNC: 6.9 G/DL — SIGNIFICANT CHANGE UP (ref 6–8.3)
RBC # BLD: 4.2 M/UL — SIGNIFICANT CHANGE UP (ref 4.2–5.8)
RBC # BLD: 4.2 M/UL — SIGNIFICANT CHANGE UP (ref 4.2–5.8)
RBC # FLD: 16.1 % — HIGH (ref 10.3–14.5)
RBC # FLD: 16.1 % — HIGH (ref 10.3–14.5)
SODIUM SERPL-SCNC: 143 MMOL/L — SIGNIFICANT CHANGE UP (ref 135–145)
SODIUM SERPL-SCNC: 143 MMOL/L — SIGNIFICANT CHANGE UP (ref 135–145)
WBC # BLD: 6.32 K/UL — SIGNIFICANT CHANGE UP (ref 3.8–10.5)
WBC # BLD: 6.32 K/UL — SIGNIFICANT CHANGE UP (ref 3.8–10.5)
WBC # FLD AUTO: 6.32 K/UL — SIGNIFICANT CHANGE UP (ref 3.8–10.5)
WBC # FLD AUTO: 6.32 K/UL — SIGNIFICANT CHANGE UP (ref 3.8–10.5)

## 2023-11-30 ENCOUNTER — APPOINTMENT (OUTPATIENT)
Dept: INFUSION THERAPY | Facility: CLINIC | Age: 63
End: 2023-11-30

## 2023-12-12 ENCOUNTER — RESULT REVIEW (OUTPATIENT)
Age: 63
End: 2023-12-12

## 2023-12-12 ENCOUNTER — APPOINTMENT (OUTPATIENT)
Dept: INFUSION THERAPY | Facility: CLINIC | Age: 63
End: 2023-12-12

## 2023-12-12 LAB
BASOPHILS # BLD AUTO: 0.13 K/UL — SIGNIFICANT CHANGE UP (ref 0–0.2)
BASOPHILS # BLD AUTO: 0.13 K/UL — SIGNIFICANT CHANGE UP (ref 0–0.2)
BASOPHILS NFR BLD AUTO: 1.7 % — SIGNIFICANT CHANGE UP (ref 0–2)
BASOPHILS NFR BLD AUTO: 1.7 % — SIGNIFICANT CHANGE UP (ref 0–2)
EOSINOPHIL # BLD AUTO: 0.21 K/UL — SIGNIFICANT CHANGE UP (ref 0–0.5)
EOSINOPHIL # BLD AUTO: 0.21 K/UL — SIGNIFICANT CHANGE UP (ref 0–0.5)
EOSINOPHIL NFR BLD AUTO: 2.8 % — SIGNIFICANT CHANGE UP (ref 0–6)
EOSINOPHIL NFR BLD AUTO: 2.8 % — SIGNIFICANT CHANGE UP (ref 0–6)
HCT VFR BLD CALC: 40.2 % — SIGNIFICANT CHANGE UP (ref 39–50)
HCT VFR BLD CALC: 40.2 % — SIGNIFICANT CHANGE UP (ref 39–50)
HGB BLD-MCNC: 13.2 G/DL — SIGNIFICANT CHANGE UP (ref 13–17)
HGB BLD-MCNC: 13.2 G/DL — SIGNIFICANT CHANGE UP (ref 13–17)
IMM GRANULOCYTES NFR BLD AUTO: 1.4 % — HIGH (ref 0–0.9)
IMM GRANULOCYTES NFR BLD AUTO: 1.4 % — HIGH (ref 0–0.9)
LYMPHOCYTES # BLD AUTO: 1.71 K/UL — SIGNIFICANT CHANGE UP (ref 1–3.3)
LYMPHOCYTES # BLD AUTO: 1.71 K/UL — SIGNIFICANT CHANGE UP (ref 1–3.3)
LYMPHOCYTES # BLD AUTO: 22.4 % — SIGNIFICANT CHANGE UP (ref 13–44)
LYMPHOCYTES # BLD AUTO: 22.4 % — SIGNIFICANT CHANGE UP (ref 13–44)
MCHC RBC-ENTMCNC: 30.8 PG — SIGNIFICANT CHANGE UP (ref 27–34)
MCHC RBC-ENTMCNC: 30.8 PG — SIGNIFICANT CHANGE UP (ref 27–34)
MCHC RBC-ENTMCNC: 32.8 GM/DL — SIGNIFICANT CHANGE UP (ref 32–36)
MCHC RBC-ENTMCNC: 32.8 GM/DL — SIGNIFICANT CHANGE UP (ref 32–36)
MCV RBC AUTO: 93.7 FL — SIGNIFICANT CHANGE UP (ref 80–100)
MCV RBC AUTO: 93.7 FL — SIGNIFICANT CHANGE UP (ref 80–100)
MONOCYTES # BLD AUTO: 0.77 K/UL — SIGNIFICANT CHANGE UP (ref 0–0.9)
MONOCYTES # BLD AUTO: 0.77 K/UL — SIGNIFICANT CHANGE UP (ref 0–0.9)
MONOCYTES NFR BLD AUTO: 10.1 % — SIGNIFICANT CHANGE UP (ref 2–14)
MONOCYTES NFR BLD AUTO: 10.1 % — SIGNIFICANT CHANGE UP (ref 2–14)
NEUTROPHILS # BLD AUTO: 4.69 K/UL — SIGNIFICANT CHANGE UP (ref 1.8–7.4)
NEUTROPHILS # BLD AUTO: 4.69 K/UL — SIGNIFICANT CHANGE UP (ref 1.8–7.4)
NEUTROPHILS NFR BLD AUTO: 61.6 % — SIGNIFICANT CHANGE UP (ref 43–77)
NEUTROPHILS NFR BLD AUTO: 61.6 % — SIGNIFICANT CHANGE UP (ref 43–77)
NRBC # BLD: 0 /100 WBCS — SIGNIFICANT CHANGE UP (ref 0–0)
NRBC # BLD: 0 /100 WBCS — SIGNIFICANT CHANGE UP (ref 0–0)
PLATELET # BLD AUTO: 182 K/UL — SIGNIFICANT CHANGE UP (ref 150–400)
PLATELET # BLD AUTO: 182 K/UL — SIGNIFICANT CHANGE UP (ref 150–400)
RBC # BLD: 4.29 M/UL — SIGNIFICANT CHANGE UP (ref 4.2–5.8)
RBC # BLD: 4.29 M/UL — SIGNIFICANT CHANGE UP (ref 4.2–5.8)
RBC # FLD: 15.6 % — HIGH (ref 10.3–14.5)
RBC # FLD: 15.6 % — HIGH (ref 10.3–14.5)
WBC # BLD: 7.62 K/UL — SIGNIFICANT CHANGE UP (ref 3.8–10.5)
WBC # BLD: 7.62 K/UL — SIGNIFICANT CHANGE UP (ref 3.8–10.5)
WBC # FLD AUTO: 7.62 K/UL — SIGNIFICANT CHANGE UP (ref 3.8–10.5)
WBC # FLD AUTO: 7.62 K/UL — SIGNIFICANT CHANGE UP (ref 3.8–10.5)

## 2023-12-13 LAB
ALBUMIN SERPL ELPH-MCNC: 4.5 G/DL — SIGNIFICANT CHANGE UP (ref 3.3–5)
ALBUMIN SERPL ELPH-MCNC: 4.5 G/DL — SIGNIFICANT CHANGE UP (ref 3.3–5)
ALP SERPL-CCNC: 178 U/L — HIGH (ref 40–120)
ALP SERPL-CCNC: 178 U/L — HIGH (ref 40–120)
ALT FLD-CCNC: 29 U/L — SIGNIFICANT CHANGE UP (ref 10–45)
ALT FLD-CCNC: 29 U/L — SIGNIFICANT CHANGE UP (ref 10–45)
ANION GAP SERPL CALC-SCNC: 12 MMOL/L — SIGNIFICANT CHANGE UP (ref 5–17)
ANION GAP SERPL CALC-SCNC: 12 MMOL/L — SIGNIFICANT CHANGE UP (ref 5–17)
AST SERPL-CCNC: 21 U/L — SIGNIFICANT CHANGE UP (ref 10–40)
AST SERPL-CCNC: 21 U/L — SIGNIFICANT CHANGE UP (ref 10–40)
BILIRUB SERPL-MCNC: 0.3 MG/DL — SIGNIFICANT CHANGE UP (ref 0.2–1.2)
BILIRUB SERPL-MCNC: 0.3 MG/DL — SIGNIFICANT CHANGE UP (ref 0.2–1.2)
BUN SERPL-MCNC: 10 MG/DL — SIGNIFICANT CHANGE UP (ref 7–23)
BUN SERPL-MCNC: 10 MG/DL — SIGNIFICANT CHANGE UP (ref 7–23)
CALCIUM SERPL-MCNC: 9.3 MG/DL — SIGNIFICANT CHANGE UP (ref 8.4–10.5)
CALCIUM SERPL-MCNC: 9.3 MG/DL — SIGNIFICANT CHANGE UP (ref 8.4–10.5)
CHLORIDE SERPL-SCNC: 104 MMOL/L — SIGNIFICANT CHANGE UP (ref 96–108)
CHLORIDE SERPL-SCNC: 104 MMOL/L — SIGNIFICANT CHANGE UP (ref 96–108)
CO2 SERPL-SCNC: 24 MMOL/L — SIGNIFICANT CHANGE UP (ref 22–31)
CO2 SERPL-SCNC: 24 MMOL/L — SIGNIFICANT CHANGE UP (ref 22–31)
CREAT SERPL-MCNC: 0.81 MG/DL — SIGNIFICANT CHANGE UP (ref 0.5–1.3)
CREAT SERPL-MCNC: 0.81 MG/DL — SIGNIFICANT CHANGE UP (ref 0.5–1.3)
EGFR: 99 ML/MIN/1.73M2 — SIGNIFICANT CHANGE UP
EGFR: 99 ML/MIN/1.73M2 — SIGNIFICANT CHANGE UP
GLUCOSE SERPL-MCNC: 75 MG/DL — SIGNIFICANT CHANGE UP (ref 70–99)
GLUCOSE SERPL-MCNC: 75 MG/DL — SIGNIFICANT CHANGE UP (ref 70–99)
MAGNESIUM SERPL-MCNC: 2.1 MG/DL — SIGNIFICANT CHANGE UP (ref 1.6–2.6)
MAGNESIUM SERPL-MCNC: 2.1 MG/DL — SIGNIFICANT CHANGE UP (ref 1.6–2.6)
POTASSIUM SERPL-MCNC: 3.9 MMOL/L — SIGNIFICANT CHANGE UP (ref 3.5–5.3)
POTASSIUM SERPL-MCNC: 3.9 MMOL/L — SIGNIFICANT CHANGE UP (ref 3.5–5.3)
POTASSIUM SERPL-SCNC: 3.9 MMOL/L — SIGNIFICANT CHANGE UP (ref 3.5–5.3)
POTASSIUM SERPL-SCNC: 3.9 MMOL/L — SIGNIFICANT CHANGE UP (ref 3.5–5.3)
PROT SERPL-MCNC: 7.3 G/DL — SIGNIFICANT CHANGE UP (ref 6–8.3)
PROT SERPL-MCNC: 7.3 G/DL — SIGNIFICANT CHANGE UP (ref 6–8.3)
SODIUM SERPL-SCNC: 140 MMOL/L — SIGNIFICANT CHANGE UP (ref 135–145)
SODIUM SERPL-SCNC: 140 MMOL/L — SIGNIFICANT CHANGE UP (ref 135–145)

## 2023-12-14 ENCOUNTER — APPOINTMENT (OUTPATIENT)
Dept: INFUSION THERAPY | Facility: CLINIC | Age: 63
End: 2023-12-14

## 2023-12-23 NOTE — CONSULT NOTE ADULT - CONSULT REASON
Distal Femur Fracture  Consulted: 1830  Seen: 1845
Stage IV colon CA
fall, risk stratification and anticoagulation management.
Home

## 2023-12-26 ENCOUNTER — RESULT REVIEW (OUTPATIENT)
Age: 63
End: 2023-12-26

## 2023-12-26 ENCOUNTER — APPOINTMENT (OUTPATIENT)
Dept: INFUSION THERAPY | Facility: CLINIC | Age: 63
End: 2023-12-26

## 2023-12-26 VITALS
WEIGHT: 225 LBS | SYSTOLIC BLOOD PRESSURE: 154 MMHG | HEART RATE: 88 BPM | RESPIRATION RATE: 17 BRPM | OXYGEN SATURATION: 98 % | HEIGHT: 71 IN | BODY MASS INDEX: 31.5 KG/M2 | TEMPERATURE: 98.6 F | DIASTOLIC BLOOD PRESSURE: 89 MMHG

## 2023-12-26 LAB
ALBUMIN SERPL ELPH-MCNC: 4.6 G/DL — SIGNIFICANT CHANGE UP (ref 3.3–5)
ALBUMIN SERPL ELPH-MCNC: 4.6 G/DL — SIGNIFICANT CHANGE UP (ref 3.3–5)
ALP SERPL-CCNC: 216 U/L — HIGH (ref 40–120)
ALP SERPL-CCNC: 216 U/L — HIGH (ref 40–120)
ALT FLD-CCNC: 35 U/L — SIGNIFICANT CHANGE UP (ref 10–45)
ALT FLD-CCNC: 35 U/L — SIGNIFICANT CHANGE UP (ref 10–45)
ANION GAP SERPL CALC-SCNC: 16 MMOL/L — SIGNIFICANT CHANGE UP (ref 5–17)
ANION GAP SERPL CALC-SCNC: 16 MMOL/L — SIGNIFICANT CHANGE UP (ref 5–17)
AST SERPL-CCNC: 21 U/L — SIGNIFICANT CHANGE UP (ref 10–40)
AST SERPL-CCNC: 21 U/L — SIGNIFICANT CHANGE UP (ref 10–40)
BASOPHILS # BLD AUTO: 0.12 K/UL — SIGNIFICANT CHANGE UP (ref 0–0.2)
BASOPHILS # BLD AUTO: 0.12 K/UL — SIGNIFICANT CHANGE UP (ref 0–0.2)
BASOPHILS NFR BLD AUTO: 0.8 % — SIGNIFICANT CHANGE UP (ref 0–2)
BASOPHILS NFR BLD AUTO: 0.8 % — SIGNIFICANT CHANGE UP (ref 0–2)
BILIRUB SERPL-MCNC: 0.2 MG/DL — SIGNIFICANT CHANGE UP (ref 0.2–1.2)
BILIRUB SERPL-MCNC: 0.2 MG/DL — SIGNIFICANT CHANGE UP (ref 0.2–1.2)
BUN SERPL-MCNC: 19 MG/DL — SIGNIFICANT CHANGE UP (ref 7–23)
BUN SERPL-MCNC: 19 MG/DL — SIGNIFICANT CHANGE UP (ref 7–23)
CALCIUM SERPL-MCNC: 9.4 MG/DL — SIGNIFICANT CHANGE UP (ref 8.4–10.5)
CALCIUM SERPL-MCNC: 9.4 MG/DL — SIGNIFICANT CHANGE UP (ref 8.4–10.5)
CHLORIDE SERPL-SCNC: 104 MMOL/L — SIGNIFICANT CHANGE UP (ref 96–108)
CHLORIDE SERPL-SCNC: 104 MMOL/L — SIGNIFICANT CHANGE UP (ref 96–108)
CO2 SERPL-SCNC: 22 MMOL/L — SIGNIFICANT CHANGE UP (ref 22–31)
CO2 SERPL-SCNC: 22 MMOL/L — SIGNIFICANT CHANGE UP (ref 22–31)
CREAT SERPL-MCNC: 0.87 MG/DL — SIGNIFICANT CHANGE UP (ref 0.5–1.3)
CREAT SERPL-MCNC: 0.87 MG/DL — SIGNIFICANT CHANGE UP (ref 0.5–1.3)
EGFR: 97 ML/MIN/1.73M2 — SIGNIFICANT CHANGE UP
EGFR: 97 ML/MIN/1.73M2 — SIGNIFICANT CHANGE UP
EOSINOPHIL # BLD AUTO: 0.18 K/UL — SIGNIFICANT CHANGE UP (ref 0–0.5)
EOSINOPHIL # BLD AUTO: 0.18 K/UL — SIGNIFICANT CHANGE UP (ref 0–0.5)
EOSINOPHIL NFR BLD AUTO: 1.2 % — SIGNIFICANT CHANGE UP (ref 0–6)
EOSINOPHIL NFR BLD AUTO: 1.2 % — SIGNIFICANT CHANGE UP (ref 0–6)
GLUCOSE SERPL-MCNC: 95 MG/DL — SIGNIFICANT CHANGE UP (ref 70–99)
GLUCOSE SERPL-MCNC: 95 MG/DL — SIGNIFICANT CHANGE UP (ref 70–99)
HCT VFR BLD CALC: 43.8 % — SIGNIFICANT CHANGE UP (ref 39–50)
HCT VFR BLD CALC: 43.8 % — SIGNIFICANT CHANGE UP (ref 39–50)
HGB BLD-MCNC: 14.2 G/DL — SIGNIFICANT CHANGE UP (ref 13–17)
HGB BLD-MCNC: 14.2 G/DL — SIGNIFICANT CHANGE UP (ref 13–17)
IMM GRANULOCYTES NFR BLD AUTO: 1.2 % — HIGH (ref 0–0.9)
IMM GRANULOCYTES NFR BLD AUTO: 1.2 % — HIGH (ref 0–0.9)
LYMPHOCYTES # BLD AUTO: 0.46 K/UL — LOW (ref 1–3.3)
LYMPHOCYTES # BLD AUTO: 0.46 K/UL — LOW (ref 1–3.3)
LYMPHOCYTES # BLD AUTO: 3.1 % — LOW (ref 13–44)
LYMPHOCYTES # BLD AUTO: 3.1 % — LOW (ref 13–44)
MAGNESIUM SERPL-MCNC: 2 MG/DL — SIGNIFICANT CHANGE UP (ref 1.6–2.6)
MAGNESIUM SERPL-MCNC: 2 MG/DL — SIGNIFICANT CHANGE UP (ref 1.6–2.6)
MCHC RBC-ENTMCNC: 30.9 PG — SIGNIFICANT CHANGE UP (ref 27–34)
MCHC RBC-ENTMCNC: 30.9 PG — SIGNIFICANT CHANGE UP (ref 27–34)
MCHC RBC-ENTMCNC: 32.4 GM/DL — SIGNIFICANT CHANGE UP (ref 32–36)
MCHC RBC-ENTMCNC: 32.4 GM/DL — SIGNIFICANT CHANGE UP (ref 32–36)
MCV RBC AUTO: 95.2 FL — SIGNIFICANT CHANGE UP (ref 80–100)
MCV RBC AUTO: 95.2 FL — SIGNIFICANT CHANGE UP (ref 80–100)
MONOCYTES # BLD AUTO: 0.74 K/UL — SIGNIFICANT CHANGE UP (ref 0–0.9)
MONOCYTES # BLD AUTO: 0.74 K/UL — SIGNIFICANT CHANGE UP (ref 0–0.9)
MONOCYTES NFR BLD AUTO: 5 % — SIGNIFICANT CHANGE UP (ref 2–14)
MONOCYTES NFR BLD AUTO: 5 % — SIGNIFICANT CHANGE UP (ref 2–14)
NEUTROPHILS # BLD AUTO: 13.16 K/UL — HIGH (ref 1.8–7.4)
NEUTROPHILS # BLD AUTO: 13.16 K/UL — HIGH (ref 1.8–7.4)
NEUTROPHILS NFR BLD AUTO: 88.7 % — HIGH (ref 43–77)
NEUTROPHILS NFR BLD AUTO: 88.7 % — HIGH (ref 43–77)
NRBC # BLD: 0 /100 WBCS — SIGNIFICANT CHANGE UP (ref 0–0)
NRBC # BLD: 0 /100 WBCS — SIGNIFICANT CHANGE UP (ref 0–0)
PLATELET # BLD AUTO: 193 K/UL — SIGNIFICANT CHANGE UP (ref 150–400)
PLATELET # BLD AUTO: 193 K/UL — SIGNIFICANT CHANGE UP (ref 150–400)
POTASSIUM SERPL-MCNC: 4.1 MMOL/L — SIGNIFICANT CHANGE UP (ref 3.5–5.3)
POTASSIUM SERPL-MCNC: 4.1 MMOL/L — SIGNIFICANT CHANGE UP (ref 3.5–5.3)
POTASSIUM SERPL-SCNC: 4.1 MMOL/L — SIGNIFICANT CHANGE UP (ref 3.5–5.3)
POTASSIUM SERPL-SCNC: 4.1 MMOL/L — SIGNIFICANT CHANGE UP (ref 3.5–5.3)
PROT SERPL-MCNC: 7.4 G/DL — SIGNIFICANT CHANGE UP (ref 6–8.3)
PROT SERPL-MCNC: 7.4 G/DL — SIGNIFICANT CHANGE UP (ref 6–8.3)
RBC # BLD: 4.6 M/UL — SIGNIFICANT CHANGE UP (ref 4.2–5.8)
RBC # BLD: 4.6 M/UL — SIGNIFICANT CHANGE UP (ref 4.2–5.8)
RBC # FLD: 15.8 % — HIGH (ref 10.3–14.5)
RBC # FLD: 15.8 % — HIGH (ref 10.3–14.5)
SODIUM SERPL-SCNC: 142 MMOL/L — SIGNIFICANT CHANGE UP (ref 135–145)
SODIUM SERPL-SCNC: 142 MMOL/L — SIGNIFICANT CHANGE UP (ref 135–145)
WBC # BLD: 14.84 K/UL — HIGH (ref 3.8–10.5)
WBC # BLD: 14.84 K/UL — HIGH (ref 3.8–10.5)
WBC # FLD AUTO: 14.84 K/UL — HIGH (ref 3.8–10.5)
WBC # FLD AUTO: 14.84 K/UL — HIGH (ref 3.8–10.5)

## 2023-12-28 ENCOUNTER — APPOINTMENT (OUTPATIENT)
Dept: INFUSION THERAPY | Facility: CLINIC | Age: 63
End: 2023-12-28

## 2024-01-08 ENCOUNTER — OUTPATIENT (OUTPATIENT)
Dept: OUTPATIENT SERVICES | Facility: HOSPITAL | Age: 64
LOS: 1 days | Discharge: HOME | End: 2024-01-08

## 2024-01-08 DIAGNOSIS — C18.9 MALIGNANT NEOPLASM OF COLON, UNSPECIFIED: ICD-10-CM

## 2024-01-09 ENCOUNTER — RESULT REVIEW (OUTPATIENT)
Age: 64
End: 2024-01-09

## 2024-01-09 ENCOUNTER — APPOINTMENT (OUTPATIENT)
Dept: INFUSION THERAPY | Facility: CLINIC | Age: 64
End: 2024-01-09

## 2024-01-09 ENCOUNTER — NON-APPOINTMENT (OUTPATIENT)
Age: 64
End: 2024-01-09

## 2024-01-09 LAB
BASOPHILS # BLD AUTO: 0.11 K/UL — SIGNIFICANT CHANGE UP (ref 0–0.2)
BASOPHILS # BLD AUTO: 0.11 K/UL — SIGNIFICANT CHANGE UP (ref 0–0.2)
BASOPHILS NFR BLD AUTO: 1.2 % — SIGNIFICANT CHANGE UP (ref 0–2)
BASOPHILS NFR BLD AUTO: 1.2 % — SIGNIFICANT CHANGE UP (ref 0–2)
CEA SERPL-MCNC: 4.4 NG/ML — HIGH (ref 0–3.8)
CEA SERPL-MCNC: 4.4 NG/ML — HIGH (ref 0–3.8)
EOSINOPHIL # BLD AUTO: 0.22 K/UL — SIGNIFICANT CHANGE UP (ref 0–0.5)
EOSINOPHIL # BLD AUTO: 0.22 K/UL — SIGNIFICANT CHANGE UP (ref 0–0.5)
EOSINOPHIL NFR BLD AUTO: 2.3 % — SIGNIFICANT CHANGE UP (ref 0–6)
EOSINOPHIL NFR BLD AUTO: 2.3 % — SIGNIFICANT CHANGE UP (ref 0–6)
HCT VFR BLD CALC: 37.4 % — LOW (ref 39–50)
HCT VFR BLD CALC: 37.4 % — LOW (ref 39–50)
HGB BLD-MCNC: 12.2 G/DL — LOW (ref 13–17)
HGB BLD-MCNC: 12.2 G/DL — LOW (ref 13–17)
IMM GRANULOCYTES NFR BLD AUTO: 0.8 % — SIGNIFICANT CHANGE UP (ref 0–0.9)
IMM GRANULOCYTES NFR BLD AUTO: 0.8 % — SIGNIFICANT CHANGE UP (ref 0–0.9)
LYMPHOCYTES # BLD AUTO: 1.27 K/UL — SIGNIFICANT CHANGE UP (ref 1–3.3)
LYMPHOCYTES # BLD AUTO: 1.27 K/UL — SIGNIFICANT CHANGE UP (ref 1–3.3)
LYMPHOCYTES # BLD AUTO: 13.3 % — SIGNIFICANT CHANGE UP (ref 13–44)
LYMPHOCYTES # BLD AUTO: 13.3 % — SIGNIFICANT CHANGE UP (ref 13–44)
MCHC RBC-ENTMCNC: 30.7 PG — SIGNIFICANT CHANGE UP (ref 27–34)
MCHC RBC-ENTMCNC: 30.7 PG — SIGNIFICANT CHANGE UP (ref 27–34)
MCHC RBC-ENTMCNC: 32.6 GM/DL — SIGNIFICANT CHANGE UP (ref 32–36)
MCHC RBC-ENTMCNC: 32.6 GM/DL — SIGNIFICANT CHANGE UP (ref 32–36)
MCV RBC AUTO: 94 FL — SIGNIFICANT CHANGE UP (ref 80–100)
MCV RBC AUTO: 94 FL — SIGNIFICANT CHANGE UP (ref 80–100)
MONOCYTES # BLD AUTO: 0.69 K/UL — SIGNIFICANT CHANGE UP (ref 0–0.9)
MONOCYTES # BLD AUTO: 0.69 K/UL — SIGNIFICANT CHANGE UP (ref 0–0.9)
MONOCYTES NFR BLD AUTO: 7.2 % — SIGNIFICANT CHANGE UP (ref 2–14)
MONOCYTES NFR BLD AUTO: 7.2 % — SIGNIFICANT CHANGE UP (ref 2–14)
NEUTROPHILS # BLD AUTO: 7.15 K/UL — SIGNIFICANT CHANGE UP (ref 1.8–7.4)
NEUTROPHILS # BLD AUTO: 7.15 K/UL — SIGNIFICANT CHANGE UP (ref 1.8–7.4)
NEUTROPHILS NFR BLD AUTO: 75.2 % — SIGNIFICANT CHANGE UP (ref 43–77)
NEUTROPHILS NFR BLD AUTO: 75.2 % — SIGNIFICANT CHANGE UP (ref 43–77)
NRBC # BLD: 0 /100 WBCS — SIGNIFICANT CHANGE UP (ref 0–0)
NRBC # BLD: 0 /100 WBCS — SIGNIFICANT CHANGE UP (ref 0–0)
PLATELET # BLD AUTO: 170 K/UL — SIGNIFICANT CHANGE UP (ref 150–400)
PLATELET # BLD AUTO: 170 K/UL — SIGNIFICANT CHANGE UP (ref 150–400)
RBC # BLD: 3.98 M/UL — LOW (ref 4.2–5.8)
RBC # BLD: 3.98 M/UL — LOW (ref 4.2–5.8)
RBC # FLD: 15.7 % — HIGH (ref 10.3–14.5)
RBC # FLD: 15.7 % — HIGH (ref 10.3–14.5)
WBC # BLD: 9.52 K/UL — SIGNIFICANT CHANGE UP (ref 3.8–10.5)
WBC # BLD: 9.52 K/UL — SIGNIFICANT CHANGE UP (ref 3.8–10.5)
WBC # FLD AUTO: 9.52 K/UL — SIGNIFICANT CHANGE UP (ref 3.8–10.5)
WBC # FLD AUTO: 9.52 K/UL — SIGNIFICANT CHANGE UP (ref 3.8–10.5)

## 2024-01-09 RX ORDER — ENOXAPARIN SODIUM 150 MG/ML
150 INJECTION, SOLUTION SUBCUTANEOUS DAILY
Qty: 30 | Refills: 1 | Status: ACTIVE | COMMUNITY
Start: 2023-11-02 | End: 1900-01-01

## 2024-01-10 DIAGNOSIS — Z51.11 ENCOUNTER FOR ANTINEOPLASTIC CHEMOTHERAPY: ICD-10-CM

## 2024-01-10 DIAGNOSIS — R11.2 NAUSEA WITH VOMITING, UNSPECIFIED: ICD-10-CM

## 2024-01-10 LAB
ALBUMIN SERPL ELPH-MCNC: 3.8 G/DL — SIGNIFICANT CHANGE UP (ref 3.3–5)
ALBUMIN SERPL ELPH-MCNC: 3.8 G/DL — SIGNIFICANT CHANGE UP (ref 3.3–5)
ALP SERPL-CCNC: 163 U/L — HIGH (ref 40–120)
ALP SERPL-CCNC: 163 U/L — HIGH (ref 40–120)
ALT FLD-CCNC: 61 U/L — HIGH (ref 10–45)
ALT FLD-CCNC: 61 U/L — HIGH (ref 10–45)
ANION GAP SERPL CALC-SCNC: 11 MMOL/L — SIGNIFICANT CHANGE UP (ref 5–17)
ANION GAP SERPL CALC-SCNC: 11 MMOL/L — SIGNIFICANT CHANGE UP (ref 5–17)
AST SERPL-CCNC: 23 U/L — SIGNIFICANT CHANGE UP (ref 10–40)
AST SERPL-CCNC: 23 U/L — SIGNIFICANT CHANGE UP (ref 10–40)
BILIRUB SERPL-MCNC: 0.2 MG/DL — SIGNIFICANT CHANGE UP (ref 0.2–1.2)
BILIRUB SERPL-MCNC: 0.2 MG/DL — SIGNIFICANT CHANGE UP (ref 0.2–1.2)
BUN SERPL-MCNC: 10 MG/DL — SIGNIFICANT CHANGE UP (ref 7–23)
BUN SERPL-MCNC: 10 MG/DL — SIGNIFICANT CHANGE UP (ref 7–23)
CALCIUM SERPL-MCNC: 8.4 MG/DL — SIGNIFICANT CHANGE UP (ref 8.4–10.5)
CALCIUM SERPL-MCNC: 8.4 MG/DL — SIGNIFICANT CHANGE UP (ref 8.4–10.5)
CHLORIDE SERPL-SCNC: 103 MMOL/L — SIGNIFICANT CHANGE UP (ref 96–108)
CHLORIDE SERPL-SCNC: 103 MMOL/L — SIGNIFICANT CHANGE UP (ref 96–108)
CO2 SERPL-SCNC: 28 MMOL/L — SIGNIFICANT CHANGE UP (ref 22–31)
CO2 SERPL-SCNC: 28 MMOL/L — SIGNIFICANT CHANGE UP (ref 22–31)
CREAT SERPL-MCNC: 0.75 MG/DL — SIGNIFICANT CHANGE UP (ref 0.5–1.3)
CREAT SERPL-MCNC: 0.75 MG/DL — SIGNIFICANT CHANGE UP (ref 0.5–1.3)
EGFR: 101 ML/MIN/1.73M2 — SIGNIFICANT CHANGE UP
EGFR: 101 ML/MIN/1.73M2 — SIGNIFICANT CHANGE UP
GLUCOSE SERPL-MCNC: 81 MG/DL — SIGNIFICANT CHANGE UP (ref 70–99)
GLUCOSE SERPL-MCNC: 81 MG/DL — SIGNIFICANT CHANGE UP (ref 70–99)
MAGNESIUM SERPL-MCNC: 2.2 MG/DL — SIGNIFICANT CHANGE UP (ref 1.6–2.6)
MAGNESIUM SERPL-MCNC: 2.2 MG/DL — SIGNIFICANT CHANGE UP (ref 1.6–2.6)
POTASSIUM SERPL-MCNC: 3.1 MMOL/L — LOW (ref 3.5–5.3)
POTASSIUM SERPL-MCNC: 3.1 MMOL/L — LOW (ref 3.5–5.3)
POTASSIUM SERPL-SCNC: 3.1 MMOL/L — LOW (ref 3.5–5.3)
POTASSIUM SERPL-SCNC: 3.1 MMOL/L — LOW (ref 3.5–5.3)
PROT SERPL-MCNC: 5.8 G/DL — LOW (ref 6–8.3)
PROT SERPL-MCNC: 5.8 G/DL — LOW (ref 6–8.3)
SODIUM SERPL-SCNC: 142 MMOL/L — SIGNIFICANT CHANGE UP (ref 135–145)
SODIUM SERPL-SCNC: 142 MMOL/L — SIGNIFICANT CHANGE UP (ref 135–145)

## 2024-01-10 RX ORDER — DABIGATRAN ETEXILATE 150 MG/1
150 CAPSULE ORAL TWICE DAILY
Qty: 180 | Refills: 1 | Status: ACTIVE | COMMUNITY
Start: 2023-11-16 | End: 1900-01-01

## 2024-01-11 ENCOUNTER — NON-APPOINTMENT (OUTPATIENT)
Age: 64
End: 2024-01-11

## 2024-01-11 ENCOUNTER — APPOINTMENT (OUTPATIENT)
Dept: INFUSION THERAPY | Facility: CLINIC | Age: 64
End: 2024-01-11

## 2024-01-11 RX ORDER — POTASSIUM CHLORIDE 1500 MG/1
20 TABLET, FILM COATED, EXTENDED RELEASE ORAL
Qty: 30 | Refills: 2 | Status: ACTIVE | COMMUNITY
Start: 2024-01-11 | End: 1900-01-01

## 2024-01-12 DIAGNOSIS — Z51.89 ENCOUNTER FOR OTHER SPECIFIED AFTERCARE: ICD-10-CM

## 2024-01-19 ENCOUNTER — NON-APPOINTMENT (OUTPATIENT)
Age: 64
End: 2024-01-19

## 2024-01-23 ENCOUNTER — RESULT REVIEW (OUTPATIENT)
Age: 64
End: 2024-01-23

## 2024-01-23 ENCOUNTER — APPOINTMENT (OUTPATIENT)
Dept: HEMATOLOGY ONCOLOGY | Facility: CLINIC | Age: 64
End: 2024-01-23
Payer: MEDICAID

## 2024-01-23 ENCOUNTER — APPOINTMENT (OUTPATIENT)
Dept: INFUSION THERAPY | Facility: CLINIC | Age: 64
End: 2024-01-23
Payer: MEDICAID

## 2024-01-23 VITALS
OXYGEN SATURATION: 97 % | BODY MASS INDEX: 29.82 KG/M2 | SYSTOLIC BLOOD PRESSURE: 146 MMHG | TEMPERATURE: 97.2 F | DIASTOLIC BLOOD PRESSURE: 89 MMHG | HEART RATE: 99 BPM | WEIGHT: 213 LBS | HEIGHT: 71 IN

## 2024-01-23 LAB
BASOPHILS # BLD AUTO: 0.09 K/UL — SIGNIFICANT CHANGE UP (ref 0–0.2)
BASOPHILS NFR BLD AUTO: 2.2 % — HIGH (ref 0–2)
EOSINOPHIL # BLD AUTO: 0.14 K/UL — SIGNIFICANT CHANGE UP (ref 0–0.5)
EOSINOPHIL NFR BLD AUTO: 3.5 % — SIGNIFICANT CHANGE UP (ref 0–6)
HCT VFR BLD CALC: 39.7 % — SIGNIFICANT CHANGE UP (ref 39–50)
HGB BLD-MCNC: 13 G/DL — SIGNIFICANT CHANGE UP (ref 13–17)
IMM GRANULOCYTES NFR BLD AUTO: 0.2 % — SIGNIFICANT CHANGE UP (ref 0–0.9)
LYMPHOCYTES # BLD AUTO: 1.37 K/UL — SIGNIFICANT CHANGE UP (ref 1–3.3)
LYMPHOCYTES # BLD AUTO: 34.1 % — SIGNIFICANT CHANGE UP (ref 13–44)
MCHC RBC-ENTMCNC: 30.7 PG — SIGNIFICANT CHANGE UP (ref 27–34)
MCHC RBC-ENTMCNC: 32.7 GM/DL — SIGNIFICANT CHANGE UP (ref 32–36)
MCV RBC AUTO: 93.6 FL — SIGNIFICANT CHANGE UP (ref 80–100)
MONOCYTES # BLD AUTO: 0.51 K/UL — SIGNIFICANT CHANGE UP (ref 0–0.9)
MONOCYTES NFR BLD AUTO: 12.7 % — SIGNIFICANT CHANGE UP (ref 2–14)
NEUTROPHILS # BLD AUTO: 1.9 K/UL — SIGNIFICANT CHANGE UP (ref 1.8–7.4)
NEUTROPHILS NFR BLD AUTO: 47.3 % — SIGNIFICANT CHANGE UP (ref 43–77)
NRBC # BLD: 0 /100 WBCS — SIGNIFICANT CHANGE UP (ref 0–0)
PLATELET # BLD AUTO: 207 K/UL — SIGNIFICANT CHANGE UP (ref 150–400)
RBC # BLD: 4.24 M/UL — SIGNIFICANT CHANGE UP (ref 4.2–5.8)
RBC # FLD: 14.6 % — HIGH (ref 10.3–14.5)
WBC # BLD: 4.02 K/UL — SIGNIFICANT CHANGE UP (ref 3.8–10.5)
WBC # FLD AUTO: 4.02 K/UL — SIGNIFICANT CHANGE UP (ref 3.8–10.5)

## 2024-01-23 PROCEDURE — 99213 OFFICE O/P EST LOW 20 MIN: CPT

## 2024-01-23 NOTE — PHYSICAL EXAM
[Restricted in physically strenuous activity but ambulatory and able to carry out work of a light or sedentary nature] : Status 1- Restricted in physically strenuous activity but ambulatory and able to carry out work of a light or sedentary nature, e.g., light house work, office work [Normal] : affect appropriate [de-identified] : wt loss 12 lbs  [de-identified] : walking wiht cane today  [de-identified] : palpable right chest wall port

## 2024-01-23 NOTE — RESULTS/DATA
[FreeTextEntry1] : 10/25/23 CT C/A/P: Hepatic metastatic disease stable to mildly improved. Small catheter associated thrombus at the tip of a right sided Mediport catheter.  7/11/23 CT C/A/P: Continued decrease in size of hepatic metastases without evidence of new lesion, 1.2 x 1.1 cm previously measuring 1.7 x 1.3 cm; measures 1.9 x 0.9 cm previously measuring 2.3 x 1.4 cm. Subtle findings consistent with the patient's previous ascending colonic mass identified without evidence of large bowel obstruction. Stable left adrenal mass and punctate pulmonary nodules. Small pleural thickening along the right posterior pleural amenable to follow-up unclear significance.  4/10/23 CT C/A/P: Distal descending colon mass has decreased in size since prior with decreased associated colonic dilatation. Large amount of stool again noted in the colon and rectum. Hepatic metastases are slightly decreased in size, largest decrease 2.3 x 1.4 cm, previously 3.0 x 1.8 cm. Unchanged left adrenal mass and small lung nodule.  1/23/23 CT:  New partial large bowel obstruction secondary to known apple core lesion in the distal descending colon. Again extensive adjacent mesenteric abnormality is seen which may represent conglomerate lymph nodes.Redemonstrated hepatic metastases.  No change large left adrenal nodule.Small pericardial effusion is again seen.  1/9/23 CT C/A/P: Findings consistent with descending colon cancer with extension through the wall with extensive mesenteric confluent tumor and/or ivelisse disease mildly worse when compared to the prior examination. Diffuse metastatic disease identified throughout the liver and small lesion most noted within the right middle lobe improved when compared to the prior examination (now measuring 2.7 x 2.5 cm previously measuring 3.7 x 3.2 cm and a lesion in the right lobe measuring 2.1 x by 1.6 cm previously measuring 3.3 x 2.3 cm). Small pericardial effusion and areas of thickening of questionable significance. Small amount of pericatheter clot near the tip of the catheter in the superior vena cava.  8/19/22 Path: Liver, core biopsy: Metastatic adenocarcinoma, consistent with metastasis from colonic primary. Immunohistochemical stains were performed and results as follows: CDX - 2 is positive. CK 20 is patchy positive. CK 7 is negative. These findings support the diagnosis. No loss of nuclear expression of MMR proteins (MLH1, MSH2, MSH6, and PMS2).   These results show low probability of microsatellite instability-high (MSI-H).  8/18/22 Path: Colon, descending, mass, biopsy. Invasive adenocarcinoma, moderately differentiated. No loss of nuclear expression of MMR proteins (MLH1, MSH2, MSH6, and PMS2).   These results show low probability of microsatellite instability-high (MSI-H).  8/18/22 Colonoscopy: A frond-like/villous and ulcerated non-obstructing large mass was found in the descending colon. The lass was circumferential. The mass measured five cm in length. Oozing was present. Biopsied and tattooed. The scope was able to transverse the mass. The colon was otherwise normal.  8/18/22 CT C/A/P: There is irregular colonic wall thickening of the proximal sigmoid colon with stranding of the surrounding paracolic fat. Regional lymph nodes are identified adjacent to the colonic mass measuring up to 1.2 cm.  Multiple hypodense masses identified within the hepatic parenchyma is not to 3.9 cm, suspicious for hepatic parenchymal neoplastic disease. 3.4 cm left adrenal mass, suspicious for neoplastic disease. There is a 1.0 cm soft tissue nodule identified along the right lateral wall of the distal trachea, superior to the tracheal bifurcation. Lung nodules suspicious for lung parenchymal neoplastic disease.

## 2024-01-23 NOTE — REVIEW OF SYSTEMS
[Diarrhea: Grade 0] : Diarrhea: Grade 0 [Negative] : Allergic/Immunologic [Recent Change In Weight] : ~T recent weight change [Chest Pain] : no chest pain [Shortness Of Breath] : no shortness of breath [Abdominal Pain] : no abdominal pain [Vomiting] : no vomiting [Constipation] : no constipation [Confused] : no confusion [FreeTextEntry2] : wt loss due to illness [de-identified] : neuropathy; walking with cane today

## 2024-01-23 NOTE — HISTORY OF PRESENT ILLNESS
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 On 08/18/22, he had a CT C/A/P which showed  irregular colonic wall thickening of the proximal sigmoid colon with stranding of the surrounding paracolic fat. Regional lymph nodes are identified adjacent to the colonic mass measuring up to 1.2 cm. Multiple masses identified within the hepatic parenchyma noted to be 3.9 cm, suspicious for hepatic parenchymal neoplastic disease. 3.4 cm left adrenal mass, suspicious for neoplastic disease. There is a 1.0 cm soft tissue nodule identified along the right lateral wall of the distal trachea, superior to the tracheal bifurcation. Lung nodules suspicious for lung parenchymal neoplastic disease.  \par  \par  On 8/18/22, he had a colonoscopy that showed a frond-like/villous and ulcerated non-obstructing large mass was found in the descending colon. The mass was circumferential. The mass measured five cm in length. Pathology showed invasive adenocarcinoma, moderately differentiated. No loss of nuclear expression of MMR proteins (MLH1, MSH2, MSH6, and PMS2). These results show low probability of microsatellite instability-high (MSI-H). On 8/19/22, he had a liver, core biopsy which showed metastatic adenocarcinoma, consistent with metastasis from colonic primary.\par  \par   [de-identified] : no MMR deficiency [de-identified] : Medical Hx: DVT 14 years ago (unknown cause) \par  Surgical Hx: left eye sx; left torn meniscus repaired \par  Family Hx: Mother leukemia\par  Social Hx: never smoker; ETOH never; lives alone; single; works PT at [employer]; daughters close by [de-identified] : Pt is C31D1 of FOLFIOXIRI q 2 weeks, with ongoing cycles planned. He was hospitalized June 9th - 14th, 2023 due to mechanical fall/Left femoral condyle fracture s/p retrograde IMN. He is walking with cane today. No pain today. Neuropathy continues, worse overnight in fingertips, leg with fx continues with a little neuropathy, bottom of foot and toes, he follows with Dr Vergara. He denies fatigue, cold sensitivity, eye changes, hair loss, nail / skin changes, pain, mouth sores, vomiting, heartburn, C/D. He spent 8 months with his daughter, now living in his house fulltime. He feels well overall.   Today he reflects a 12 lb wt loss, reports stomach virus over 10 days, with both nausea and vomiting, in between treatments.

## 2024-01-24 DIAGNOSIS — G62.9 POLYNEUROPATHY, UNSPECIFIED: ICD-10-CM

## 2024-01-24 DIAGNOSIS — T82.898A OTHER SPECIFIED COMPLICATION OF VASCULAR PROSTHETIC DEVICES, IMPLANTS AND GRAFTS, INITIAL ENCOUNTER: ICD-10-CM

## 2024-01-24 LAB
ALBUMIN SERPL ELPH-MCNC: 4.4 G/DL — SIGNIFICANT CHANGE UP (ref 3.3–5)
ALP SERPL-CCNC: 134 U/L — HIGH (ref 40–120)
ALT FLD-CCNC: 30 U/L — SIGNIFICANT CHANGE UP (ref 10–45)
ANION GAP SERPL CALC-SCNC: 9 MMOL/L — SIGNIFICANT CHANGE UP (ref 5–17)
AST SERPL-CCNC: 21 U/L — SIGNIFICANT CHANGE UP (ref 10–40)
BILIRUB SERPL-MCNC: 0.2 MG/DL — SIGNIFICANT CHANGE UP (ref 0.2–1.2)
BUN SERPL-MCNC: 9 MG/DL — SIGNIFICANT CHANGE UP (ref 7–23)
CALCIUM SERPL-MCNC: 8.9 MG/DL — SIGNIFICANT CHANGE UP (ref 8.4–10.5)
CHLORIDE SERPL-SCNC: 103 MMOL/L — SIGNIFICANT CHANGE UP (ref 96–108)
CO2 SERPL-SCNC: 26 MMOL/L — SIGNIFICANT CHANGE UP (ref 22–31)
CREAT SERPL-MCNC: 0.73 MG/DL — SIGNIFICANT CHANGE UP (ref 0.5–1.3)
EGFR: 102 ML/MIN/1.73M2 — SIGNIFICANT CHANGE UP
GLUCOSE SERPL-MCNC: 109 MG/DL — HIGH (ref 70–99)
MAGNESIUM SERPL-MCNC: 2.2 MG/DL — SIGNIFICANT CHANGE UP (ref 1.6–2.6)
POTASSIUM SERPL-MCNC: 4.8 MMOL/L — SIGNIFICANT CHANGE UP (ref 3.5–5.3)
POTASSIUM SERPL-SCNC: 4.8 MMOL/L — SIGNIFICANT CHANGE UP (ref 3.5–5.3)
PROT SERPL-MCNC: 6.9 G/DL — SIGNIFICANT CHANGE UP (ref 6–8.3)
SODIUM SERPL-SCNC: 139 MMOL/L — SIGNIFICANT CHANGE UP (ref 135–145)

## 2024-01-25 ENCOUNTER — APPOINTMENT (OUTPATIENT)
Dept: INFUSION THERAPY | Facility: CLINIC | Age: 64
End: 2024-01-25

## 2024-02-06 ENCOUNTER — APPOINTMENT (OUTPATIENT)
Dept: HEMATOLOGY ONCOLOGY | Facility: CLINIC | Age: 64
End: 2024-02-06
Payer: MEDICAID

## 2024-02-06 ENCOUNTER — RESULT REVIEW (OUTPATIENT)
Age: 64
End: 2024-02-06

## 2024-02-06 ENCOUNTER — APPOINTMENT (OUTPATIENT)
Dept: INFUSION THERAPY | Facility: CLINIC | Age: 64
End: 2024-02-06
Payer: MEDICAID

## 2024-02-06 VITALS
SYSTOLIC BLOOD PRESSURE: 134 MMHG | DIASTOLIC BLOOD PRESSURE: 85 MMHG | HEIGHT: 71 IN | HEART RATE: 98 BPM | RESPIRATION RATE: 17 BRPM | OXYGEN SATURATION: 97 % | BODY MASS INDEX: 30.52 KG/M2 | WEIGHT: 218 LBS | TEMPERATURE: 97.3 F

## 2024-02-06 LAB
BASOPHILS # BLD AUTO: 0.1 K/UL — SIGNIFICANT CHANGE UP (ref 0–0.2)
BASOPHILS NFR BLD AUTO: 1.4 % — SIGNIFICANT CHANGE UP (ref 0–2)
EOSINOPHIL # BLD AUTO: 0.18 K/UL — SIGNIFICANT CHANGE UP (ref 0–0.5)
EOSINOPHIL NFR BLD AUTO: 2.5 % — SIGNIFICANT CHANGE UP (ref 0–6)
HCT VFR BLD CALC: 41.1 % — SIGNIFICANT CHANGE UP (ref 39–50)
HGB BLD-MCNC: 13.4 G/DL — SIGNIFICANT CHANGE UP (ref 13–17)
IMM GRANULOCYTES NFR BLD AUTO: 0.6 % — SIGNIFICANT CHANGE UP (ref 0–0.9)
LYMPHOCYTES # BLD AUTO: 1.4 K/UL — SIGNIFICANT CHANGE UP (ref 1–3.3)
LYMPHOCYTES # BLD AUTO: 19.4 % — SIGNIFICANT CHANGE UP (ref 13–44)
MCHC RBC-ENTMCNC: 30.7 PG — SIGNIFICANT CHANGE UP (ref 27–34)
MCHC RBC-ENTMCNC: 32.6 GM/DL — SIGNIFICANT CHANGE UP (ref 32–36)
MCV RBC AUTO: 94.1 FL — SIGNIFICANT CHANGE UP (ref 80–100)
MONOCYTES # BLD AUTO: 0.45 K/UL — SIGNIFICANT CHANGE UP (ref 0–0.9)
MONOCYTES NFR BLD AUTO: 6.2 % — SIGNIFICANT CHANGE UP (ref 2–14)
NEUTROPHILS # BLD AUTO: 5.04 K/UL — SIGNIFICANT CHANGE UP (ref 1.8–7.4)
NEUTROPHILS NFR BLD AUTO: 69.9 % — SIGNIFICANT CHANGE UP (ref 43–77)
NRBC # BLD: 0 /100 WBCS — SIGNIFICANT CHANGE UP (ref 0–0)
PLATELET # BLD AUTO: 167 K/UL — SIGNIFICANT CHANGE UP (ref 150–400)
RBC # BLD: 4.37 M/UL — SIGNIFICANT CHANGE UP (ref 4.2–5.8)
RBC # FLD: 15.3 % — HIGH (ref 10.3–14.5)
WBC # BLD: 7.21 K/UL — SIGNIFICANT CHANGE UP (ref 3.8–10.5)
WBC # FLD AUTO: 7.21 K/UL — SIGNIFICANT CHANGE UP (ref 3.8–10.5)

## 2024-02-06 PROCEDURE — 99213 OFFICE O/P EST LOW 20 MIN: CPT

## 2024-02-06 RX ORDER — GABAPENTIN 400 MG/1
0 CAPSULE ORAL
Refills: 0 | DISCHARGE

## 2024-02-06 NOTE — REVIEW OF SYSTEMS
[Recent Change In Weight] : ~T recent weight change [Diarrhea: Grade 0] : Diarrhea: Grade 0 [Negative] : Allergic/Immunologic [Chest Pain] : no chest pain [Shortness Of Breath] : no shortness of breath [Abdominal Pain] : no abdominal pain [Vomiting] : no vomiting [Constipation] : no constipation [Confused] : no confusion [FreeTextEntry2] : wt gain noted  [de-identified] : neuropathy; walking with cane today

## 2024-02-06 NOTE — HISTORY OF PRESENT ILLNESS
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 On 08/18/22, he had a CT C/A/P which showed  irregular colonic wall thickening of the proximal sigmoid colon with stranding of the surrounding paracolic fat. Regional lymph nodes are identified adjacent to the colonic mass measuring up to 1.2 cm. Multiple masses identified within the hepatic parenchyma noted to be 3.9 cm, suspicious for hepatic parenchymal neoplastic disease. 3.4 cm left adrenal mass, suspicious for neoplastic disease. There is a 1.0 cm soft tissue nodule identified along the right lateral wall of the distal trachea, superior to the tracheal bifurcation. Lung nodules suspicious for lung parenchymal neoplastic disease.  \par  \par  On 8/18/22, he had a colonoscopy that showed a frond-like/villous and ulcerated non-obstructing large mass was found in the descending colon. The mass was circumferential. The mass measured five cm in length. Pathology showed invasive adenocarcinoma, moderately differentiated. No loss of nuclear expression of MMR proteins (MLH1, MSH2, MSH6, and PMS2). These results show low probability of microsatellite instability-high (MSI-H). On 8/19/22, he had a liver, core biopsy which showed metastatic adenocarcinoma, consistent with metastasis from colonic primary.\par  \par   [de-identified] : no MMR deficiency [de-identified] : Medical Hx: DVT 14 years ago (unknown cause) \par  Surgical Hx: left eye sx; left torn meniscus repaired \par  Family Hx: Mother leukemia\par  Social Hx: never smoker; ETOH never; lives alone; single; works PT at [employer]; daughters close by [de-identified] : Pt is C32D1 of FOLFIOXIRI q 2 weeks, with ongoing cycles planned. He was hospitalized June 9th - 14th, 2023 due to mechanical fall/Left femoral condyle fracture s/p retrograde IMN. He continues walking with his cane today. No pain. Neuropathy is stable but continues, always worse overnight in fingertips, continues with a little neuropathy, bottom of foot and toes, he follows with Dr Vergara. He denies fatigue, cold sensitivity, eye changes, hair loss, nail / skin changes, pain, mouth sores, vomiting, heartburn, C/D. He needs a root canal with dental. He feels well overall.

## 2024-02-06 NOTE — PHYSICAL EXAM
[Restricted in physically strenuous activity but ambulatory and able to carry out work of a light or sedentary nature] : Status 1- Restricted in physically strenuous activity but ambulatory and able to carry out work of a light or sedentary nature, e.g., light house work, office work [Normal] : affect appropriate [de-identified] : wt gain noted  [de-identified] : walking wiht cane today  [de-identified] : right chest wall port accessed, dressing C/D/I

## 2024-02-07 LAB
ALBUMIN SERPL ELPH-MCNC: 4.4 G/DL — SIGNIFICANT CHANGE UP (ref 3.3–5)
ALP SERPL-CCNC: 160 U/L — HIGH (ref 40–120)
ALT FLD-CCNC: 21 U/L — SIGNIFICANT CHANGE UP (ref 10–45)
ANION GAP SERPL CALC-SCNC: 14 MMOL/L — SIGNIFICANT CHANGE UP (ref 5–17)
AST SERPL-CCNC: 19 U/L — SIGNIFICANT CHANGE UP (ref 10–40)
BILIRUB SERPL-MCNC: 0.2 MG/DL — SIGNIFICANT CHANGE UP (ref 0.2–1.2)
BUN SERPL-MCNC: 12 MG/DL — SIGNIFICANT CHANGE UP (ref 7–23)
CALCIUM SERPL-MCNC: 9.1 MG/DL — SIGNIFICANT CHANGE UP (ref 8.4–10.5)
CHLORIDE SERPL-SCNC: 105 MMOL/L — SIGNIFICANT CHANGE UP (ref 96–108)
CO2 SERPL-SCNC: 22 MMOL/L — SIGNIFICANT CHANGE UP (ref 22–31)
CREAT SERPL-MCNC: 0.88 MG/DL — SIGNIFICANT CHANGE UP (ref 0.5–1.3)
EGFR: 97 ML/MIN/1.73M2 — SIGNIFICANT CHANGE UP
GLUCOSE SERPL-MCNC: 106 MG/DL — HIGH (ref 70–99)
MAGNESIUM SERPL-MCNC: 2.2 MG/DL — SIGNIFICANT CHANGE UP (ref 1.6–2.6)
POTASSIUM SERPL-MCNC: 4.4 MMOL/L — SIGNIFICANT CHANGE UP (ref 3.5–5.3)
POTASSIUM SERPL-SCNC: 4.4 MMOL/L — SIGNIFICANT CHANGE UP (ref 3.5–5.3)
PROT SERPL-MCNC: 7.1 G/DL — SIGNIFICANT CHANGE UP (ref 6–8.3)
SODIUM SERPL-SCNC: 140 MMOL/L — SIGNIFICANT CHANGE UP (ref 135–145)

## 2024-02-08 ENCOUNTER — APPOINTMENT (OUTPATIENT)
Dept: INFUSION THERAPY | Facility: CLINIC | Age: 64
End: 2024-02-08

## 2024-02-20 ENCOUNTER — RESULT REVIEW (OUTPATIENT)
Age: 64
End: 2024-02-20

## 2024-02-20 ENCOUNTER — APPOINTMENT (OUTPATIENT)
Dept: INFUSION THERAPY | Facility: CLINIC | Age: 64
End: 2024-02-20

## 2024-02-20 VITALS
BODY MASS INDEX: 30.52 KG/M2 | SYSTOLIC BLOOD PRESSURE: 128 MMHG | DIASTOLIC BLOOD PRESSURE: 83 MMHG | TEMPERATURE: 98 F | HEART RATE: 83 BPM | HEIGHT: 71 IN | WEIGHT: 218 LBS | OXYGEN SATURATION: 97 %

## 2024-02-20 LAB
BASOPHILS # BLD AUTO: 0.13 K/UL — SIGNIFICANT CHANGE UP (ref 0–0.2)
BASOPHILS NFR BLD AUTO: 1.7 % — SIGNIFICANT CHANGE UP (ref 0–2)
EOSINOPHIL # BLD AUTO: 0.26 K/UL — SIGNIFICANT CHANGE UP (ref 0–0.5)
EOSINOPHIL NFR BLD AUTO: 3.3 % — SIGNIFICANT CHANGE UP (ref 0–6)
HCT VFR BLD CALC: 41.2 % — SIGNIFICANT CHANGE UP (ref 39–50)
HGB BLD-MCNC: 13.3 G/DL — SIGNIFICANT CHANGE UP (ref 13–17)
IMM GRANULOCYTES NFR BLD AUTO: 1 % — HIGH (ref 0–0.9)
LYMPHOCYTES # BLD AUTO: 1.83 K/UL — SIGNIFICANT CHANGE UP (ref 1–3.3)
LYMPHOCYTES # BLD AUTO: 23.3 % — SIGNIFICANT CHANGE UP (ref 13–44)
MCHC RBC-ENTMCNC: 30.2 PG — SIGNIFICANT CHANGE UP (ref 27–34)
MCHC RBC-ENTMCNC: 32.3 GM/DL — SIGNIFICANT CHANGE UP (ref 32–36)
MCV RBC AUTO: 93.4 FL — SIGNIFICANT CHANGE UP (ref 80–100)
MONOCYTES # BLD AUTO: 0.6 K/UL — SIGNIFICANT CHANGE UP (ref 0–0.9)
MONOCYTES NFR BLD AUTO: 7.7 % — SIGNIFICANT CHANGE UP (ref 2–14)
NEUTROPHILS # BLD AUTO: 4.94 K/UL — SIGNIFICANT CHANGE UP (ref 1.8–7.4)
NEUTROPHILS NFR BLD AUTO: 63 % — SIGNIFICANT CHANGE UP (ref 43–77)
NRBC # BLD: 0 /100 WBCS — SIGNIFICANT CHANGE UP (ref 0–0)
PLATELET # BLD AUTO: 193 K/UL — SIGNIFICANT CHANGE UP (ref 150–400)
RBC # BLD: 4.41 M/UL — SIGNIFICANT CHANGE UP (ref 4.2–5.8)
RBC # FLD: 15.3 % — HIGH (ref 10.3–14.5)
WBC # BLD: 7.84 K/UL — SIGNIFICANT CHANGE UP (ref 3.8–10.5)
WBC # FLD AUTO: 7.84 K/UL — SIGNIFICANT CHANGE UP (ref 3.8–10.5)

## 2024-02-21 LAB
ALBUMIN SERPL ELPH-MCNC: 4.6 G/DL — SIGNIFICANT CHANGE UP (ref 3.3–5)
ALP SERPL-CCNC: 171 U/L — HIGH (ref 40–120)
ALT FLD-CCNC: 27 U/L — SIGNIFICANT CHANGE UP (ref 10–45)
ANION GAP SERPL CALC-SCNC: 11 MMOL/L — SIGNIFICANT CHANGE UP (ref 5–17)
AST SERPL-CCNC: 22 U/L — SIGNIFICANT CHANGE UP (ref 10–40)
BILIRUB SERPL-MCNC: 0.2 MG/DL — SIGNIFICANT CHANGE UP (ref 0.2–1.2)
BUN SERPL-MCNC: 11 MG/DL — SIGNIFICANT CHANGE UP (ref 7–23)
CALCIUM SERPL-MCNC: 9.4 MG/DL — SIGNIFICANT CHANGE UP (ref 8.4–10.5)
CHLORIDE SERPL-SCNC: 104 MMOL/L — SIGNIFICANT CHANGE UP (ref 96–108)
CO2 SERPL-SCNC: 26 MMOL/L — SIGNIFICANT CHANGE UP (ref 22–31)
CREAT SERPL-MCNC: 0.95 MG/DL — SIGNIFICANT CHANGE UP (ref 0.5–1.3)
EGFR: 90 ML/MIN/1.73M2 — SIGNIFICANT CHANGE UP
GLUCOSE SERPL-MCNC: 89 MG/DL — SIGNIFICANT CHANGE UP (ref 70–99)
MAGNESIUM SERPL-MCNC: 2.2 MG/DL — SIGNIFICANT CHANGE UP (ref 1.6–2.6)
POTASSIUM SERPL-MCNC: 4.5 MMOL/L — SIGNIFICANT CHANGE UP (ref 3.5–5.3)
POTASSIUM SERPL-SCNC: 4.5 MMOL/L — SIGNIFICANT CHANGE UP (ref 3.5–5.3)
PROT SERPL-MCNC: 7.1 G/DL — SIGNIFICANT CHANGE UP (ref 6–8.3)
SODIUM SERPL-SCNC: 140 MMOL/L — SIGNIFICANT CHANGE UP (ref 135–145)

## 2024-02-22 ENCOUNTER — APPOINTMENT (OUTPATIENT)
Dept: INFUSION THERAPY | Facility: CLINIC | Age: 64
End: 2024-02-22

## 2024-02-25 RX ORDER — DABIGATRAN ETEXILATE MESYLATE 150 MG/1
1 CAPSULE ORAL
Qty: 0 | Refills: 0 | DISCHARGE
Start: 2024-02-25

## 2024-02-29 ENCOUNTER — APPOINTMENT (OUTPATIENT)
Dept: CT IMAGING | Facility: CLINIC | Age: 64
End: 2024-02-29
Payer: MEDICAID

## 2024-02-29 PROCEDURE — 71260 CT THORAX DX C+: CPT

## 2024-02-29 PROCEDURE — 74177 CT ABD & PELVIS W/CONTRAST: CPT

## 2024-03-05 ENCOUNTER — RESULT REVIEW (OUTPATIENT)
Age: 64
End: 2024-03-05

## 2024-03-05 ENCOUNTER — APPOINTMENT (OUTPATIENT)
Dept: INFUSION THERAPY | Facility: CLINIC | Age: 64
End: 2024-03-05

## 2024-03-05 ENCOUNTER — INPATIENT (INPATIENT)
Facility: HOSPITAL | Age: 64
LOS: 0 days | Discharge: HOME | DRG: 240 | End: 2024-03-06
Attending: HOSPITALIST | Admitting: INTERNAL MEDICINE
Payer: MEDICAID

## 2024-03-05 VITALS
RESPIRATION RATE: 18 BRPM | OXYGEN SATURATION: 100 % | TEMPERATURE: 98 F | SYSTOLIC BLOOD PRESSURE: 133 MMHG | WEIGHT: 225.09 LBS | HEART RATE: 71 BPM | HEIGHT: 71 IN | DIASTOLIC BLOOD PRESSURE: 77 MMHG

## 2024-03-05 DIAGNOSIS — Z78.9 OTHER SPECIFIED HEALTH STATUS: ICD-10-CM

## 2024-03-05 LAB
ALBUMIN SERPL ELPH-MCNC: 3.6 G/DL — SIGNIFICANT CHANGE UP (ref 3.3–5)
ALP SERPL-CCNC: 169 U/L — HIGH (ref 40–120)
ALT FLD-CCNC: 28 U/L — SIGNIFICANT CHANGE UP (ref 12–78)
ANION GAP SERPL CALC-SCNC: 3 MMOL/L — LOW (ref 5–17)
APTT BLD: 30.1 SEC — SIGNIFICANT CHANGE UP (ref 24.5–35.6)
AST SERPL-CCNC: 12 U/L — LOW (ref 15–37)
BASOPHILS # BLD AUTO: 0.1 K/UL — SIGNIFICANT CHANGE UP (ref 0–0.2)
BASOPHILS # BLD AUTO: 0.11 K/UL — SIGNIFICANT CHANGE UP (ref 0–0.2)
BASOPHILS NFR BLD AUTO: 0.9 % — SIGNIFICANT CHANGE UP (ref 0–2)
BASOPHILS NFR BLD AUTO: 1.6 % — SIGNIFICANT CHANGE UP (ref 0–2)
BILIRUB SERPL-MCNC: 0.3 MG/DL — SIGNIFICANT CHANGE UP (ref 0.2–1.2)
BLD GP AB SCN SERPL QL: SIGNIFICANT CHANGE UP
BUN SERPL-MCNC: 10 MG/DL — SIGNIFICANT CHANGE UP (ref 7–23)
CALCIUM SERPL-MCNC: 9.1 MG/DL — SIGNIFICANT CHANGE UP (ref 8.5–10.1)
CHLORIDE SERPL-SCNC: 109 MMOL/L — HIGH (ref 96–108)
CO2 SERPL-SCNC: 29 MMOL/L — SIGNIFICANT CHANGE UP (ref 22–31)
CREAT SERPL-MCNC: 1.01 MG/DL — SIGNIFICANT CHANGE UP (ref 0.5–1.3)
EGFR: 84 ML/MIN/1.73M2 — SIGNIFICANT CHANGE UP
EOSINOPHIL # BLD AUTO: 0.03 K/UL — SIGNIFICANT CHANGE UP (ref 0–0.5)
EOSINOPHIL # BLD AUTO: 0.21 K/UL — SIGNIFICANT CHANGE UP (ref 0–0.5)
EOSINOPHIL NFR BLD AUTO: 0.3 % — SIGNIFICANT CHANGE UP (ref 0–6)
EOSINOPHIL NFR BLD AUTO: 3 % — SIGNIFICANT CHANGE UP (ref 0–6)
GLUCOSE SERPL-MCNC: 118 MG/DL — HIGH (ref 70–99)
HCT VFR BLD CALC: 38.2 % — LOW (ref 39–50)
HCT VFR BLD CALC: 38.4 % — LOW (ref 39–50)
HGB BLD-MCNC: 12.1 G/DL — LOW (ref 13–17)
HGB BLD-MCNC: 12.2 G/DL — LOW (ref 13–17)
IMM GRANULOCYTES NFR BLD AUTO: 1.6 % — HIGH (ref 0–0.9)
IMM GRANULOCYTES NFR BLD AUTO: 2 % — HIGH (ref 0–0.9)
INR BLD: 0.99 RATIO — SIGNIFICANT CHANGE UP (ref 0.85–1.18)
LYMPHOCYTES # BLD AUTO: 0.83 K/UL — LOW (ref 1–3.3)
LYMPHOCYTES # BLD AUTO: 1.41 K/UL — SIGNIFICANT CHANGE UP (ref 1–3.3)
LYMPHOCYTES # BLD AUTO: 20.4 % — SIGNIFICANT CHANGE UP (ref 13–44)
LYMPHOCYTES # BLD AUTO: 7.2 % — LOW (ref 13–44)
MCHC RBC-ENTMCNC: 30 PG — SIGNIFICANT CHANGE UP (ref 27–34)
MCHC RBC-ENTMCNC: 30.3 PG — SIGNIFICANT CHANGE UP (ref 27–34)
MCHC RBC-ENTMCNC: 31.5 GM/DL — LOW (ref 32–36)
MCHC RBC-ENTMCNC: 31.9 GM/DL — LOW (ref 32–36)
MCV RBC AUTO: 93.9 FL — SIGNIFICANT CHANGE UP (ref 80–100)
MCV RBC AUTO: 96.2 FL — SIGNIFICANT CHANGE UP (ref 80–100)
MONOCYTES # BLD AUTO: 0.15 K/UL — SIGNIFICANT CHANGE UP (ref 0–0.9)
MONOCYTES # BLD AUTO: 0.41 K/UL — SIGNIFICANT CHANGE UP (ref 0–0.9)
MONOCYTES NFR BLD AUTO: 1.3 % — LOW (ref 2–14)
MONOCYTES NFR BLD AUTO: 5.9 % — SIGNIFICANT CHANGE UP (ref 2–14)
NEUTROPHILS # BLD AUTO: 10.19 K/UL — HIGH (ref 1.8–7.4)
NEUTROPHILS # BLD AUTO: 4.63 K/UL — SIGNIFICANT CHANGE UP (ref 1.8–7.4)
NEUTROPHILS NFR BLD AUTO: 67.1 % — SIGNIFICANT CHANGE UP (ref 43–77)
NEUTROPHILS NFR BLD AUTO: 88.7 % — HIGH (ref 43–77)
NRBC # BLD: 0 /100 WBCS — SIGNIFICANT CHANGE UP (ref 0–0)
PLATELET # BLD AUTO: 172 K/UL — SIGNIFICANT CHANGE UP (ref 150–400)
PLATELET # BLD AUTO: 175 K/UL — SIGNIFICANT CHANGE UP (ref 150–400)
POTASSIUM SERPL-MCNC: 4.3 MMOL/L — SIGNIFICANT CHANGE UP (ref 3.5–5.3)
POTASSIUM SERPL-SCNC: 4.3 MMOL/L — SIGNIFICANT CHANGE UP (ref 3.5–5.3)
PROT SERPL-MCNC: 7.1 GM/DL — SIGNIFICANT CHANGE UP (ref 6–8.3)
PROTHROM AB SERPL-ACNC: 11.2 SEC — SIGNIFICANT CHANGE UP (ref 9.5–13)
RBC # BLD: 3.99 M/UL — LOW (ref 4.2–5.8)
RBC # BLD: 4.07 M/UL — LOW (ref 4.2–5.8)
RBC # FLD: 15.7 % — HIGH (ref 10.3–14.5)
RBC # FLD: 15.9 % — HIGH (ref 10.3–14.5)
SODIUM SERPL-SCNC: 141 MMOL/L — SIGNIFICANT CHANGE UP (ref 135–145)
WBC # BLD: 11.48 K/UL — HIGH (ref 3.8–10.5)
WBC # BLD: 6.91 K/UL — SIGNIFICANT CHANGE UP (ref 3.8–10.5)
WBC # FLD AUTO: 11.48 K/UL — HIGH (ref 3.8–10.5)
WBC # FLD AUTO: 6.91 K/UL — SIGNIFICANT CHANGE UP (ref 3.8–10.5)

## 2024-03-05 PROCEDURE — 85014 HEMATOCRIT: CPT

## 2024-03-05 PROCEDURE — C9113: CPT

## 2024-03-05 PROCEDURE — 80048 BASIC METABOLIC PNL TOTAL CA: CPT

## 2024-03-05 PROCEDURE — 99285 EMERGENCY DEPT VISIT HI MDM: CPT

## 2024-03-05 PROCEDURE — 36415 COLL VENOUS BLD VENIPUNCTURE: CPT

## 2024-03-05 PROCEDURE — 85018 HEMOGLOBIN: CPT

## 2024-03-05 PROCEDURE — 85027 COMPLETE CBC AUTOMATED: CPT

## 2024-03-05 PROCEDURE — 99223 1ST HOSP IP/OBS HIGH 75: CPT

## 2024-03-05 PROCEDURE — 93005 ELECTROCARDIOGRAM TRACING: CPT

## 2024-03-05 RX ORDER — ASCORBIC ACID 60 MG
1 TABLET,CHEWABLE ORAL
Refills: 0 | DISCHARGE

## 2024-03-05 RX ORDER — ACETAMINOPHEN 500 MG
650 TABLET ORAL EVERY 6 HOURS
Refills: 0 | Status: DISCONTINUED | OUTPATIENT
Start: 2024-03-05 | End: 2024-03-06

## 2024-03-05 RX ORDER — LACTOBACILLUS ACIDOPHILUS 100MM CELL
1 CAPSULE ORAL
Refills: 0 | DISCHARGE

## 2024-03-05 RX ORDER — PANTOPRAZOLE SODIUM 20 MG/1
8 TABLET, DELAYED RELEASE ORAL
Qty: 80 | Refills: 0 | Status: DISCONTINUED | OUTPATIENT
Start: 2024-03-05 | End: 2024-03-06

## 2024-03-05 RX ORDER — LANOLIN ALCOHOL/MO/W.PET/CERES
3 CREAM (GRAM) TOPICAL AT BEDTIME
Refills: 0 | Status: DISCONTINUED | OUTPATIENT
Start: 2024-03-05 | End: 2024-03-06

## 2024-03-05 RX ORDER — ONDANSETRON 8 MG/1
4 TABLET, FILM COATED ORAL EVERY 8 HOURS
Refills: 0 | Status: DISCONTINUED | OUTPATIENT
Start: 2024-03-05 | End: 2024-03-06

## 2024-03-05 RX ORDER — SODIUM CHLORIDE 9 MG/ML
1000 INJECTION INTRAMUSCULAR; INTRAVENOUS; SUBCUTANEOUS
Refills: 0 | Status: DISCONTINUED | OUTPATIENT
Start: 2024-03-05 | End: 2024-03-06

## 2024-03-05 RX ORDER — GABAPENTIN 400 MG/1
1 CAPSULE ORAL
Refills: 0 | DISCHARGE

## 2024-03-05 RX ADMIN — SODIUM CHLORIDE 75 MILLILITER(S): 9 INJECTION INTRAMUSCULAR; INTRAVENOUS; SUBCUTANEOUS at 22:24

## 2024-03-05 RX ADMIN — PANTOPRAZOLE SODIUM 10 MG/HR: 20 TABLET, DELAYED RELEASE ORAL at 18:14

## 2024-03-05 NOTE — ED PROVIDER NOTE - CLINICAL SUMMARY MEDICAL DECISION MAKING FREE TEXT BOX
Patient presents to the ED c/o black stool. Vitals stable, normal bp, no active bleeding. Spoke with GI, will dp endoscopy in the morning. Admit to medicine in stable condition.

## 2024-03-05 NOTE — ED ADULT TRIAGE NOTE - CHIEF COMPLAINT QUOTE
Pt ambulatory to ED w/ black stool since Sunday. Pt on Pradaxa, states he last took it on Sunday but stopped after he noticed black stool. Denies abd pain. Pt has hx of colon ca w/ mets to liver and lungs, received chemo today. NKDA.

## 2024-03-05 NOTE — ED PROVIDER NOTE - OBJECTIVE STATEMENT
62 y/o male with PMHx of colon ca with mets to liver and lungs on chemo presents to the ED c/o black stool for 2 days. Started taking Pradaxa 5 days ago. Had chemotherapy today and was sent to ED after session. No BM today. Denies lightheadedness, dizziness, abdominal pain, syncope. NKDA. Nonsmoker, no etoh.

## 2024-03-05 NOTE — ED PROVIDER NOTE - NS ED MD TWO NIGHTS YN
Admission Reconciliation is Not Complete  Discharge Reconciliation is Not Complete Admission Reconciliation is Not Complete  Discharge Reconciliation is Completed Admission Reconciliation is Completed  Discharge Reconciliation is Completed Yes

## 2024-03-05 NOTE — H&P ADULT - NSHPLABSRESULTS_GEN_ALL_CORE
LABS:                        12.1   11.48 )-----------( 175      ( 05 Mar 2024 17:11 )             38.4     03-05    141  |  109<H>  |  10  ----------------------------<  118<H>  4.3   |  29  |  1.01    Ca    9.1      05 Mar 2024 17:11    TPro  7.1  /  Alb  3.6  /  TBili  0.3  /  DBili  x   /  AST  12<L>  /  ALT  28  /  AlkPhos  169<H>  03-05    PT/INR - ( 05 Mar 2024 17:11 )   PT: 11.2 sec;   INR: 0.99 ratio         PTT - ( 05 Mar 2024 17:11 )  PTT:30.1 sec

## 2024-03-05 NOTE — H&P ADULT - ASSESSMENT
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 Harrison with c/o black stool for 2 days.  Patient says that he was on Lovenox full dose which had been changed to Pradaxa 5 days ago.  He had noted blood stools on Saturday (3/2) and Sunday (3/3), and he had stopped taking Pradaxa by himself (as he was instructed) from yesterday (Monday) morning. Today, he had chemo treatment as scheduled, and was referred to ED after finishing his chemo treatment.  He denies rectal bleed since Monday morning.  He further added that he never has bloody stool being on Lovenox.  he also denies hematemesis or hemoptysis or hematuria.  He denies sob, dizziness, fever, chills, chest pain, palpitation, abdominal pain, diarrhea or dysuria.  Denies smoking, alcohol or substance abuse.      # Rectal bleeding on Pradaxa.  -Hb 12.1 (was 13.3 on 02/20/204).  -Admit to medical floor.  -Keep NPO after midnight.  -Maintenance IV fluid while NPO.  -Monitor H/H q6h.  -Continue Protonix IV drip.  -Hold his Pradaxa for now.    # Stage IV adenocarcinoma of colon with mets to liver, lung and adrenal gland.  -S/p Chemo today.  -Follows with Dr. Harrison.    # H/o Chemo port catheter thrombosis.  -Pradaxa will be on hold for now due to the above.    # DVT prophylaxis: SCDs.

## 2024-03-05 NOTE — H&P ADULT - NSICDXFAMILYHX_GEN_ALL_CORE_FT
FAMILY HISTORY:  No pertinent family history in first degree relatives     FAMILY HISTORY:  Mother  Still living? Unknown  FHx: leukemia, Age at diagnosis: Age Unknown

## 2024-03-05 NOTE — ED PROVIDER NOTE - PHYSICAL EXAMINATION
Const: Well appearing, NAD  Eyes: PERRL, EOM intact  CV: RRR, no murmurs, no chest wall tenderness, distal pulses intact  Resp: CTAB, normal resp effort  GI: soft, nondistended, nontender  MSK: Full ROM, no muscle or bony deformity or tenderness  Neuro: AOx3, GCS 15, No focal deficits  Skin: No rash, laceration or abrasion  Psych: calm, cooperative, No SI, HI, hallucination. Const: Well appearing, NAD  Eyes: PERRL, EOM intact  CV: RRR, no murmurs, no chest wall tenderness, distal pulses intact  Resp: CTAB, normal resp effort  GI: soft, nondistended, nontender. Rectal- no active bleeding  MSK: Full ROM, no muscle or bony deformity or tenderness  Neuro: AOx3, GCS 15, No focal deficits  Skin: No rash, laceration or abrasion  Psych: calm, cooperative, No SI, HI, hallucination.

## 2024-03-05 NOTE — H&P ADULT - NSICDXPASTMEDICALHX_GEN_ALL_CORE_FT
PAST MEDICAL HISTORY:  Colon cancer     No pertinent past medical history      PAST MEDICAL HISTORY:  Chronic anticoagulation     Colon cancer     History of DVT (deep vein thrombosis)     Stage IV carcinoma of colon

## 2024-03-05 NOTE — H&P ADULT - HISTORY OF PRESENT ILLNESS
Chief Complaint: bloody stools.    · Chief Complaint: The patient is a 63y Male complaining of bloody stools.  · HPI Objective Statement: 64 y/o male with PMHx of colon ca with mets to liver and lungs on chemo presents to the ED c/o black stool for 2 days. Started taking Pradaxa 5 days ago. Had chemotherapy today and was sent to ED after session. No BM today. Denies lightheadedness, dizziness, abdominal pain, syncope. NKDA. Nonsmoker, no etoh.   Chief Complaint: bloody stools.    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 Harrison with c/o black stool for 2 days.  Patient says that he was on Lovenox full dose which had been changed to Pradaxa 5 days ago.  He had noted blood stools on Saturday (3/2) and Sunday (3/3), and he had stopped taking Pradaxa by himself (as he was instructed) from yesterday (Monday) morning. Today, he had chemo treatment as scheduled, and was referred to ED after finishing his chemo treatment.  He denies rectal bleed since Monday morning.  He further added that he never has bloody stool being on Lovenox.  he also denies hematemesis or hemoptysis or hematuria.  He denies sob, dizziness, fever, chills, chest pain, palpitation, abdominal pain, diarrhea or dysuria.  Denies smoking, alcohol or substance abuse.    Sig labs: Hb 12.1 (was 13.3 on 02/20/2024), Platelets 175k, INR 0.99, BUN 10, Cr 1.01, K 4.3, LFTs wnl.  Protonix drip has been started in ED after bolus dose.  Dr. Harrison spoke to Dr. Hills, and plan for EGD tomorrow,

## 2024-03-05 NOTE — PHARMACOTHERAPY INTERVENTION NOTE - COMMENTS
Medication reconciliation completed.  Reviewed Medication list and confirmed med allergies with patient; confirmed with Dr. Liu MedHx.  Pt confirms that he stopped Lovenox 150 QD and was switched to Pradaxa 150mg BID, he took for 5 days and stopped due to Blood in Stool.

## 2024-03-05 NOTE — ED ADULT NURSE NOTE - NSFALLHARMRISKINTERV_ED_ALL_ED

## 2024-03-05 NOTE — H&P ADULT - NSICDXPASTSURGICALHX_GEN_ALL_CORE_FT
PAST SURGICAL HISTORY:  No significant past surgical history      PAST SURGICAL HISTORY:  Closed fracture of femur

## 2024-03-05 NOTE — ED PROVIDER NOTE - NS ED MD DISPO DIVISION
;      [facility] Emergency Department  [address], [place] [address]  [phone]     Clinical Summary     PERSON INFORMATION   Name JUSTICE ZARAGOZA Age  50 Years  1970 12:00 AM   Acct# [id number] Sex Female Phone [phone]   Dispo Type Home - (i.e. Home on oxygen, DME)-  Arrival 2020 9:57 PM Checkout 2020 1:25 AM    [facility] [address]      Visit Reason BILATERAL FLANK PAIN     ED Physician Note      ED Time Seen By Provider Entered On:  2020 22:01     Performed On:  2020 22:01  by Shin Vergara               Time Seen By Provider   Time Seen by Provider :   2020 22:01    Shin Vergara - 2020 22:01           VITALS INFORMATION  Vitals/Ht/Wt  Temperature Tympanic:  98.4 F  Peripheral Pulse Rate:  102 bpm  Respiratory Rate:  16 br/min  Oxygen Saturation:  96 %  Systolic Blood Pressure:  154 mmHg  Diastolic Blood Pressure:  84 mmHg  Mean Arterial Pressure:  107 mmHg  Height:  170.1 cm  Weight:  59.2 kg  Weight Type:  Measured  Weight Method:  Standing  ED Hand-Off Communication  ED Hand-Off Reason:  Shift Report  ED Hand-Off Reason / In Hospital:  SBAR reviewed during report  Patient on Cardiac Monitor:  Yes  Sitter Present:  No  Cardiac Monitor Box:  er monitor  Potential Core Measure:  N/A  Hand-off Report Given to:  Vannessa Lozano       MEDICAL INFORMATION   Allergy Info:          NKA             Prescriptions:                  Prescription Display   levoFLOXacin (Levaquin 500 mg oral tablet) 500 mg = 1 tab, PO, Tablet, q24hr, X 10 day, # 10 tab, 0 Refill(s)          DISCHARGE INFORMATION   Discharge Disposition: Home - (i.e. Home on oxygen, DME)-      PATIENT EDUCATION INFORMATION   Instructions:       Pyelonephritis, Adult, Easy-to-Read   Follow up:                  With: Address: [facility]:   [address]  [address] [phone] [employer]  (1)        With: Address: When:   Follow up with primary care provider     Comments:   Diagnosis: Urinary tract infection     Take Levaquin as directed for 10 days.     Push fluids.     Follow-up with your regular doctor or the [facility] clinic in 2 to 3 days, call for appointment.     Return to the ER immediately if worse, increasing back pain, high fevers, vomiting, or other new symptoms or concerns.            DIAGNOSIS           [facility]

## 2024-03-06 ENCOUNTER — TRANSCRIPTION ENCOUNTER (OUTPATIENT)
Age: 64
End: 2024-03-06

## 2024-03-06 VITALS — WEIGHT: 220.46 LBS

## 2024-03-06 DIAGNOSIS — S72.90XA UNSPECIFIED FRACTURE OF UNSPECIFIED FEMUR, INITIAL ENCOUNTER FOR CLOSED FRACTURE: Chronic | ICD-10-CM

## 2024-03-06 LAB
ALBUMIN SERPL ELPH-MCNC: 4.4 G/DL — SIGNIFICANT CHANGE UP (ref 3.3–5)
ALP SERPL-CCNC: 171 U/L — HIGH (ref 40–120)
ALT FLD-CCNC: 21 U/L — SIGNIFICANT CHANGE UP (ref 10–45)
ANION GAP SERPL CALC-SCNC: 13 MMOL/L — SIGNIFICANT CHANGE UP (ref 5–17)
ANION GAP SERPL CALC-SCNC: 6 MMOL/L — SIGNIFICANT CHANGE UP (ref 5–17)
AST SERPL-CCNC: 16 U/L — SIGNIFICANT CHANGE UP (ref 10–40)
BILIRUB SERPL-MCNC: 0.2 MG/DL — SIGNIFICANT CHANGE UP (ref 0.2–1.2)
BUN SERPL-MCNC: 10 MG/DL — SIGNIFICANT CHANGE UP (ref 7–23)
BUN SERPL-MCNC: 14 MG/DL — SIGNIFICANT CHANGE UP (ref 7–23)
CALCIUM SERPL-MCNC: 8.8 MG/DL — SIGNIFICANT CHANGE UP (ref 8.5–10.1)
CALCIUM SERPL-MCNC: 9.1 MG/DL — SIGNIFICANT CHANGE UP (ref 8.4–10.5)
CEA SERPL-MCNC: 6.8 NG/ML — HIGH (ref 0–3.8)
CHLORIDE SERPL-SCNC: 106 MMOL/L — SIGNIFICANT CHANGE UP (ref 96–108)
CHLORIDE SERPL-SCNC: 113 MMOL/L — HIGH (ref 96–108)
CO2 SERPL-SCNC: 24 MMOL/L — SIGNIFICANT CHANGE UP (ref 22–31)
CO2 SERPL-SCNC: 24 MMOL/L — SIGNIFICANT CHANGE UP (ref 22–31)
CREAT SERPL-MCNC: 0.87 MG/DL — SIGNIFICANT CHANGE UP (ref 0.5–1.3)
CREAT SERPL-MCNC: 1.1 MG/DL — SIGNIFICANT CHANGE UP (ref 0.5–1.3)
EGFR: 75 ML/MIN/1.73M2 — SIGNIFICANT CHANGE UP
EGFR: 97 ML/MIN/1.73M2 — SIGNIFICANT CHANGE UP
GLUCOSE SERPL-MCNC: 121 MG/DL — HIGH (ref 70–99)
GLUCOSE SERPL-MCNC: 130 MG/DL — HIGH (ref 70–99)
HCT VFR BLD CALC: 34.8 % — LOW (ref 39–50)
HCT VFR BLD CALC: 35.5 % — LOW (ref 39–50)
HGB BLD-MCNC: 11.2 G/DL — LOW (ref 13–17)
HGB BLD-MCNC: 11.2 G/DL — LOW (ref 13–17)
MAGNESIUM SERPL-MCNC: 2.2 MG/DL — SIGNIFICANT CHANGE UP (ref 1.6–2.6)
MCHC RBC-ENTMCNC: 30.4 PG — SIGNIFICANT CHANGE UP (ref 27–34)
MCHC RBC-ENTMCNC: 31.5 GM/DL — LOW (ref 32–36)
MCV RBC AUTO: 96.5 FL — SIGNIFICANT CHANGE UP (ref 80–100)
PLATELET # BLD AUTO: 197 K/UL — SIGNIFICANT CHANGE UP (ref 150–400)
POTASSIUM SERPL-MCNC: 4.2 MMOL/L — SIGNIFICANT CHANGE UP (ref 3.5–5.3)
POTASSIUM SERPL-MCNC: 4.6 MMOL/L — SIGNIFICANT CHANGE UP (ref 3.5–5.3)
POTASSIUM SERPL-SCNC: 4.2 MMOL/L — SIGNIFICANT CHANGE UP (ref 3.5–5.3)
POTASSIUM SERPL-SCNC: 4.6 MMOL/L — SIGNIFICANT CHANGE UP (ref 3.5–5.3)
PROT SERPL-MCNC: 6.7 G/DL — SIGNIFICANT CHANGE UP (ref 6–8.3)
RBC # BLD: 3.68 M/UL — LOW (ref 4.2–5.8)
RBC # FLD: 15.9 % — HIGH (ref 10.3–14.5)
SODIUM SERPL-SCNC: 143 MMOL/L — SIGNIFICANT CHANGE UP (ref 135–145)
SODIUM SERPL-SCNC: 143 MMOL/L — SIGNIFICANT CHANGE UP (ref 135–145)
WBC # BLD: 12.73 K/UL — HIGH (ref 3.8–10.5)
WBC # FLD AUTO: 12.73 K/UL — HIGH (ref 3.8–10.5)

## 2024-03-06 PROCEDURE — 99254 IP/OBS CNSLTJ NEW/EST MOD 60: CPT | Mod: 25

## 2024-03-06 PROCEDURE — 99233 SBSQ HOSP IP/OBS HIGH 50: CPT

## 2024-03-06 PROCEDURE — 93010 ELECTROCARDIOGRAM REPORT: CPT

## 2024-03-06 PROCEDURE — 43235 EGD DIAGNOSTIC BRUSH WASH: CPT

## 2024-03-06 PROCEDURE — 99239 HOSP IP/OBS DSCHRG MGMT >30: CPT

## 2024-03-06 RX ADMIN — SODIUM CHLORIDE 75 MILLILITER(S): 9 INJECTION INTRAMUSCULAR; INTRAVENOUS; SUBCUTANEOUS at 11:15

## 2024-03-06 RX ADMIN — PANTOPRAZOLE SODIUM 10 MG/HR: 20 TABLET, DELAYED RELEASE ORAL at 11:17

## 2024-03-06 NOTE — DIETITIAN INITIAL EVALUATION ADULT - NAME AND PHONE
Nahomi Lombardo RDN, CDN, [facility]      [phone]   [email or web address]

## 2024-03-06 NOTE — DISCHARGE NOTE PROVIDER - NSDCFUSCHEDAPPT_GEN_ALL_CORE_FT
Carmela Harrison  [facility]  [facility]  Scheduled Appointment: 03/07/2024    [facility]  [facility]  Scheduled Appointment: 03/19/2024    [facility]  [facility]  Scheduled Appointment: 03/21/2024    [facility]  [facility]  Scheduled Appointment: 04/02/2024    Matilde Mcdaniels  [facility]  [facility]  Scheduled Appointment: 04/02/2024    [facility]  [facility]  Scheduled Appointment: 04/04/2024    [facility]  [facility]  Scheduled Appointment: 04/16/2024    [facility]  [facility]  Scheduled Appointment: 04/18/2024

## 2024-03-06 NOTE — DISCHARGE NOTE PROVIDER - NSDCMRMEDTOKEN_GEN_ALL_CORE_FT
Acidophilus Probiotic Blend oral capsule: 1 cap(s) orally once a day  dabigatran 150 mg oral capsule: 1 cap(s) orally 2 times a day  gabapentin 300 mg oral capsule: 1 cap(s) orally 3 times a day as needed for pain  HemoHIM Herbal Extract OTC supplement: daily  Vitamin C 500 mg oral tablet: 1 tab(s) orally once a day

## 2024-03-06 NOTE — DIETITIAN INITIAL EVALUATION ADULT - ADD RECOMMEND
Advance diet to low fiber when medically feasible  Record PO intake in EMR after each meal (nursing.)   MVI w/ minerals daily to ensure 100% RDA met   Add ONS if pt's po intake falls below 75% ENN   Monitor bowel movements, if no BM for >3 days, consider implementing bowel regimen.  Monitor PO intake, tolerance, labs and weight.

## 2024-03-06 NOTE — DIETITIAN INITIAL EVALUATION ADULT - PERTINENT LABORATORY DATA
03-06    143  |  113<H>  |  14  ----------------------------<  130<H>  4.6   |  24  |  1.10    Ca    8.8      06 Mar 2024 07:02  Mg     2.2     03-05    TPro  7.1  /  Alb  3.6  /  TBili  0.3  /  DBili  x   /  AST  12<L>  /  ALT  28  /  AlkPhos  169<H>  03-05

## 2024-03-06 NOTE — CONSULT NOTE ADULT - ASSESSMENT
62 y/o M with known metastatic L colon adenocarcinoma currently on chemo with most recent dosing 03/05/24 currently admitted with suspected GIB from being on Pradaxa instead of Lovenox with plan for GI EGD.      # Stage IV L Sided Sigmoid Colon CA with Mets to Liver, Lung, Adrenals (No MMR, HER Neg, PDL1 0%)    - 08/18/22 Colonoscopy revealed frond-like/villous and ulcerated non-obstructing large circumferential mass found in descending colon approx 5cm in length   - 08/2022 CEA elevated   - Was receiving FOLFOXIRI but changed to FOLFIRI with most recent dosing 03/05/24 which was C34  - No planned inpatient cancer directed therapy   - Continue supportive measures as necessary  - Close outpatient follow up once stable and d/c'ed       # Hx Mediport Clot    - 01/09/23 CT imaging revealed R anterior chest wall Mediport catheter with the tip in the region of the superior vena cava with a small amount of probable pericatheter clot towards the tip.  - Has been on Lovenox injections since, unable to get DOAC because of insurance coverage/cost ---> had started Pradaxa approx x1 week ago but stopped because of GIB symptoms as of 03/02/24  - Will need to continue AC therapy once medically appropriate       # Normocytic Anemia in setting of Suspected GIB     - Hgb down to 11.2 g/dL, usually ranges 13.0 - 14.0 g/dL  - BRBPR and dark black stools as of 03/02/24 after changing from Lovenox to Pradaxa  - No AC therapy as of 03/04/24  - GI consulted; anticipating EGD for further eval  - Continue to trend serial CBCs while admitted   - Would only transfuse PRBC if hgb drops precipitously  - Continue supportive measures as necessary  64 y/o M with known metastatic L colon adenocarcinoma currently on chemo with most recent dosing 03/05/24 currently admitted with suspected GIB from being on Pradaxa instead of Lovenox with plan for GI EGD.      # Stage IV L Sided Sigmoid Colon CA with Mets to Liver, Lung, Adrenals (No MMR, HER Neg, PDL1 0%)    - 08/18/22 Colonoscopy revealed frond-like/villous and ulcerated non-obstructing large circumferential mass found in descending colon approx 5cm in length   - 08/2022 CEA elevated   - Was receiving FOLFOXIRI but changed to FOLFIRI with most recent dosing 03/05/24 which was C34  - No planned inpatient cancer directed therapy   - Continue supportive measures as necessary  - Close outpatient follow up once stable and d/c'ed       # Hx Mediport Clot    - 01/09/23 CT imaging revealed R anterior chest wall Mediport catheter with the tip in the region of the superior vena cava with a small amount of probable pericatheter clot towards the tip.  - Has been on Lovenox injections since, unable to get DOAC because of insurance coverage/cost ---> had started Pradaxa approx x1 week ago but stopped because of GIB symptoms as of 03/02/24  - Will need to continue AC therapy once medically appropriate   - Recently had outpatient CT chest imaging 02/29/24 pending report      # Normocytic Anemia in setting of Suspected GIB     - Hgb down to 11.2 g/dL, usually ranges 13.0 - 14.0 g/dL  - BRBPR and dark black stools as of 03/02/24 after changing from Lovenox to Pradaxa  - No AC therapy as of 03/04/24  - GI consulted; anticipating EGD for further eval  - Continue to trend serial CBCs while admitted   - Would only transfuse PRBC if hgb drops precipitously  - Continue supportive measures as necessary         Jacky Quinn PA-C  Hematology / Oncology  [facility]  [phone]

## 2024-03-06 NOTE — DIETITIAN INITIAL EVALUATION ADULT - NSICDXPASTMEDICALHX_GEN_ALL_CORE_FT
PAST MEDICAL HISTORY:  Chronic anticoagulation     Colon cancer     History of DVT (deep vein thrombosis)     Stage IV carcinoma of colon

## 2024-03-06 NOTE — DISCHARGE NOTE PROVIDER - DETAILS OF MALNUTRITION DIAGNOSIS/DIAGNOSES
This patient has been assessed with a concern for Malnutrition and was treated during this hospitalization for the following Nutrition diagnosis/diagnoses:     -  03/06/2024: Moderate protein-calorie malnutrition

## 2024-03-06 NOTE — DIETITIAN NUTRITION RISK NOTIFICATION - TREATMENT: THE FOLLOWING DIET HAS BEEN RECOMMENDED
Diet, NPO after Midnight:      NPO Start Date: 05-Mar-2024,   NPO Start Time: 23:59 (03-05-24 @ 17:41) [Active]  Diet, Clear Liquid (03-05-24 @ 17:41) [Active]

## 2024-03-06 NOTE — DISCHARGE NOTE PROVIDER - NSDCCAREPROVSEEN_GEN_ALL_CORE_FT
Wanda Alas (Gastroenterology)  Ger Henriquez (Medicine)  [place] Cameron (Hematology Oncology)  Charito Latham (Medicine)  Dion Hills (Gastroenterology)

## 2024-03-06 NOTE — DIETITIAN INITIAL EVALUATION ADULT - OTHER INFO
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 Harrison with c/o black stool for 2 days.  Patient says that he was on Lovenox full dose which had been changed to Pradaxa 5 days ago.  He had noted blood stools on Saturday (3/2) and Sunday (3/3), and he had stopped taking Pradaxa by himself (as he was instructed) from yesterday (Monday) morning. Today, he had chemo treatment as scheduled, and was referred to ED after finishing his chemo treatment.    ADmit dx:  Other specified health status  Visited pt at bedside, conversation was in English  Pt scheduled for endoscopy this AM, is on clear liquids  RD bedscale wt is 100kg  220#  NFPE reveals some muscle wasting, fat wasting   PO intake estimated < 75% ENN > one month   3+ edema in left leg and knee  Pt s/p chemo  BM last 3/3  Pt reports he is hungry, will be allowed food after procedure  Recommendations to follow in Plan/Intervention

## 2024-03-06 NOTE — CONSULT NOTE ADULT - SUBJECTIVE AND OBJECTIVE BOX
Patient is a 63y old  Male who presents with a chief complaint of Other specified health status     (06 Mar 2024 10:32)      HPI:  Chief Complaint: bloody stools.    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 Harrison with c/o black stool for 2 days.  Patient says that he was on Lovenox full dose which had been changed to Pradaxa 5 days ago.  He had noted blood stools on Saturday (3/2) and Sunday (3/3), and he had stopped taking Pradaxa by himself (as he was instructed) from yesterday (Monday) morning. Today, he had chemo treatment as scheduled, and was referred to ED after finishing his chemo treatment.  He denies rectal bleed since Monday morning.  He further added that he never has bloody stool being on Lovenox.  he also denies hematemesis or hemoptysis or hematuria.  He denies sob, dizziness, fever, chills, chest pain, palpitation, abdominal pain, diarrhea or dysuria.  Denies smoking, alcohol or substance abuse.    Sig labs: Hb 12.1 (was 13.3 on 02/20/2024), Platelets 175k, INR 0.99, BUN 10, Cr 1.01, K 4.3, LFTs wnl.  Protonix drip has been started in ED after bolus dose.  Dr. Harrison spoke to Dr. Hills, and plan for EGD tomorrow,       (05 Mar 2024 20:49)      PAST MEDICAL & SURGICAL HISTORY:  Colon cancer      Stage IV carcinoma of colon      History of DVT (deep vein thrombosis)      Chronic anticoagulation      Closed fracture of femur          MEDICATIONS  (STANDING):  pantoprazole Infusion 8 mG/Hr (10 mL/Hr) IV Continuous <Continuous>  sodium chloride 0.9%. 1000 milliLiter(s) (75 mL/Hr) IV Continuous <Continuous>    MEDICATIONS  (PRN):  acetaminophen     Tablet .. 650 milliGRAM(s) Oral every 6 hours PRN Temp greater or equal to 38C (100.4F), Mild Pain (1 - 3)  aluminum hydroxide/magnesium hydroxide/simethicone Suspension 30 milliLiter(s) Oral every 4 hours PRN Dyspepsia  melatonin 3 milliGRAM(s) Oral at bedtime PRN Insomnia  ondansetron Injectable 4 milliGRAM(s) IV Push every 8 hours PRN Nausea and/or Vomiting      Allergies    Seafood (Unknown)  No Known Drug Allergies    Intolerances        SOCIAL HISTORY:    FAMILY HISTORY:  FHx: leukemia (Mother)        REVIEW OF SYSTEMS:    CONSTITUTIONAL: No weakness, fevers or chills  EYES/ENT: No visual changes;  No vertigo or throat pain   NECK: No pain or stiffness  RESPIRATORY: No cough, wheezing, hemoptysis; No shortness of breath  CARDIOVASCULAR: No chest pain or palpitations  GASTROINTESTINAL: No abdominal or epigastric pain. No nausea, vomiting, or hematemesis; No diarrhea or constipation. No melena or hematochezia.  GENITOURINARY: No dysuria, frequency or hematuria  NEUROLOGICAL: No numbness or weakness  SKIN: No itching, burning, rashes, or lesions   PSYCH: Normal mood and affect  All other review of systems is negative unless indicated above.    Vital Signs Last 24 Hrs  T(C): 36.7 (06 Mar 2024 07:48), Max: 36.7 (05 Mar 2024 16:03)  T(F): 98.1 (06 Mar 2024 07:48), Max: 98.1 (06 Mar 2024 07:48)  HR: 68 (06 Mar 2024 07:48) (66 - 72)  BP: 127/69 (06 Mar 2024 07:48) (127/69 - 152/71)  BP(mean): 98 (05 Mar 2024 16:03) (98 - 98)  RR: 18 (06 Mar 2024 07:48) (18 - 18)  SpO2: 96% (06 Mar 2024 07:48) (96% - 100%)    Parameters below as of 06 Mar 2024 07:48  Patient On (Oxygen Delivery Method): room air        PHYSICAL EXAM:    Constitutional: No acute distress, well-developed, non-toxic appearing  HEENT: masked, good phonation, not icteric  Neck: supple, no lymphadenopathy  Respiratory: clear to ascultation bilaterally, no wheezing  Cardiovascular: S1 and S2, regular rate and rhythm, no murmurs rubs or gallops  Gastrointestinal: soft, non-tender, non-distended, +bowel sounds, no rebound or guarding, no surgical scars, no drains  Extremities: No peripheral edema, no cyanosis or clubbing  Vascular: 2+ peripheral pulses, no venous stasis  Neurological: A/O x 3, no focal deficits, no asterixis  Psychiatric: Normal mood, normal affect  Skin: No rashes, not jaundiced    LABS:                        11.2   12.73 )-----------( 197      ( 06 Mar 2024 07:02 )             35.5     03-06    143  |  113<H>  |  14  ----------------------------<  130<H>  4.6   |  24  |  1.10    Ca    8.8      06 Mar 2024 07:02  Mg     2.2     03-05    TPro  7.1  /  Alb  3.6  /  TBili  0.3  /  DBili  x   /  AST  12<L>  /  ALT  28  /  AlkPhos  169<H>  03-05    PT/INR - ( 05 Mar 2024 17:11 )   PT: 11.2 sec;   INR: 0.99 ratio         PTT - ( 05 Mar 2024 17:11 )  PTT:30.1 sec  LIVER FUNCTIONS - ( 05 Mar 2024 17:11 )  Alb: 3.6 g/dL / Pro: 7.1 gm/dL / ALK PHOS: 169 U/L / ALT: 28 U/L / AST: 12 U/L / GGT: x             RADIOLOGY & ADDITIONAL STUDIES: Patient is a 63y old male who presents with a chief complaint of dark stools    HPI:  This is a pleasant 63 year old male with significant history of DVT, stage IV adenocarcinoma of right colon with mets to liver, lungs and left adrenal nodule (diagnosed in 08/2022) on chemo and 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 Barajas for melena. Evaluated at bedside. Patient endorses 3 day history dark stools. Denies abdominal or epigastric pain. Denies heartburn, vomiting, or loose stools. Denies history GIB. Last dose Pradaxa 3/4. Hgb 11 from 13 baseline.    PAST MEDICAL & SURGICAL HISTORY:  Colon cancer      Stage IV carcinoma of colon      History of DVT (deep vein thrombosis)      Chronic anticoagulation      Closed fracture of femur      MEDICATIONS  (STANDING):  pantoprazole Infusion 8 mG/Hr (10 mL/Hr) IV Continuous <Continuous>  sodium chloride 0.9%. 1000 milliLiter(s) (75 mL/Hr) IV Continuous <Continuous>    MEDICATIONS  (PRN):  acetaminophen     Tablet .. 650 milliGRAM(s) Oral every 6 hours PRN Temp greater or equal to 38C (100.4F), Mild Pain (1 - 3)  aluminum hydroxide/magnesium hydroxide/simethicone Suspension 30 milliLiter(s) Oral every 4 hours PRN Dyspepsia  melatonin 3 milliGRAM(s) Oral at bedtime PRN Insomnia  ondansetron Injectable 4 milliGRAM(s) IV Push every 8 hours PRN Nausea and/or Vomiting      Allergies    Seafood (Unknown)  No Known Drug Allergies    Intolerances    SOCIAL HISTORY:    FAMILY HISTORY:  FHx: leukemia (Mother)    REVIEW OF SYSTEMS:    CONSTITUTIONAL: No weakness, fevers or chills  EYES/ENT: No visual changes;  No vertigo or throat pain   NECK: No pain or stiffness  RESPIRATORY: No cough, wheezing, hemoptysis; No shortness of breath  CARDIOVASCULAR: No chest pain or palpitations  GASTROINTESTINAL: See HPI  GENITOURINARY: No dysuria, frequency or hematuria  NEUROLOGICAL: No numbness or weakness  SKIN: No itching, burning, rashes, or lesions   PSYCH: Normal mood and affect  All other review of systems is negative unless indicated above.    Vital Signs Last 24 Hrs  T(C): 36.7 (06 Mar 2024 07:48), Max: 36.7 (05 Mar 2024 16:03)  T(F): 98.1 (06 Mar 2024 07:48), Max: 98.1 (06 Mar 2024 07:48)  HR: 68 (06 Mar 2024 07:48) (66 - 72)  BP: 127/69 (06 Mar 2024 07:48) (127/69 - 152/71)  BP(mean): 98 (05 Mar 2024 16:03) (98 - 98)  RR: 18 (06 Mar 2024 07:48) (18 - 18)  SpO2: 96% (06 Mar 2024 07:48) (96% - 100%)    Parameters below as of 06 Mar 2024 07:48  Patient On (Oxygen Delivery Method): room air        PHYSICAL EXAM:    Constitutional: No acute distress, well-developed, non-toxic appearing  HEENT: masked, good phonation, not icteric  Neck: supple, no lymphadenopathy  Respiratory: clear to ascultation bilaterally, no wheezing  Cardiovascular: S1 and S2, regular rate and rhythm, no murmurs rubs or gallops  Gastrointestinal: soft, non-tender, non-distended, +bowel sounds, no rebound or guarding, no surgical scars, no drains  Extremities: No peripheral edema, no cyanosis or clubbing  Vascular: 2+ peripheral pulses, no venous stasis  Neurological: A/O x 3, no focal deficits, no asterixis  Psychiatric: Normal mood, normal affect  Skin: No rashes, not jaundiced    LABS:                        11.2   12.73 )-----------( 197      ( 06 Mar 2024 07:02 )             35.5     03-06    143  |  113<H>  |  14  ----------------------------<  130<H>  4.6   |  24  |  1.10    Ca    8.8      06 Mar 2024 07:02  Mg     2.2     03-05    TPro  7.1  /  Alb  3.6  /  TBili  0.3  /  DBili  x   /  AST  12<L>  /  ALT  28  /  AlkPhos  169<H>  03-05    PT/INR - ( 05 Mar 2024 17:11 )   PT: 11.2 sec;   INR: 0.99 ratio         PTT - ( 05 Mar 2024 17:11 )  PTT:30.1 sec  LIVER FUNCTIONS - ( 05 Mar 2024 17:11 )  Alb: 3.6 g/dL / Pro: 7.1 gm/dL / ALK PHOS: 169 U/L / ALT: 28 U/L / AST: 12 U/L / GGT: x             RADIOLOGY & ADDITIONAL STUDIES: Patient is a 63y old male who presents with a chief complaint of dark stools    63 year old man with stage IV adenocarcinoma of right colon on chemo with mets to liver,catheter tip thrombosis on anticogulation, sent to [facility] per oncologist Dr. Barajas for melena.     Evaluated at bedside. Patient endorses 3 day history dark black stools. Denies abdominal or epigastric pain. Denies heartburn, vomiting. No post prandial symptoms. Good appetite. No weakness. No weight loss. Typically has brown solid BM's daily but for the last few days stools are black. Denies prior history GI bleeding. No dizziness, chest pain or shortness of breath. Last dose Pradaxa 3/4. Hgb 11 from 13 baseline.     PAST MEDICAL & SURGICAL HISTORY:  Colon cancer      Stage IV carcinoma of colon      History of DVT (deep vein thrombosis)      Chronic anticoagulation      Closed fracture of femur      MEDICATIONS  (STANDING):  pantoprazole Infusion 8 mG/Hr (10 mL/Hr) IV Continuous <Continuous>  sodium chloride 0.9%. 1000 milliLiter(s) (75 mL/Hr) IV Continuous <Continuous>    MEDICATIONS  (PRN):  acetaminophen     Tablet .. 650 milliGRAM(s) Oral every 6 hours PRN Temp greater or equal to 38C (100.4F), Mild Pain (1 - 3)  aluminum hydroxide/magnesium hydroxide/simethicone Suspension 30 milliLiter(s) Oral every 4 hours PRN Dyspepsia  melatonin 3 milliGRAM(s) Oral at bedtime PRN Insomnia  ondansetron Injectable 4 milliGRAM(s) IV Push every 8 hours PRN Nausea and/or Vomiting      Allergies    Seafood (Unknown)  No Known Drug Allergies    Intolerances    SOCIAL HISTORY:  no smoking, drinking or drugs    FAMILY HISTORY:  FHx: leukemia (Mother)  no colon or stomach cancer    REVIEW OF SYSTEMS:    CONSTITUTIONAL: No weakness, fevers or chills  EYES/ENT: No visual changes;  No vertigo or throat pain   NECK: No pain or stiffness  RESPIRATORY: No cough, wheezing, hemoptysis; No shortness of breath  CARDIOVASCULAR: No chest pain or palpitations  GASTROINTESTINAL: See HPI  GENITOURINARY: No dysuria, frequency or hematuria  NEUROLOGICAL: No numbness or weakness  SKIN: No itching, burning, rashes, or lesions   PSYCH: Normal mood and affect  All other review of systems is negative unless indicated above.    Vital Signs Last 24 Hrs  T(C): 36.7 (06 Mar 2024 07:48), Max: 36.7 (05 Mar 2024 16:03)  T(F): 98.1 (06 Mar 2024 07:48), Max: 98.1 (06 Mar 2024 07:48)  HR: 68 (06 Mar 2024 07:48) (66 - 72)  BP: 127/69 (06 Mar 2024 07:48) (127/69 - 152/71)  BP(mean): 98 (05 Mar 2024 16:03) (98 - 98)  RR: 18 (06 Mar 2024 07:48) (18 - 18)  SpO2: 96% (06 Mar 2024 07:48) (96% - 100%)    Parameters below as of 06 Mar 2024 07:48  Patient On (Oxygen Delivery Method): room air        PHYSICAL EXAM:    Constitutional: No acute distress, well-developed, obese, non-toxic appearing  HEENT: masked, good phonation, not icteric  Neck: supple, no lymphadenopathy  Respiratory: clear to ascultation bilaterally, no wheezing  Cardiovascular: S1 and S2, regular rate and rhythm, no murmurs rubs or gallops  Gastrointestinal: soft, non-tender, non-distended, +bowel sounds, no rebound or guarding  Extremities: No peripheral edema, no cyanosis or clubbing  Vascular: 2+ peripheral pulses, no venous stasis  Neurological: A/O x 3, no focal deficits, no asterixis  Psychiatric: Normal mood, normal affect  Skin: No rashes, not jaundiced    LABS:                        11.2   12.73 )-----------( 197      ( 06 Mar 2024 07:02 )             35.5     03-06    143  |  113<H>  |  14  ----------------------------<  130<H>  4.6   |  24  |  1.10    Ca    8.8      06 Mar 2024 07:02  Mg     2.2     03-05    TPro  7.1  /  Alb  3.6  /  TBili  0.3  /  DBili  x   /  AST  12<L>  /  ALT  28  /  AlkPhos  169<H>  03-05    PT/INR - ( 05 Mar 2024 17:11 )   PT: 11.2 sec;   INR: 0.99 ratio         PTT - ( 05 Mar 2024 17:11 )  PTT:30.1 sec  LIVER FUNCTIONS - ( 05 Mar 2024 17:11 )  Alb: 3.6 g/dL / Pro: 7.1 gm/dL / ALK PHOS: 169 U/L / ALT: 28 U/L / AST: 12 U/L / GGT: x

## 2024-03-06 NOTE — DIETITIAN INITIAL EVALUATION ADULT - ETIOLOGY
r/t inability to consume sufficient energy/protein 2/2 GI issue of unknown etiology, stage 4 colon Ca, radiation

## 2024-03-06 NOTE — CONSULT NOTE ADULT - NS ATTEND AMEND GEN_ALL_CORE FT
63 year old man with metastatic colon cancer on chemo, on anticoagulation for catheter tip thrombosis, here with melena.     Plan for endoscopy.   At least two large bore IV's  Rescucitate
Patient with metastatic colon cancer on FOLFIRI who was is admitted for GI bleed, was recently changed to pradaxa for treatment of mediport thrombus.   Agree with holding anticoagulation, await EGD but he may also require colonoscopy as he had bright blood initially.   Monitor his counts closely.   He did receive irinotecan yesterday, no 5-FU.      Await CT results to evaluate the status of his thrombus, he had imaging on 2/29- no report as of yet.

## 2024-03-06 NOTE — DIETITIAN INITIAL EVALUATION ADULT - PERTINENT MEDS FT
MEDICATIONS  (STANDING):  pantoprazole Infusion 8 mG/Hr (10 mL/Hr) IV Continuous <Continuous>  sodium chloride 0.9%. 1000 milliLiter(s) (75 mL/Hr) IV Continuous <Continuous>    MEDICATIONS  (PRN):  acetaminophen     Tablet .. 650 milliGRAM(s) Oral every 6 hours PRN Temp greater or equal to 38C (100.4F), Mild Pain (1 - 3)  aluminum hydroxide/magnesium hydroxide/simethicone Suspension 30 milliLiter(s) Oral every 4 hours PRN Dyspepsia  melatonin 3 milliGRAM(s) Oral at bedtime PRN Insomnia  ondansetron Injectable 4 milliGRAM(s) IV Push every 8 hours PRN Nausea and/or Vomiting

## 2024-03-06 NOTE — DISCHARGE NOTE NURSING/CASE MANAGEMENT/SOCIAL WORK - NSTRANSFERBELONGINGSRESP_GEN_A_NUR
----- Message from Neva Beck sent at 8/29/2017  1:21 PM CDT -----  Contact: Erin with [facility] [phone]  Pharmacy is calling to clarify an RX.  RX name:  Augmentin 250 mg   What do they need to clarify:  Verify Directions   Comments:    yes

## 2024-03-06 NOTE — DISCHARGE NOTE PROVIDER - NSDCCPCAREPLAN_GEN_ALL_CORE_FT
PRINCIPAL DISCHARGE DIAGNOSIS  Diagnosis: Melena  Assessment and Plan of Treatment: You were treated in the hospital for dark stools, concern for gastrointestinal bleeding. Reassuringly, your bleeding appears to have resolved. Blood counts remain stable, and hemodynamics (blood pressure and heart rate) are normal. You underwent upper endoscopy today with Gastorenterology Dr. Hills who found you to have a normal appearing esophagus, stomach and duodenum. No immediate complications. Melena likely due to known colon mass. Gastroenterology deferring further inpatient endoscopic examination given your clinical stability. As per Gastroenterology, stable for disharge home today. Okay to resume your blood thinner as well. Advise close outpatient follow up with Dr. Harrison, your oncologist. Dr. Hills to alert Dr. Harrison of your endoscopy results.

## 2024-03-06 NOTE — DISCHARGE NOTE PROVIDER - CARE PROVIDER_API CALL
Carmela Harrison  Medical Oncology  [address], [place] [address]  Phone: [phone]  Fax: [phone]  Scheduled Appointment: 03/07/2024

## 2024-03-06 NOTE — DISCHARGE NOTE NURSING/CASE MANAGEMENT/SOCIAL WORK - PATIENT PORTAL LINK FT
You can access the FollowMyHealth Patient Portal offered by [facility] by registering at the following website: [email or web address]. By joining [facility]’s FollowMyHealth portal, you will also be able to view your health information using other applications (apps) compatible with our system.

## 2024-03-06 NOTE — DIETITIAN NUTRITION RISK NOTIFICATION - ETIOLOGY-BASIS
RT: Dr Armas    Patient Notified.   Patient verbalized understanding of information given.       Symptoms same, Epigastric pain,indigestion that is intermittent. Yesterday evening symptoms were worse, and he could not sleep. Today pain is back to baseline.     Family asking for next steps.    Acute illness or injury

## 2024-03-06 NOTE — DIETITIAN INITIAL EVALUATION ADULT - ORAL INTAKE PTA/DIET HISTORY
Pt lives alone but has help of a friend,  he typically eats three meal a day but breakfast is very light.  Based on his   dietary recall, PO intake estimated < 75% ENN > one month

## 2024-03-06 NOTE — CONSULT NOTE ADULT - ASSESSMENT
63 year old male with metastatic stage IV colon cancer presenting with melena x 3 days in setting of anticoagulation for recurrent thrombosis    Imp: Melena, anemia 2/2 likely UGIB    Rec:  ::Continue PPI  ::NPO for EGD today

## 2024-03-06 NOTE — CONSULT NOTE ADULT - SUBJECTIVE AND OBJECTIVE BOX
HPI:    64 y/o M with significant PMHx of Stage IV L Colon Adenocarcinoma with mets to liver, lung, R adrenal initially diagnosed 08/2022 currently on chemo complicated by Mediport thrombosis on Lovenox / Pradaxa, now referred to ED by Primary Onc Dr Harrison because of ongoing black stools x2 days. Patient stated that he was on Lovenox full dose which had been changed to Pradaxa about x1 week ago. He had noted bloody stools this past Saturday and Sunday, stopped taking Pradaxa as he was instructed with no additional blood noted since Monday 03/04. He followed up in the office, received chemo as scheduled and then sent over. Of note, he has not ever had any bleeding while on Lovenox. Primary Onc Dr Harrison spoke with GI Dr Hills who is planning for patient to receive EGD 03/06.    03/06/24:       PAST MEDICAL & SURGICAL HISTORY:    Colon cancer    Stage IV carcinoma of colon    History of DVT (deep vein thrombosis)    Chronic anticoagulation    Closed fracture of femur        MEDICATIONS  (STANDING):    pantoprazole Infusion 8 mG/Hr (10 mL/Hr) IV Continuous <Continuous>  sodium chloride 0.9%. 1000 milliLiter(s) (75 mL/Hr) IV Continuous <Continuous>      MEDICATIONS  (PRN):    acetaminophen     Tablet .. 650 milliGRAM(s) Oral every 6 hours PRN Temp greater or equal to 38C (100.4F), Mild Pain (1 - 3)  aluminum hydroxide/magnesium hydroxide/simethicone Suspension 30 milliLiter(s) Oral every 4 hours PRN Dyspepsia  melatonin 3 milliGRAM(s) Oral at bedtime PRN Insomnia  ondansetron Injectable 4 milliGRAM(s) IV Push every 8 hours PRN Nausea and/or Vomiting      ALLERGIES:    Seafood (Unknown)        FAMILY HISTORY:    leukemia (Mother)        REVIEW OF SYSTEMS:    Constitutional, Eyes, ENT, Cardiovascular, Respiratory, Gastrointestinal, Genitourinary, Musculoskeletal, Integumentary, Neurological, Psychiatric, Endocrine, Heme/Lymph and Allergic/Immunologic review of systems are otherwise negative except as noted in HPI.       VITALS:    T(C): 36.6 (05 Mar 2024 23:00), Max: 36.8 (05 Mar 2024 12:00)  T(F): 97.9 (05 Mar 2024 23:00), Max: 98.2 (05 Mar 2024 12:00)  HR: 72 (05 Mar 2024 23:00) (66 - 84)  BP: 152/71 (05 Mar 2024 23:00) (128/74 - 152/71)  BP(mean): 98 (05 Mar 2024 16:03) (98 - 98)  RR: 18 (05 Mar 2024 23:00) (18 - 18)  SpO2: 99% (05 Mar 2024 23:00) (98% - 100%)    Parameters below as of 05 Mar 2024 23:00  Patient On (Oxygen Delivery Method): room air        PHYSICAL:    Constitutional:  Eyes: no conjunctival infection, anicteric.   ENT: pharynx is unremarkable  Neck: supple without JVD  Pulmonary: clear to auscultation bilaterally  Cardiac: RRR  Vascular: no calf tenderness, venous stasis changes  Abdomen: normoactive bowel sounds, soft and nontender, no hepatosplenomegaly or masses appreciated  Lymphatic: no peripheral adenopathy appreciated  Musculoskeletal: full range of motion and no deformities appreciated  Skin: normal appearance, no rash  Neurology:      LABS:      CBC Full  -  ( 06 Mar 2024 01:56 )  WBC Count : x  RBC Count : x  Hemoglobin : 11.2 g/dL  Hematocrit : 34.8 %  Platelet Count - Automated : x  Mean Cell Volume : x  Mean Cell Hemoglobin : x  Mean Cell Hemoglobin Concentration : x  Auto Neutrophil # : x  Auto Lymphocyte # : x  Auto Monocyte # : x  Auto Eosinophil # : x  Auto Basophil # : x  Auto Neutrophil % : x  Auto Lymphocyte % : x  Auto Monocyte % : x  Auto Eosinophil % : x  Auto Basophil % : x    03-05    141  |  109<H>  |  10  ----------------------------<  118<H>  4.3   |  29  |  1.01    Ca    9.1      05 Mar 2024 17:11  Mg     2.2     03-05    TPro  7.1  /  Alb  3.6  /  TBili  0.3  /  DBili  x   /  AST  12<L>  /  ALT  28  /  AlkPhos  169<H>  03-05    PT/INR - ( 05 Mar 2024 17:11 )   PT: 11.2 sec;   INR: 0.99 ratio         PTT - ( 05 Mar 2024 17:11 )  PTT:30.1 sec  Urinalysis Basic - ( 05 Mar 2024 17:11 )    Color: x / Appearance: x / SG: x / pH: x  Gluc: 118 mg/dL / Ketone: x  / Bili: x / Urobili: x   Blood: x / Protein: x / Nitrite: x   Leuk Esterase: x / RBC: x / WBC x   Sq Epi: x / Non Sq Epi: x / Bacteria: x        RADIOLOGY & ADDITIONAL STUDIES:       HPI:    62 y/o M with significant PMHx of Stage IV L Colon Adenocarcinoma with mets to liver, lung, R adrenal initially diagnosed 08/2022 currently on chemo complicated by Mediport thrombosis on Lovenox / Pradaxa, now referred to ED by Primary Onc Dr Harrison because of ongoing black stools x2 days. Patient stated that he was on Lovenox full dose which had been changed to Pradaxa about x1 week ago. He had noted bloody stools this past Saturday and Sunday, stopped taking Pradaxa as he was instructed with no additional blood noted since Monday 03/04. He followed up in the office, received chemo as scheduled and then sent over. Of note, he has not ever had any bleeding while on Lovenox. Primary Onc Dr Harrison spoke with GI Dr Hills who is planning for patient to receive EGD 03/06.    03/06/24: Seen at bedside, no acute distress, recent CT chest imaging 02/29/24      PAST MEDICAL & SURGICAL HISTORY:    Colon cancer    Stage IV carcinoma of colon    History of DVT (deep vein thrombosis)    Chronic anticoagulation    Closed fracture of femur        MEDICATIONS  (STANDING):    pantoprazole Infusion 8 mG/Hr (10 mL/Hr) IV Continuous <Continuous>  sodium chloride 0.9%. 1000 milliLiter(s) (75 mL/Hr) IV Continuous <Continuous>      MEDICATIONS  (PRN):    acetaminophen     Tablet .. 650 milliGRAM(s) Oral every 6 hours PRN Temp greater or equal to 38C (100.4F), Mild Pain (1 - 3)  aluminum hydroxide/magnesium hydroxide/simethicone Suspension 30 milliLiter(s) Oral every 4 hours PRN Dyspepsia  melatonin 3 milliGRAM(s) Oral at bedtime PRN Insomnia  ondansetron Injectable 4 milliGRAM(s) IV Push every 8 hours PRN Nausea and/or Vomiting      ALLERGIES:    Seafood (Unknown)        FAMILY HISTORY:    leukemia (Mother)        REVIEW OF SYSTEMS:    Constitutional, Eyes, ENT, Cardiovascular, Respiratory, Gastrointestinal, Genitourinary, Musculoskeletal, Integumentary, Neurological, Psychiatric, Endocrine, Heme/Lymph and Allergic/Immunologic review of systems are otherwise negative except as noted in HPI.       VITALS:    T(C): 36.6 (05 Mar 2024 23:00), Max: 36.8 (05 Mar 2024 12:00)  T(F): 97.9 (05 Mar 2024 23:00), Max: 98.2 (05 Mar 2024 12:00)  HR: 72 (05 Mar 2024 23:00) (66 - 84)  BP: 152/71 (05 Mar 2024 23:00) (128/74 - 152/71)  BP(mean): 98 (05 Mar 2024 16:03) (98 - 98)  RR: 18 (05 Mar 2024 23:00) (18 - 18)  SpO2: 99% (05 Mar 2024 23:00) (98% - 100%)    Parameters below as of 05 Mar 2024 23:00  Patient On (Oxygen Delivery Method): room air        PHYSICAL:    Constitutional: no acute distress  Eyes: no conjunctival infection, anicteric.   ENT: pharynx is unremarkable  Neck: supple without JVD  Pulmonary: clear to auscultation bilaterally  Cardiac: RRR  Vascular: no calf tenderness, venous stasis changes  Abdomen: normoactive bowel sounds, soft and nontender, no hepatosplenomegaly or masses appreciated  Lymphatic: no peripheral adenopathy appreciated  Musculoskeletal: full range of motion and no deformities appreciated  Skin: normal appearance, no rash  Neurology: awake, alert, oriented       LABS:      CBC Full  -  ( 06 Mar 2024 01:56 )  WBC Count : x  RBC Count : x  Hemoglobin : 11.2 g/dL  Hematocrit : 34.8 %  Platelet Count - Automated : x  Mean Cell Volume : x  Mean Cell Hemoglobin : x  Mean Cell Hemoglobin Concentration : x  Auto Neutrophil # : x  Auto Lymphocyte # : x  Auto Monocyte # : x  Auto Eosinophil # : x  Auto Basophil # : x  Auto Neutrophil % : x  Auto Lymphocyte % : x  Auto Monocyte % : x  Auto Eosinophil % : x  Auto Basophil % : x    03-05    141  |  109<H>  |  10  ----------------------------<  118<H>  4.3   |  29  |  1.01    Ca    9.1      05 Mar 2024 17:11  Mg     2.2     03-05    TPro  7.1  /  Alb  3.6  /  TBili  0.3  /  DBili  x   /  AST  12<L>  /  ALT  28  /  AlkPhos  169<H>  03-05    PT/INR - ( 05 Mar 2024 17:11 )   PT: 11.2 sec;   INR: 0.99 ratio         PTT - ( 05 Mar 2024 17:11 )  PTT:30.1 sec  Urinalysis Basic - ( 05 Mar 2024 17:11 )    Color: x / Appearance: x / SG: x / pH: x  Gluc: 118 mg/dL / Ketone: x  / Bili: x / Urobili: x   Blood: x / Protein: x / Nitrite: x   Leuk Esterase: x / RBC: x / WBC x   Sq Epi: x / Non Sq Epi: x / Bacteria: x        RADIOLOGY & ADDITIONAL STUDIES:

## 2024-03-06 NOTE — DIETITIAN INITIAL EVALUATION ADULT - DIET TYPE
advance diet to low fiber when medically feasible.  Pt had tolerated clears./fiber/residue restricted

## 2024-03-06 NOTE — DISCHARGE NOTE PROVIDER - HOSPITAL COURSE
63 year old man with stage IV adenocarcinoma of right colon with metastases to liver, lungs and left adrenal nodule diagnosed 8/2022 on chemo, hx of catheter tip thrombosis on anticoagulation, also hx of left medial femoral condyle fracture s/p ORIF 6/2023, referred to ED by his oncologist Dr. Harrison for further evaluation of patient complaints of black stool x 2 days. Patient says that he was on Lovenox full dose, which had been changed to Pradaxa 5 days prior to presentation, had noted these abnormal stools starting 3/2, stopped taking Pradaxa on 3/4 as he was instructed. On 3/5, he had chemo treatment as scheduled and was referred to ED after finishing his chemo treatment. He denied ant further rectal bleeding or black stool since early AM 3/4. Monday morning. He further added that he never has bloody stool being on Lovenox. Denied hematemesis or hemoptysis or hematuria.  Denied shortness of breath, dizziness, lightheadedness, fever, chills, chest pain, palpitation, abdominal pain, diarrhea or dysuria. In the ED. Hgb was 12.1, Platelets 175, INR 0.99, BUN 10, Cr 1.01, K 4.3, LFTs WNL.  Protonix drip was started in ED after bolus dose. Dr. Harrison spoke to Dr. Hills, and plan for EGD. Admitted to Medicine.     GI bleeding  Patient is now S/P EGD. The examined esophagus was normal. The entire examined stomach was normal. The examined duodenum was normal. No immediate complications. Unremarkable upper endoscopy. Melena likely due to known colon mass. Hgb stable. Hemodynamics normal. Patient without any complaints. No overt bleeding for >48 hrs. As per GI, ok to restart Pradaxa and discharge home. Close outpatient follow up with Dr. Barajas. Dr. Hills to alert Dr. Harrison of the EGD results.       Discharge Exam:  Afebrile  BP 120s/60s  HR 60s  RR 16-18  O2 % on room air  Constitutional: No acute distress, well-developed, non-toxic appearing  HEENT: NCAT, PERRL, EOMI, anicteric sclera, MMM, clear oropharynx  Neck: Supple, no lymphadenopathy  Respiratory: Normal resp effort, lungs clear to ascultation bilaterally  Cardiovascular: S1 and S2 normal, regular rate and rhythm  Gastrointestinal: Soft, non-tender, non-distended, +bowel sounds, no rebound or guarding  Extremities: No peripheral edema, no tenderness, intact peripheral pulses, normal cap refill  Skin: No rashes, not jaundiced  Psychiatric: Normal mood, normal affect  Neurological: A/O x 3, no focal deficits, no asterixis    Labs:                        11.2   12.73 )---------( 197                   35.5       143  |  113  |  14  ----------------------<  130  4.6   |  24  |  1.10    Ca  8.8      Mg  2.2         TPro  7.1  /  Alb  3.6  /  TBili  0.3  /  DBili  x   /  AST  12  /  ALT  28  /  AlkPhos  169    PT: 11.2 sec;   INR: 0.99 ratio   PTT: 30.1 sec    Imaging:  None this visit.    Cardiac Testing:  EKG 3/5: Rate WNL. NSR. Nonspecific ST T change.     Prior visit cardiac testing  TTE 1/24/23:  The left ventricle is normal in size, wall thickness, wall motion and contractility. Estimated left ventricular ejection fraction is 55%. Mild (1+) mitral regurgitation.    Procedures:  EGD 3/6: The examined esophagus was normal. The entire examined stomach was normal. The examined duodenum was normal. No immediate complications. Unremarkable upper endoscopy. Melena likely due to known colon mass.

## 2024-03-07 ENCOUNTER — RESULT REVIEW (OUTPATIENT)
Age: 64
End: 2024-03-07

## 2024-03-07 ENCOUNTER — APPOINTMENT (OUTPATIENT)
Dept: INFUSION THERAPY | Facility: CLINIC | Age: 64
End: 2024-03-07
Payer: COMMERCIAL

## 2024-03-07 ENCOUNTER — APPOINTMENT (OUTPATIENT)
Dept: HEMATOLOGY ONCOLOGY | Facility: CLINIC | Age: 64
End: 2024-03-07
Payer: COMMERCIAL

## 2024-03-07 VITALS
BODY MASS INDEX: 31.64 KG/M2 | HEIGHT: 71 IN | DIASTOLIC BLOOD PRESSURE: 83 MMHG | HEART RATE: 91 BPM | OXYGEN SATURATION: 96 % | SYSTOLIC BLOOD PRESSURE: 123 MMHG | WEIGHT: 226 LBS | TEMPERATURE: 98 F

## 2024-03-07 LAB
BASOPHILS # BLD AUTO: 0.09 K/UL — SIGNIFICANT CHANGE UP (ref 0–0.2)
BASOPHILS NFR BLD AUTO: 1 % — SIGNIFICANT CHANGE UP (ref 0–2)
EOSINOPHIL # BLD AUTO: 0.08 K/UL — SIGNIFICANT CHANGE UP (ref 0–0.5)
EOSINOPHIL NFR BLD AUTO: 0.9 % — SIGNIFICANT CHANGE UP (ref 0–6)
HCT VFR BLD CALC: 34.7 % — LOW (ref 39–50)
HGB BLD-MCNC: 11.3 G/DL — LOW (ref 13–17)
IMM GRANULOCYTES NFR BLD AUTO: 0.7 % — SIGNIFICANT CHANGE UP (ref 0–0.9)
LYMPHOCYTES # BLD AUTO: 1.99 K/UL — SIGNIFICANT CHANGE UP (ref 1–3.3)
LYMPHOCYTES # BLD AUTO: 21.2 % — SIGNIFICANT CHANGE UP (ref 13–44)
MCHC RBC-ENTMCNC: 30.4 PG — SIGNIFICANT CHANGE UP (ref 27–34)
MCHC RBC-ENTMCNC: 32.6 GM/DL — SIGNIFICANT CHANGE UP (ref 32–36)
MCV RBC AUTO: 93.3 FL — SIGNIFICANT CHANGE UP (ref 80–100)
MONOCYTES # BLD AUTO: 0.46 K/UL — SIGNIFICANT CHANGE UP (ref 0–0.9)
MONOCYTES NFR BLD AUTO: 4.9 % — SIGNIFICANT CHANGE UP (ref 2–14)
NEUTROPHILS # BLD AUTO: 6.7 K/UL — SIGNIFICANT CHANGE UP (ref 1.8–7.4)
NEUTROPHILS NFR BLD AUTO: 71.3 % — SIGNIFICANT CHANGE UP (ref 43–77)
NRBC # BLD: 0 /100 WBCS — SIGNIFICANT CHANGE UP (ref 0–0)
PLATELET # BLD AUTO: 182 K/UL — SIGNIFICANT CHANGE UP (ref 150–400)
RBC # BLD: 3.72 M/UL — LOW (ref 4.2–5.8)
RBC # FLD: 15.9 % — HIGH (ref 10.3–14.5)
WBC # BLD: 9.39 K/UL — SIGNIFICANT CHANGE UP (ref 3.8–10.5)
WBC # FLD AUTO: 9.39 K/UL — SIGNIFICANT CHANGE UP (ref 3.8–10.5)

## 2024-03-07 PROCEDURE — 99215 OFFICE O/P EST HI 40 MIN: CPT

## 2024-03-07 PROCEDURE — G2211 COMPLEX E/M VISIT ADD ON: CPT

## 2024-03-07 NOTE — REVIEW OF SYSTEMS
[Recent Change In Weight] : ~T recent weight change [Diarrhea: Grade 0] : Diarrhea: Grade 0 [Negative] : Allergic/Immunologic [Chest Pain] : no chest pain [Shortness Of Breath] : no shortness of breath [Vomiting] : no vomiting [Abdominal Pain] : no abdominal pain [Constipation] : no constipation [Confused] : no confusion [FreeTextEntry2] : wt gain noted  [de-identified] : neuropathy; walking with cane today

## 2024-03-07 NOTE — HISTORY OF PRESENT ILLNESS
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 On 08/18/22, he had a CT C/A/P which showed  irregular colonic wall thickening of the proximal sigmoid colon with stranding of the surrounding paracolic fat. Regional lymph nodes are identified adjacent to the colonic mass measuring up to 1.2 cm. Multiple masses identified within the hepatic parenchyma noted to be 3.9 cm, suspicious for hepatic parenchymal neoplastic disease. 3.4 cm left adrenal mass, suspicious for neoplastic disease. There is a 1.0 cm soft tissue nodule identified along the right lateral wall of the distal trachea, superior to the tracheal bifurcation. Lung nodules suspicious for lung parenchymal neoplastic disease.  \par  \par  On 8/18/22, he had a colonoscopy that showed a frond-like/villous and ulcerated non-obstructing large mass was found in the descending colon. The mass was circumferential. The mass measured five cm in length. Pathology showed invasive adenocarcinoma, moderately differentiated. No loss of nuclear expression of MMR proteins (MLH1, MSH2, MSH6, and PMS2). These results show low probability of microsatellite instability-high (MSI-H). On 8/19/22, he had a liver, core biopsy which showed metastatic adenocarcinoma, consistent with metastasis from colonic primary.\par  \par   [de-identified] : no MMR deficiency [de-identified] : Medical Hx: DVT 14 years ago (unknown cause) \par  Surgical Hx: left eye sx; left torn meniscus repaired \par  Family Hx: Mother leukemia\par  Social Hx: never smoker; ETOH never; lives alone; single; works PT at [employer]; daughters close by [de-identified] : Pt is C34D3 of FOLFIOXIRI q 2 weeks, with ongoing cycles planned. He was hospitalized June 9th - 14th, 2023 due to mechanical fall/Left femoral condyle fracture s/p retrograde IMN. He continues walking with his cane today. No pain. Neuropathy is stable but continues, always worse overnight in fingertips, continues with a little neuropathy, bottom of foot and toes, he follows with Dr Vergara. He denies fatigue, cold sensitivity, eye changes, hair loss, nail / skin changes, pain, mouth sores, vomiting, heartburn, C/D. He needs a root canal with dental. He feels well overall.

## 2024-03-07 NOTE — RESULTS/DATA
[FreeTextEntry1] : 2/29/24 CT C/A/P: Stable bilobar hepatic metastasis. Findings consistent with interval disease stability. Small catheter associated thrombosis of the tip of a right-sided Mediport catheter in the superior vena cava unchanged.  10/25/23 CT C/A/P: Hepatic metastatic disease stable to mildly improved. Small catheter associated thrombus at the tip of a right sided Mediport catheter.  7/11/23 CT C/A/P: Continued decrease in size of hepatic metastases without evidence of new lesion, 1.2 x 1.1 cm previously measuring 1.7 x 1.3 cm; measures 1.9 x 0.9 cm previously measuring 2.3 x 1.4 cm. Subtle findings consistent with the patient's previous ascending colonic mass identified without evidence of large bowel obstruction. Stable left adrenal mass and punctate pulmonary nodules. Small pleural thickening along the right posterior pleural amenable to follow-up unclear significance.  4/10/23 CT C/A/P: Distal descending colon mass has decreased in size since prior with decreased associated colonic dilatation. Large amount of stool again noted in the colon and rectum. Hepatic metastases are slightly decreased in size, largest decrease 2.3 x 1.4 cm, previously 3.0 x 1.8 cm. Unchanged left adrenal mass and small lung nodule.  1/23/23 CT:  New partial large bowel obstruction secondary to known apple core lesion in the distal descending colon. Again extensive adjacent mesenteric abnormality is seen which may represent conglomerate lymph nodes.Redemonstrated hepatic metastases.  No change large left adrenal nodule.Small pericardial effusion is again seen.  1/9/23 CT C/A/P: Findings consistent with descending colon cancer with extension through the wall with extensive mesenteric confluent tumor and/or ivelisse disease mildly worse when compared to the prior examination. Diffuse metastatic disease identified throughout the liver and small lesion most noted within the right middle lobe improved when compared to the prior examination (now measuring 2.7 x 2.5 cm previously measuring 3.7 x 3.2 cm and a lesion in the right lobe measuring 2.1 x by 1.6 cm previously measuring 3.3 x 2.3 cm). Small pericardial effusion and areas of thickening of questionable significance. Small amount of pericatheter clot near the tip of the catheter in the superior vena cava.  8/19/22 Path: Liver, core biopsy: Metastatic adenocarcinoma, consistent with metastasis from colonic primary. Immunohistochemical stains were performed and results as follows: CDX - 2 is positive. CK 20 is patchy positive. CK 7 is negative. These findings support the diagnosis. No loss of nuclear expression of MMR proteins (MLH1, MSH2, MSH6, and PMS2).   These results show low probability of microsatellite instability-high (MSI-H).  8/18/22 Path: Colon, descending, mass, biopsy. Invasive adenocarcinoma, moderately differentiated. No loss of nuclear expression of MMR proteins (MLH1, MSH2, MSH6, and PMS2).   These results show low probability of microsatellite instability-high (MSI-H).  8/18/22 Colonoscopy: A frond-like/villous and ulcerated non-obstructing large mass was found in the descending colon. The lass was circumferential. The mass measured five cm in length. Oozing was present. Biopsied and tattooed. The scope was able to transverse the mass. The colon was otherwise normal.  8/18/22 CT C/A/P: There is irregular colonic wall thickening of the proximal sigmoid colon with stranding of the surrounding paracolic fat. Regional lymph nodes are identified adjacent to the colonic mass measuring up to 1.2 cm.  Multiple hypodense masses identified within the hepatic parenchyma is not to 3.9 cm, suspicious for hepatic parenchymal neoplastic disease. 3.4 cm left adrenal mass, suspicious for neoplastic disease. There is a 1.0 cm soft tissue nodule identified along the right lateral wall of the distal trachea, superior to the tracheal bifurcation. Lung nodules suspicious for lung parenchymal neoplastic disease.

## 2024-03-07 NOTE — PHYSICAL EXAM
[Restricted in physically strenuous activity but ambulatory and able to carry out work of a light or sedentary nature] : Status 1- Restricted in physically strenuous activity but ambulatory and able to carry out work of a light or sedentary nature, e.g., light house work, office work [Normal] : grossly intact [de-identified] : wt gain noted  [de-identified] : walking wiht cane today  [de-identified] : right chest wall port accessed, dressing C/D/I

## 2024-03-08 PROBLEM — C18.9 MALIGNANT NEOPLASM OF COLON, UNSPECIFIED: Chronic | Status: ACTIVE | Noted: 2024-03-06

## 2024-03-08 PROBLEM — Z86.718 PERSONAL HISTORY OF OTHER VENOUS THROMBOSIS AND EMBOLISM: Chronic | Status: ACTIVE | Noted: 2024-03-06

## 2024-03-08 PROBLEM — Z79.01 LONG TERM (CURRENT) USE OF ANTICOAGULANTS: Chronic | Status: ACTIVE | Noted: 2024-03-06

## 2024-03-12 DIAGNOSIS — E44.0 MODERATE PROTEIN-CALORIE MALNUTRITION: ICD-10-CM

## 2024-03-12 DIAGNOSIS — C78.7 SECONDARY MALIGNANT NEOPLASM OF LIVER AND INTRAHEPATIC BILE DUCT: ICD-10-CM

## 2024-03-12 DIAGNOSIS — Z79.01 LONG TERM (CURRENT) USE OF ANTICOAGULANTS: ICD-10-CM

## 2024-03-12 DIAGNOSIS — D62 ACUTE POSTHEMORRHAGIC ANEMIA: ICD-10-CM

## 2024-03-12 DIAGNOSIS — Z91.013 ALLERGY TO SEAFOOD: ICD-10-CM

## 2024-03-12 DIAGNOSIS — C78.00 SECONDARY MALIGNANT NEOPLASM OF UNSPECIFIED LUNG: ICD-10-CM

## 2024-03-12 DIAGNOSIS — K62.5 HEMORRHAGE OF ANUS AND RECTUM: ICD-10-CM

## 2024-03-12 DIAGNOSIS — Z86.718 PERSONAL HISTORY OF OTHER VENOUS THROMBOSIS AND EMBOLISM: ICD-10-CM

## 2024-03-12 DIAGNOSIS — C18.7 MALIGNANT NEOPLASM OF SIGMOID COLON: ICD-10-CM

## 2024-03-12 DIAGNOSIS — C79.70 SECONDARY MALIGNANT NEOPLASM OF UNSPECIFIED ADRENAL GLAND: ICD-10-CM

## 2024-03-18 ENCOUNTER — OUTPATIENT (OUTPATIENT)
Dept: OUTPATIENT SERVICES | Facility: HOSPITAL | Age: 64
LOS: 1 days | Discharge: HOME | End: 2024-03-18

## 2024-03-18 DIAGNOSIS — S72.90XA UNSPECIFIED FRACTURE OF UNSPECIFIED FEMUR, INITIAL ENCOUNTER FOR CLOSED FRACTURE: Chronic | ICD-10-CM

## 2024-03-18 DIAGNOSIS — C18.9 MALIGNANT NEOPLASM OF COLON, UNSPECIFIED: ICD-10-CM

## 2024-03-19 ENCOUNTER — RESULT REVIEW (OUTPATIENT)
Age: 64
End: 2024-03-19

## 2024-03-19 ENCOUNTER — APPOINTMENT (OUTPATIENT)
Dept: INFUSION THERAPY | Facility: CLINIC | Age: 64
End: 2024-03-19

## 2024-03-19 VITALS
DIASTOLIC BLOOD PRESSURE: 77 MMHG | HEART RATE: 79 BPM | SYSTOLIC BLOOD PRESSURE: 158 MMHG | HEIGHT: 71 IN | BODY MASS INDEX: 31.51 KG/M2 | TEMPERATURE: 98.1 F | WEIGHT: 225.06 LBS | OXYGEN SATURATION: 100 % | RESPIRATION RATE: 16 BRPM

## 2024-03-19 LAB
BASOPHILS # BLD AUTO: 0.11 K/UL — SIGNIFICANT CHANGE UP (ref 0–0.2)
BASOPHILS NFR BLD AUTO: 2.6 % — HIGH (ref 0–2)
EOSINOPHIL # BLD AUTO: 0.19 K/UL — SIGNIFICANT CHANGE UP (ref 0–0.5)
EOSINOPHIL NFR BLD AUTO: 4.4 % — SIGNIFICANT CHANGE UP (ref 0–6)
HCT VFR BLD CALC: 34.9 % — LOW (ref 39–50)
HGB BLD-MCNC: 11.3 G/DL — LOW (ref 13–17)
IMM GRANULOCYTES NFR BLD AUTO: 0.2 % — SIGNIFICANT CHANGE UP (ref 0–0.9)
LYMPHOCYTES # BLD AUTO: 1.19 K/UL — SIGNIFICANT CHANGE UP (ref 1–3.3)
LYMPHOCYTES # BLD AUTO: 27.7 % — SIGNIFICANT CHANGE UP (ref 13–44)
MCHC RBC-ENTMCNC: 30.1 PG — SIGNIFICANT CHANGE UP (ref 27–34)
MCHC RBC-ENTMCNC: 32.4 GM/DL — SIGNIFICANT CHANGE UP (ref 32–36)
MCV RBC AUTO: 92.8 FL — SIGNIFICANT CHANGE UP (ref 80–100)
MONOCYTES # BLD AUTO: 0.47 K/UL — SIGNIFICANT CHANGE UP (ref 0–0.9)
MONOCYTES NFR BLD AUTO: 10.9 % — SIGNIFICANT CHANGE UP (ref 2–14)
NEUTROPHILS # BLD AUTO: 2.33 K/UL — SIGNIFICANT CHANGE UP (ref 1.8–7.4)
NEUTROPHILS NFR BLD AUTO: 54.2 % — SIGNIFICANT CHANGE UP (ref 43–77)
NRBC # BLD: 0 /100 WBCS — SIGNIFICANT CHANGE UP (ref 0–0)
PLATELET # BLD AUTO: 221 K/UL — SIGNIFICANT CHANGE UP (ref 150–400)
RBC # BLD: 3.76 M/UL — LOW (ref 4.2–5.8)
RBC # FLD: 15.5 % — HIGH (ref 10.3–14.5)
WBC # BLD: 4.3 K/UL — SIGNIFICANT CHANGE UP (ref 3.8–10.5)
WBC # FLD AUTO: 4.3 K/UL — SIGNIFICANT CHANGE UP (ref 3.8–10.5)

## 2024-03-20 DIAGNOSIS — R11.2 NAUSEA WITH VOMITING, UNSPECIFIED: ICD-10-CM

## 2024-03-20 DIAGNOSIS — Z51.11 ENCOUNTER FOR ANTINEOPLASTIC CHEMOTHERAPY: ICD-10-CM

## 2024-03-20 LAB
ALBUMIN SERPL ELPH-MCNC: 4.2 G/DL — SIGNIFICANT CHANGE UP (ref 3.3–5)
ALP SERPL-CCNC: 119 U/L — SIGNIFICANT CHANGE UP (ref 40–120)
ALT FLD-CCNC: 23 U/L — SIGNIFICANT CHANGE UP (ref 10–45)
ANION GAP SERPL CALC-SCNC: 11 MMOL/L — SIGNIFICANT CHANGE UP (ref 5–17)
AST SERPL-CCNC: 18 U/L — SIGNIFICANT CHANGE UP (ref 10–40)
BILIRUB SERPL-MCNC: 0.3 MG/DL — SIGNIFICANT CHANGE UP (ref 0.2–1.2)
BUN SERPL-MCNC: 7 MG/DL — SIGNIFICANT CHANGE UP (ref 7–23)
CALCIUM SERPL-MCNC: 8.6 MG/DL — SIGNIFICANT CHANGE UP (ref 8.4–10.5)
CHLORIDE SERPL-SCNC: 105 MMOL/L — SIGNIFICANT CHANGE UP (ref 96–108)
CO2 SERPL-SCNC: 24 MMOL/L — SIGNIFICANT CHANGE UP (ref 22–31)
CREAT SERPL-MCNC: 0.76 MG/DL — SIGNIFICANT CHANGE UP (ref 0.5–1.3)
EGFR: 101 ML/MIN/1.73M2 — SIGNIFICANT CHANGE UP
GLUCOSE SERPL-MCNC: 98 MG/DL — SIGNIFICANT CHANGE UP (ref 70–99)
MAGNESIUM SERPL-MCNC: 1.9 MG/DL — SIGNIFICANT CHANGE UP (ref 1.6–2.6)
POTASSIUM SERPL-MCNC: 4.1 MMOL/L — SIGNIFICANT CHANGE UP (ref 3.5–5.3)
POTASSIUM SERPL-SCNC: 4.1 MMOL/L — SIGNIFICANT CHANGE UP (ref 3.5–5.3)
PROT SERPL-MCNC: 6.3 G/DL — SIGNIFICANT CHANGE UP (ref 6–8.3)
SODIUM SERPL-SCNC: 140 MMOL/L — SIGNIFICANT CHANGE UP (ref 135–145)

## 2024-03-21 ENCOUNTER — APPOINTMENT (OUTPATIENT)
Dept: INFUSION THERAPY | Facility: CLINIC | Age: 64
End: 2024-03-21

## 2024-03-22 DIAGNOSIS — Z51.89 ENCOUNTER FOR OTHER SPECIFIED AFTERCARE: ICD-10-CM

## 2024-04-02 ENCOUNTER — RESULT REVIEW (OUTPATIENT)
Age: 64
End: 2024-04-02

## 2024-04-02 ENCOUNTER — APPOINTMENT (OUTPATIENT)
Dept: HEMATOLOGY ONCOLOGY | Facility: CLINIC | Age: 64
End: 2024-04-02
Payer: COMMERCIAL

## 2024-04-02 ENCOUNTER — APPOINTMENT (OUTPATIENT)
Dept: INFUSION THERAPY | Facility: CLINIC | Age: 64
End: 2024-04-02
Payer: COMMERCIAL

## 2024-04-02 VITALS
OXYGEN SATURATION: 100 % | RESPIRATION RATE: 16 BRPM | HEART RATE: 89 BPM | WEIGHT: 220 LBS | TEMPERATURE: 98 F | DIASTOLIC BLOOD PRESSURE: 85 MMHG | SYSTOLIC BLOOD PRESSURE: 136 MMHG | HEIGHT: 71 IN | BODY MASS INDEX: 30.8 KG/M2

## 2024-04-02 LAB
ALBUMIN SERPL ELPH-MCNC: 4.3 G/DL — SIGNIFICANT CHANGE UP (ref 3.3–5)
ALP SERPL-CCNC: 159 U/L — HIGH (ref 40–120)
ALT FLD-CCNC: 24 U/L — SIGNIFICANT CHANGE UP (ref 10–45)
ANION GAP SERPL CALC-SCNC: 12 MMOL/L — SIGNIFICANT CHANGE UP (ref 5–17)
AST SERPL-CCNC: 19 U/L — SIGNIFICANT CHANGE UP (ref 10–40)
BASOPHILS # BLD AUTO: 0.08 K/UL — SIGNIFICANT CHANGE UP (ref 0–0.2)
BASOPHILS NFR BLD AUTO: 1.3 % — SIGNIFICANT CHANGE UP (ref 0–2)
BILIRUB SERPL-MCNC: 0.2 MG/DL — SIGNIFICANT CHANGE UP (ref 0.2–1.2)
BUN SERPL-MCNC: 11 MG/DL — SIGNIFICANT CHANGE UP (ref 7–23)
CALCIUM SERPL-MCNC: 8.9 MG/DL — SIGNIFICANT CHANGE UP (ref 8.4–10.5)
CHLORIDE SERPL-SCNC: 102 MMOL/L — SIGNIFICANT CHANGE UP (ref 96–108)
CO2 SERPL-SCNC: 24 MMOL/L — SIGNIFICANT CHANGE UP (ref 22–31)
CREAT SERPL-MCNC: 0.8 MG/DL — SIGNIFICANT CHANGE UP (ref 0.5–1.3)
EGFR: 99 ML/MIN/1.73M2 — SIGNIFICANT CHANGE UP
EOSINOPHIL # BLD AUTO: 0.29 K/UL — SIGNIFICANT CHANGE UP (ref 0–0.5)
EOSINOPHIL NFR BLD AUTO: 4.7 % — SIGNIFICANT CHANGE UP (ref 0–6)
GLUCOSE SERPL-MCNC: 88 MG/DL — SIGNIFICANT CHANGE UP (ref 70–99)
HCT VFR BLD CALC: 37.1 % — LOW (ref 39–50)
HGB BLD-MCNC: 12 G/DL — LOW (ref 13–17)
IMM GRANULOCYTES NFR BLD AUTO: 0.3 % — SIGNIFICANT CHANGE UP (ref 0–0.9)
LYMPHOCYTES # BLD AUTO: 1.41 K/UL — SIGNIFICANT CHANGE UP (ref 1–3.3)
LYMPHOCYTES # BLD AUTO: 23 % — SIGNIFICANT CHANGE UP (ref 13–44)
MAGNESIUM SERPL-MCNC: 2.3 MG/DL — SIGNIFICANT CHANGE UP (ref 1.6–2.6)
MCHC RBC-ENTMCNC: 29.6 PG — SIGNIFICANT CHANGE UP (ref 27–34)
MCHC RBC-ENTMCNC: 32.3 GM/DL — SIGNIFICANT CHANGE UP (ref 32–36)
MCV RBC AUTO: 91.4 FL — SIGNIFICANT CHANGE UP (ref 80–100)
MONOCYTES # BLD AUTO: 0.54 K/UL — SIGNIFICANT CHANGE UP (ref 0–0.9)
MONOCYTES NFR BLD AUTO: 8.8 % — SIGNIFICANT CHANGE UP (ref 2–14)
NEUTROPHILS # BLD AUTO: 3.78 K/UL — SIGNIFICANT CHANGE UP (ref 1.8–7.4)
NEUTROPHILS NFR BLD AUTO: 61.9 % — SIGNIFICANT CHANGE UP (ref 43–77)
NRBC # BLD: 0 /100 WBCS — SIGNIFICANT CHANGE UP (ref 0–0)
PLATELET # BLD AUTO: 172 K/UL — SIGNIFICANT CHANGE UP (ref 150–400)
POTASSIUM SERPL-MCNC: 4.4 MMOL/L — SIGNIFICANT CHANGE UP (ref 3.5–5.3)
POTASSIUM SERPL-SCNC: 4.4 MMOL/L — SIGNIFICANT CHANGE UP (ref 3.5–5.3)
PROT SERPL-MCNC: 7 G/DL — SIGNIFICANT CHANGE UP (ref 6–8.3)
RBC # BLD: 4.06 M/UL — LOW (ref 4.2–5.8)
RBC # FLD: 15.4 % — HIGH (ref 10.3–14.5)
SODIUM SERPL-SCNC: 138 MMOL/L — SIGNIFICANT CHANGE UP (ref 135–145)
WBC # BLD: 6.12 K/UL — SIGNIFICANT CHANGE UP (ref 3.8–10.5)
WBC # FLD AUTO: 6.12 K/UL — SIGNIFICANT CHANGE UP (ref 3.8–10.5)

## 2024-04-02 PROCEDURE — G2211 COMPLEX E/M VISIT ADD ON: CPT

## 2024-04-02 PROCEDURE — 99215 OFFICE O/P EST HI 40 MIN: CPT

## 2024-04-03 NOTE — REVIEW OF SYSTEMS
[Diarrhea: Grade 0] : Diarrhea: Grade 0 [Negative] : Heme/Lymph [Chest Pain] : no chest pain [Shortness Of Breath] : no shortness of breath [Abdominal Pain] : no abdominal pain [Vomiting] : no vomiting [Constipation] : no constipation [Confused] : no confusion [FreeTextEntry2] : wt stable [de-identified] : right chest wall port accessed [de-identified] : walking with cane today

## 2024-04-03 NOTE — PHYSICAL EXAM
[Restricted in physically strenuous activity but ambulatory and able to carry out work of a light or sedentary nature] : Status 1- Restricted in physically strenuous activity but ambulatory and able to carry out work of a light or sedentary nature, e.g., light house work, office work [Normal] : affect appropriate [de-identified] : wt stable [de-identified] : walking with cane today  [de-identified] : right chest wall port accessed, dressing C/D/I

## 2024-04-03 NOTE — HISTORY OF PRESENT ILLNESS
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 On 08/18/22, he had a CT C/A/P which showed  irregular colonic wall thickening of the proximal sigmoid colon with stranding of the surrounding paracolic fat. Regional lymph nodes are identified adjacent to the colonic mass measuring up to 1.2 cm. Multiple masses identified within the hepatic parenchyma noted to be 3.9 cm, suspicious for hepatic parenchymal neoplastic disease. 3.4 cm left adrenal mass, suspicious for neoplastic disease. There is a 1.0 cm soft tissue nodule identified along the right lateral wall of the distal trachea, superior to the tracheal bifurcation. Lung nodules suspicious for lung parenchymal neoplastic disease.  \par  \par  On 8/18/22, he had a colonoscopy that showed a frond-like/villous and ulcerated non-obstructing large mass was found in the descending colon. The mass was circumferential. The mass measured five cm in length. Pathology showed invasive adenocarcinoma, moderately differentiated. No loss of nuclear expression of MMR proteins (MLH1, MSH2, MSH6, and PMS2). These results show low probability of microsatellite instability-high (MSI-H). On 8/19/22, he had a liver, core biopsy which showed metastatic adenocarcinoma, consistent with metastasis from colonic primary.\par  \par   [de-identified] : no MMR deficiency [de-identified] : Medical Hx: DVT 14 years ago (unknown cause) \par  Surgical Hx: left eye sx; left torn meniscus repaired \par  Family Hx: Mother leukemia\par  Social Hx: never smoker; ETOH never; lives alone; single; works PT at [employer]; daughters close by [de-identified] : Pt is C36D1 of FOLFIOXIRI q 2 weeks, with ongoing cycles planned. He was hospitalized June 9th - 14th, 2023 due to mechanical fall/Left femoral condyle fracture s/p retrograde IMN. He continues walking with his cane today, difficultly bending his left knee, will be beginning PT out pt. No pain. Neuropathy is mostly resolved at this time. He denies fatigue, cold sensitivity, eye changes, hair loss, nail / skin changes, pain, mouth sores, vomiting, heartburn, C/D. His wt is stable overall, pt tring to lose wt with diet changes. He feels well overall.

## 2024-04-04 ENCOUNTER — APPOINTMENT (OUTPATIENT)
Dept: INFUSION THERAPY | Facility: CLINIC | Age: 64
End: 2024-04-04

## 2024-04-04 DIAGNOSIS — T82.898A OTHER SPECIFIED COMPLICATION OF VASCULAR PROSTHETIC DEVICES, IMPLANTS AND GRAFTS, INITIAL ENCOUNTER: ICD-10-CM

## 2024-04-04 DIAGNOSIS — G62.9 POLYNEUROPATHY, UNSPECIFIED: ICD-10-CM

## 2024-04-16 ENCOUNTER — APPOINTMENT (OUTPATIENT)
Dept: INFUSION THERAPY | Facility: CLINIC | Age: 64
End: 2024-04-16

## 2024-04-16 ENCOUNTER — RESULT REVIEW (OUTPATIENT)
Age: 64
End: 2024-04-16

## 2024-04-16 VITALS
HEIGHT: 71 IN | RESPIRATION RATE: 16 BRPM | WEIGHT: 220 LBS | DIASTOLIC BLOOD PRESSURE: 80 MMHG | BODY MASS INDEX: 30.8 KG/M2 | TEMPERATURE: 97.4 F | OXYGEN SATURATION: 97 % | HEART RATE: 96 BPM | SYSTOLIC BLOOD PRESSURE: 127 MMHG

## 2024-04-16 LAB
BASOPHILS # BLD AUTO: 0.09 K/UL — SIGNIFICANT CHANGE UP (ref 0–0.2)
BASOPHILS NFR BLD AUTO: 1.4 % — SIGNIFICANT CHANGE UP (ref 0–2)
EOSINOPHIL # BLD AUTO: 0.2 K/UL — SIGNIFICANT CHANGE UP (ref 0–0.5)
EOSINOPHIL NFR BLD AUTO: 3.2 % — SIGNIFICANT CHANGE UP (ref 0–6)
HCT VFR BLD CALC: 34.9 % — LOW (ref 39–50)
HGB BLD-MCNC: 11.4 G/DL — LOW (ref 13–17)
IMM GRANULOCYTES NFR BLD AUTO: 1.1 % — HIGH (ref 0–0.9)
LYMPHOCYTES # BLD AUTO: 1.53 K/UL — SIGNIFICANT CHANGE UP (ref 1–3.3)
LYMPHOCYTES # BLD AUTO: 24.1 % — SIGNIFICANT CHANGE UP (ref 13–44)
MCHC RBC-ENTMCNC: 29.3 PG — SIGNIFICANT CHANGE UP (ref 27–34)
MCHC RBC-ENTMCNC: 32.7 GM/DL — SIGNIFICANT CHANGE UP (ref 32–36)
MCV RBC AUTO: 89.7 FL — SIGNIFICANT CHANGE UP (ref 80–100)
MONOCYTES # BLD AUTO: 0.45 K/UL — SIGNIFICANT CHANGE UP (ref 0–0.9)
MONOCYTES NFR BLD AUTO: 7.1 % — SIGNIFICANT CHANGE UP (ref 2–14)
NEUTROPHILS # BLD AUTO: 4 K/UL — SIGNIFICANT CHANGE UP (ref 1.8–7.4)
NEUTROPHILS NFR BLD AUTO: 63.1 % — SIGNIFICANT CHANGE UP (ref 43–77)
NRBC # BLD: 0 /100 WBCS — SIGNIFICANT CHANGE UP (ref 0–0)
PLATELET # BLD AUTO: 231 K/UL — SIGNIFICANT CHANGE UP (ref 150–400)
RBC # BLD: 3.89 M/UL — LOW (ref 4.2–5.8)
RBC # FLD: 15.3 % — HIGH (ref 10.3–14.5)
WBC # BLD: 6.34 K/UL — SIGNIFICANT CHANGE UP (ref 3.8–10.5)
WBC # FLD AUTO: 6.34 K/UL — SIGNIFICANT CHANGE UP (ref 3.8–10.5)

## 2024-04-17 LAB
ALBUMIN SERPL ELPH-MCNC: 4.3 G/DL — SIGNIFICANT CHANGE UP (ref 3.3–5)
ALP SERPL-CCNC: 181 U/L — HIGH (ref 40–120)
ALT FLD-CCNC: 28 U/L — SIGNIFICANT CHANGE UP (ref 10–45)
ANION GAP SERPL CALC-SCNC: 13 MMOL/L — SIGNIFICANT CHANGE UP (ref 5–17)
AST SERPL-CCNC: 20 U/L — SIGNIFICANT CHANGE UP (ref 10–40)
BILIRUB SERPL-MCNC: 0.2 MG/DL — SIGNIFICANT CHANGE UP (ref 0.2–1.2)
BUN SERPL-MCNC: 12 MG/DL — SIGNIFICANT CHANGE UP (ref 7–23)
CALCIUM SERPL-MCNC: 9.1 MG/DL — SIGNIFICANT CHANGE UP (ref 8.4–10.5)
CHLORIDE SERPL-SCNC: 105 MMOL/L — SIGNIFICANT CHANGE UP (ref 96–108)
CO2 SERPL-SCNC: 23 MMOL/L — SIGNIFICANT CHANGE UP (ref 22–31)
CREAT SERPL-MCNC: 0.83 MG/DL — SIGNIFICANT CHANGE UP (ref 0.5–1.3)
EGFR: 98 ML/MIN/1.73M2 — SIGNIFICANT CHANGE UP
GLUCOSE SERPL-MCNC: 91 MG/DL — SIGNIFICANT CHANGE UP (ref 70–99)
MAGNESIUM SERPL-MCNC: 2.1 MG/DL — SIGNIFICANT CHANGE UP (ref 1.6–2.6)
POTASSIUM SERPL-MCNC: 4.1 MMOL/L — SIGNIFICANT CHANGE UP (ref 3.5–5.3)
POTASSIUM SERPL-SCNC: 4.1 MMOL/L — SIGNIFICANT CHANGE UP (ref 3.5–5.3)
PROT SERPL-MCNC: 6.7 G/DL — SIGNIFICANT CHANGE UP (ref 6–8.3)
SODIUM SERPL-SCNC: 141 MMOL/L — SIGNIFICANT CHANGE UP (ref 135–145)

## 2024-04-18 ENCOUNTER — APPOINTMENT (OUTPATIENT)
Dept: INFUSION THERAPY | Facility: CLINIC | Age: 64
End: 2024-04-18

## 2024-04-30 ENCOUNTER — RESULT REVIEW (OUTPATIENT)
Age: 64
End: 2024-04-30

## 2024-04-30 ENCOUNTER — APPOINTMENT (OUTPATIENT)
Dept: INFUSION THERAPY | Facility: CLINIC | Age: 64
End: 2024-04-30

## 2024-04-30 VITALS
WEIGHT: 220 LBS | HEIGHT: 71 IN | DIASTOLIC BLOOD PRESSURE: 87 MMHG | OXYGEN SATURATION: 99 % | TEMPERATURE: 97.9 F | BODY MASS INDEX: 30.8 KG/M2 | SYSTOLIC BLOOD PRESSURE: 146 MMHG | HEART RATE: 95 BPM

## 2024-04-30 LAB
BASOPHILS # BLD AUTO: 0.13 K/UL — SIGNIFICANT CHANGE UP (ref 0–0.2)
BASOPHILS NFR BLD AUTO: 1.6 % — SIGNIFICANT CHANGE UP (ref 0–2)
EOSINOPHIL # BLD AUTO: 0.21 K/UL — SIGNIFICANT CHANGE UP (ref 0–0.5)
EOSINOPHIL NFR BLD AUTO: 2.6 % — SIGNIFICANT CHANGE UP (ref 0–6)
HCT VFR BLD CALC: 36.2 % — LOW (ref 39–50)
HGB BLD-MCNC: 11.6 G/DL — LOW (ref 13–17)
IMM GRANULOCYTES NFR BLD AUTO: 1.2 % — HIGH (ref 0–0.9)
LYMPHOCYTES # BLD AUTO: 1.71 K/UL — SIGNIFICANT CHANGE UP (ref 1–3.3)
LYMPHOCYTES # BLD AUTO: 21.1 % — SIGNIFICANT CHANGE UP (ref 13–44)
MCHC RBC-ENTMCNC: 28.4 PG — SIGNIFICANT CHANGE UP (ref 27–34)
MCHC RBC-ENTMCNC: 32 GM/DL — SIGNIFICANT CHANGE UP (ref 32–36)
MCV RBC AUTO: 88.5 FL — SIGNIFICANT CHANGE UP (ref 80–100)
MONOCYTES # BLD AUTO: 0.65 K/UL — SIGNIFICANT CHANGE UP (ref 0–0.9)
MONOCYTES NFR BLD AUTO: 8 % — SIGNIFICANT CHANGE UP (ref 2–14)
NEUTROPHILS # BLD AUTO: 5.29 K/UL — SIGNIFICANT CHANGE UP (ref 1.8–7.4)
NEUTROPHILS NFR BLD AUTO: 65.5 % — SIGNIFICANT CHANGE UP (ref 43–77)
NRBC # BLD: 0 /100 WBCS — SIGNIFICANT CHANGE UP (ref 0–0)
PLATELET # BLD AUTO: 203 K/UL — SIGNIFICANT CHANGE UP (ref 150–400)
RBC # BLD: 4.09 M/UL — LOW (ref 4.2–5.8)
RBC # FLD: 15.5 % — HIGH (ref 10.3–14.5)
WBC # BLD: 8.09 K/UL — SIGNIFICANT CHANGE UP (ref 3.8–10.5)
WBC # FLD AUTO: 8.09 K/UL — SIGNIFICANT CHANGE UP (ref 3.8–10.5)

## 2024-05-01 LAB
ALBUMIN SERPL ELPH-MCNC: 4.5 G/DL — SIGNIFICANT CHANGE UP (ref 3.3–5)
ALP SERPL-CCNC: 197 U/L — HIGH (ref 40–120)
ALT FLD-CCNC: 23 U/L — SIGNIFICANT CHANGE UP (ref 10–45)
ANION GAP SERPL CALC-SCNC: 13 MMOL/L — SIGNIFICANT CHANGE UP (ref 5–17)
AST SERPL-CCNC: 18 U/L — SIGNIFICANT CHANGE UP (ref 10–40)
BILIRUB SERPL-MCNC: 0.2 MG/DL — SIGNIFICANT CHANGE UP (ref 0.2–1.2)
BUN SERPL-MCNC: 13 MG/DL — SIGNIFICANT CHANGE UP (ref 7–23)
CALCIUM SERPL-MCNC: 9.3 MG/DL — SIGNIFICANT CHANGE UP (ref 8.4–10.5)
CHLORIDE SERPL-SCNC: 105 MMOL/L — SIGNIFICANT CHANGE UP (ref 96–108)
CO2 SERPL-SCNC: 24 MMOL/L — SIGNIFICANT CHANGE UP (ref 22–31)
CREAT SERPL-MCNC: 0.83 MG/DL — SIGNIFICANT CHANGE UP (ref 0.5–1.3)
EGFR: 98 ML/MIN/1.73M2 — SIGNIFICANT CHANGE UP
GLUCOSE SERPL-MCNC: 82 MG/DL — SIGNIFICANT CHANGE UP (ref 70–99)
MAGNESIUM SERPL-MCNC: 2.1 MG/DL — SIGNIFICANT CHANGE UP (ref 1.6–2.6)
POTASSIUM SERPL-MCNC: 4.3 MMOL/L — SIGNIFICANT CHANGE UP (ref 3.5–5.3)
POTASSIUM SERPL-SCNC: 4.3 MMOL/L — SIGNIFICANT CHANGE UP (ref 3.5–5.3)
PROT SERPL-MCNC: 7 G/DL — SIGNIFICANT CHANGE UP (ref 6–8.3)
SODIUM SERPL-SCNC: 142 MMOL/L — SIGNIFICANT CHANGE UP (ref 135–145)

## 2024-05-02 ENCOUNTER — APPOINTMENT (OUTPATIENT)
Dept: INFUSION THERAPY | Facility: CLINIC | Age: 64
End: 2024-05-02

## 2024-05-09 ENCOUNTER — APPOINTMENT (OUTPATIENT)
Age: 64
End: 2024-05-09

## 2024-05-14 ENCOUNTER — APPOINTMENT (OUTPATIENT)
Dept: INFUSION THERAPY | Facility: CLINIC | Age: 64
End: 2024-05-14
Payer: COMMERCIAL

## 2024-05-14 ENCOUNTER — RESULT REVIEW (OUTPATIENT)
Age: 64
End: 2024-05-14

## 2024-05-14 ENCOUNTER — APPOINTMENT (OUTPATIENT)
Dept: HEMATOLOGY ONCOLOGY | Facility: CLINIC | Age: 64
End: 2024-05-14
Payer: COMMERCIAL

## 2024-05-14 VITALS
BODY MASS INDEX: 31.56 KG/M2 | HEART RATE: 113 BPM | DIASTOLIC BLOOD PRESSURE: 84 MMHG | OXYGEN SATURATION: 96 % | TEMPERATURE: 97.7 F | HEIGHT: 71 IN | RESPIRATION RATE: 16 BRPM | WEIGHT: 225.44 LBS | SYSTOLIC BLOOD PRESSURE: 141 MMHG

## 2024-05-14 LAB
BASOPHILS # BLD AUTO: 0.13 K/UL — SIGNIFICANT CHANGE UP (ref 0–0.2)
BASOPHILS NFR BLD AUTO: 1.7 % — SIGNIFICANT CHANGE UP (ref 0–2)
EOSINOPHIL # BLD AUTO: 0.2 K/UL — SIGNIFICANT CHANGE UP (ref 0–0.5)
EOSINOPHIL NFR BLD AUTO: 2.6 % — SIGNIFICANT CHANGE UP (ref 0–6)
HCT VFR BLD CALC: 32.3 % — LOW (ref 39–50)
HGB BLD-MCNC: 10.2 G/DL — LOW (ref 13–17)
IMM GRANULOCYTES NFR BLD AUTO: 1.6 % — HIGH (ref 0–0.9)
LYMPHOCYTES # BLD AUTO: 1.53 K/UL — SIGNIFICANT CHANGE UP (ref 1–3.3)
LYMPHOCYTES # BLD AUTO: 19.8 % — SIGNIFICANT CHANGE UP (ref 13–44)
MCHC RBC-ENTMCNC: 27.8 PG — SIGNIFICANT CHANGE UP (ref 27–34)
MCHC RBC-ENTMCNC: 31.6 GM/DL — LOW (ref 32–36)
MCV RBC AUTO: 88 FL — SIGNIFICANT CHANGE UP (ref 80–100)
MONOCYTES # BLD AUTO: 0.72 K/UL — SIGNIFICANT CHANGE UP (ref 0–0.9)
MONOCYTES NFR BLD AUTO: 9.3 % — SIGNIFICANT CHANGE UP (ref 2–14)
NEUTROPHILS # BLD AUTO: 5.04 K/UL — SIGNIFICANT CHANGE UP (ref 1.8–7.4)
NEUTROPHILS NFR BLD AUTO: 65 % — SIGNIFICANT CHANGE UP (ref 43–77)
NRBC # BLD: 0 /100 WBCS — SIGNIFICANT CHANGE UP (ref 0–0)
PLATELET # BLD AUTO: 201 K/UL — SIGNIFICANT CHANGE UP (ref 150–400)
RBC # BLD: 3.67 M/UL — LOW (ref 4.2–5.8)
RBC # FLD: 16.1 % — HIGH (ref 10.3–14.5)
WBC # BLD: 7.74 K/UL — SIGNIFICANT CHANGE UP (ref 3.8–10.5)
WBC # FLD AUTO: 7.74 K/UL — SIGNIFICANT CHANGE UP (ref 3.8–10.5)

## 2024-05-14 PROCEDURE — G2211 COMPLEX E/M VISIT ADD ON: CPT

## 2024-05-14 PROCEDURE — 99214 OFFICE O/P EST MOD 30 MIN: CPT

## 2024-05-15 LAB
ALBUMIN SERPL ELPH-MCNC: 4.4 G/DL — SIGNIFICANT CHANGE UP (ref 3.3–5)
ALP SERPL-CCNC: 188 U/L — HIGH (ref 40–120)
ALT FLD-CCNC: 17 U/L — SIGNIFICANT CHANGE UP (ref 10–45)
ANION GAP SERPL CALC-SCNC: 14 MMOL/L — SIGNIFICANT CHANGE UP (ref 5–17)
AST SERPL-CCNC: 16 U/L — SIGNIFICANT CHANGE UP (ref 10–40)
BILIRUB SERPL-MCNC: 0.2 MG/DL — SIGNIFICANT CHANGE UP (ref 0.2–1.2)
BUN SERPL-MCNC: 12 MG/DL — SIGNIFICANT CHANGE UP (ref 7–23)
CALCIUM SERPL-MCNC: 9 MG/DL — SIGNIFICANT CHANGE UP (ref 8.4–10.5)
CHLORIDE SERPL-SCNC: 106 MMOL/L — SIGNIFICANT CHANGE UP (ref 96–108)
CO2 SERPL-SCNC: 22 MMOL/L — SIGNIFICANT CHANGE UP (ref 22–31)
CREAT SERPL-MCNC: 0.83 MG/DL — SIGNIFICANT CHANGE UP (ref 0.5–1.3)
EGFR: 98 ML/MIN/1.73M2 — SIGNIFICANT CHANGE UP
GLUCOSE SERPL-MCNC: 104 MG/DL — HIGH (ref 70–99)
MAGNESIUM SERPL-MCNC: 2.1 MG/DL — SIGNIFICANT CHANGE UP (ref 1.6–2.6)
POTASSIUM SERPL-MCNC: 3.9 MMOL/L — SIGNIFICANT CHANGE UP (ref 3.5–5.3)
POTASSIUM SERPL-SCNC: 3.9 MMOL/L — SIGNIFICANT CHANGE UP (ref 3.5–5.3)
PROT SERPL-MCNC: 6.8 G/DL — SIGNIFICANT CHANGE UP (ref 6–8.3)
SODIUM SERPL-SCNC: 142 MMOL/L — SIGNIFICANT CHANGE UP (ref 135–145)

## 2024-05-16 ENCOUNTER — APPOINTMENT (OUTPATIENT)
Dept: INFUSION THERAPY | Facility: CLINIC | Age: 64
End: 2024-05-16

## 2024-05-21 NOTE — PHYSICAL EXAM
[Restricted in physically strenuous activity but ambulatory and able to carry out work of a light or sedentary nature] : Status 1- Restricted in physically strenuous activity but ambulatory and able to carry out work of a light or sedentary nature, e.g., light house work, office work [Normal] : affect appropriate [de-identified] : wt stable overall  [de-identified] : walking with cane today  [de-identified] : right chest wall port accessed, dressing C/D/I

## 2024-05-21 NOTE — REVIEW OF SYSTEMS
[Diarrhea: Grade 0] : Diarrhea: Grade 0 [Negative] : Allergic/Immunologic [Recent Change In Weight] : ~T no recent weight change [Chest Pain] : no chest pain [Shortness Of Breath] : no shortness of breath [Abdominal Pain] : no abdominal pain [Vomiting] : no vomiting [Constipation] : no constipation [Confused] : no confusion [FreeTextEntry2] : wt stable overall  [de-identified] : right chest wall port accessed [de-identified] : walking with cane today

## 2024-05-21 NOTE — HISTORY OF PRESENT ILLNESS
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 On 08/18/22, he had a CT C/A/P which showed  irregular colonic wall thickening of the proximal sigmoid colon with stranding of the surrounding paracolic fat. Regional lymph nodes are identified adjacent to the colonic mass measuring up to 1.2 cm. Multiple masses identified within the hepatic parenchyma noted to be 3.9 cm, suspicious for hepatic parenchymal neoplastic disease. 3.4 cm left adrenal mass, suspicious for neoplastic disease. There is a 1.0 cm soft tissue nodule identified along the right lateral wall of the distal trachea, superior to the tracheal bifurcation. Lung nodules suspicious for lung parenchymal neoplastic disease.  \par  \par  On 8/18/22, he had a colonoscopy that showed a frond-like/villous and ulcerated non-obstructing large mass was found in the descending colon. The mass was circumferential. The mass measured five cm in length. Pathology showed invasive adenocarcinoma, moderately differentiated. No loss of nuclear expression of MMR proteins (MLH1, MSH2, MSH6, and PMS2). These results show low probability of microsatellite instability-high (MSI-H). On 8/19/22, he had a liver, core biopsy which showed metastatic adenocarcinoma, consistent with metastasis from colonic primary.\par  \par   [de-identified] : no MMR deficiency [de-identified] : Medical Hx: DVT 14 years ago (unknown cause) \par  Surgical Hx: left eye sx; left torn meniscus repaired \par  Family Hx: Mother leukemia\par  Social Hx: never smoker; ETOH never; lives alone; single; works PT at [employer]; daughters close by [de-identified] : Pt is C39D1 of FOLFIOXIRI q 2 weeks, with ongoing cycles planned. He was hospitalized June 9th - 14th, 2023 due to mechanical fall/Left femoral condyle fracture s/p retrograde IMN. He continues walking with his cane today, difficultly bending his left knee, started PT out pt. No pain. Neuropathy is worse days 1-4, and resolves. He denies fatigue, cold sensitivity, eye changes, hair loss, nail / skin changes, pain, mouth sores, vomiting, heartburn, C/D. His wt gain noted, he remains stable overall. He reports he has been off his AC for 2 weeks since the rectal bleeding. He needs a new GI due to insurance issues. Asked him to restart Pradaxa every other day and see if bleeding restarts. He feels well today.

## 2024-05-28 ENCOUNTER — RESULT REVIEW (OUTPATIENT)
Age: 64
End: 2024-05-28

## 2024-05-28 ENCOUNTER — APPOINTMENT (OUTPATIENT)
Dept: INFUSION THERAPY | Facility: CLINIC | Age: 64
End: 2024-05-28

## 2024-05-28 ENCOUNTER — OUTPATIENT (OUTPATIENT)
Dept: OUTPATIENT SERVICES | Facility: HOSPITAL | Age: 64
LOS: 1 days | Discharge: HOME | End: 2024-05-28

## 2024-05-28 DIAGNOSIS — C18.9 MALIGNANT NEOPLASM OF COLON, UNSPECIFIED: ICD-10-CM

## 2024-05-28 DIAGNOSIS — S72.90XA UNSPECIFIED FRACTURE OF UNSPECIFIED FEMUR, INITIAL ENCOUNTER FOR CLOSED FRACTURE: Chronic | ICD-10-CM

## 2024-05-28 LAB
BASOPHILS # BLD AUTO: 0.15 K/UL — SIGNIFICANT CHANGE UP (ref 0–0.2)
BASOPHILS NFR BLD AUTO: 1.9 % — SIGNIFICANT CHANGE UP (ref 0–2)
EOSINOPHIL # BLD AUTO: 0.19 K/UL — SIGNIFICANT CHANGE UP (ref 0–0.5)
EOSINOPHIL NFR BLD AUTO: 2.4 % — SIGNIFICANT CHANGE UP (ref 0–6)
HCT VFR BLD CALC: 32.3 % — LOW (ref 39–50)
HGB BLD-MCNC: 9.8 G/DL — LOW (ref 13–17)
IMM GRANULOCYTES NFR BLD AUTO: 1.8 % — HIGH (ref 0–0.9)
LYMPHOCYTES # BLD AUTO: 1.57 K/UL — SIGNIFICANT CHANGE UP (ref 1–3.3)
LYMPHOCYTES # BLD AUTO: 19.9 % — SIGNIFICANT CHANGE UP (ref 13–44)
MCHC RBC-ENTMCNC: 26.3 PG — LOW (ref 27–34)
MCHC RBC-ENTMCNC: 30.3 GM/DL — LOW (ref 32–36)
MCV RBC AUTO: 86.8 FL — SIGNIFICANT CHANGE UP (ref 80–100)
MONOCYTES # BLD AUTO: 0.67 K/UL — SIGNIFICANT CHANGE UP (ref 0–0.9)
MONOCYTES NFR BLD AUTO: 8.5 % — SIGNIFICANT CHANGE UP (ref 2–14)
NEUTROPHILS # BLD AUTO: 5.18 K/UL — SIGNIFICANT CHANGE UP (ref 1.8–7.4)
NEUTROPHILS NFR BLD AUTO: 65.5 % — SIGNIFICANT CHANGE UP (ref 43–77)
NRBC # BLD: 0 /100 WBCS — SIGNIFICANT CHANGE UP (ref 0–0)
PLATELET # BLD AUTO: 236 K/UL — SIGNIFICANT CHANGE UP (ref 150–400)
RBC # BLD: 3.72 M/UL — LOW (ref 4.2–5.8)
RBC # FLD: 16.7 % — HIGH (ref 10.3–14.5)
WBC # BLD: 7.9 K/UL — SIGNIFICANT CHANGE UP (ref 3.8–10.5)
WBC # FLD AUTO: 7.9 K/UL — SIGNIFICANT CHANGE UP (ref 3.8–10.5)

## 2024-05-29 DIAGNOSIS — Z51.11 ENCOUNTER FOR ANTINEOPLASTIC CHEMOTHERAPY: ICD-10-CM

## 2024-05-29 DIAGNOSIS — R11.2 NAUSEA WITH VOMITING, UNSPECIFIED: ICD-10-CM

## 2024-05-29 LAB
ALBUMIN SERPL ELPH-MCNC: 4.3 G/DL — SIGNIFICANT CHANGE UP (ref 3.3–5)
ALP SERPL-CCNC: 180 U/L — HIGH (ref 40–120)
ALT FLD-CCNC: 21 U/L — SIGNIFICANT CHANGE UP (ref 10–45)
ANION GAP SERPL CALC-SCNC: 13 MMOL/L — SIGNIFICANT CHANGE UP (ref 5–17)
AST SERPL-CCNC: 18 U/L — SIGNIFICANT CHANGE UP (ref 10–40)
BILIRUB SERPL-MCNC: 0.2 MG/DL — SIGNIFICANT CHANGE UP (ref 0.2–1.2)
BUN SERPL-MCNC: 9 MG/DL — SIGNIFICANT CHANGE UP (ref 7–23)
CALCIUM SERPL-MCNC: 8.7 MG/DL — SIGNIFICANT CHANGE UP (ref 8.4–10.5)
CEA SERPL-MCNC: 23.6 NG/ML — HIGH (ref 0–3.8)
CHLORIDE SERPL-SCNC: 107 MMOL/L — SIGNIFICANT CHANGE UP (ref 96–108)
CO2 SERPL-SCNC: 23 MMOL/L — SIGNIFICANT CHANGE UP (ref 22–31)
CREAT SERPL-MCNC: 0.88 MG/DL — SIGNIFICANT CHANGE UP (ref 0.5–1.3)
EGFR: 97 ML/MIN/1.73M2 — SIGNIFICANT CHANGE UP
GLUCOSE SERPL-MCNC: 85 MG/DL — SIGNIFICANT CHANGE UP (ref 70–99)
MAGNESIUM SERPL-MCNC: 2.3 MG/DL — SIGNIFICANT CHANGE UP (ref 1.6–2.6)
POTASSIUM SERPL-MCNC: 3.7 MMOL/L — SIGNIFICANT CHANGE UP (ref 3.5–5.3)
POTASSIUM SERPL-SCNC: 3.7 MMOL/L — SIGNIFICANT CHANGE UP (ref 3.5–5.3)
PROT SERPL-MCNC: 6.7 G/DL — SIGNIFICANT CHANGE UP (ref 6–8.3)
SODIUM SERPL-SCNC: 144 MMOL/L — SIGNIFICANT CHANGE UP (ref 135–145)

## 2024-05-30 ENCOUNTER — APPOINTMENT (OUTPATIENT)
Dept: INFUSION THERAPY | Facility: CLINIC | Age: 64
End: 2024-05-30

## 2024-05-31 DIAGNOSIS — Z51.89 ENCOUNTER FOR OTHER SPECIFIED AFTERCARE: ICD-10-CM

## 2024-06-03 ENCOUNTER — OUTPATIENT (OUTPATIENT)
Dept: OUTPATIENT SERVICES | Facility: HOSPITAL | Age: 64
LOS: 1 days | End: 2024-06-03
Payer: COMMERCIAL

## 2024-06-03 ENCOUNTER — APPOINTMENT (OUTPATIENT)
Dept: CT IMAGING | Facility: CLINIC | Age: 64
End: 2024-06-03
Payer: COMMERCIAL

## 2024-06-03 DIAGNOSIS — S72.90XA UNSPECIFIED FRACTURE OF UNSPECIFIED FEMUR, INITIAL ENCOUNTER FOR CLOSED FRACTURE: Chronic | ICD-10-CM

## 2024-06-03 DIAGNOSIS — C18.9 MALIGNANT NEOPLASM OF COLON, UNSPECIFIED: ICD-10-CM

## 2024-06-03 DIAGNOSIS — Z00.8 ENCOUNTER FOR OTHER GENERAL EXAMINATION: ICD-10-CM

## 2024-06-03 PROCEDURE — 71260 CT THORAX DX C+: CPT | Mod: 26

## 2024-06-03 PROCEDURE — 71260 CT THORAX DX C+: CPT

## 2024-06-03 PROCEDURE — 74177 CT ABD & PELVIS W/CONTRAST: CPT | Mod: 26

## 2024-06-03 PROCEDURE — 74177 CT ABD & PELVIS W/CONTRAST: CPT

## 2024-06-12 ENCOUNTER — RESULT REVIEW (OUTPATIENT)
Age: 64
End: 2024-06-12

## 2024-06-12 ENCOUNTER — APPOINTMENT (OUTPATIENT)
Dept: INFUSION THERAPY | Facility: CLINIC | Age: 64
End: 2024-06-12

## 2024-06-12 VITALS
SYSTOLIC BLOOD PRESSURE: 143 MMHG | HEART RATE: 111 BPM | TEMPERATURE: 97.7 F | WEIGHT: 223 LBS | OXYGEN SATURATION: 98 % | BODY MASS INDEX: 31.22 KG/M2 | HEIGHT: 71 IN | DIASTOLIC BLOOD PRESSURE: 86 MMHG

## 2024-06-12 LAB
ALBUMIN SERPL ELPH-MCNC: 4.5 G/DL — SIGNIFICANT CHANGE UP (ref 3.3–5)
ALP SERPL-CCNC: 198 U/L — HIGH (ref 40–120)
ALT FLD-CCNC: 20 U/L — SIGNIFICANT CHANGE UP (ref 10–45)
ANION GAP SERPL CALC-SCNC: 12 MMOL/L — SIGNIFICANT CHANGE UP (ref 5–17)
AST SERPL-CCNC: 16 U/L — SIGNIFICANT CHANGE UP (ref 10–40)
BASOPHILS # BLD AUTO: 0.11 K/UL — SIGNIFICANT CHANGE UP (ref 0–0.2)
BASOPHILS NFR BLD AUTO: 1.3 % — SIGNIFICANT CHANGE UP (ref 0–2)
BILIRUB SERPL-MCNC: 0.2 MG/DL — SIGNIFICANT CHANGE UP (ref 0.2–1.2)
BUN SERPL-MCNC: 10 MG/DL — SIGNIFICANT CHANGE UP (ref 7–23)
CALCIUM SERPL-MCNC: 9 MG/DL — SIGNIFICANT CHANGE UP (ref 8.4–10.5)
CHLORIDE SERPL-SCNC: 105 MMOL/L — SIGNIFICANT CHANGE UP (ref 96–108)
CO2 SERPL-SCNC: 23 MMOL/L — SIGNIFICANT CHANGE UP (ref 22–31)
CREAT SERPL-MCNC: 0.85 MG/DL — SIGNIFICANT CHANGE UP (ref 0.5–1.3)
EGFR: 98 ML/MIN/1.73M2 — SIGNIFICANT CHANGE UP
EOSINOPHIL # BLD AUTO: 0.18 K/UL — SIGNIFICANT CHANGE UP (ref 0–0.5)
EOSINOPHIL NFR BLD AUTO: 2.2 % — SIGNIFICANT CHANGE UP (ref 0–6)
GLUCOSE SERPL-MCNC: 81 MG/DL — SIGNIFICANT CHANGE UP (ref 70–99)
HCT VFR BLD CALC: 34.8 % — LOW (ref 39–50)
HGB BLD-MCNC: 10.6 G/DL — LOW (ref 13–17)
IMM GRANULOCYTES NFR BLD AUTO: 0.6 % — SIGNIFICANT CHANGE UP (ref 0–0.9)
LYMPHOCYTES # BLD AUTO: 1.67 K/UL — SIGNIFICANT CHANGE UP (ref 1–3.3)
LYMPHOCYTES # BLD AUTO: 20.2 % — SIGNIFICANT CHANGE UP (ref 13–44)
MAGNESIUM SERPL-MCNC: 2.2 MG/DL — SIGNIFICANT CHANGE UP (ref 1.6–2.6)
MCHC RBC-ENTMCNC: 25.4 PG — LOW (ref 27–34)
MCHC RBC-ENTMCNC: 30.5 GM/DL — LOW (ref 32–36)
MCV RBC AUTO: 83.5 FL — SIGNIFICANT CHANGE UP (ref 80–100)
MONOCYTES # BLD AUTO: 0.75 K/UL — SIGNIFICANT CHANGE UP (ref 0–0.9)
MONOCYTES NFR BLD AUTO: 9.1 % — SIGNIFICANT CHANGE UP (ref 2–14)
NEUTROPHILS # BLD AUTO: 5.51 K/UL — SIGNIFICANT CHANGE UP (ref 1.8–7.4)
NEUTROPHILS NFR BLD AUTO: 66.6 % — SIGNIFICANT CHANGE UP (ref 43–77)
NRBC # BLD: 0 /100 WBCS — SIGNIFICANT CHANGE UP (ref 0–0)
PLATELET # BLD AUTO: 239 K/UL — SIGNIFICANT CHANGE UP (ref 150–400)
POTASSIUM SERPL-MCNC: 4.2 MMOL/L — SIGNIFICANT CHANGE UP (ref 3.5–5.3)
POTASSIUM SERPL-SCNC: 4.2 MMOL/L — SIGNIFICANT CHANGE UP (ref 3.5–5.3)
PROT SERPL-MCNC: 7.1 G/DL — SIGNIFICANT CHANGE UP (ref 6–8.3)
RBC # BLD: 4.17 M/UL — LOW (ref 4.2–5.8)
RBC # FLD: 17.2 % — HIGH (ref 10.3–14.5)
SODIUM SERPL-SCNC: 140 MMOL/L — SIGNIFICANT CHANGE UP (ref 135–145)
WBC # BLD: 8.27 K/UL — SIGNIFICANT CHANGE UP (ref 3.8–10.5)
WBC # FLD AUTO: 8.27 K/UL — SIGNIFICANT CHANGE UP (ref 3.8–10.5)

## 2024-06-13 ENCOUNTER — APPOINTMENT (OUTPATIENT)
Dept: HEMATOLOGY ONCOLOGY | Facility: CLINIC | Age: 64
End: 2024-06-13
Payer: COMMERCIAL

## 2024-06-13 VITALS
HEART RATE: 105 BPM | TEMPERATURE: 97.9 F | HEIGHT: 71 IN | BODY MASS INDEX: 31.22 KG/M2 | WEIGHT: 223 LBS | OXYGEN SATURATION: 97 % | DIASTOLIC BLOOD PRESSURE: 69 MMHG | SYSTOLIC BLOOD PRESSURE: 130 MMHG

## 2024-06-13 DIAGNOSIS — K92.1 MELENA: ICD-10-CM

## 2024-06-13 DIAGNOSIS — C18.9 MALIGNANT NEOPLASM OF COLON, UNSPECIFIED: ICD-10-CM

## 2024-06-13 DIAGNOSIS — T82.898A OTHER SPECIFIED COMPLICATION OF VASCULAR PROSTHETIC DEVICES, IMPLANTS AND GRAFTS, INITIAL ENCOUNTER: ICD-10-CM

## 2024-06-13 DIAGNOSIS — G62.9 POLYNEUROPATHY, UNSPECIFIED: ICD-10-CM

## 2024-06-13 DIAGNOSIS — C78.7 MALIGNANT NEOPLASM OF COLON, UNSPECIFIED: ICD-10-CM

## 2024-06-13 PROCEDURE — 99215 OFFICE O/P EST HI 40 MIN: CPT

## 2024-06-13 PROCEDURE — G2211 COMPLEX E/M VISIT ADD ON: CPT

## 2024-06-13 NOTE — REVIEW OF SYSTEMS
[Diarrhea: Grade 0] : Diarrhea: Grade 0 [Negative] : Allergic/Immunologic [Recent Change In Weight] : ~T no recent weight change [Chest Pain] : no chest pain [Shortness Of Breath] : no shortness of breath [Abdominal Pain] : no abdominal pain [Vomiting] : no vomiting [Constipation] : no constipation [Confused] : no confusion [FreeTextEntry2] : wt stable overall  [de-identified] : right chest wall port accessed [de-identified] : walking with cane today

## 2024-06-13 NOTE — PHYSICAL EXAM
[Restricted in physically strenuous activity but ambulatory and able to carry out work of a light or sedentary nature] : Status 1- Restricted in physically strenuous activity but ambulatory and able to carry out work of a light or sedentary nature, e.g., light house work, office work [Normal] : affect appropriate [de-identified] : wt stable overall  [de-identified] : walking with cane today  [de-identified] : right chest wall port accessed, dressing C/D/I

## 2024-06-13 NOTE — RESULTS/DATA
[FreeTextEntry1] : 6/3/24 CT C/A/P: Small catheter associated thrombosis of the tip of a right-sided Mediport catheter in the superior vena cava unchanged. Multiple hepatic metastatic lesions are unchanged. However, a single lesion in the right lobe of the liver is larger since the prior exam. Circumferential wall thickening involving the long segment of the distal aspect of the descending colon may reflect evidence of colitis.  2/29/24 CT C/A/P: Stable bilobar hepatic metastasis. Findings consistent with interval disease stability. Small catheter associated thrombosis of the tip of a right-sided Mediport catheter in the superior vena cava unchanged.  10/25/23 CT C/A/P: Hepatic metastatic disease stable to mildly improved. Small catheter associated thrombus at the tip of a right sided Mediport catheter.  7/11/23 CT C/A/P: Continued decrease in size of hepatic metastases without evidence of new lesion, 1.2 x 1.1 cm previously measuring 1.7 x 1.3 cm; measures 1.9 x 0.9 cm previously measuring 2.3 x 1.4 cm. Subtle findings consistent with the patient's previous ascending colonic mass identified without evidence of large bowel obstruction. Stable left adrenal mass and punctate pulmonary nodules. Small pleural thickening along the right posterior pleural amenable to follow-up unclear significance.  4/10/23 CT C/A/P: Distal descending colon mass has decreased in size since prior with decreased associated colonic dilatation. Large amount of stool again noted in the colon and rectum. Hepatic metastases are slightly decreased in size, largest decrease 2.3 x 1.4 cm, previously 3.0 x 1.8 cm. Unchanged left adrenal mass and small lung nodule.  1/23/23 CT:  New partial large bowel obstruction secondary to known apple core lesion in the distal descending colon. Again extensive adjacent mesenteric abnormality is seen which may represent conglomerate lymph nodes.Redemonstrated hepatic metastases.  No change large left adrenal nodule.Small pericardial effusion is again seen.  1/9/23 CT C/A/P: Findings consistent with descending colon cancer with extension through the wall with extensive mesenteric confluent tumor and/or ivelisse disease mildly worse when compared to the prior examination. Diffuse metastatic disease identified throughout the liver and small lesion most noted within the right middle lobe improved when compared to the prior examination (now measuring 2.7 x 2.5 cm previously measuring 3.7 x 3.2 cm and a lesion in the right lobe measuring 2.1 x by 1.6 cm previously measuring 3.3 x 2.3 cm). Small pericardial effusion and areas of thickening of questionable significance. Small amount of pericatheter clot near the tip of the catheter in the superior vena cava.  8/19/22 Path: Liver, core biopsy: Metastatic adenocarcinoma, consistent with metastasis from colonic primary. Immunohistochemical stains were performed and results as follows: CDX - 2 is positive. CK 20 is patchy positive. CK 7 is negative. These findings support the diagnosis. No loss of nuclear expression of MMR proteins (MLH1, MSH2, MSH6, and PMS2).   These results show low probability of microsatellite instability-high (MSI-H).  8/18/22 Path: Colon, descending, mass, biopsy. Invasive adenocarcinoma, moderately differentiated. No loss of nuclear expression of MMR proteins (MLH1, MSH2, MSH6, and PMS2).   These results show low probability of microsatellite instability-high (MSI-H).  8/18/22 Colonoscopy: A frond-like/villous and ulcerated non-obstructing large mass was found in the descending colon. The lass was circumferential. The mass measured five cm in length. Oozing was present. Biopsied and tattooed. The scope was able to transverse the mass. The colon was otherwise normal.  8/18/22 CT C/A/P: There is irregular colonic wall thickening of the proximal sigmoid colon with stranding of the surrounding paracolic fat. Regional lymph nodes are identified adjacent to the colonic mass measuring up to 1.2 cm.  Multiple hypodense masses identified within the hepatic parenchyma is not to 3.9 cm, suspicious for hepatic parenchymal neoplastic disease. 3.4 cm left adrenal mass, suspicious for neoplastic disease. There is a 1.0 cm soft tissue nodule identified along the right lateral wall of the distal trachea, superior to the tracheal bifurcation. Lung nodules suspicious for lung parenchymal neoplastic disease.

## 2024-06-13 NOTE — HISTORY OF PRESENT ILLNESS
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 On 08/18/22, he had a CT C/A/P which showed  irregular colonic wall thickening of the proximal sigmoid colon with stranding of the surrounding paracolic fat. Regional lymph nodes are identified adjacent to the colonic mass measuring up to 1.2 cm. Multiple masses identified within the hepatic parenchyma noted to be 3.9 cm, suspicious for hepatic parenchymal neoplastic disease. 3.4 cm left adrenal mass, suspicious for neoplastic disease. There is a 1.0 cm soft tissue nodule identified along the right lateral wall of the distal trachea, superior to the tracheal bifurcation. Lung nodules suspicious for lung parenchymal neoplastic disease.  \par  \par  On 8/18/22, he had a colonoscopy that showed a frond-like/villous and ulcerated non-obstructing large mass was found in the descending colon. The mass was circumferential. The mass measured five cm in length. Pathology showed invasive adenocarcinoma, moderately differentiated. No loss of nuclear expression of MMR proteins (MLH1, MSH2, MSH6, and PMS2). These results show low probability of microsatellite instability-high (MSI-H). On 8/19/22, he had a liver, core biopsy which showed metastatic adenocarcinoma, consistent with metastasis from colonic primary.\par  \par   [de-identified] : no MMR deficiency [de-identified] : Medical Hx: DVT 14 years ago (unknown cause) \par  Surgical Hx: left eye sx; left torn meniscus repaired \par  Family Hx: Mother leukemia\par  Social Hx: never smoker; ETOH never; lives alone; single; works PT at [employer]; daughters close by [de-identified] : Pt is C41D2 of FOLFIOXIRI q 2 weeks, with ongoing cycles planned. He was hospitalized June 9th - 14th, 2023 due to mechanical fall/Left femoral condyle fracture s/p retrograde IMN. He continues walking with his cane today, difficultly bending his left knee, started PT out pt. No pain. Neuropathy is worse days 1-4, and resolves. He denies fatigue, cold sensitivity, eye changes, hair loss, nail / skin changes, pain, mouth sores, vomiting, heartburn, C/D. His wt gain noted, he remains stable overall. He reports he has been off his AC for 2 weeks since the rectal bleeding. He needs a new GI due to insurance issues. Asked him to restart Pradaxa every other day and see if bleeding restarts. He feels well today.

## 2024-06-14 ENCOUNTER — APPOINTMENT (OUTPATIENT)
Dept: INFUSION THERAPY | Facility: CLINIC | Age: 64
End: 2024-06-14

## 2024-06-25 ENCOUNTER — RESULT REVIEW (OUTPATIENT)
Age: 64
End: 2024-06-25

## 2024-06-25 ENCOUNTER — APPOINTMENT (OUTPATIENT)
Dept: INFUSION THERAPY | Facility: CLINIC | Age: 64
End: 2024-06-25

## 2024-06-25 LAB
BASOPHILS # BLD AUTO: 0.17 K/UL — SIGNIFICANT CHANGE UP (ref 0–0.2)
BASOPHILS NFR BLD AUTO: 1.8 % — SIGNIFICANT CHANGE UP (ref 0–2)
EOSINOPHIL # BLD AUTO: 0.31 K/UL — SIGNIFICANT CHANGE UP (ref 0–0.5)
EOSINOPHIL NFR BLD AUTO: 3.3 % — SIGNIFICANT CHANGE UP (ref 0–6)
HCT VFR BLD CALC: 32.2 % — LOW (ref 39–50)
HGB BLD-MCNC: 9.9 G/DL — LOW (ref 13–17)
IMM GRANULOCYTES NFR BLD AUTO: 1.6 % — HIGH (ref 0–0.9)
LYMPHOCYTES # BLD AUTO: 1.8 K/UL — SIGNIFICANT CHANGE UP (ref 1–3.3)
LYMPHOCYTES # BLD AUTO: 19 % — SIGNIFICANT CHANGE UP (ref 13–44)
MCHC RBC-ENTMCNC: 25.8 PG — LOW (ref 27–34)
MCHC RBC-ENTMCNC: 30.7 GM/DL — LOW (ref 32–36)
MCV RBC AUTO: 83.9 FL — SIGNIFICANT CHANGE UP (ref 80–100)
MONOCYTES # BLD AUTO: 0.99 K/UL — HIGH (ref 0–0.9)
MONOCYTES NFR BLD AUTO: 10.4 % — SIGNIFICANT CHANGE UP (ref 2–14)
NEUTROPHILS # BLD AUTO: 6.06 K/UL — SIGNIFICANT CHANGE UP (ref 1.8–7.4)
NEUTROPHILS NFR BLD AUTO: 63.9 % — SIGNIFICANT CHANGE UP (ref 43–77)
NRBC # BLD: 0 /100 WBCS — SIGNIFICANT CHANGE UP (ref 0–0)
PLATELET # BLD AUTO: 262 K/UL — SIGNIFICANT CHANGE UP (ref 150–400)
RBC # BLD: 3.84 M/UL — LOW (ref 4.2–5.8)
RBC # FLD: 18.4 % — HIGH (ref 10.3–14.5)
WBC # BLD: 9.48 K/UL — SIGNIFICANT CHANGE UP (ref 3.8–10.5)
WBC # FLD AUTO: 9.48 K/UL — SIGNIFICANT CHANGE UP (ref 3.8–10.5)

## 2024-06-26 LAB
ALBUMIN SERPL ELPH-MCNC: 4.5 G/DL — SIGNIFICANT CHANGE UP (ref 3.3–5)
ALP SERPL-CCNC: 206 U/L — HIGH (ref 40–120)
ALT FLD-CCNC: 21 U/L — SIGNIFICANT CHANGE UP (ref 10–45)
ANION GAP SERPL CALC-SCNC: 12 MMOL/L — SIGNIFICANT CHANGE UP (ref 5–17)
AST SERPL-CCNC: 15 U/L — SIGNIFICANT CHANGE UP (ref 10–40)
BILIRUB SERPL-MCNC: 0.2 MG/DL — SIGNIFICANT CHANGE UP (ref 0.2–1.2)
BUN SERPL-MCNC: 10 MG/DL — SIGNIFICANT CHANGE UP (ref 7–23)
CALCIUM SERPL-MCNC: 8.9 MG/DL — SIGNIFICANT CHANGE UP (ref 8.4–10.5)
CHLORIDE SERPL-SCNC: 108 MMOL/L — SIGNIFICANT CHANGE UP (ref 96–108)
CO2 SERPL-SCNC: 22 MMOL/L — SIGNIFICANT CHANGE UP (ref 22–31)
CREAT SERPL-MCNC: 0.8 MG/DL — SIGNIFICANT CHANGE UP (ref 0.5–1.3)
EGFR: 99 ML/MIN/1.73M2 — SIGNIFICANT CHANGE UP
GLUCOSE SERPL-MCNC: 87 MG/DL — SIGNIFICANT CHANGE UP (ref 70–99)
MAGNESIUM SERPL-MCNC: 2.4 MG/DL — SIGNIFICANT CHANGE UP (ref 1.6–2.6)
POTASSIUM SERPL-MCNC: 4.3 MMOL/L — SIGNIFICANT CHANGE UP (ref 3.5–5.3)
POTASSIUM SERPL-SCNC: 4.3 MMOL/L — SIGNIFICANT CHANGE UP (ref 3.5–5.3)
PROT SERPL-MCNC: 6.8 G/DL — SIGNIFICANT CHANGE UP (ref 6–8.3)
SODIUM SERPL-SCNC: 142 MMOL/L — SIGNIFICANT CHANGE UP (ref 135–145)

## 2024-06-27 ENCOUNTER — APPOINTMENT (OUTPATIENT)
Dept: INFUSION THERAPY | Facility: CLINIC | Age: 64
End: 2024-06-27

## 2024-07-08 ENCOUNTER — TRANSCRIPTION ENCOUNTER (OUTPATIENT)
Age: 64
End: 2024-07-08

## 2024-07-09 ENCOUNTER — APPOINTMENT (OUTPATIENT)
Dept: INFUSION THERAPY | Facility: CLINIC | Age: 64
End: 2024-07-09

## 2024-07-09 ENCOUNTER — RESULT REVIEW (OUTPATIENT)
Age: 64
End: 2024-07-09

## 2024-07-09 LAB
ALBUMIN SERPL ELPH-MCNC: 4.4 G/DL — SIGNIFICANT CHANGE UP (ref 3.3–5)
ALP SERPL-CCNC: 195 U/L — HIGH (ref 40–120)
ALT FLD-CCNC: 21 U/L — SIGNIFICANT CHANGE UP (ref 10–45)
ANION GAP SERPL CALC-SCNC: 14 MMOL/L — SIGNIFICANT CHANGE UP (ref 5–17)
AST SERPL-CCNC: 22 U/L — SIGNIFICANT CHANGE UP (ref 10–40)
BASOPHILS # BLD AUTO: 0.07 K/UL — SIGNIFICANT CHANGE UP (ref 0–0.2)
BASOPHILS NFR BLD AUTO: 1.1 % — SIGNIFICANT CHANGE UP (ref 0–2)
BILIRUB SERPL-MCNC: 0.2 MG/DL — SIGNIFICANT CHANGE UP (ref 0.2–1.2)
BUN SERPL-MCNC: 9 MG/DL — SIGNIFICANT CHANGE UP (ref 7–23)
CALCIUM SERPL-MCNC: 8.6 MG/DL — SIGNIFICANT CHANGE UP (ref 8.4–10.5)
CHLORIDE SERPL-SCNC: 106 MMOL/L — SIGNIFICANT CHANGE UP (ref 96–108)
CO2 SERPL-SCNC: 22 MMOL/L — SIGNIFICANT CHANGE UP (ref 22–31)
CREAT SERPL-MCNC: 0.98 MG/DL — SIGNIFICANT CHANGE UP (ref 0.5–1.3)
EGFR: 87 ML/MIN/1.73M2 — SIGNIFICANT CHANGE UP
EOSINOPHIL # BLD AUTO: 0.2 K/UL — SIGNIFICANT CHANGE UP (ref 0–0.5)
EOSINOPHIL NFR BLD AUTO: 3.3 % — SIGNIFICANT CHANGE UP (ref 0–6)
GLUCOSE SERPL-MCNC: 112 MG/DL — HIGH (ref 70–99)
HCT VFR BLD CALC: 33.2 % — LOW (ref 39–50)
HGB BLD-MCNC: 10 G/DL — LOW (ref 13–17)
IMM GRANULOCYTES NFR BLD AUTO: 2 % — HIGH (ref 0–0.9)
LYMPHOCYTES # BLD AUTO: 1.49 K/UL — SIGNIFICANT CHANGE UP (ref 1–3.3)
LYMPHOCYTES # BLD AUTO: 24.3 % — SIGNIFICANT CHANGE UP (ref 13–44)
MAGNESIUM SERPL-MCNC: 2.2 MG/DL — SIGNIFICANT CHANGE UP (ref 1.6–2.6)
MCHC RBC-ENTMCNC: 24.8 PG — LOW (ref 27–34)
MCHC RBC-ENTMCNC: 30.1 GM/DL — LOW (ref 32–36)
MCV RBC AUTO: 82.2 FL — SIGNIFICANT CHANGE UP (ref 80–100)
MONOCYTES # BLD AUTO: 0.51 K/UL — SIGNIFICANT CHANGE UP (ref 0–0.9)
MONOCYTES NFR BLD AUTO: 8.3 % — SIGNIFICANT CHANGE UP (ref 2–14)
NEUTROPHILS # BLD AUTO: 3.75 K/UL — SIGNIFICANT CHANGE UP (ref 1.8–7.4)
NEUTROPHILS NFR BLD AUTO: 61 % — SIGNIFICANT CHANGE UP (ref 43–77)
NRBC # BLD: 0 /100 WBCS — SIGNIFICANT CHANGE UP (ref 0–0)
PLATELET # BLD AUTO: 204 K/UL — SIGNIFICANT CHANGE UP (ref 150–400)
POTASSIUM SERPL-MCNC: 3.9 MMOL/L — SIGNIFICANT CHANGE UP (ref 3.5–5.3)
POTASSIUM SERPL-SCNC: 3.9 MMOL/L — SIGNIFICANT CHANGE UP (ref 3.5–5.3)
PROT SERPL-MCNC: 6.8 G/DL — SIGNIFICANT CHANGE UP (ref 6–8.3)
RBC # BLD: 4.04 M/UL — LOW (ref 4.2–5.8)
RBC # FLD: 19.9 % — HIGH (ref 10.3–14.5)
SODIUM SERPL-SCNC: 142 MMOL/L — SIGNIFICANT CHANGE UP (ref 135–145)
WBC # BLD: 6.14 K/UL — SIGNIFICANT CHANGE UP (ref 3.8–10.5)
WBC # FLD AUTO: 6.14 K/UL — SIGNIFICANT CHANGE UP (ref 3.8–10.5)

## 2024-07-11 ENCOUNTER — APPOINTMENT (OUTPATIENT)
Dept: INFUSION THERAPY | Facility: CLINIC | Age: 64
End: 2024-07-11

## 2024-07-23 ENCOUNTER — RESULT REVIEW (OUTPATIENT)
Age: 64
End: 2024-07-23

## 2024-07-23 ENCOUNTER — APPOINTMENT (OUTPATIENT)
Dept: INFUSION THERAPY | Facility: CLINIC | Age: 64
End: 2024-07-23
Payer: MEDICAID

## 2024-07-23 ENCOUNTER — APPOINTMENT (OUTPATIENT)
Dept: HEMATOLOGY ONCOLOGY | Facility: CLINIC | Age: 64
End: 2024-07-23
Payer: MEDICAID

## 2024-07-23 DIAGNOSIS — G62.9 POLYNEUROPATHY, UNSPECIFIED: ICD-10-CM

## 2024-07-23 DIAGNOSIS — C18.9 MALIGNANT NEOPLASM OF COLON, UNSPECIFIED: ICD-10-CM

## 2024-07-23 DIAGNOSIS — T82.898A OTHER SPECIFIED COMPLICATION OF VASCULAR PROSTHETIC DEVICES, IMPLANTS AND GRAFTS, INITIAL ENCOUNTER: ICD-10-CM

## 2024-07-23 DIAGNOSIS — C78.7 MALIGNANT NEOPLASM OF COLON, UNSPECIFIED: ICD-10-CM

## 2024-07-23 LAB
BASOPHILS # BLD AUTO: 0.11 K/UL — SIGNIFICANT CHANGE UP (ref 0–0.2)
BASOPHILS NFR BLD AUTO: 1.8 % — SIGNIFICANT CHANGE UP (ref 0–2)
EOSINOPHIL # BLD AUTO: 0.09 K/UL — SIGNIFICANT CHANGE UP (ref 0–0.5)
EOSINOPHIL NFR BLD AUTO: 1.5 % — SIGNIFICANT CHANGE UP (ref 0–6)
HCT VFR BLD CALC: 30.5 % — LOW (ref 39–50)
HGB BLD-MCNC: 9.1 G/DL — LOW (ref 13–17)
IMM GRANULOCYTES NFR BLD AUTO: 4.5 % — HIGH (ref 0–0.9)
LYMPHOCYTES # BLD AUTO: 1.41 K/UL — SIGNIFICANT CHANGE UP (ref 1–3.3)
LYMPHOCYTES # BLD AUTO: 23.6 % — SIGNIFICANT CHANGE UP (ref 13–44)
MCHC RBC-ENTMCNC: 24.4 PG — LOW (ref 27–34)
MCHC RBC-ENTMCNC: 29.8 GM/DL — LOW (ref 32–36)
MCV RBC AUTO: 81.8 FL — SIGNIFICANT CHANGE UP (ref 80–100)
MONOCYTES # BLD AUTO: 0.55 K/UL — SIGNIFICANT CHANGE UP (ref 0–0.9)
MONOCYTES NFR BLD AUTO: 9.2 % — SIGNIFICANT CHANGE UP (ref 2–14)
NEUTROPHILS # BLD AUTO: 3.54 K/UL — SIGNIFICANT CHANGE UP (ref 1.8–7.4)
NEUTROPHILS NFR BLD AUTO: 59.4 % — SIGNIFICANT CHANGE UP (ref 43–77)
NRBC # BLD: 0 /100 WBCS — SIGNIFICANT CHANGE UP (ref 0–0)
PLATELET # BLD AUTO: 179 K/UL — SIGNIFICANT CHANGE UP (ref 150–400)
RBC # BLD: 3.73 M/UL — LOW (ref 4.2–5.8)
RBC # FLD: 20.8 % — HIGH (ref 10.3–14.5)
WBC # BLD: 5.97 K/UL — SIGNIFICANT CHANGE UP (ref 3.8–10.5)
WBC # FLD AUTO: 5.97 K/UL — SIGNIFICANT CHANGE UP (ref 3.8–10.5)

## 2024-07-23 PROCEDURE — G2211 COMPLEX E/M VISIT ADD ON: CPT

## 2024-07-23 PROCEDURE — 99215 OFFICE O/P EST HI 40 MIN: CPT

## 2024-07-23 PROCEDURE — 99417 PROLNG OP E/M EACH 15 MIN: CPT

## 2024-07-24 LAB
ALBUMIN SERPL ELPH-MCNC: 4 G/DL — SIGNIFICANT CHANGE UP (ref 3.3–5)
ALP SERPL-CCNC: 177 U/L — HIGH (ref 40–120)
ALT FLD-CCNC: 22 U/L — SIGNIFICANT CHANGE UP (ref 10–45)
ANION GAP SERPL CALC-SCNC: 13 MMOL/L — SIGNIFICANT CHANGE UP (ref 5–17)
AST SERPL-CCNC: 24 U/L — SIGNIFICANT CHANGE UP (ref 10–40)
BILIRUB SERPL-MCNC: 0.2 MG/DL — SIGNIFICANT CHANGE UP (ref 0.2–1.2)
BUN SERPL-MCNC: 7 MG/DL — SIGNIFICANT CHANGE UP (ref 7–23)
CALCIUM SERPL-MCNC: 8.3 MG/DL — LOW (ref 8.4–10.5)
CEA SERPL-MCNC: 40.8 NG/ML — HIGH (ref 0–3.8)
CHLORIDE SERPL-SCNC: 106 MMOL/L — SIGNIFICANT CHANGE UP (ref 96–108)
CO2 SERPL-SCNC: 22 MMOL/L — SIGNIFICANT CHANGE UP (ref 22–31)
CREAT SERPL-MCNC: 0.9 MG/DL — SIGNIFICANT CHANGE UP (ref 0.5–1.3)
EGFR: 96 ML/MIN/1.73M2 — SIGNIFICANT CHANGE UP
GLUCOSE SERPL-MCNC: 82 MG/DL — SIGNIFICANT CHANGE UP (ref 70–99)
MAGNESIUM SERPL-MCNC: 2.3 MG/DL — SIGNIFICANT CHANGE UP (ref 1.6–2.6)
POTASSIUM SERPL-MCNC: 4.1 MMOL/L — SIGNIFICANT CHANGE UP (ref 3.5–5.3)
POTASSIUM SERPL-SCNC: 4.1 MMOL/L — SIGNIFICANT CHANGE UP (ref 3.5–5.3)
PROT SERPL-MCNC: 6.5 G/DL — SIGNIFICANT CHANGE UP (ref 6–8.3)
SODIUM SERPL-SCNC: 142 MMOL/L — SIGNIFICANT CHANGE UP (ref 135–145)

## 2024-07-24 NOTE — PHYSICAL EXAM
[Restricted in physically strenuous activity but ambulatory and able to carry out work of a light or sedentary nature] : Status 1- Restricted in physically strenuous activity but ambulatory and able to carry out work of a light or sedentary nature, e.g., light house work, office work [Normal] : affect appropriate [de-identified] : wt stable overall  [de-identified] : walking with cane today  [de-identified] : right chest wall port accessed, dressing C/D/I

## 2024-07-24 NOTE — HISTORY OF PRESENT ILLNESS
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 On 08/18/22, he had a CT C/A/P which showed  irregular colonic wall thickening of the proximal sigmoid colon with stranding of the surrounding paracolic fat. Regional lymph nodes are identified adjacent to the colonic mass measuring up to 1.2 cm. Multiple masses identified within the hepatic parenchyma noted to be 3.9 cm, suspicious for hepatic parenchymal neoplastic disease. 3.4 cm left adrenal mass, suspicious for neoplastic disease. There is a 1.0 cm soft tissue nodule identified along the right lateral wall of the distal trachea, superior to the tracheal bifurcation. Lung nodules suspicious for lung parenchymal neoplastic disease.  \par  \par  On 8/18/22, he had a colonoscopy that showed a frond-like/villous and ulcerated non-obstructing large mass was found in the descending colon. The mass was circumferential. The mass measured five cm in length. Pathology showed invasive adenocarcinoma, moderately differentiated. No loss of nuclear expression of MMR proteins (MLH1, MSH2, MSH6, and PMS2). These results show low probability of microsatellite instability-high (MSI-H). On 8/19/22, he had a liver, core biopsy which showed metastatic adenocarcinoma, consistent with metastasis from colonic primary.\par  \par   [de-identified] : no MMR deficiency [de-identified] : Medical Hx: DVT 14 years ago (unknown cause) \par  Surgical Hx: left eye sx; left torn meniscus repaired \par  Family Hx: Mother leukemia\par  Social Hx: never smoker; ETOH never; lives alone; single; works PT at [employer]; daughters close by [de-identified] : Pt is C44D1 of FOLFIOXIRI q 2 weeks, with ongoing cycles planned. He was hospitalized June 9th - 14th, 2023 due to mechanical fall/Left femoral condyle fracture s/p retrograde IMN. He continues walking with his cane today, difficultly bending his left knee, PT completed. No pain. Neuropathy is mild. He denies fatigue, cold sensitivity, eye changes, hair loss, nail / skin changes, pain, mouth sores, vomiting, heartburn, C/D. His wt remains stable overall. He reports he has been off his AC for 10 days, had another episode of rectal bleeding. He needs a new GI due to insurance issues. Asked him to restart Pradaxa every other day and see if bleeding restarts. He feels well today.

## 2024-07-24 NOTE — REVIEW OF SYSTEMS
[Diarrhea: Grade 0] : Diarrhea: Grade 0 [Negative] : Allergic/Immunologic [Recent Change In Weight] : ~T no recent weight change [Chest Pain] : no chest pain [Shortness Of Breath] : no shortness of breath [Abdominal Pain] : no abdominal pain [Vomiting] : no vomiting [Constipation] : no constipation [Confused] : no confusion [FreeTextEntry2] : wt stable overall  [de-identified] : right chest wall port accessed [de-identified] : walking with cane today

## 2024-07-25 ENCOUNTER — APPOINTMENT (OUTPATIENT)
Dept: INFUSION THERAPY | Facility: CLINIC | Age: 64
End: 2024-07-25

## 2024-07-29 ENCOUNTER — OUTPATIENT (OUTPATIENT)
Dept: OUTPATIENT SERVICES | Facility: HOSPITAL | Age: 64
LOS: 1 days | Discharge: HOME | End: 2024-07-29

## 2024-07-29 DIAGNOSIS — C18.9 MALIGNANT NEOPLASM OF COLON, UNSPECIFIED: ICD-10-CM

## 2024-07-29 DIAGNOSIS — S72.90XA UNSPECIFIED FRACTURE OF UNSPECIFIED FEMUR, INITIAL ENCOUNTER FOR CLOSED FRACTURE: Chronic | ICD-10-CM

## 2024-07-30 ENCOUNTER — APPOINTMENT (OUTPATIENT)
Dept: CT IMAGING | Facility: CLINIC | Age: 64
End: 2024-07-30
Payer: SELF-PAY

## 2024-07-30 ENCOUNTER — OUTPATIENT (OUTPATIENT)
Dept: OUTPATIENT SERVICES | Facility: HOSPITAL | Age: 64
LOS: 1 days | End: 2024-07-30
Payer: SELF-PAY

## 2024-07-30 DIAGNOSIS — C18.9 MALIGNANT NEOPLASM OF COLON, UNSPECIFIED: ICD-10-CM

## 2024-07-30 PROCEDURE — 74177 CT ABD & PELVIS W/CONTRAST: CPT | Mod: 26

## 2024-07-30 PROCEDURE — 71260 CT THORAX DX C+: CPT | Mod: 26

## 2024-07-30 PROCEDURE — 74177 CT ABD & PELVIS W/CONTRAST: CPT

## 2024-07-30 PROCEDURE — 71260 CT THORAX DX C+: CPT

## 2024-08-06 ENCOUNTER — RESULT REVIEW (OUTPATIENT)
Age: 64
End: 2024-08-06

## 2024-08-06 ENCOUNTER — APPOINTMENT (OUTPATIENT)
Dept: INFUSION THERAPY | Facility: CLINIC | Age: 64
End: 2024-08-06

## 2024-08-06 LAB
BASOPHILS # BLD AUTO: 0.14 K/UL — SIGNIFICANT CHANGE UP (ref 0–0.2)
BASOPHILS NFR BLD AUTO: 2.2 % — HIGH (ref 0–2)
EOSINOPHIL # BLD AUTO: 0.11 K/UL — SIGNIFICANT CHANGE UP (ref 0–0.5)
EOSINOPHIL NFR BLD AUTO: 1.7 % — SIGNIFICANT CHANGE UP (ref 0–6)
HCT VFR BLD CALC: 32 % — LOW (ref 39–50)
HGB BLD-MCNC: 9.5 G/DL — LOW (ref 13–17)
IMM GRANULOCYTES NFR BLD AUTO: 2 % — HIGH (ref 0–0.9)
LYMPHOCYTES # BLD AUTO: 1.41 K/UL — SIGNIFICANT CHANGE UP (ref 1–3.3)
LYMPHOCYTES # BLD AUTO: 22.2 % — SIGNIFICANT CHANGE UP (ref 13–44)
MCHC RBC-ENTMCNC: 24.4 PG — LOW (ref 27–34)
MCHC RBC-ENTMCNC: 29.7 GM/DL — LOW (ref 32–36)
MCV RBC AUTO: 82.1 FL — SIGNIFICANT CHANGE UP (ref 80–100)
MONOCYTES # BLD AUTO: 0.56 K/UL — SIGNIFICANT CHANGE UP (ref 0–0.9)
MONOCYTES NFR BLD AUTO: 8.8 % — SIGNIFICANT CHANGE UP (ref 2–14)
NEUTROPHILS # BLD AUTO: 4.01 K/UL — SIGNIFICANT CHANGE UP (ref 1.8–7.4)
NEUTROPHILS NFR BLD AUTO: 63.1 % — SIGNIFICANT CHANGE UP (ref 43–77)
NRBC # BLD: 0 /100 WBCS — SIGNIFICANT CHANGE UP (ref 0–0)
PLATELET # BLD AUTO: 202 K/UL — SIGNIFICANT CHANGE UP (ref 150–400)
RBC # BLD: 3.9 M/UL — LOW (ref 4.2–5.8)
RBC # FLD: 21.2 % — HIGH (ref 10.3–14.5)
WBC # BLD: 6.36 K/UL — SIGNIFICANT CHANGE UP (ref 3.8–10.5)
WBC # FLD AUTO: 6.36 K/UL — SIGNIFICANT CHANGE UP (ref 3.8–10.5)

## 2024-08-07 DIAGNOSIS — R11.2 NAUSEA WITH VOMITING, UNSPECIFIED: ICD-10-CM

## 2024-08-07 DIAGNOSIS — Z51.11 ENCOUNTER FOR ANTINEOPLASTIC CHEMOTHERAPY: ICD-10-CM

## 2024-08-07 LAB
ALBUMIN SERPL ELPH-MCNC: 4.4 G/DL — SIGNIFICANT CHANGE UP (ref 3.3–5)
ALP SERPL-CCNC: 189 U/L — HIGH (ref 40–120)
ALT FLD-CCNC: 12 U/L — SIGNIFICANT CHANGE UP (ref 10–45)
ANION GAP SERPL CALC-SCNC: 16 MMOL/L — SIGNIFICANT CHANGE UP (ref 5–17)
AST SERPL-CCNC: 14 U/L — SIGNIFICANT CHANGE UP (ref 10–40)
BILIRUB SERPL-MCNC: 0.2 MG/DL — SIGNIFICANT CHANGE UP (ref 0.2–1.2)
BUN SERPL-MCNC: 12 MG/DL — SIGNIFICANT CHANGE UP (ref 7–23)
CALCIUM SERPL-MCNC: 8.4 MG/DL — SIGNIFICANT CHANGE UP (ref 8.4–10.5)
CHLORIDE SERPL-SCNC: 106 MMOL/L — SIGNIFICANT CHANGE UP (ref 96–108)
CO2 SERPL-SCNC: 20 MMOL/L — LOW (ref 22–31)
CREAT SERPL-MCNC: 0.89 MG/DL — SIGNIFICANT CHANGE UP (ref 0.5–1.3)
EGFR: 96 ML/MIN/1.73M2 — SIGNIFICANT CHANGE UP
GLUCOSE SERPL-MCNC: 110 MG/DL — HIGH (ref 70–99)
MAGNESIUM SERPL-MCNC: 2.2 MG/DL — SIGNIFICANT CHANGE UP (ref 1.6–2.6)
POTASSIUM SERPL-MCNC: 4.2 MMOL/L — SIGNIFICANT CHANGE UP (ref 3.5–5.3)
POTASSIUM SERPL-SCNC: 4.2 MMOL/L — SIGNIFICANT CHANGE UP (ref 3.5–5.3)
PROT SERPL-MCNC: 6.5 G/DL — SIGNIFICANT CHANGE UP (ref 6–8.3)
SODIUM SERPL-SCNC: 143 MMOL/L — SIGNIFICANT CHANGE UP (ref 135–145)

## 2024-08-08 ENCOUNTER — APPOINTMENT (OUTPATIENT)
Dept: INFUSION THERAPY | Facility: CLINIC | Age: 64
End: 2024-08-08

## 2024-08-12 NOTE — PATIENT PROFILE ADULT - ARRIVAL FROM
Initiate Treatment: Ketoconazole cream BID AAA twice daily for maintenance \\nTAC 0.1% cream BID AAA until better, then on weekends until f/u\\nElidel BID AAA Monday-Friday Plan: No phx of psoriasis. Happens after running/jogging when it is hot outside. Has tried OTC “Jock itch” cream. Discussed chronic nature of disease and that prevention/treatment of condition can be achieved with anti fungal and topical steroid. Render In Strict Bullet Format?: No Detail Level: Zone Home

## 2024-08-14 DIAGNOSIS — Z51.89 ENCOUNTER FOR OTHER SPECIFIED AFTERCARE: ICD-10-CM

## 2024-08-20 ENCOUNTER — RESULT REVIEW (OUTPATIENT)
Age: 64
End: 2024-08-20

## 2024-08-20 ENCOUNTER — APPOINTMENT (OUTPATIENT)
Dept: INFUSION THERAPY | Facility: CLINIC | Age: 64
End: 2024-08-20

## 2024-08-20 VITALS
TEMPERATURE: 97.6 F | OXYGEN SATURATION: 99 % | SYSTOLIC BLOOD PRESSURE: 155 MMHG | DIASTOLIC BLOOD PRESSURE: 90 MMHG | HEIGHT: 71 IN | HEART RATE: 94 BPM | WEIGHT: 226 LBS | BODY MASS INDEX: 31.64 KG/M2

## 2024-08-20 DIAGNOSIS — E86.0 DEHYDRATION: ICD-10-CM

## 2024-08-20 LAB
ALBUMIN SERPL ELPH-MCNC: 4.4 G/DL — SIGNIFICANT CHANGE UP (ref 3.3–5)
ALP SERPL-CCNC: 184 U/L — HIGH (ref 40–120)
ALT FLD-CCNC: 15 U/L — SIGNIFICANT CHANGE UP (ref 10–45)
ANION GAP SERPL CALC-SCNC: 13 MMOL/L — SIGNIFICANT CHANGE UP (ref 5–17)
AST SERPL-CCNC: 15 U/L — SIGNIFICANT CHANGE UP (ref 10–40)
BASOPHILS # BLD AUTO: 0.16 K/UL — SIGNIFICANT CHANGE UP (ref 0–0.2)
BASOPHILS NFR BLD AUTO: 2.8 % — HIGH (ref 0–2)
BILIRUB SERPL-MCNC: 0.2 MG/DL — SIGNIFICANT CHANGE UP (ref 0.2–1.2)
BUN SERPL-MCNC: 11 MG/DL — SIGNIFICANT CHANGE UP (ref 7–23)
CALCIUM SERPL-MCNC: 8.8 MG/DL — SIGNIFICANT CHANGE UP (ref 8.4–10.5)
CHLORIDE SERPL-SCNC: 105 MMOL/L — SIGNIFICANT CHANGE UP (ref 96–108)
CO2 SERPL-SCNC: 23 MMOL/L — SIGNIFICANT CHANGE UP (ref 22–31)
CREAT SERPL-MCNC: 0.83 MG/DL — SIGNIFICANT CHANGE UP (ref 0.5–1.3)
EGFR: 98 ML/MIN/1.73M2 — SIGNIFICANT CHANGE UP
EOSINOPHIL # BLD AUTO: 0.17 K/UL — SIGNIFICANT CHANGE UP (ref 0–0.5)
EOSINOPHIL NFR BLD AUTO: 3 % — SIGNIFICANT CHANGE UP (ref 0–6)
GLUCOSE SERPL-MCNC: 90 MG/DL — SIGNIFICANT CHANGE UP (ref 70–99)
HCT VFR BLD CALC: 31.6 % — LOW (ref 39–50)
HGB BLD-MCNC: 9.5 G/DL — LOW (ref 13–17)
IMM GRANULOCYTES NFR BLD AUTO: 1.6 % — HIGH (ref 0–0.9)
LYMPHOCYTES # BLD AUTO: 1.4 K/UL — SIGNIFICANT CHANGE UP (ref 1–3.3)
LYMPHOCYTES # BLD AUTO: 24.8 % — SIGNIFICANT CHANGE UP (ref 13–44)
MAGNESIUM SERPL-MCNC: 2.2 MG/DL — SIGNIFICANT CHANGE UP (ref 1.6–2.6)
MCHC RBC-ENTMCNC: 24.4 PG — LOW (ref 27–34)
MCHC RBC-ENTMCNC: 30.1 GM/DL — LOW (ref 32–36)
MCV RBC AUTO: 81 FL — SIGNIFICANT CHANGE UP (ref 80–100)
MONOCYTES # BLD AUTO: 0.64 K/UL — SIGNIFICANT CHANGE UP (ref 0–0.9)
MONOCYTES NFR BLD AUTO: 11.3 % — SIGNIFICANT CHANGE UP (ref 2–14)
NEUTROPHILS # BLD AUTO: 3.19 K/UL — SIGNIFICANT CHANGE UP (ref 1.8–7.4)
NEUTROPHILS NFR BLD AUTO: 56.5 % — SIGNIFICANT CHANGE UP (ref 43–77)
NRBC # BLD: 0 /100 WBCS — SIGNIFICANT CHANGE UP (ref 0–0)
PLATELET # BLD AUTO: 240 K/UL — SIGNIFICANT CHANGE UP (ref 150–400)
POTASSIUM SERPL-MCNC: 4 MMOL/L — SIGNIFICANT CHANGE UP (ref 3.5–5.3)
POTASSIUM SERPL-SCNC: 4 MMOL/L — SIGNIFICANT CHANGE UP (ref 3.5–5.3)
PROT SERPL-MCNC: 6.8 G/DL — SIGNIFICANT CHANGE UP (ref 6–8.3)
RBC # BLD: 3.9 M/UL — LOW (ref 4.2–5.8)
RBC # FLD: 21.1 % — HIGH (ref 10.3–14.5)
SODIUM SERPL-SCNC: 141 MMOL/L — SIGNIFICANT CHANGE UP (ref 135–145)
WBC # BLD: 5.65 K/UL — SIGNIFICANT CHANGE UP (ref 3.8–10.5)
WBC # FLD AUTO: 5.65 K/UL — SIGNIFICANT CHANGE UP (ref 3.8–10.5)

## 2024-08-22 ENCOUNTER — APPOINTMENT (OUTPATIENT)
Dept: INFUSION THERAPY | Facility: CLINIC | Age: 64
End: 2024-08-22
Payer: MEDICAID

## 2024-08-22 ENCOUNTER — APPOINTMENT (OUTPATIENT)
Dept: HEMATOLOGY ONCOLOGY | Facility: CLINIC | Age: 64
End: 2024-08-22
Payer: MEDICAID

## 2024-08-22 VITALS
SYSTOLIC BLOOD PRESSURE: 172 MMHG | HEIGHT: 71 IN | BODY MASS INDEX: 32.2 KG/M2 | TEMPERATURE: 98.6 F | HEART RATE: 85 BPM | WEIGHT: 230 LBS | OXYGEN SATURATION: 98 % | DIASTOLIC BLOOD PRESSURE: 91 MMHG

## 2024-08-22 DIAGNOSIS — T82.898A OTHER SPECIFIED COMPLICATION OF VASCULAR PROSTHETIC DEVICES, IMPLANTS AND GRAFTS, INITIAL ENCOUNTER: ICD-10-CM

## 2024-08-22 DIAGNOSIS — G62.9 POLYNEUROPATHY, UNSPECIFIED: ICD-10-CM

## 2024-08-22 DIAGNOSIS — K58.9 IRRITABLE BOWEL SYNDROME W/OUT DIARRHEA: ICD-10-CM

## 2024-08-22 DIAGNOSIS — C18.9 MALIGNANT NEOPLASM OF COLON, UNSPECIFIED: ICD-10-CM

## 2024-08-22 DIAGNOSIS — C78.7 MALIGNANT NEOPLASM OF COLON, UNSPECIFIED: ICD-10-CM

## 2024-08-22 PROCEDURE — 99417 PROLNG OP E/M EACH 15 MIN: CPT

## 2024-08-22 PROCEDURE — 99215 OFFICE O/P EST HI 40 MIN: CPT

## 2024-08-22 PROCEDURE — G2211 COMPLEX E/M VISIT ADD ON: CPT

## 2024-08-22 RX ORDER — DICYCLOMINE HYDROCHLORIDE 10 MG/1
10 CAPSULE ORAL
Qty: 40 | Refills: 0 | Status: ACTIVE | COMMUNITY
Start: 2024-08-22 | End: 1900-01-01

## 2024-08-22 NOTE — HISTORY OF PRESENT ILLNESS
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 On 08/18/22, he had a CT C/A/P which showed  irregular colonic wall thickening of the proximal sigmoid colon with stranding of the surrounding paracolic fat. Regional lymph nodes are identified adjacent to the colonic mass measuring up to 1.2 cm. Multiple masses identified within the hepatic parenchyma noted to be 3.9 cm, suspicious for hepatic parenchymal neoplastic disease. 3.4 cm left adrenal mass, suspicious for neoplastic disease. There is a 1.0 cm soft tissue nodule identified along the right lateral wall of the distal trachea, superior to the tracheal bifurcation. Lung nodules suspicious for lung parenchymal neoplastic disease.  \par  \par  On 8/18/22, he had a colonoscopy that showed a frond-like/villous and ulcerated non-obstructing large mass was found in the descending colon. The mass was circumferential. The mass measured five cm in length. Pathology showed invasive adenocarcinoma, moderately differentiated. No loss of nuclear expression of MMR proteins (MLH1, MSH2, MSH6, and PMS2). These results show low probability of microsatellite instability-high (MSI-H). On 8/19/22, he had a liver, core biopsy which showed metastatic adenocarcinoma, consistent with metastasis from colonic primary.\par  \par   [de-identified] : no MMR deficiency [de-identified] : Medical Hx: DVT 14 years ago (unknown cause) \par  Surgical Hx: left eye sx; left torn meniscus repaired \par  Family Hx: Mother leukemia\par  Social Hx: never smoker; ETOH never; lives alone; single; works PT at [employer]; daughters close by [de-identified] : Pt is C44D1 of FOLFIOXIRI q 2 weeks, with ongoing cycles planned. He was hospitalized June 9th - 14th, 2023 due to mechanical fall/Left femoral condyle fracture s/p retrograde IMN. He continues walking with his cane today, difficultly bending his left knee, PT completed. No pain. Neuropathy is mild. He denies fatigue, cold sensitivity, eye changes, hair loss, nail / skin changes, pain, mouth sores, vomiting, heartburn, C/D. His wt remains stable overall. He reports he has been off his AC for 10 days, had another episode of rectal bleeding. He needs a new GI due to insurance issues. Asked him to restart Pradaxa every other day and see if bleeding restarts. He feels well today.

## 2024-08-22 NOTE — RESULTS/DATA
[FreeTextEntry1] : 7/31/24 CT: Multiple liver lesions appear stable. Splenomegaly of 16.8 cm stable. No splenic lesions identified. Concentric thickening of the descending colon has increased. Involvement of the entire descending colon, including the splenic flexure; findings are suggestive for left-sided colitis however there is abrupt transition in caliber change within the sigmoid loop on therefore an underlying or associated colonic lesion cannot be entirely excluded.  6/3/24 CT C/A/P: Small catheter associated thrombosis of the tip of a right-sided Mediport catheter in the superior vena cava unchanged. Multiple hepatic metastatic lesions are unchanged. However, a single lesion in the right lobe of the liver is larger since the prior exam. Circumferential wall thickening involving the long segment of the distal aspect of the descending colon may reflect evidence of colitis.  2/29/24 CT C/A/P: Stable bilobar hepatic metastasis. Findings consistent with interval disease stability. Small catheter associated thrombosis of the tip of a right-sided Mediport catheter in the superior vena cava unchanged.  10/25/23 CT C/A/P: Hepatic metastatic disease stable to mildly improved. Small catheter associated thrombus at the tip of a right sided Mediport catheter.  7/11/23 CT C/A/P: Continued decrease in size of hepatic metastases without evidence of new lesion, 1.2 x 1.1 cm previously measuring 1.7 x 1.3 cm; measures 1.9 x 0.9 cm previously measuring 2.3 x 1.4 cm. Subtle findings consistent with the patient's previous ascending colonic mass identified without evidence of large bowel obstruction. Stable left adrenal mass and punctate pulmonary nodules. Small pleural thickening along the right posterior pleural amenable to follow-up unclear significance.  4/10/23 CT C/A/P: Distal descending colon mass has decreased in size since prior with decreased associated colonic dilatation. Large amount of stool again noted in the colon and rectum. Hepatic metastases are slightly decreased in size, largest decrease 2.3 x 1.4 cm, previously 3.0 x 1.8 cm. Unchanged left adrenal mass and small lung nodule.  1/23/23 CT:  New partial large bowel obstruction secondary to known apple core lesion in the distal descending colon. Again extensive adjacent mesenteric abnormality is seen which may represent conglomerate lymph nodes.Redemonstrated hepatic metastases.  No change large left adrenal nodule.Small pericardial effusion is again seen.  1/9/23 CT C/A/P: Findings consistent with descending colon cancer with extension through the wall with extensive mesenteric confluent tumor and/or ivelisse disease mildly worse when compared to the prior examination. Diffuse metastatic disease identified throughout the liver and small lesion most noted within the right middle lobe improved when compared to the prior examination (now measuring 2.7 x 2.5 cm previously measuring 3.7 x 3.2 cm and a lesion in the right lobe measuring 2.1 x by 1.6 cm previously measuring 3.3 x 2.3 cm). Small pericardial effusion and areas of thickening of questionable significance. Small amount of pericatheter clot near the tip of the catheter in the superior vena cava.  8/19/22 Path: Liver, core biopsy: Metastatic adenocarcinoma, consistent with metastasis from colonic primary. Immunohistochemical stains were performed and results as follows: CDX - 2 is positive. CK 20 is patchy positive. CK 7 is negative. These findings support the diagnosis. No loss of nuclear expression of MMR proteins (MLH1, MSH2, MSH6, and PMS2).   These results show low probability of microsatellite instability-high (MSI-H).  8/18/22 Path: Colon, descending, mass, biopsy. Invasive adenocarcinoma, moderately differentiated. No loss of nuclear expression of MMR proteins (MLH1, MSH2, MSH6, and PMS2).   These results show low probability of microsatellite instability-high (MSI-H).  8/18/22 Colonoscopy: A frond-like/villous and ulcerated non-obstructing large mass was found in the descending colon. The lass was circumferential. The mass measured five cm in length. Oozing was present. Biopsied and tattooed. The scope was able to transverse the mass. The colon was otherwise normal.  8/18/22 CT C/A/P: There is irregular colonic wall thickening of the proximal sigmoid colon with stranding of the surrounding paracolic fat. Regional lymph nodes are identified adjacent to the colonic mass measuring up to 1.2 cm.  Multiple hypodense masses identified within the hepatic parenchyma is not to 3.9 cm, suspicious for hepatic parenchymal neoplastic disease. 3.4 cm left adrenal mass, suspicious for neoplastic disease. There is a 1.0 cm soft tissue nodule identified along the right lateral wall of the distal trachea, superior to the tracheal bifurcation. Lung nodules suspicious for lung parenchymal neoplastic disease.

## 2024-08-22 NOTE — PHYSICAL EXAM
[Restricted in physically strenuous activity but ambulatory and able to carry out work of a light or sedentary nature] : Status 1- Restricted in physically strenuous activity but ambulatory and able to carry out work of a light or sedentary nature, e.g., light house work, office work [Normal] : affect appropriate [de-identified] : wt stable overall  [de-identified] : walking with cane today  [de-identified] : right chest wall port accessed, dressing C/D/I

## 2024-08-22 NOTE — REVIEW OF SYSTEMS
[Diarrhea: Grade 0] : Diarrhea: Grade 0 [Negative] : Allergic/Immunologic [Recent Change In Weight] : ~T no recent weight change [Chest Pain] : no chest pain [Shortness Of Breath] : no shortness of breath [Abdominal Pain] : no abdominal pain [Vomiting] : no vomiting [Constipation] : no constipation [Confused] : no confusion [FreeTextEntry2] : wt stable overall  [de-identified] : right chest wall port accessed [de-identified] : walking with cane today

## 2024-09-04 ENCOUNTER — APPOINTMENT (OUTPATIENT)
Dept: INFUSION THERAPY | Facility: CLINIC | Age: 64
End: 2024-09-04

## 2024-09-04 ENCOUNTER — RESULT REVIEW (OUTPATIENT)
Age: 64
End: 2024-09-04

## 2024-09-04 VITALS
OXYGEN SATURATION: 98 % | BODY MASS INDEX: 32.2 KG/M2 | HEIGHT: 71 IN | TEMPERATURE: 98.9 F | WEIGHT: 230 LBS | HEART RATE: 105 BPM | SYSTOLIC BLOOD PRESSURE: 160 MMHG | DIASTOLIC BLOOD PRESSURE: 95 MMHG

## 2024-09-04 LAB
BASOPHILS # BLD AUTO: 0.15 K/UL — SIGNIFICANT CHANGE UP (ref 0–0.2)
BASOPHILS NFR BLD AUTO: 2.2 % — HIGH (ref 0–2)
EOSINOPHIL # BLD AUTO: 0.11 K/UL — SIGNIFICANT CHANGE UP (ref 0–0.5)
EOSINOPHIL NFR BLD AUTO: 1.6 % — SIGNIFICANT CHANGE UP (ref 0–6)
HCT VFR BLD CALC: 34.8 % — LOW (ref 39–50)
HGB BLD-MCNC: 10.3 G/DL — LOW (ref 13–17)
IMM GRANULOCYTES NFR BLD AUTO: 2.9 % — HIGH (ref 0–0.9)
LYMPHOCYTES # BLD AUTO: 1.43 K/UL — SIGNIFICANT CHANGE UP (ref 1–3.3)
LYMPHOCYTES # BLD AUTO: 20.8 % — SIGNIFICANT CHANGE UP (ref 13–44)
MCHC RBC-ENTMCNC: 24.7 PG — LOW (ref 27–34)
MCHC RBC-ENTMCNC: 29.6 GM/DL — LOW (ref 32–36)
MCV RBC AUTO: 83.5 FL — SIGNIFICANT CHANGE UP (ref 80–100)
MONOCYTES # BLD AUTO: 0.67 K/UL — SIGNIFICANT CHANGE UP (ref 0–0.9)
MONOCYTES NFR BLD AUTO: 9.7 % — SIGNIFICANT CHANGE UP (ref 2–14)
NEUTROPHILS # BLD AUTO: 4.33 K/UL — SIGNIFICANT CHANGE UP (ref 1.8–7.4)
NEUTROPHILS NFR BLD AUTO: 62.8 % — SIGNIFICANT CHANGE UP (ref 43–77)
NRBC # BLD: 0 /100 WBCS — SIGNIFICANT CHANGE UP (ref 0–0)
PLATELET # BLD AUTO: 216 K/UL — SIGNIFICANT CHANGE UP (ref 150–400)
RBC # BLD: 4.17 M/UL — LOW (ref 4.2–5.8)
RBC # FLD: 21.1 % — HIGH (ref 10.3–14.5)
WBC # BLD: 6.89 K/UL — SIGNIFICANT CHANGE UP (ref 3.8–10.5)
WBC # FLD AUTO: 6.89 K/UL — SIGNIFICANT CHANGE UP (ref 3.8–10.5)

## 2024-09-05 LAB
ALBUMIN SERPL ELPH-MCNC: 3.9 G/DL — SIGNIFICANT CHANGE UP (ref 3.3–5)
ALP SERPL-CCNC: 163 U/L — HIGH (ref 40–120)
ALT FLD-CCNC: 16 U/L — SIGNIFICANT CHANGE UP (ref 10–45)
ANION GAP SERPL CALC-SCNC: 10 MMOL/L — SIGNIFICANT CHANGE UP (ref 5–17)
AST SERPL-CCNC: 17 U/L — SIGNIFICANT CHANGE UP (ref 10–40)
BILIRUB SERPL-MCNC: 0.2 MG/DL — SIGNIFICANT CHANGE UP (ref 0.2–1.2)
BUN SERPL-MCNC: 12 MG/DL — SIGNIFICANT CHANGE UP (ref 7–23)
CALCIUM SERPL-MCNC: 8.3 MG/DL — LOW (ref 8.4–10.5)
CHLORIDE SERPL-SCNC: 104 MMOL/L — SIGNIFICANT CHANGE UP (ref 96–108)
CO2 SERPL-SCNC: 26 MMOL/L — SIGNIFICANT CHANGE UP (ref 22–31)
CREAT SERPL-MCNC: 1.28 MG/DL — SIGNIFICANT CHANGE UP (ref 0.5–1.3)
EGFR: 63 ML/MIN/1.73M2 — SIGNIFICANT CHANGE UP
GLUCOSE SERPL-MCNC: 110 MG/DL — HIGH (ref 70–99)
MAGNESIUM SERPL-MCNC: 2.2 MG/DL — SIGNIFICANT CHANGE UP (ref 1.6–2.6)
POTASSIUM SERPL-MCNC: 3.9 MMOL/L — SIGNIFICANT CHANGE UP (ref 3.5–5.3)
POTASSIUM SERPL-SCNC: 3.9 MMOL/L — SIGNIFICANT CHANGE UP (ref 3.5–5.3)
PROT SERPL-MCNC: 6.4 G/DL — SIGNIFICANT CHANGE UP (ref 6–8.3)
SODIUM SERPL-SCNC: 141 MMOL/L — SIGNIFICANT CHANGE UP (ref 135–145)

## 2024-09-06 ENCOUNTER — APPOINTMENT (OUTPATIENT)
Dept: INFUSION THERAPY | Facility: CLINIC | Age: 64
End: 2024-09-06

## 2024-09-17 ENCOUNTER — APPOINTMENT (OUTPATIENT)
Dept: HEMATOLOGY ONCOLOGY | Facility: CLINIC | Age: 64
End: 2024-09-17
Payer: MEDICAID

## 2024-09-17 ENCOUNTER — RESULT REVIEW (OUTPATIENT)
Age: 64
End: 2024-09-17

## 2024-09-17 ENCOUNTER — NON-APPOINTMENT (OUTPATIENT)
Age: 64
End: 2024-09-17

## 2024-09-17 ENCOUNTER — APPOINTMENT (OUTPATIENT)
Dept: INFUSION THERAPY | Facility: CLINIC | Age: 64
End: 2024-09-17
Payer: MEDICAID

## 2024-09-17 VITALS
OXYGEN SATURATION: 99 % | SYSTOLIC BLOOD PRESSURE: 150 MMHG | TEMPERATURE: 97.6 F | DIASTOLIC BLOOD PRESSURE: 88 MMHG | HEIGHT: 71 IN | BODY MASS INDEX: 31.78 KG/M2 | HEART RATE: 104 BPM | WEIGHT: 227 LBS

## 2024-09-17 DIAGNOSIS — C78.7 MALIGNANT NEOPLASM OF COLON, UNSPECIFIED: ICD-10-CM

## 2024-09-17 DIAGNOSIS — G62.9 POLYNEUROPATHY, UNSPECIFIED: ICD-10-CM

## 2024-09-17 DIAGNOSIS — C18.9 MALIGNANT NEOPLASM OF COLON, UNSPECIFIED: ICD-10-CM

## 2024-09-17 DIAGNOSIS — T82.898A OTHER SPECIFIED COMPLICATION OF VASCULAR PROSTHETIC DEVICES, IMPLANTS AND GRAFTS, INITIAL ENCOUNTER: ICD-10-CM

## 2024-09-17 LAB
BASOPHILS # BLD AUTO: 0.12 K/UL — SIGNIFICANT CHANGE UP (ref 0–0.2)
BASOPHILS NFR BLD AUTO: 2 % — SIGNIFICANT CHANGE UP (ref 0–2)
EOSINOPHIL # BLD AUTO: 0.13 K/UL — SIGNIFICANT CHANGE UP (ref 0–0.5)
EOSINOPHIL NFR BLD AUTO: 2.2 % — SIGNIFICANT CHANGE UP (ref 0–6)
HCT VFR BLD CALC: 33.4 % — LOW (ref 39–50)
HGB BLD-MCNC: 10.1 G/DL — LOW (ref 13–17)
IMM GRANULOCYTES NFR BLD AUTO: 0.8 % — SIGNIFICANT CHANGE UP (ref 0–0.9)
LYMPHOCYTES # BLD AUTO: 1.28 K/UL — SIGNIFICANT CHANGE UP (ref 1–3.3)
LYMPHOCYTES # BLD AUTO: 21.3 % — SIGNIFICANT CHANGE UP (ref 13–44)
MCHC RBC-ENTMCNC: 24.8 PG — LOW (ref 27–34)
MCHC RBC-ENTMCNC: 30.2 GM/DL — LOW (ref 32–36)
MCV RBC AUTO: 81.9 FL — SIGNIFICANT CHANGE UP (ref 80–100)
MONOCYTES # BLD AUTO: 0.56 K/UL — SIGNIFICANT CHANGE UP (ref 0–0.9)
MONOCYTES NFR BLD AUTO: 9.3 % — SIGNIFICANT CHANGE UP (ref 2–14)
NEUTROPHILS # BLD AUTO: 3.87 K/UL — SIGNIFICANT CHANGE UP (ref 1.8–7.4)
NEUTROPHILS NFR BLD AUTO: 64.4 % — SIGNIFICANT CHANGE UP (ref 43–77)
NRBC # BLD: 0 /100 WBCS — SIGNIFICANT CHANGE UP (ref 0–0)
PLATELET # BLD AUTO: 240 K/UL — SIGNIFICANT CHANGE UP (ref 150–400)
RBC # BLD: 4.08 M/UL — LOW (ref 4.2–5.8)
RBC # FLD: 20.1 % — HIGH (ref 10.3–14.5)
WBC # BLD: 6.01 K/UL — SIGNIFICANT CHANGE UP (ref 3.8–10.5)
WBC # FLD AUTO: 6.01 K/UL — SIGNIFICANT CHANGE UP (ref 3.8–10.5)

## 2024-09-17 PROCEDURE — G2211 COMPLEX E/M VISIT ADD ON: CPT

## 2024-09-17 PROCEDURE — 99214 OFFICE O/P EST MOD 30 MIN: CPT

## 2024-09-18 LAB
ALBUMIN SERPL ELPH-MCNC: 4.5 G/DL — SIGNIFICANT CHANGE UP (ref 3.3–5)
ALP SERPL-CCNC: 227 U/L — HIGH (ref 40–120)
ALT FLD-CCNC: 15 U/L — SIGNIFICANT CHANGE UP (ref 10–45)
ANION GAP SERPL CALC-SCNC: 14 MMOL/L — SIGNIFICANT CHANGE UP (ref 5–17)
AST SERPL-CCNC: 19 U/L — SIGNIFICANT CHANGE UP (ref 10–40)
BILIRUB SERPL-MCNC: 0.3 MG/DL — SIGNIFICANT CHANGE UP (ref 0.2–1.2)
BUN SERPL-MCNC: 9 MG/DL — SIGNIFICANT CHANGE UP (ref 7–23)
CALCIUM SERPL-MCNC: 8.7 MG/DL — SIGNIFICANT CHANGE UP (ref 8.4–10.5)
CEA SERPL-MCNC: 92.8 NG/ML — HIGH (ref 0–3.8)
CHLORIDE SERPL-SCNC: 103 MMOL/L — SIGNIFICANT CHANGE UP (ref 96–108)
CO2 SERPL-SCNC: 22 MMOL/L — SIGNIFICANT CHANGE UP (ref 22–31)
CREAT SERPL-MCNC: 0.89 MG/DL — SIGNIFICANT CHANGE UP (ref 0.5–1.3)
EGFR: 96 ML/MIN/1.73M2 — SIGNIFICANT CHANGE UP
GLUCOSE SERPL-MCNC: 80 MG/DL — SIGNIFICANT CHANGE UP (ref 70–99)
MAGNESIUM SERPL-MCNC: 2.2 MG/DL — SIGNIFICANT CHANGE UP (ref 1.6–2.6)
POTASSIUM SERPL-MCNC: 4 MMOL/L — SIGNIFICANT CHANGE UP (ref 3.5–5.3)
POTASSIUM SERPL-SCNC: 4 MMOL/L — SIGNIFICANT CHANGE UP (ref 3.5–5.3)
PROT SERPL-MCNC: 6.9 G/DL — SIGNIFICANT CHANGE UP (ref 6–8.3)
SODIUM SERPL-SCNC: 139 MMOL/L — SIGNIFICANT CHANGE UP (ref 135–145)

## 2024-09-19 ENCOUNTER — APPOINTMENT (OUTPATIENT)
Dept: INFUSION THERAPY | Facility: CLINIC | Age: 64
End: 2024-09-19

## 2024-09-23 NOTE — REVIEW OF SYSTEMS
[Fatigue] : fatigue [Diarrhea: Grade 0] : Diarrhea: Grade 0 [Negative] : Allergic/Immunologic [Recent Change In Weight] : ~T no recent weight change [Chest Pain] : no chest pain [Shortness Of Breath] : no shortness of breath [Abdominal Pain] : no abdominal pain [Vomiting] : no vomiting [Constipation] : no constipation [Confused] : no confusion [FreeTextEntry2] : wt stable overall  [de-identified] : right chest wall port accessed [de-identified] : walking with cane today; neuropathy

## 2024-09-23 NOTE — PHYSICAL EXAM
[Restricted in physically strenuous activity but ambulatory and able to carry out work of a light or sedentary nature] : Status 1- Restricted in physically strenuous activity but ambulatory and able to carry out work of a light or sedentary nature, e.g., light house work, office work [Normal] : affect appropriate [de-identified] : wt stable overall  [de-identified] : walking with cane today  [de-identified] : right chest wall port accessed, dressing C/D/I

## 2024-09-23 NOTE — BEGINNING OF VISIT
[0] : 2) Feeling down, depressed, or hopeless: Not at all (0) [[id number]] : 0 [Pain Scale: ___] : On a scale of 1-10, today the patient's pain is a(n) [unfilled]. [Never] : Never [Date Discussed (MM/DD/YY): ___] : Discussed: [unfilled] [With Patient/Caregiver] : with Patient/Caregiver [Diarrhea Character] : Diarrhea: Grade 0

## 2024-09-23 NOTE — REVIEW OF SYSTEMS
[Fatigue] : fatigue [Diarrhea: Grade 0] : Diarrhea: Grade 0 [Negative] : Allergic/Immunologic [Recent Change In Weight] : ~T no recent weight change [Chest Pain] : no chest pain [Shortness Of Breath] : no shortness of breath [Abdominal Pain] : no abdominal pain [Vomiting] : no vomiting [Constipation] : no constipation [Confused] : no confusion [FreeTextEntry2] : wt stable overall  [de-identified] : right chest wall port accessed [de-identified] : walking with cane today; neuropathy

## 2024-09-23 NOTE — HISTORY OF PRESENT ILLNESS
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 On 08/18/22, he had a CT C/A/P which showed  irregular colonic wall thickening of the proximal sigmoid colon with stranding of the surrounding paracolic fat. Regional lymph nodes are identified adjacent to the colonic mass measuring up to 1.2 cm. Multiple masses identified within the hepatic parenchyma noted to be 3.9 cm, suspicious for hepatic parenchymal neoplastic disease. 3.4 cm left adrenal mass, suspicious for neoplastic disease. There is a 1.0 cm soft tissue nodule identified along the right lateral wall of the distal trachea, superior to the tracheal bifurcation. Lung nodules suspicious for lung parenchymal neoplastic disease.  \par  \par  On 8/18/22, he had a colonoscopy that showed a frond-like/villous and ulcerated non-obstructing large mass was found in the descending colon. The mass was circumferential. The mass measured five cm in length. Pathology showed invasive adenocarcinoma, moderately differentiated. No loss of nuclear expression of MMR proteins (MLH1, MSH2, MSH6, and PMS2). These results show low probability of microsatellite instability-high (MSI-H). On 8/19/22, he had a liver, core biopsy which showed metastatic adenocarcinoma, consistent with metastasis from colonic primary.\par  \par   [de-identified] : no MMR deficiency [de-identified] : Medical Hx: DVT 14 years ago (unknown cause) \par  Surgical Hx: left eye sx; left torn meniscus repaired \par  Family Hx: Mother leukemia\par  Social Hx: never smoker; ETOH never; lives alone; single; works PT at [employer]; daughters close by [de-identified] : Pt is C48D1 of FOLFIOXIRI q 2 weeks, with ongoing cycles planned. He was hospitalized June 9th - 14th, 2023 due to mechanical fall/Left femoral condyle fracture s/p retrograde IMN. He continues walking with his cane today, difficultly bending his left knee, PT completed. No pain. Neuropathy is mild, no changes. He notes mild fatigue. He denies cold sensitivity, eye changes, hair loss, nail / skin changes, pain, mouth sores, vomiting, heartburn, C/D. His wt remains stable overall.  He reports he has been off his anticoagulant AMA for over 1 month. He notes rectal bleeding while on medication. He has a new GI consult 9/24/24 at [facility], due to insurance issues. He feels well today.

## 2024-09-23 NOTE — PHYSICAL EXAM
[Restricted in physically strenuous activity but ambulatory and able to carry out work of a light or sedentary nature] : Status 1- Restricted in physically strenuous activity but ambulatory and able to carry out work of a light or sedentary nature, e.g., light house work, office work [Normal] : affect appropriate [de-identified] : wt stable overall  [de-identified] : walking with cane today  [de-identified] : right chest wall port accessed, dressing C/D/I

## 2024-09-23 NOTE — BEGINNING OF VISIT
[0] : 2) Feeling down, depressed, or hopeless: Not at all (0) [[id number]] : 0 [Pain Scale: ___] : On a scale of 1-10, today the patient's pain is a(n) [unfilled]. [Never] : Never [Date Discussed (MM/DD/YY): ___] : Discussed: [unfilled] [With Patient/Caregiver] : with Patient/Caregiver [Diarrhea Character] : Diarrhea: Grade 0

## 2024-09-23 NOTE — HISTORY OF PRESENT ILLNESS
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 On 08/18/22, he had a CT C/A/P which showed  irregular colonic wall thickening of the proximal sigmoid colon with stranding of the surrounding paracolic fat. Regional lymph nodes are identified adjacent to the colonic mass measuring up to 1.2 cm. Multiple masses identified within the hepatic parenchyma noted to be 3.9 cm, suspicious for hepatic parenchymal neoplastic disease. 3.4 cm left adrenal mass, suspicious for neoplastic disease. There is a 1.0 cm soft tissue nodule identified along the right lateral wall of the distal trachea, superior to the tracheal bifurcation. Lung nodules suspicious for lung parenchymal neoplastic disease.  \par  \par  On 8/18/22, he had a colonoscopy that showed a frond-like/villous and ulcerated non-obstructing large mass was found in the descending colon. The mass was circumferential. The mass measured five cm in length. Pathology showed invasive adenocarcinoma, moderately differentiated. No loss of nuclear expression of MMR proteins (MLH1, MSH2, MSH6, and PMS2). These results show low probability of microsatellite instability-high (MSI-H). On 8/19/22, he had a liver, core biopsy which showed metastatic adenocarcinoma, consistent with metastasis from colonic primary.\par  \par   [de-identified] : no MMR deficiency [de-identified] : Medical Hx: DVT 14 years ago (unknown cause) \par  Surgical Hx: left eye sx; left torn meniscus repaired \par  Family Hx: Mother leukemia\par  Social Hx: never smoker; ETOH never; lives alone; single; works PT at [employer]; daughters close by [de-identified] : Pt is C48D1 of FOLFIOXIRI q 2 weeks, with ongoing cycles planned. He was hospitalized June 9th - 14th, 2023 due to mechanical fall/Left femoral condyle fracture s/p retrograde IMN. He continues walking with his cane today, difficultly bending his left knee, PT completed. No pain. Neuropathy is mild, no changes. He notes mild fatigue. He denies cold sensitivity, eye changes, hair loss, nail / skin changes, pain, mouth sores, vomiting, heartburn, C/D. His wt remains stable overall.  He reports he has been off his anticoagulant AMA for over 1 month. He notes rectal bleeding while on medication. He has a new GI consult 9/24/24 at [facility], due to insurance issues. He feels well today.

## 2024-09-24 DIAGNOSIS — G62.9 POLYNEUROPATHY, UNSPECIFIED: ICD-10-CM

## 2024-09-30 ENCOUNTER — OUTPATIENT (OUTPATIENT)
Dept: OUTPATIENT SERVICES | Facility: HOSPITAL | Age: 64
LOS: 1 days | Discharge: HOME | End: 2024-09-30

## 2024-09-30 DIAGNOSIS — S72.90XA UNSPECIFIED FRACTURE OF UNSPECIFIED FEMUR, INITIAL ENCOUNTER FOR CLOSED FRACTURE: Chronic | ICD-10-CM

## 2024-09-30 DIAGNOSIS — G62.9 POLYNEUROPATHY, UNSPECIFIED: ICD-10-CM

## 2024-09-30 DIAGNOSIS — C18.9 MALIGNANT NEOPLASM OF COLON, UNSPECIFIED: ICD-10-CM

## 2024-10-01 ENCOUNTER — RESULT REVIEW (OUTPATIENT)
Age: 64
End: 2024-10-01

## 2024-10-01 ENCOUNTER — APPOINTMENT (OUTPATIENT)
Dept: INFUSION THERAPY | Facility: CLINIC | Age: 64
End: 2024-10-01

## 2024-10-01 VITALS
DIASTOLIC BLOOD PRESSURE: 93 MMHG | OXYGEN SATURATION: 98 % | WEIGHT: 229 LBS | HEART RATE: 99 BPM | TEMPERATURE: 98.2 F | SYSTOLIC BLOOD PRESSURE: 166 MMHG | HEIGHT: 71 IN | BODY MASS INDEX: 32.06 KG/M2

## 2024-10-01 DIAGNOSIS — C18.6 MALIGNANT NEOPLASM OF DESCENDING COLON: ICD-10-CM

## 2024-10-01 LAB
ALBUMIN SERPL ELPH-MCNC: 4.1 G/DL — SIGNIFICANT CHANGE UP (ref 3.3–5)
ALP SERPL-CCNC: 169 U/L — HIGH (ref 40–120)
ALT FLD-CCNC: 15 U/L — SIGNIFICANT CHANGE UP (ref 10–45)
ANION GAP SERPL CALC-SCNC: 16 MMOL/L — SIGNIFICANT CHANGE UP (ref 5–17)
AST SERPL-CCNC: 15 U/L — SIGNIFICANT CHANGE UP (ref 10–40)
BASOPHILS # BLD AUTO: 0.14 K/UL — SIGNIFICANT CHANGE UP (ref 0–0.2)
BASOPHILS NFR BLD AUTO: 2.6 % — HIGH (ref 0–2)
BILIRUB SERPL-MCNC: 0.2 MG/DL — SIGNIFICANT CHANGE UP (ref 0.2–1.2)
BUN SERPL-MCNC: 9 MG/DL — SIGNIFICANT CHANGE UP (ref 7–23)
CALCIUM SERPL-MCNC: 8.7 MG/DL — SIGNIFICANT CHANGE UP (ref 8.4–10.5)
CHLORIDE SERPL-SCNC: 104 MMOL/L — SIGNIFICANT CHANGE UP (ref 96–108)
CO2 SERPL-SCNC: 22 MMOL/L — SIGNIFICANT CHANGE UP (ref 22–31)
CREAT SERPL-MCNC: 0.87 MG/DL — SIGNIFICANT CHANGE UP (ref 0.5–1.3)
EGFR: 97 ML/MIN/1.73M2 — SIGNIFICANT CHANGE UP
EOSINOPHIL # BLD AUTO: 0.1 K/UL — SIGNIFICANT CHANGE UP (ref 0–0.5)
EOSINOPHIL NFR BLD AUTO: 1.8 % — SIGNIFICANT CHANGE UP (ref 0–6)
GLUCOSE SERPL-MCNC: 125 MG/DL — HIGH (ref 70–99)
HCT VFR BLD CALC: 31.1 % — LOW (ref 39–50)
HGB BLD-MCNC: 9.2 G/DL — LOW (ref 13–17)
IMM GRANULOCYTES NFR BLD AUTO: 1.8 % — HIGH (ref 0–0.9)
LYMPHOCYTES # BLD AUTO: 1.39 K/UL — SIGNIFICANT CHANGE UP (ref 1–3.3)
LYMPHOCYTES # BLD AUTO: 25.5 % — SIGNIFICANT CHANGE UP (ref 13–44)
MAGNESIUM SERPL-MCNC: 2.1 MG/DL — SIGNIFICANT CHANGE UP (ref 1.6–2.6)
MCHC RBC-ENTMCNC: 24.7 PG — LOW (ref 27–34)
MCHC RBC-ENTMCNC: 29.6 GM/DL — LOW (ref 32–36)
MCV RBC AUTO: 83.6 FL — SIGNIFICANT CHANGE UP (ref 80–100)
MONOCYTES # BLD AUTO: 0.38 K/UL — SIGNIFICANT CHANGE UP (ref 0–0.9)
MONOCYTES NFR BLD AUTO: 7 % — SIGNIFICANT CHANGE UP (ref 2–14)
NEUTROPHILS # BLD AUTO: 3.35 K/UL — SIGNIFICANT CHANGE UP (ref 1.8–7.4)
NEUTROPHILS NFR BLD AUTO: 61.3 % — SIGNIFICANT CHANGE UP (ref 43–77)
NRBC # BLD: 0 /100 WBCS — SIGNIFICANT CHANGE UP (ref 0–0)
PLATELET # BLD AUTO: 166 K/UL — SIGNIFICANT CHANGE UP (ref 150–400)
POTASSIUM SERPL-MCNC: 3.5 MMOL/L — SIGNIFICANT CHANGE UP (ref 3.5–5.3)
POTASSIUM SERPL-SCNC: 3.5 MMOL/L — SIGNIFICANT CHANGE UP (ref 3.5–5.3)
PROT SERPL-MCNC: 6.6 G/DL — SIGNIFICANT CHANGE UP (ref 6–8.3)
RBC # BLD: 3.72 M/UL — LOW (ref 4.2–5.8)
RBC # FLD: 19 % — HIGH (ref 10.3–14.5)
SODIUM SERPL-SCNC: 142 MMOL/L — SIGNIFICANT CHANGE UP (ref 135–145)
WBC # BLD: 5.46 K/UL — SIGNIFICANT CHANGE UP (ref 3.8–10.5)
WBC # FLD AUTO: 5.46 K/UL — SIGNIFICANT CHANGE UP (ref 3.8–10.5)

## 2024-10-02 DIAGNOSIS — R11.2 NAUSEA WITH VOMITING, UNSPECIFIED: ICD-10-CM

## 2024-10-02 DIAGNOSIS — Z51.11 ENCOUNTER FOR ANTINEOPLASTIC CHEMOTHERAPY: ICD-10-CM

## 2024-10-03 ENCOUNTER — APPOINTMENT (OUTPATIENT)
Dept: INFUSION THERAPY | Facility: CLINIC | Age: 64
End: 2024-10-03

## 2024-10-04 DIAGNOSIS — Z51.89 ENCOUNTER FOR OTHER SPECIFIED AFTERCARE: ICD-10-CM

## 2024-10-14 ENCOUNTER — APPOINTMENT (OUTPATIENT)
Dept: CT IMAGING | Facility: CLINIC | Age: 64
End: 2024-10-14
Payer: COMMERCIAL

## 2024-10-14 PROCEDURE — 71260 CT THORAX DX C+: CPT

## 2024-10-14 PROCEDURE — 74177 CT ABD & PELVIS W/CONTRAST: CPT

## 2024-10-15 ENCOUNTER — APPOINTMENT (OUTPATIENT)
Dept: INFUSION THERAPY | Facility: CLINIC | Age: 64
End: 2024-10-15

## 2024-10-15 ENCOUNTER — RESULT REVIEW (OUTPATIENT)
Age: 64
End: 2024-10-15

## 2024-10-15 ENCOUNTER — APPOINTMENT (OUTPATIENT)
Dept: HEMATOLOGY ONCOLOGY | Facility: CLINIC | Age: 64
End: 2024-10-15

## 2024-10-15 VITALS
HEIGHT: 71 IN | DIASTOLIC BLOOD PRESSURE: 79 MMHG | OXYGEN SATURATION: 100 % | TEMPERATURE: 98.5 F | WEIGHT: 226 LBS | HEART RATE: 114 BPM | BODY MASS INDEX: 31.64 KG/M2 | SYSTOLIC BLOOD PRESSURE: 166 MMHG

## 2024-10-15 DIAGNOSIS — C78.7 MALIGNANT NEOPLASM OF COLON, UNSPECIFIED: ICD-10-CM

## 2024-10-15 DIAGNOSIS — T82.898A OTHER SPECIFIED COMPLICATION OF VASCULAR PROSTHETIC DEVICES, IMPLANTS AND GRAFTS, INITIAL ENCOUNTER: ICD-10-CM

## 2024-10-15 DIAGNOSIS — G62.9 POLYNEUROPATHY, UNSPECIFIED: ICD-10-CM

## 2024-10-15 DIAGNOSIS — C18.9 MALIGNANT NEOPLASM OF COLON, UNSPECIFIED: ICD-10-CM

## 2024-10-15 LAB
ALBUMIN SERPL ELPH-MCNC: 4.4 G/DL — SIGNIFICANT CHANGE UP (ref 3.3–5)
ALP SERPL-CCNC: 198 U/L — HIGH (ref 40–120)
ALT FLD-CCNC: 14 U/L — SIGNIFICANT CHANGE UP (ref 10–45)
ANION GAP SERPL CALC-SCNC: 14 MMOL/L — SIGNIFICANT CHANGE UP (ref 5–17)
AST SERPL-CCNC: 14 U/L — SIGNIFICANT CHANGE UP (ref 10–40)
BASOPHILS # BLD AUTO: 0.13 K/UL — SIGNIFICANT CHANGE UP (ref 0–0.2)
BASOPHILS NFR BLD AUTO: 1.5 % — SIGNIFICANT CHANGE UP (ref 0–2)
BILIRUB SERPL-MCNC: 0.3 MG/DL — SIGNIFICANT CHANGE UP (ref 0.2–1.2)
BUN SERPL-MCNC: 7 MG/DL — SIGNIFICANT CHANGE UP (ref 7–23)
CALCIUM SERPL-MCNC: 8.9 MG/DL — SIGNIFICANT CHANGE UP (ref 8.4–10.5)
CHLORIDE SERPL-SCNC: 102 MMOL/L — SIGNIFICANT CHANGE UP (ref 96–108)
CO2 SERPL-SCNC: 25 MMOL/L — SIGNIFICANT CHANGE UP (ref 22–31)
CREAT SERPL-MCNC: 0.77 MG/DL — SIGNIFICANT CHANGE UP (ref 0.5–1.3)
EGFR: 101 ML/MIN/1.73M2 — SIGNIFICANT CHANGE UP
EOSINOPHIL # BLD AUTO: 0.1 K/UL — SIGNIFICANT CHANGE UP (ref 0–0.5)
EOSINOPHIL NFR BLD AUTO: 1.1 % — SIGNIFICANT CHANGE UP (ref 0–6)
GLUCOSE SERPL-MCNC: 142 MG/DL — HIGH (ref 70–99)
HCT VFR BLD CALC: 31.3 % — LOW (ref 39–50)
HGB BLD-MCNC: 9.3 G/DL — LOW (ref 13–17)
IMM GRANULOCYTES NFR BLD AUTO: 4.9 % — HIGH (ref 0–0.9)
LYMPHOCYTES # BLD AUTO: 1.28 K/UL — SIGNIFICANT CHANGE UP (ref 1–3.3)
LYMPHOCYTES # BLD AUTO: 14.3 % — SIGNIFICANT CHANGE UP (ref 13–44)
MCHC RBC-ENTMCNC: 24.6 PG — LOW (ref 27–34)
MCHC RBC-ENTMCNC: 29.7 GM/DL — LOW (ref 32–36)
MCV RBC AUTO: 82.8 FL — SIGNIFICANT CHANGE UP (ref 80–100)
MONOCYTES # BLD AUTO: 0.77 K/UL — SIGNIFICANT CHANGE UP (ref 0–0.9)
MONOCYTES NFR BLD AUTO: 8.6 % — SIGNIFICANT CHANGE UP (ref 2–14)
NEUTROPHILS # BLD AUTO: 6.24 K/UL — SIGNIFICANT CHANGE UP (ref 1.8–7.4)
NEUTROPHILS NFR BLD AUTO: 69.6 % — SIGNIFICANT CHANGE UP (ref 43–77)
NRBC # BLD: 0 /100 WBCS — SIGNIFICANT CHANGE UP (ref 0–0)
PLATELET # BLD AUTO: 188 K/UL — SIGNIFICANT CHANGE UP (ref 150–400)
POTASSIUM SERPL-MCNC: 4.1 MMOL/L — SIGNIFICANT CHANGE UP (ref 3.5–5.3)
POTASSIUM SERPL-SCNC: 4.1 MMOL/L — SIGNIFICANT CHANGE UP (ref 3.5–5.3)
PROT SERPL-MCNC: 7 G/DL — SIGNIFICANT CHANGE UP (ref 6–8.3)
RBC # BLD: 3.78 M/UL — LOW (ref 4.2–5.8)
RBC # FLD: 18.9 % — HIGH (ref 10.3–14.5)
SODIUM SERPL-SCNC: 141 MMOL/L — SIGNIFICANT CHANGE UP (ref 135–145)
WBC # BLD: 8.96 K/UL — SIGNIFICANT CHANGE UP (ref 3.8–10.5)
WBC # FLD AUTO: 8.96 K/UL — SIGNIFICANT CHANGE UP (ref 3.8–10.5)

## 2024-10-15 PROCEDURE — G2211 COMPLEX E/M VISIT ADD ON: CPT

## 2024-10-15 PROCEDURE — 99214 OFFICE O/P EST MOD 30 MIN: CPT

## 2024-10-16 LAB — MAGNESIUM SERPL-MCNC: 2.2 MG/DL — SIGNIFICANT CHANGE UP (ref 1.6–2.6)

## 2024-10-17 ENCOUNTER — APPOINTMENT (OUTPATIENT)
Dept: INFUSION THERAPY | Facility: CLINIC | Age: 64
End: 2024-10-17

## 2024-10-24 ENCOUNTER — APPOINTMENT (OUTPATIENT)
Dept: HEMATOLOGY ONCOLOGY | Facility: CLINIC | Age: 64
End: 2024-10-24

## 2024-10-25 ENCOUNTER — RESULT REVIEW (OUTPATIENT)
Age: 64
End: 2024-10-25

## 2024-10-25 ENCOUNTER — APPOINTMENT (OUTPATIENT)
Dept: COLORECTAL SURGERY | Facility: HOSPITAL | Age: 64
End: 2024-10-25

## 2024-10-25 ENCOUNTER — OUTPATIENT (OUTPATIENT)
Dept: OUTPATIENT SERVICES | Facility: HOSPITAL | Age: 64
LOS: 1 days | Discharge: HOME | End: 2024-10-25
Payer: COMMERCIAL

## 2024-10-25 VITALS
HEIGHT: 71 IN | WEIGHT: 225.09 LBS | SYSTOLIC BLOOD PRESSURE: 153 MMHG | RESPIRATION RATE: 9 BRPM | TEMPERATURE: 97 F | HEART RATE: 108 BPM | OXYGEN SATURATION: 98 % | DIASTOLIC BLOOD PRESSURE: 91 MMHG

## 2024-10-25 DIAGNOSIS — S72.90XA UNSPECIFIED FRACTURE OF UNSPECIFIED FEMUR, INITIAL ENCOUNTER FOR CLOSED FRACTURE: Chronic | ICD-10-CM

## 2024-10-25 DIAGNOSIS — C18.6 MALIGNANT NEOPLASM OF DESCENDING COLON: ICD-10-CM

## 2024-10-25 PROCEDURE — 88305 TISSUE EXAM BY PATHOLOGIST: CPT

## 2024-10-25 PROCEDURE — 45380 COLONOSCOPY AND BIOPSY: CPT

## 2024-10-25 PROCEDURE — 88342 IMHCHEM/IMCYTCHM 1ST ANTB: CPT | Mod: 26

## 2024-10-25 PROCEDURE — 88341 IMHCHEM/IMCYTCHM EA ADD ANTB: CPT | Mod: 26

## 2024-10-25 PROCEDURE — 88342 IMHCHEM/IMCYTCHM 1ST ANTB: CPT

## 2024-10-25 PROCEDURE — 88341 IMHCHEM/IMCYTCHM EA ADD ANTB: CPT

## 2024-10-25 PROCEDURE — 88305 TISSUE EXAM BY PATHOLOGIST: CPT | Mod: 26

## 2024-10-25 NOTE — ASU PATIENT PROFILE, ADULT - FALL HARM RISK - HARM RISK INTERVENTIONS

## 2024-10-29 ENCOUNTER — APPOINTMENT (OUTPATIENT)
Dept: INFUSION THERAPY | Facility: CLINIC | Age: 64
End: 2024-10-29

## 2024-10-29 ENCOUNTER — NON-APPOINTMENT (OUTPATIENT)
Age: 64
End: 2024-10-29

## 2024-10-29 LAB — SURGICAL PATHOLOGY STUDY: SIGNIFICANT CHANGE UP

## 2024-10-30 DIAGNOSIS — Z12.11 ENCOUNTER FOR SCREENING FOR MALIGNANT NEOPLASM OF COLON: ICD-10-CM

## 2024-10-30 DIAGNOSIS — K63.3 ULCER OF INTESTINE: ICD-10-CM

## 2024-10-30 DIAGNOSIS — Z53.8 PROCEDURE AND TREATMENT NOT CARRIED OUT FOR OTHER REASONS: ICD-10-CM

## 2024-10-30 DIAGNOSIS — Z86.718 PERSONAL HISTORY OF OTHER VENOUS THROMBOSIS AND EMBOLISM: ICD-10-CM

## 2024-10-30 DIAGNOSIS — Z86.0100 PERSONAL HISTORY OF COLON POLYPS, UNSPECIFIED: ICD-10-CM

## 2024-10-31 ENCOUNTER — APPOINTMENT (OUTPATIENT)
Dept: INFUSION THERAPY | Facility: CLINIC | Age: 64
End: 2024-10-31

## 2024-10-31 ENCOUNTER — INPATIENT (INPATIENT)
Facility: HOSPITAL | Age: 64
LOS: 0 days | Discharge: HOME | DRG: 861 | End: 2024-11-01
Attending: STUDENT IN AN ORGANIZED HEALTH CARE EDUCATION/TRAINING PROGRAM | Admitting: STUDENT IN AN ORGANIZED HEALTH CARE EDUCATION/TRAINING PROGRAM
Payer: MEDICAID

## 2024-10-31 VITALS
DIASTOLIC BLOOD PRESSURE: 97 MMHG | OXYGEN SATURATION: 96 % | SYSTOLIC BLOOD PRESSURE: 147 MMHG | HEART RATE: 109 BPM | RESPIRATION RATE: 18 BRPM | HEIGHT: 71 IN

## 2024-10-31 DIAGNOSIS — S72.90XA UNSPECIFIED FRACTURE OF UNSPECIFIED FEMUR, INITIAL ENCOUNTER FOR CLOSED FRACTURE: Chronic | ICD-10-CM

## 2024-10-31 DIAGNOSIS — Z80.6 FAMILY HISTORY OF LEUKEMIA: ICD-10-CM

## 2024-10-31 LAB
ALBUMIN SERPL ELPH-MCNC: 3.9 G/DL — SIGNIFICANT CHANGE UP (ref 3.3–5)
ALP SERPL-CCNC: 187 U/L — HIGH (ref 40–120)
ALT FLD-CCNC: 30 U/L — SIGNIFICANT CHANGE UP (ref 12–78)
ANION GAP SERPL CALC-SCNC: 5 MMOL/L — SIGNIFICANT CHANGE UP (ref 5–17)
ANISOCYTOSIS BLD QL: SLIGHT — SIGNIFICANT CHANGE UP
APTT BLD: 29 SEC — SIGNIFICANT CHANGE UP (ref 24.5–35.6)
AST SERPL-CCNC: 18 U/L — SIGNIFICANT CHANGE UP (ref 15–37)
BASOPHILS # BLD AUTO: 0.15 K/UL — SIGNIFICANT CHANGE UP (ref 0–0.2)
BASOPHILS NFR BLD AUTO: 2 % — SIGNIFICANT CHANGE UP (ref 0–2)
BILIRUB SERPL-MCNC: 0.4 MG/DL — SIGNIFICANT CHANGE UP (ref 0.2–1.2)
BLD GP AB SCN SERPL QL: SIGNIFICANT CHANGE UP
BUN SERPL-MCNC: 8 MG/DL — SIGNIFICANT CHANGE UP (ref 7–23)
CALCIUM SERPL-MCNC: 9.4 MG/DL — SIGNIFICANT CHANGE UP (ref 8.5–10.1)
CHLORIDE SERPL-SCNC: 104 MMOL/L — SIGNIFICANT CHANGE UP (ref 96–108)
CO2 SERPL-SCNC: 28 MMOL/L — SIGNIFICANT CHANGE UP (ref 22–31)
CREAT SERPL-MCNC: 0.99 MG/DL — SIGNIFICANT CHANGE UP (ref 0.5–1.3)
EGFR: 85 ML/MIN/1.73M2 — SIGNIFICANT CHANGE UP
ELLIPTOCYTES BLD QL SMEAR: SLIGHT — SIGNIFICANT CHANGE UP
EOSINOPHIL # BLD AUTO: 0.31 K/UL — SIGNIFICANT CHANGE UP (ref 0–0.5)
EOSINOPHIL NFR BLD AUTO: 4 % — SIGNIFICANT CHANGE UP (ref 0–6)
GLUCOSE SERPL-MCNC: 97 MG/DL — SIGNIFICANT CHANGE UP (ref 70–99)
HCT VFR BLD CALC: 34.6 % — LOW (ref 39–50)
HGB BLD-MCNC: 9.9 G/DL — LOW (ref 13–17)
INR BLD: 0.98 RATIO — SIGNIFICANT CHANGE UP (ref 0.85–1.16)
LIDOCAIN IGE QN: 95 U/L — HIGH (ref 13–75)
LYMPHOCYTES # BLD AUTO: 1.62 K/UL — SIGNIFICANT CHANGE UP (ref 1–3.3)
LYMPHOCYTES # BLD AUTO: 21 % — SIGNIFICANT CHANGE UP (ref 13–44)
MANUAL SMEAR VERIFICATION: SIGNIFICANT CHANGE UP
MCHC RBC-ENTMCNC: 24.4 PG — LOW (ref 27–34)
MCHC RBC-ENTMCNC: 28.6 G/DL — LOW (ref 32–36)
MCV RBC AUTO: 85.4 FL — SIGNIFICANT CHANGE UP (ref 80–100)
METAMYELOCYTES # FLD: 1 % — HIGH (ref 0–0)
MICROCYTES BLD QL: SLIGHT — SIGNIFICANT CHANGE UP
MONOCYTES # BLD AUTO: 0.54 K/UL — SIGNIFICANT CHANGE UP (ref 0–0.9)
MONOCYTES NFR BLD AUTO: 7 % — SIGNIFICANT CHANGE UP (ref 2–14)
NEUTROPHILS # BLD AUTO: 4.95 K/UL — SIGNIFICANT CHANGE UP (ref 1.8–7.4)
NEUTROPHILS NFR BLD AUTO: 64 % — SIGNIFICANT CHANGE UP (ref 43–77)
NRBC # BLD: 0 /100 WBCS — SIGNIFICANT CHANGE UP (ref 0–0)
NRBC # BLD: SIGNIFICANT CHANGE UP /100 WBCS (ref 0–0)
OVALOCYTES BLD QL SMEAR: SLIGHT — SIGNIFICANT CHANGE UP
PLAT MORPH BLD: NORMAL — SIGNIFICANT CHANGE UP
PLATELET # BLD AUTO: 282 K/UL — SIGNIFICANT CHANGE UP (ref 150–400)
POIKILOCYTOSIS BLD QL AUTO: SLIGHT — SIGNIFICANT CHANGE UP
POTASSIUM SERPL-MCNC: 4.3 MMOL/L — SIGNIFICANT CHANGE UP (ref 3.5–5.3)
POTASSIUM SERPL-SCNC: 4.3 MMOL/L — SIGNIFICANT CHANGE UP (ref 3.5–5.3)
PROT SERPL-MCNC: 7.9 GM/DL — SIGNIFICANT CHANGE UP (ref 6–8.3)
PROTHROM AB SERPL-ACNC: 11.6 SEC — SIGNIFICANT CHANGE UP (ref 9.9–13.4)
RBC # BLD: 4.05 M/UL — LOW (ref 4.2–5.8)
RBC # FLD: 18.8 % — HIGH (ref 10.3–14.5)
RBC BLD AUTO: ABNORMAL
SCHISTOCYTES BLD QL AUTO: SLIGHT — SIGNIFICANT CHANGE UP
SODIUM SERPL-SCNC: 137 MMOL/L — SIGNIFICANT CHANGE UP (ref 135–145)
VARIANT LYMPHS # BLD: 1 % — SIGNIFICANT CHANGE UP (ref 0–6)
WBC # BLD: 7.73 K/UL — SIGNIFICANT CHANGE UP (ref 3.8–10.5)
WBC # FLD AUTO: 7.73 K/UL — SIGNIFICANT CHANGE UP (ref 3.8–10.5)

## 2024-10-31 PROCEDURE — C1769: CPT

## 2024-10-31 PROCEDURE — 83550 IRON BINDING TEST: CPT

## 2024-10-31 PROCEDURE — 36415 COLL VENOUS BLD VENIPUNCTURE: CPT

## 2024-10-31 PROCEDURE — 83540 ASSAY OF IRON: CPT

## 2024-10-31 PROCEDURE — 82728 ASSAY OF FERRITIN: CPT

## 2024-10-31 PROCEDURE — 99285 EMERGENCY DEPT VISIT HI MDM: CPT

## 2024-10-31 PROCEDURE — 99223 1ST HOSP IP/OBS HIGH 75: CPT

## 2024-10-31 PROCEDURE — 99253 IP/OBS CNSLTJ NEW/EST LOW 45: CPT

## 2024-10-31 PROCEDURE — 80053 COMPREHEN METABOLIC PANEL: CPT

## 2024-10-31 PROCEDURE — 71046 X-RAY EXAM CHEST 2 VIEWS: CPT | Mod: 26

## 2024-10-31 PROCEDURE — 85025 COMPLETE CBC W/AUTO DIFF WBC: CPT

## 2024-10-31 PROCEDURE — C1876: CPT

## 2024-10-31 PROCEDURE — 93010 ELECTROCARDIOGRAM REPORT: CPT

## 2024-10-31 PROCEDURE — 99254 IP/OBS CNSLTJ NEW/EST MOD 60: CPT

## 2024-10-31 RX ORDER — ACETAMINOPHEN 500 MG
650 TABLET ORAL EVERY 6 HOURS
Refills: 0 | Status: DISCONTINUED | OUTPATIENT
Start: 2024-10-31 | End: 2024-11-01

## 2024-10-31 RX ORDER — SODIUM CHLORIDE 9 MG/ML
1000 INJECTION, SOLUTION INTRAMUSCULAR; INTRAVENOUS; SUBCUTANEOUS ONCE
Refills: 0 | Status: COMPLETED | OUTPATIENT
Start: 2024-10-31 | End: 2024-10-31

## 2024-10-31 RX ORDER — INFLUENZ VIR VAC TV P-SURF2003 15MCG/.5ML
0.5 SYRINGE (ML) INTRAMUSCULAR ONCE
Refills: 0 | Status: DISCONTINUED | OUTPATIENT
Start: 2024-10-31 | End: 2024-11-01

## 2024-10-31 RX ORDER — POLYETHYLENE GLYCOL 3350, SODIUM SULFATE ANHYDROUS, SODIUM BICARBONATE, SODIUM CHLORIDE, POTASSIUM CHLORIDE 236; 22.74; 6.74; 5.86; 2.97 G/4L; G/4L; G/4L; G/4L; G/4L
2 POWDER, FOR SOLUTION ORAL ONCE
Refills: 0 | Status: COMPLETED | OUTPATIENT
Start: 2024-10-31 | End: 2024-10-31

## 2024-10-31 RX ADMIN — SODIUM CHLORIDE 2000 MILLILITER(S): 9 INJECTION, SOLUTION INTRAMUSCULAR; INTRAVENOUS; SUBCUTANEOUS at 12:04

## 2024-10-31 RX ADMIN — POLYETHYLENE GLYCOL 3350, SODIUM SULFATE ANHYDROUS, SODIUM BICARBONATE, SODIUM CHLORIDE, POTASSIUM CHLORIDE 2 LITER(S): 236; 22.74; 6.74; 5.86; 2.97 POWDER, FOR SOLUTION ORAL at 16:31

## 2024-10-31 NOTE — H&P ADULT - HISTORY OF PRESENT ILLNESS
Patient is a 65 y/o M with stage 4 colon cancer, hx DVT sent in by GI Dr. Hills for stent placement. Currently getting chemotherapy for CRC. ROS otherwise negative.    Afebrile, HR low 100s, BP slighlty HTN 140s/90s, 96% on RA  No leukocytosis, Hb 9.9 (at baseline), BMP unremarkable,  (down from 198 last week), lipase 95  Patient is a 65 y/o M with stage 4 colon cancer, hx DVT sent in by GI Dr. Hills for stent placement. Currently getting chemotherapy for CRC. PT denies complaints other than hunger. ROS otherwise negative.    Afebrile, HR low 100s, BP slighlty HTN 140s/90s, 96% on RA  No leukocytosis, Hb 9.9 (at baseline), BMP unremarkable,  (down from 198 last week), lipase 95

## 2024-10-31 NOTE — H&P ADULT - ASSESSMENT
Patient is a 63 y/o M with stage 4 colon cancer, hx DVT sent in by GI Dr. Hills for stent placement      #Stage 4 colorectal cancer  - dx earlier this month  - actively getting chemotherapy (last 10/15/2024)  - ALP slighlty elevated 187 (down from 198), lipase 95 otherwise labs remarkable  - NPO@MN for Stent placement w/ Dr. Hills      #HX dvt    #anemia  - likely chemo relate vs AOCD from CRC  - iron studies    #dvt ppx -       #dispo- pending stent placement 11/1  Patient is a 63 y/o M with stage 4 colon cancer, hx DVT sent in by GI Dr. Hills for stent placement      #Stage 4 colorectal cancer  - dx earlier this month  - actively getting chemotherapy (last 10/15/2024)  - ALP slighlty elevated 187 (down from 198), lipase 95 otherwise labs remarkable  - bowel prep   - NPO@MN for Stent placement w/ Dr. Hills  - sent in by GI/Dr. Hills         #HX dvt  - not on AC     #anemia  - likely chemo relate vs AOCD from CRC  - iron studies    #dvt ppx - SCDs, resume chemical ppx post stent       #dispo- pending stent placement 11/1  Patient is a 63 y/o M with stage 4 colon cancer, hx DVT sent in by GI Dr. Hills for stent placement      #Stage 4 colorectal cancer  - dx earlier this month  - actively getting chemotherapy (last 10/15/2024)  - ALP slighlty elevated 187 (down from 198), lipase 95 otherwise labs remarkable  - bowel prep   - NPO@MN for Stent placement w/ Dr. Hills  - sent in by GI/Dr. Hills         #HX dvt  - not on AC currently     #anemia  - likely chemo relate vs AOCD from CRC  - iron studies    #dvt ppx - SCDs, resume chemical ppx post stent       #dispo- pending stent placement 11/1

## 2024-10-31 NOTE — CONSULT NOTE ADULT - SUBJECTIVE AND OBJECTIVE BOX
Surgery Consultation    65 yo M with PMH of metastatic colon cancer, Stage IV carcinoma of colon on active chemotherapy, last session 2 weeks ago and h/o DVT sent in by Dr. Barrios and Dr. Hills, to the ED for stent to be placed. Pt states he is doing well, having bowel function, last BM yesterday PM.  Pt denies fever or chills, n/v/d/c.   PAST MEDICAL & SURGICAL HISTORY:  Colon cancer      Stage IV carcinoma of colon      History of DVT (deep vein thrombosis)      Chronic anticoagulation      Closed fracture of femur  2023        Allergies:  No Known Drug Allergies  Seafood (Unknown)    Home Medications:      Family History:  FAMILY HISTORY:  FHx: leukemia (Mother)        ROS:  Constitutional: Denies fever, fatigue or weight loss.  Skin: Denies rash.  Eyes: Denies recent vision problems or eye pain.  ENT: Denies congestion, ear pain, or sore throat.  Endocrine: Denies thyroid problems.  Cardiovascular: Denies chest pain or palpation.  Respiratory: Denies cough, shortness of breath, congestion, or wheezing.  Gastrointestinal: Denies abdominal pain, nausea, vomiting, or diarrhea.  Genitourinary: Denies dysuria.  Musculoskeletal: Denies joint swelling.  Neurologic: Denies headache.      Physical Examination:  AO x3, NAD  GENERAL: No acute distress, well-developed  HEAD:  Atraumatic, Normocephalic  CHEST/LUNG: equal rise and fall of chest  HEART: Regular rate and rhythm; No murmurs, rubs, or gallops  ABDOMEN: Soft, non-tender, non-distended  NEUROLOGY: responding appropriately, no focal deficits    Data:                        9.9    7.73  )-----------( 282      ( 31 Oct 2024 11:46 )             34.6     10-31    137  |  104  |  8   ----------------------------<  97  4.3   |  28  |  0.99    Ca    9.4      31 Oct 2024 11:46    TPro  7.9  /  Alb  3.9  /  TBili  0.4  /  DBili  x   /  AST  18  /  ALT  30  /  AlkPhos  187[H]  10-31      LIVER FUNCTIONS - ( 31 Oct 2024 11:46 )  Alb: 3.9 g/dL / Pro: 7.9 gm/dL / ALK PHOS: 187 U/L / ALT: 30 U/L / AST: 18 U/L / GGT: x           Urinalysis Basic - ( 31 Oct 2024 11:46 )    Color: x / Appearance: x / SG: x / pH: x  Gluc: 97 mg/dL / Ketone: x  / Bili: x / Urobili: x   Blood: x / Protein: x / Nitrite: x   Leuk Esterase: x / RBC: x / WBC x   Sq Epi: x / Non Sq Epi: x / Bacteria: x        Radiology:       path:    Large bowel, descending, mass (biopsy):   - Adenocarcinoma, invasive, with ulceration.   - Fragments of acute inflammatory exudateconsistent with ulcer.

## 2024-10-31 NOTE — CONSULT NOTE ADULT - ASSESSMENT
Imp: St 4 metastatic colon cancer with evolving sigmoid mass requiring intervention with stent placement    Rec:  ::Bowel prep today  ::Clear liquids ok  ::NPO p MN  ::Colonoscopy tomorrow with stent placement Imp: Staget 4 metastatic colon cancer with evolving sigmoid mass requiring intervention with stent placement    Rec:  ::Bowel prep today  ::Clear liquids ok  ::NPO p MN  ::Colonoscopy tomorrow with stent placement

## 2024-10-31 NOTE — ED STATDOCS - OBJECTIVE STATEMENT
64 year old male with PMHx of colon cancer, Stage IV carcinoma of colon, and h/o DVT sent in by MD, Dr. Hills, to the ED for stent to be placed. Pt states he is currently on chemotherapy for colon cancer Pt denies any other complaints at this time. Pt has no known medical allergies. PMD is Dr. Smith.

## 2024-10-31 NOTE — ED STATDOCS - CLINICAL SUMMARY MEDICAL DECISION MAKING FREE TEXT BOX
64 year old male here to see to [facility] (as per daughter on phone) for stent placement, Pt is in no pain, plan for preprocedure labs, EKG, CXR and contact [facility] team

## 2024-10-31 NOTE — ED ADULT NURSE NOTE - OBJECTIVE STATEMENT
pt presents to the ED sent in by MD for stent to be placed. pt states he has colon cancer. also states he had slight facial swelling from last chemo treatment. denies any other complaints at this time. pt is axo4 ambulatory. NKDA no other complaints pt worked up in  and brought to rw

## 2024-10-31 NOTE — H&P ADULT - TIME BILLING
Time spent coordinating the patient's care. This includes reviewing documentation pertinent to this admission, results and imaging. Further tests, medications, and procedures have been ordered as indicated. Laboratory results and the plan of care were communicated to the patient and family.  Discussed care plan with nursing and will discuss plan and care with appropriate consultant(s).

## 2024-10-31 NOTE — CONSULT NOTE ADULT - TIME BILLING
Patient seen and examined, chart reviewed.  Mr. Griffith is well known to me as outpatient.  He has metastatic CRC on FOLFOXIRI with a near-obstructing L-sided colon mass visualized on his colonoscopy performed by me last week.  He has no clinical symptoms of obstruction and had tolerated his bowel prep without issues.  Given findings he was recommended to return for evaluation for stent.  He has been evaluated by GI and plan for stent with Dr. Hills on Friday.  No urgent surgical issues at this time.  Will follow peripherally.  Please call with any urgent questions/concerns for Colorectal Surgery.

## 2024-10-31 NOTE — CONSULT NOTE ADULT - ASSESSMENT
A/P:  65 yo M with stage IV metastatic colon CA  last biopsy 10/25:  Adenocarcinoma, invasive, with ulceration  +bowel function    P:  no acute colorectal surgery intervention at this time  f/u GI consult for Stent placement  bowel preop  CLD for now  NPO past MN  rest of plan as per primary team    plan d/w Dr. Barrios

## 2024-10-31 NOTE — ED ADULT NURSE NOTE - HISTORY OF COVID-19 VACCINATION
Call placed to patient and message was left to call to reschedule as MD is out. Left [facility] number to call to reschedule.   Vaccine status unknown

## 2024-10-31 NOTE — ED STATDOCS - PROGRESS NOTE DETAILS
I spoke with Sondra Hammer, GI and will have stent tomorrow.  Medicine admit.  Brianna Lr PA-C Pt. with colon adenocarcinoma moderately differentiated with metastatic disease in the lungs, live and left adrenal gland.   Pt. to have procedure tomorrow with Dr. Hills.  Brianna Lr PA-C

## 2024-10-31 NOTE — PATIENT PROFILE ADULT - NSPROPOAURINARYCATHETER_GEN_A_NUR
Loren Starks has tomer on a prescription called memantine and has him increasing it   started out with 1 tab in pm  them increasing   This morning Maren Fournier took 2 in the morning was at [employer] and felt woozie/dizzy and passed out.   Paramedics were called an no

## 2024-10-31 NOTE — H&P ADULT - NSHPLABSRESULTS_GEN_ALL_CORE
.  LABS:                         9.9    7.73  )-----------( 282      ( 31 Oct 2024 11:46 )             34.6     10-31    137  |  104  |  8   ----------------------------<  97  4.3   |  28  |  0.99    Ca    9.4      31 Oct 2024 11:46    TPro  7.9  /  Alb  3.9  /  TBili  0.4  /  DBili  x   /  AST  18  /  ALT  30  /  AlkPhos  187[H]  10-31    PT/INR - ( 31 Oct 2024 11:46 )   PT: 11.6 sec;   INR: 0.98 ratio         PTT - ( 31 Oct 2024 11:46 )  PTT:29.0 sec  Urinalysis Basic - ( 31 Oct 2024 11:46 )    Color: x / Appearance: x / SG: x / pH: x  Gluc: 97 mg/dL / Ketone: x  / Bili: x / Urobili: x   Blood: x / Protein: x / Nitrite: x   Leuk Esterase: x / RBC: x / WBC x   Sq Epi: x / Non Sq Epi: x / Bacteria: x                RADIOLOGY, EKG & ADDITIONAL TESTS: Reviewed.

## 2024-10-31 NOTE — ED STATDOCS - CADM POA URETHRAL CATHETER
[FreeTextEntry1] : 21 year-old female with history of migraines, fibromyalgia, backache and dry eyes. She was noted to have elevated resting sinus heart rate and low blood pressure.\par \par The patient had noticed that her heart rate is sometimes fast. She does not have dizziness or lightheadedness. No history of syncope. She stopped  Zyrtec-D for allergies and  Topamax for migraines as well as increased her soidum intake and this has helped her elevated resting HR and low BP issues. \par \par  No

## 2024-10-31 NOTE — ED ADULT NURSE NOTE - NSFALLUNIVINTERV_ED_ALL_ED
Bed/Stretcher in lowest position, wheels locked, appropriate side rails in place/Call bell, personal items and telephone in reach/Instruct patient to call for assistance before getting out of bed/chair/stretcher/Non-slip footwear applied when patient is off stretcher/[place] to call system/Physically safe environment - no spills, clutter or unnecessary equipment/Purposeful proactive rounding/Room/bathroom lighting operational, light cord in reach

## 2024-10-31 NOTE — ED ADULT TRIAGE NOTE - CHIEF COMPLAINT QUOTE
pt presents to the ED sent in by MD for stent to be placed. pt states he has colon cancer. also states he had slight facial swelling from last chemo treatment. denies any other complaints at this time. pt is axo4 ambulatory. JORGE

## 2024-10-31 NOTE — PATIENT PROFILE ADULT - FALL HARM RISK - RISK INTERVENTIONS
Assistance OOB with selected safe patient handling equipment/Assistance with ambulation/Communicate Fall Risk and Risk Factors to all staff, patient, and family/Move patient closer to nurses' station/Reinforce activity limits and safety measures with patient and family/Visual Cue: Yellow wristband/Bed in lowest position, wheels locked, appropriate side rails in place/Call bell, personal items and telephone in reach/Instruct patient to call for assistance before getting out of bed or chair/Non-slip footwear when patient is out of bed/[place] to call system/Physically safe environment - no spills, clutter or unnecessary equipment/Purposeful Proactive Rounding/Room/bathroom lighting operational, light cord in reach

## 2024-10-31 NOTE — CONSULT NOTE ADULT - SUBJECTIVE AND OBJECTIVE BOX
Patient is a 64y old male presenting with chief complaint of stage 4 cancer, stent placement    HPI:  Patient is a 65 y/o M with stage 4 colon cancer, hx DVT sent in by GI Dr. Hills for stent placement. Currently getting chemotherapy for CRC. PT denies complaints other than hunger. ROS otherwise negative.    Afebrile, HR low 100s, BP slighlty HTN 140s/90s, 96% on RA  No leukocytosis, Hb 9.9 (at baseline), BMP unremarkable,  (down from 198 last week), lipase 95  (31 Oct 2024 13:23)      PAST MEDICAL & SURGICAL HISTORY:  Colon cancer      Stage IV carcinoma of colon      History of DVT (deep vein thrombosis)      Chronic anticoagulation      Closed fracture of femur  2023          MEDICATIONS  (STANDING):  polyethylene glycol/electrolyte Solution 2 Liter(s) Oral once    MEDICATIONS  (PRN):  acetaminophen     Tablet .. 650 milliGRAM(s) Oral every 6 hours PRN Temp greater or equal to 38C (100.4F), Mild Pain (1 - 3)      Allergies    No Known Drug Allergies  Seafood (Unknown)    Intolerances        SOCIAL HISTORY:    FAMILY HISTORY:  FHx: leukemia (Mother)        REVIEW OF SYSTEMS:    CONSTITUTIONAL: No weakness, fevers or chills  EYES/ENT: No visual changes;  No vertigo or throat pain   NECK: No pain or stiffness  RESPIRATORY: No cough, wheezing, hemoptysis; No shortness of breath  CARDIOVASCULAR: No chest pain or palpitations  GASTROINTESTINAL: No abdominal or epigastric pain. No nausea, vomiting, or hematemesis; No diarrhea or constipation. No melena or hematochezia.  GENITOURINARY: No dysuria, frequency or hematuria  NEUROLOGICAL: No numbness or weakness  SKIN: No itching, burning, rashes, or lesions   PSYCH: Normal mood and affect  All other review of systems is negative unless indicated above.    Vital Signs Last 24 Hrs  T(C): 36.8 (31 Oct 2024 14:26), Max: 36.8 (31 Oct 2024 10:42)  T(F): 98.3 (31 Oct 2024 14:26), Max: 98.3 (31 Oct 2024 10:42)  HR: 77 (31 Oct 2024 14:26) (77 - 109)  BP: 147/79 (31 Oct 2024 14:26) (142/85 - 147/97)  BP(mean): 100 (31 Oct 2024 13:37) (100 - 113)  RR: 18 (31 Oct 2024 14:26) (18 - 18)  SpO2: 100% (31 Oct 2024 14:26) (96% - 100%)    Parameters below as of 31 Oct 2024 14:26  Patient On (Oxygen Delivery Method): room air        PHYSICAL EXAM:    Constitutional: No acute distress, well-developed, non-toxic appearing  HEENT: masked, good phonation, not icteric  Neck: supple, no lymphadenopathy  Respiratory: clear to ascultation bilaterally, no wheezing  Cardiovascular: S1 and S2, regular rate and rhythm, no murmurs rubs or gallops  Gastrointestinal: soft, non-tender, non-distended, +bowel sounds, no rebound or guarding, no surgical scars, no drains  Extremities: No peripheral edema, no cyanosis or clubbing  Vascular: 2+ peripheral pulses, no venous stasis  Neurological: A/O x 3, no focal deficits, no asterixis  Psychiatric: Normal mood, normal affect  Skin: No rashes, not jaundiced    LABS:                        9.9    7.73  )-----------( 282      ( 31 Oct 2024 11:46 )             34.6     10-31    137  |  104  |  8   ----------------------------<  97  4.3   |  28  |  0.99    Ca    9.4      31 Oct 2024 11:46    TPro  7.9  /  Alb  3.9  /  TBili  0.4  /  DBili  x   /  AST  18  /  ALT  30  /  AlkPhos  187[H]  10-31    PT/INR - ( 31 Oct 2024 11:46 )   PT: 11.6 sec;   INR: 0.98 ratio         PTT - ( 31 Oct 2024 11:46 )  PTT:29.0 sec  LIVER FUNCTIONS - ( 31 Oct 2024 11:46 )  Alb: 3.9 g/dL / Pro: 7.9 gm/dL / ALK PHOS: 187 U/L / ALT: 30 U/L / AST: 18 U/L / GGT: x             RADIOLOGY & ADDITIONAL STUDIES: Patient is a 64y old male presenting with chief complaint of stage 4 cancer, stent placement    HPI:  This is a pleasant 64 year old male with stage 4 colon cancer on chemo with last treatment two weeks ago, history DVT, recent thrombus on port, with recent CT imaging showing significant narrowing sigmoid colon prompting colonoscopy with difficulty passing scope thus requiring consultation by Dr. Hills for colonic stent. Patient evaluated in ED results waiting area. Denies adbominal or epigastric pain. Denies any changes in stool pattern with 1-2 formed nonbloody brown stools daily. Does endorse loose stools after chemo with last treatment 2 weeks ago. Deneis fever, chills, chest pain, cough, or shortness of breath. Does admit to hunger. Discussed plan for colonscopy tomorrow. Denies any issues with PO intake or increased pain with PO.     PAST MEDICAL & SURGICAL HISTORY:  Colon cancer      Stage IV carcinoma of colon      History of DVT (deep vein thrombosis)      Chronic anticoagulation      Closed fracture of femur  2023      MEDICATIONS  (STANDING):  polyethylene glycol/electrolyte Solution 2 Liter(s) Oral once    MEDICATIONS  (PRN):  acetaminophen     Tablet .. 650 milliGRAM(s) Oral every 6 hours PRN Temp greater or equal to 38C (100.4F), Mild Pain (1 - 3)      Allergies    No Known Drug Allergies  Seafood (Unknown)    Intolerances    SOCIAL HISTORY:    FAMILY HISTORY:  FHx: leukemia (Mother)        REVIEW OF SYSTEMS:    CONSTITUTIONAL: No weakness, fevers or chills  EYES/ENT: No visual changes;  No vertigo or throat pain   NECK: No pain or stiffness  RESPIRATORY: No cough, wheezing, hemoptysis; No shortness of breath  CARDIOVASCULAR: No chest pain or palpitations  GASTROINTESTINAL: See HPI  GENITOURINARY: No dysuria, frequency or hematuria  NEUROLOGICAL: No numbness or weakness  SKIN: No itching, burning, rashes, or lesions   PSYCH: Normal mood and affect  All other review of systems is negative unless indicated above.    Vital Signs Last 24 Hrs  T(C): 36.8 (31 Oct 2024 14:26), Max: 36.8 (31 Oct 2024 10:42)  T(F): 98.3 (31 Oct 2024 14:26), Max: 98.3 (31 Oct 2024 10:42)  HR: 77 (31 Oct 2024 14:26) (77 - 109)  BP: 147/79 (31 Oct 2024 14:26) (142/85 - 147/97)  BP(mean): 100 (31 Oct 2024 13:37) (100 - 113)  RR: 18 (31 Oct 2024 14:26) (18 - 18)  SpO2: 100% (31 Oct 2024 14:26) (96% - 100%)    Parameters below as of 31 Oct 2024 14:26  Patient On (Oxygen Delivery Method): room air    PHYSICAL EXAM:    Constitutional: No acute distress, well-developed, non-toxic appearing  HEENT: masked, good phonation, not icteric  Neck: supple, no lymphadenopathy  Respiratory: clear to ascultation bilaterally, no wheezing  Cardiovascular: S1 and S2, regular rate and rhythm, no murmurs rubs or gallops  Gastrointestinal: soft, non-tender, protuberant due to habitus, +bowel sounds, no rebound or guarding, no surgical scars, no drains  Extremities: No peripheral edema, no cyanosis or clubbing  Vascular: 2+ peripheral pulses, no venous stasis  Neurological: A/O x 3, no focal deficits, no asterixis  Psychiatric: Normal mood, normal affect  Skin: No rashes, not jaundiced    LABS:                        9.9    7.73  )-----------( 282      ( 31 Oct 2024 11:46 )             34.6     10-31    137  |  104  |  8   ----------------------------<  97  4.3   |  28  |  0.99    Ca    9.4      31 Oct 2024 11:46    TPro  7.9  /  Alb  3.9  /  TBili  0.4  /  DBili  x   /  AST  18  /  ALT  30  /  AlkPhos  187[H]  10-31    PT/INR - ( 31 Oct 2024 11:46 )   PT: 11.6 sec;   INR: 0.98 ratio         PTT - ( 31 Oct 2024 11:46 )  PTT:29.0 sec  LIVER FUNCTIONS - ( 31 Oct 2024 11:46 )  Alb: 3.9 g/dL / Pro: 7.9 gm/dL / ALK PHOS: 187 U/L / ALT: 30 U/L / AST: 18 U/L / GGT: x             RADIOLOGY & ADDITIONAL STUDIES:  FINDINGS: CT 10/14/24  CHEST:  LUNGS AND LARGE AIRWAYS: Patent central airways. No pulmonary nodules. Bilateral dependent atelectatic changes.  PLEURA: New small bilateral pleural effusions.  VESSELS: The appearance of the right-sided MediPort with tip in the SVC with possible associated small thrombus is similar to the prior CT. Atherosclerotic changes of the aorta and coronary arteries are also stable.  HEART: Heart size is normal. No pericardial effusion.  MEDIASTINUM AND HERMES: No lymphadenopathy.  CHEST WALL AND LOWER NECK: Within normal limits.    ABDOMEN AND PELVIS:  LIVER: Multiple liver lesions are noted.  Segment 8 lesion image 77 of series 301, measures 1.0 cm versus 1.0 cm, stable.  Segment 7 lesion image 77 of series 301, measures 3.3 cm versus 2.2 cm, larger..  Segment 5 lesion image 94 of series 301, measuring 1.1 cm versus 1.2 cm, stable.  Several additional liver lesions appear similar to the prior exam..    BILE DUCTS: Normal caliber.  GALLBLADDER: Within normal limits.  SPLEEN: Splenomegaly, unchanged. Splenorenal abutting the left adrenal is unchanged measuring up to 2.5 cm.  PANCREAS: Within normal limits.  ADRENALS: Within normal limits.  KIDNEYS/URETERS: Small low-attenuation right renal lesions are likely cysts and similar to the prior CT.    BLADDER: Nondistended.  REPRODUCTIVE ORGANS: Mildly enlarged prostate.    BOWEL: Concentric thickening of the sigmoid colon segment has increased (301/129). Adjacent mesenteric infiltration is noted as well. Primary neoplasm is suspected. No significant stomach or small bowel thickening identified. Appendix is normal.  PERITONEUM/RETROPERITONEUM: Within normal limits.  VESSELS: Within normal limits.  LYMPH NODES: No lymphadenopathy.  ABDOMINAL WALL: Within normal limits.  BONES: Degenerative changes. No suspicious lytic or blastic lesion.      IMPRESSION:      Single more prominent segment 7 hepatic mass. Otherwise, hepatic metastatic disease is stable in size and extent.    Sigmoid colonic mass appears more prominent with adjacent mesenteric infiltration.    Apparent thrombus at the tip of the SVC catheter is unchanged. Patient is a 64y old male presenting with chief complaint of stage 4 cancer, stent placement    64 year old man with metastatic colon cancer, on chemo, recent thrombus on port, with recent colonoscopy (for narrowing of sigmoid) with inability to pass scope. Sent in to ED for need for evaluation for colon stent.     Patient states that he feels ok. Is able to tolerate food without nausea or vomiting. No abdominal pain. Having two loose stools daily. Recent colonoscopy with pinhole stricture and unable to pass scope. .Deneis fever, chills, chest pain, cough, or shortness of breath. Does admit to hunger. No overt signs of Gi bleeding like hematochezia, melena.     PAST MEDICAL & SURGICAL HISTORY:  Colon cancer      Stage IV carcinoma of colon      History of DVT (deep vein thrombosis)      Chronic anticoagulation      Closed fracture of femur  2023      MEDICATIONS  (STANDING):  polyethylene glycol/electrolyte Solution 2 Liter(s) Oral once    MEDICATIONS  (PRN):  acetaminophen     Tablet .. 650 milliGRAM(s) Oral every 6 hours PRN Temp greater or equal to 38C (100.4F), Mild Pain (1 - 3)      Allergies    No Known Drug Allergies  Seafood (Unknown)    Intolerances    SOCIAL HISTORY:  no smoking, drinking or drugs    FAMILY HISTORY:  leukemia (Mother)  no colon or stomach cancer        REVIEW OF SYSTEMS:    CONSTITUTIONAL: No weakness, fevers or chills  EYES/ENT: No visual changes;  No vertigo or throat pain   NECK: No pain or stiffness  RESPIRATORY: No cough, wheezing, hemoptysis; No shortness of breath  CARDIOVASCULAR: No chest pain or palpitations  GASTROINTESTINAL: See HPI  GENITOURINARY: No dysuria, frequency or hematuria  NEUROLOGICAL: No numbness or weakness  SKIN: No itching, burning, rashes, or lesions   PSYCH: Normal mood and affect  All other review of systems is negative unless indicated above.    Vital Signs Last 24 Hrs  T(C): 36.8 (31 Oct 2024 14:26), Max: 36.8 (31 Oct 2024 10:42)  T(F): 98.3 (31 Oct 2024 14:26), Max: 98.3 (31 Oct 2024 10:42)  HR: 77 (31 Oct 2024 14:26) (77 - 109)  BP: 147/79 (31 Oct 2024 14:26) (142/85 - 147/97)  BP(mean): 100 (31 Oct 2024 13:37) (100 - 113)  RR: 18 (31 Oct 2024 14:26) (18 - 18)  SpO2: 100% (31 Oct 2024 14:26) (96% - 100%)    Parameters below as of 31 Oct 2024 14:26  Patient On (Oxygen Delivery Method): room air    PHYSICAL EXAM:    Constitutional: No acute distress, well-developed, non-toxic appearing  HEENT: unmasked, good phonation, not icteric  Neck: supple, no lymphadenopathy  Respiratory: clear to ascultation bilaterally, no wheezing  Cardiovascular: S1 and S2, regular rate and rhythm, no murmurs rubs or gallops  Gastrointestinal: soft, non-tender, protuberant due to habitus, +bowel sounds, no rebound or guarding  Extremities: No peripheral edema, no cyanosis or clubbing  Vascular: 2+ peripheral pulses, no venous stasis  Neurological: A/O x 3, no focal deficits, no asterixis  Psychiatric: Normal mood, normal affect  Skin: No rashes, not jaundiced    LABS:                        9.9    7.73  )-----------( 282      ( 31 Oct 2024 11:46 )             34.6     10-31    137  |  104  |  8   ----------------------------<  97  4.3   |  28  |  0.99    Ca    9.4      31 Oct 2024 11:46    TPro  7.9  /  Alb  3.9  /  TBili  0.4  /  DBili  x   /  AST  18  /  ALT  30  /  AlkPhos  187[H]  10-31    PT/INR - ( 31 Oct 2024 11:46 )   PT: 11.6 sec;   INR: 0.98 ratio         PTT - ( 31 Oct 2024 11:46 )  PTT:29.0 sec  LIVER FUNCTIONS - ( 31 Oct 2024 11:46 )  Alb: 3.9 g/dL / Pro: 7.9 gm/dL / ALK PHOS: 187 U/L / ALT: 30 U/L / AST: 18 U/L / GGT: x             RADIOLOGY & ADDITIONAL STUDIES:  FINDINGS: CT 10/14/24  CHEST:  LUNGS AND LARGE AIRWAYS: Patent central airways. No pulmonary nodules. Bilateral dependent atelectatic changes.  PLEURA: New small bilateral pleural effusions.  VESSELS: The appearance of the right-sided MediPort with tip in the SVC with possible associated small thrombus is similar to the prior CT. Atherosclerotic changes of the aorta and coronary arteries are also stable.  HEART: Heart size is normal. No pericardial effusion.  MEDIASTINUM AND HERMES: No lymphadenopathy.  CHEST WALL AND LOWER NECK: Within normal limits.    ABDOMEN AND PELVIS:  LIVER: Multiple liver lesions are noted.  Segment 8 lesion image 77 of series 301, measures 1.0 cm versus 1.0 cm, stable.  Segment 7 lesion image 77 of series 301, measures 3.3 cm versus 2.2 cm, larger..  Segment 5 lesion image 94 of series 301, measuring 1.1 cm versus 1.2 cm, stable.  Several additional liver lesions appear similar to the prior exam..    BILE DUCTS: Normal caliber.  GALLBLADDER: Within normal limits.  SPLEEN: Splenomegaly, unchanged. Splenorenal abutting the left adrenal is unchanged measuring up to 2.5 cm.  PANCREAS: Within normal limits.  ADRENALS: Within normal limits.  KIDNEYS/URETERS: Small low-attenuation right renal lesions are likely cysts and similar to the prior CT.    BLADDER: Nondistended.  REPRODUCTIVE ORGANS: Mildly enlarged prostate.    BOWEL: Concentric thickening of the sigmoid colon segment has increased (301/129). Adjacent mesenteric infiltration is noted as well. Primary neoplasm is suspected. No significant stomach or small bowel thickening identified. Appendix is normal.  PERITONEUM/RETROPERITONEUM: Within normal limits.  VESSELS: Within normal limits.  LYMPH NODES: No lymphadenopathy.  ABDOMINAL WALL: Within normal limits.  BONES: Degenerative changes. No suspicious lytic or blastic lesion.      IMPRESSION:      Single more prominent segment 7 hepatic mass. Otherwise, hepatic metastatic disease is stable in size and extent.    Sigmoid colonic mass appears more prominent with adjacent mesenteric infiltration.    Apparent thrombus at the tip of the SVC catheter is unchanged.

## 2024-10-31 NOTE — H&P ADULT - NSHPPHYSICALEXAM_GEN_ALL_CORE
T(C): 36.8 (10-31-24 @ 10:42), Max: 36.8 (10-31-24 @ 10:42)  HR: 109 (10-31-24 @ 10:19) (109 - 109)  BP: 147/97 (10-31-24 @ 10:19) (147/97 - 147/97)  RR: 18 (10-31-24 @ 10:19) (18 - 18)  SpO2: 96% (10-31-24 @ 10:19) (96% - 96%)    General: NAD, laying in bed, speaking in full sentences  HEENT: head NC/AT, no conjunctival injection, EOMI, MMM  Neck: supple, no JVD  Cardio: RRR, +S1/S2, no M/R/G  Resp: lungs CTAB, no cough/wheezes/rales/rhonchi  Abdo: soft, NT, ND, +bowel sounds x4  Extremities: WWP, no edema/cyanosis/clubbing   Vasc: 2+ radial and DP pulses b/l  Neuro: A&Ox3  Psych: speech non-pressured, thoughts goal-oriented  Skin: dry, intact, no visible jaundice T(C): 36.8 (10-31-24 @ 10:42), Max: 36.8 (10-31-24 @ 10:42)  HR: 109 (10-31-24 @ 10:19) (109 - 109)  BP: 147/97 (10-31-24 @ 10:19) (147/97 - 147/97)  RR: 18 (10-31-24 @ 10:19) (18 - 18)  SpO2: 96% (10-31-24 @ 10:19) (96% - 96%)    General: NAD, laying in bed, speaking in full sentences  HEENT: head NC/AT, no conjunctival injection, EOMI, MMM  Neck: supple, no JVD  Cardio: RRR, +S1/S2, no M/R/G  Resp: lungs CTAB, no cough/wheezes/rales/rhonchi  Abdo: soft, NT, ND, +bowel sounds x4  Extremities: WWP, no edema/cyanosis/clubbing   Neuro: A&Ox3  Psych: speech non-pressured, thoughts goal-oriented  Skin: dry, intact, no visible jaundice

## 2024-11-01 ENCOUNTER — TRANSCRIPTION ENCOUNTER (OUTPATIENT)
Age: 64
End: 2024-11-01

## 2024-11-01 VITALS
HEART RATE: 84 BPM | DIASTOLIC BLOOD PRESSURE: 74 MMHG | OXYGEN SATURATION: 100 % | TEMPERATURE: 98 F | SYSTOLIC BLOOD PRESSURE: 147 MMHG | RESPIRATION RATE: 18 BRPM

## 2024-11-01 LAB
ALBUMIN SERPL ELPH-MCNC: 3.3 G/DL — SIGNIFICANT CHANGE UP (ref 3.3–5)
ALP SERPL-CCNC: 161 U/L — HIGH (ref 40–120)
ALT FLD-CCNC: 27 U/L — SIGNIFICANT CHANGE UP (ref 12–78)
ANION GAP SERPL CALC-SCNC: 7 MMOL/L — SIGNIFICANT CHANGE UP (ref 5–17)
AST SERPL-CCNC: 17 U/L — SIGNIFICANT CHANGE UP (ref 15–37)
BASOPHILS # BLD AUTO: 0.15 K/UL — SIGNIFICANT CHANGE UP (ref 0–0.2)
BASOPHILS NFR BLD AUTO: 2.4 % — HIGH (ref 0–2)
BILIRUB SERPL-MCNC: 0.4 MG/DL — SIGNIFICANT CHANGE UP (ref 0.2–1.2)
BUN SERPL-MCNC: 7 MG/DL — SIGNIFICANT CHANGE UP (ref 7–23)
CALCIUM SERPL-MCNC: 8.6 MG/DL — SIGNIFICANT CHANGE UP (ref 8.5–10.1)
CHLORIDE SERPL-SCNC: 106 MMOL/L — SIGNIFICANT CHANGE UP (ref 96–108)
CO2 SERPL-SCNC: 28 MMOL/L — SIGNIFICANT CHANGE UP (ref 22–31)
CREAT SERPL-MCNC: 0.86 MG/DL — SIGNIFICANT CHANGE UP (ref 0.5–1.3)
EGFR: 97 ML/MIN/1.73M2 — SIGNIFICANT CHANGE UP
EOSINOPHIL # BLD AUTO: 0.14 K/UL — SIGNIFICANT CHANGE UP (ref 0–0.5)
EOSINOPHIL NFR BLD AUTO: 2.3 % — SIGNIFICANT CHANGE UP (ref 0–6)
FERRITIN SERPL-MCNC: 33 NG/ML — SIGNIFICANT CHANGE UP (ref 30–400)
GLUCOSE SERPL-MCNC: 89 MG/DL — SIGNIFICANT CHANGE UP (ref 70–99)
HCT VFR BLD CALC: 30.1 % — LOW (ref 39–50)
HGB BLD-MCNC: 8.8 G/DL — LOW (ref 13–17)
IMM GRANULOCYTES NFR BLD AUTO: 2.1 % — HIGH (ref 0–0.9)
IRON SATN MFR SERPL: 25 UG/DL — LOW (ref 45–165)
IRON SATN MFR SERPL: 6 % — LOW (ref 16–55)
LYMPHOCYTES # BLD AUTO: 1.41 K/UL — SIGNIFICANT CHANGE UP (ref 1–3.3)
LYMPHOCYTES # BLD AUTO: 22.7 % — SIGNIFICANT CHANGE UP (ref 13–44)
MCHC RBC-ENTMCNC: 24.5 PG — LOW (ref 27–34)
MCHC RBC-ENTMCNC: 29.2 G/DL — LOW (ref 32–36)
MCV RBC AUTO: 83.8 FL — SIGNIFICANT CHANGE UP (ref 80–100)
MONOCYTES # BLD AUTO: 0.64 K/UL — SIGNIFICANT CHANGE UP (ref 0–0.9)
MONOCYTES NFR BLD AUTO: 10.3 % — SIGNIFICANT CHANGE UP (ref 2–14)
NEUTROPHILS # BLD AUTO: 3.74 K/UL — SIGNIFICANT CHANGE UP (ref 1.8–7.4)
NEUTROPHILS NFR BLD AUTO: 60.2 % — SIGNIFICANT CHANGE UP (ref 43–77)
PLATELET # BLD AUTO: 248 K/UL — SIGNIFICANT CHANGE UP (ref 150–400)
POTASSIUM SERPL-MCNC: 4.1 MMOL/L — SIGNIFICANT CHANGE UP (ref 3.5–5.3)
POTASSIUM SERPL-SCNC: 4.1 MMOL/L — SIGNIFICANT CHANGE UP (ref 3.5–5.3)
PROT SERPL-MCNC: 6.7 GM/DL — SIGNIFICANT CHANGE UP (ref 6–8.3)
RBC # BLD: 3.59 M/UL — LOW (ref 4.2–5.8)
RBC # FLD: 19 % — HIGH (ref 10.3–14.5)
SODIUM SERPL-SCNC: 141 MMOL/L — SIGNIFICANT CHANGE UP (ref 135–145)
TIBC SERPL-MCNC: 395 UG/DL — SIGNIFICANT CHANGE UP (ref 220–430)
UIBC SERPL-MCNC: 370 UG/DL — SIGNIFICANT CHANGE UP (ref 110–370)
WBC # BLD: 6.21 K/UL — SIGNIFICANT CHANGE UP (ref 3.8–10.5)
WBC # FLD AUTO: 6.21 K/UL — SIGNIFICANT CHANGE UP (ref 3.8–10.5)

## 2024-11-01 PROCEDURE — 99239 HOSP IP/OBS DSCHRG MGMT >30: CPT

## 2024-11-01 PROCEDURE — 99232 SBSQ HOSP IP/OBS MODERATE 35: CPT

## 2024-11-01 PROCEDURE — 45389 COLONOSCOPY W/STENT PLCMT: CPT

## 2024-11-01 RX ORDER — POLYETHYLENE GLYCOL 3350 17 G/17G
17 POWDER, FOR SOLUTION ORAL
Qty: 1530 | Refills: 0
Start: 2024-11-01 | End: 2024-11-30

## 2024-11-01 RX ORDER — ONDANSETRON HYDROCHLORIDE 2 MG/ML
4 INJECTION, SOLUTION INTRAMUSCULAR; INTRAVENOUS ONCE
Refills: 0 | Status: DISCONTINUED | OUTPATIENT
Start: 2024-11-01 | End: 2024-11-01

## 2024-11-01 RX ORDER — POLYETHYLENE GLYCOL 3350 17 G/17G
17 POWDER, FOR SOLUTION ORAL
Refills: 0 | Status: DISCONTINUED | OUTPATIENT
Start: 2024-11-01 | End: 2024-11-01

## 2024-11-01 RX ORDER — FENTANYL CITRAT/DEXTROSE 5%/PF 1250MCG/50
25 PATIENT CONTROLLED ANALGESIA SYRINGE INTRAVENOUS
Refills: 0 | Status: DISCONTINUED | OUTPATIENT
Start: 2024-11-01 | End: 2024-11-01

## 2024-11-01 RX ORDER — FENTANYL CITRAT/DEXTROSE 5%/PF 1250MCG/50
50 PATIENT CONTROLLED ANALGESIA SYRINGE INTRAVENOUS
Refills: 0 | Status: DISCONTINUED | OUTPATIENT
Start: 2024-11-01 | End: 2024-11-01

## 2024-11-01 RX ORDER — POLYETHYLENE GLYCOL 3350 17 G/17G
17 POWDER, FOR SOLUTION ORAL
Qty: 0 | Refills: 0 | DISCHARGE
Start: 2024-11-01

## 2024-11-01 RX ADMIN — POLYETHYLENE GLYCOL 3350 17 GRAM(S): 17 POWDER, FOR SOLUTION ORAL at 13:50

## 2024-11-01 NOTE — DISCHARGE NOTE PROVIDER - NSDCPNSUBOBJ_GEN_ALL_CORE
VITALS:  T(F): 97 (11-01-24 @ 13:15), Max: 98.5 (11-01-24 @ 08:06)  HR: 88 (11-01-24 @ 13:15) (76 - 102)  BP: 146/89 (11-01-24 @ 13:15) (133/76 - 150/90)  RR: 17 (11-01-24 @ 13:15) (12 - 20)  SpO2: 99% (11-01-24 @ 13:15) (96% - 100%)  Wt(kg): --    I&O's Summary    01 Nov 2024 07:01  -  01 Nov 2024 14:26  --------------------------------------------------------  IN: 200 mL / OUT: 0 mL / NET: 200 mL        PHYSICAL EXAM:  Gen: NAD  HEENT:  pupils equal and reactive, EOMI, no oropharyngeal lesions, erythema, exudates, oral thrush  NECK:   supple, no carotid bruits, no palpable lymph nodes, no thyromegaly  CV:  +S1, +S2, regular, no murmurs or rubs  RESP:   lungs clear to auscultation bilaterally, no wheezing, rales, rhonchi, good air entry bilaterally  BREAST:  not examined  GI:  abdomen soft, non-tender, non-distended, normal BS, no bruits, no abdominal masses, no palpable masses  RECTAL:  not examined  :  not examined  MSK:   normal muscle tone, no atrophy, no rigidity, no contractions  EXT:  no clubbing, no cyanosis, no edema, no calf pain, swelling or erythema  VASCULAR:  pulses equal and symmetric in the upper and lower extremities  NEURO:  AAOX3, no focal neurological deficits, follows all commands, able to move extremities spontaneously  SKIN:  no ulcers, lesions or rashes    LABS:                            8.8    6.21  )-----------( 248      ( 01 Nov 2024 06:18 )             30.1     11-01    141  |  106  |  7   ----------------------------<  89  4.1   |  28  |  0.86    Ca    8.6      01 Nov 2024 06:18    TPro  6.7  /  Alb  3.3  /  TBili  0.4  /  DBili  x   /  AST  17  /  ALT  27  /  AlkPhos  161[H]  11-01        LIVER FUNCTIONS - ( 01 Nov 2024 06:18 )  Alb: 3.3 g/dL / Pro: 6.7 gm/dL / ALK PHOS: 161 U/L / ALT: 27 U/L / AST: 17 U/L / GGT: x           PT/INR - ( 31 Oct 2024 11:46 )   PT: 11.6 sec;   INR: 0.98 ratio         PTT - ( 31 Oct 2024 11:46 )  PTT:29.0 sec            #Stage 4 colorectal cancer  - dx earlier this month  - actively getting chemotherapy (last 10/15/2024)  - successfully underwent stent with Dr. Hills 11/1 pt tolerating diet able to be discharged

## 2024-11-01 NOTE — PROGRESS NOTE ADULT - TIME BILLING
Patient seen and examined, chart reviewed.  No acute issues overnight, tolerated bowel prep without issue.  No N/V.  No abdominal pain.  For stent today.  Will follow peripherally as no ongoing surgical issues, please call [facility] with any urgent questions/concerns.

## 2024-11-01 NOTE — DISCHARGE NOTE PROVIDER - NSDCFUSCHEDAPPT_GEN_ALL_CORE_FT
Carmela Harrison  [facility]  [facility]  Scheduled Appointment: 11/04/2024    [facility]  [facility]  Scheduled Appointment: 11/12/2024    [facility]  [facility]  Scheduled Appointment: 11/14/2024    [facility]  [facility]  Scheduled Appointment: 11/27/2024    [facility]  [facility]  Scheduled Appointment: 11/29/2024    [facility]  [facility]  Scheduled Appointment: 12/10/2024    [facility]  [facility]  Scheduled Appointment: 12/12/2024    [facility]  [facility]  Scheduled Appointment: 12/24/2024    [facility]  [facility]  Scheduled Appointment: 12/26/2024     [facility]  [facility]  Scheduled Appointment: 11/14/2024    [facility]  [facility]  Scheduled Appointment: 11/27/2024    Matilde Mcdaniels  [facility]  [facility]  Scheduled Appointment: 11/27/2024    [facility]  [facility]  Scheduled Appointment: 11/29/2024    [facility]  [facility]  Scheduled Appointment: 12/10/2024    Carmela Harrison  [facility]  [facility]  Scheduled Appointment: 12/12/2024    [facility]  [facility]  Scheduled Appointment: 12/12/2024    [facility]  [facility] [address]  Scheduled Appointment: 12/17/2024    [facility]  [facility]  Scheduled Appointment: 12/24/2024    [facility]  [facility]  Scheduled Appointment: 12/26/2024

## 2024-11-01 NOTE — PROGRESS NOTE ADULT - SUBJECTIVE AND OBJECTIVE BOX
SURGERY DAILY PROGRESS NOTE:     Subjective:  Patient seen and examined at bedside during am rounds, pt drank bowel prep. Denies any fevers, chills, n/v/d, chest pain or shortness of breath    Objective:    MEDICATIONS  (STANDING):  influenza   Vaccine 0.5 milliLiter(s) IntraMuscular once    MEDICATIONS  (PRN):  acetaminophen     Tablet .. 650 milliGRAM(s) Oral every 6 hours PRN Temp greater or equal to 38C (100.4F), Mild Pain (1 - 3)      Vital Signs Last 24 Hrs  T(C): 36.3 (31 Oct 2024 21:32), Max: 36.8 (31 Oct 2024 10:42)  T(F): 97.4 (31 Oct 2024 21:32), Max: 98.3 (31 Oct 2024 10:42)  HR: 76 (31 Oct 2024 21:32) (76 - 109)  BP: 140/78 (31 Oct 2024 21:32) (140/78 - 147/97)  BP(mean): 100 (31 Oct 2024 13:37) (100 - 113)  RR: 18 (31 Oct 2024 21:32) (18 - 18)  SpO2: 100% (31 Oct 2024 21:32) (96% - 100%)    Parameters below as of 31 Oct 2024 21:32  Patient On (Oxygen Delivery Method): room air          PHYSICAL EXAM   Gen: well-appearing, in no acute distress  CV: pulse regularly present   Resp: airway patent, non-labored breathing  Abd: soft, NTND; no rebound or guarding.   Ext. no cyanosis or edema      I&O's Detail      Daily Height in cm: 180.34 (31 Oct 2024 10:19)    Daily     LABS:                        9.9    7.73  )-----------( 282      ( 31 Oct 2024 11:46 )             34.6     10-31    137  |  104  |  8   ----------------------------<  97  4.3   |  28  |  0.99    Ca    9.4      31 Oct 2024 11:46    TPro  7.9  /  Alb  3.9  /  TBili  0.4  /  DBili  x   /  AST  18  /  ALT  30  /  AlkPhos  187[H]  10-31    PT/INR - ( 31 Oct 2024 11:46 )   PT: 11.6 sec;   INR: 0.98 ratio         PTT - ( 31 Oct 2024 11:46 )  PTT:29.0 sec  Urinalysis Basic - ( 31 Oct 2024 11:46 )    Color: x / Appearance: x / SG: x / pH: x  Gluc: 97 mg/dL / Ketone: x  / Bili: x / Urobili: x   Blood: x / Protein: x / Nitrite: x   Leuk Esterase: x / RBC: x / WBC x   Sq Epi: x / Non Sq Epi: x / Bacteria: x

## 2024-11-01 NOTE — DISCHARGE NOTE PROVIDER - NSDCMRMEDTOKEN_GEN_ALL_CORE_FT
polyethylene glycol 3350 oral powder for reconstitution: 17 gram(s) orally 3 times a day  polyethylene glycol 3350 oral powder for reconstitution: 17 gram(s) orally 3 times a day

## 2024-11-01 NOTE — DISCHARGE NOTE PROVIDER - NSDCCPCAREPLAN_GEN_ALL_CORE_FT
PRINCIPAL DISCHARGE DIAGNOSIS  Diagnosis: Primary colon cancer with metastasis to other site  Assessment and Plan of Treatment: Please continue to take miralax three times a day and eat a diet that is low in fiber

## 2024-11-01 NOTE — DISCHARGE NOTE PROVIDER - HOSPITAL COURSE
63 yo M with PMH of metastatic colon cancer, Stage IV carcinoma of colon on active chemotherapy, last session 2 weeks ago and h/o DVT sent in by Dr. Barrios and Dr. Hills, to the ED for stent to be placed. Pt successfully underwent stent placement 11/1/24 tolerating diet and able to be discharged home.

## 2024-11-01 NOTE — DISCHARGE NOTE PROVIDER - PROVIDER TOKENS
PROVIDER:[TOKEN:[[id number]],FOLLOWUP:[1 week],ESTABLISHEDPATIENT:[T]],PROVIDER:[TOKEN:[[id number]],FOLLOWUP:[1 week]]

## 2024-11-01 NOTE — DISCHARGE NOTE PROVIDER - NSDCFUADDAPPT_GEN_ALL_CORE_FT
APPTS ARE READY TO BE MADE: [X] YES    Best Family or Patient Contact (if needed):    Additional Information about above appointments (if needed):    1: Dr Harrison - one week   2: Dr Barrios - one week   3:  APPTS ARE READY TO BE MADE: [X] YES    Best Family or Patient Contact (if needed):    Additional Information about above appointments (if needed):    1: Dr Harrison - one week   2: Dr Barrios - one week   3:     Prior to outreaching the patient, it was visible that the patient has secured a follow up appointment which was not scheduled by our team. Patient is scheduled to see Dr. Harrison on 11/4 at [address] [place] [address]    Patient was outreached but declined scheduling assistance, however there is an appointment reflected on [facility] for the patient  Patient is scheduled to see Dr. Ruano on 12/17 at [address] [place], [place]

## 2024-11-01 NOTE — DISCHARGE NOTE PROVIDER - CARE PROVIDER_API CALL
Carmela Harrison  Medical Oncology  [address], [place] [address]  Phone: [phone]  Fax: [phone]  Established Patient  Follow Up Time: 1 week    Mary Jo Barrios  Colon/Rectal Surgery  [address], [place] [address]  Phone: [phone]  Fax: [phone]  Follow Up Time: 1 week

## 2024-11-01 NOTE — DISCHARGE NOTE NURSING/CASE MANAGEMENT/SOCIAL WORK - FINANCIAL ASSISTANCE
[facility] provides services at a reduced cost to those who are determined to be eligible through [facility]’s financial assistance program. Information regarding [facility]’s financial assistance program can be found by going to [email or web address] or by calling [phone].

## 2024-11-01 NOTE — DISCHARGE NOTE NURSING/CASE MANAGEMENT/SOCIAL WORK - PATIENT PORTAL LINK FT
You can access the FollowMyHealth Patient Portal offered by [facility] by registering at the following website: [email or web address]. By joining [employer]’s FollowMyHealth portal, you will also be able to view your health information using other applications (apps) compatible with our system.

## 2024-11-01 NOTE — PROGRESS NOTE ADULT - ASSESSMENT
A/P:  65 yo M with stage IV metastatic colon CA  last biopsy 10/25:  Adenocarcinoma, invasive, with ulceration  +bowel function    P:  NPO  IV fluids  f/u GI consult for Stent placement  bowel preop  rest of plan as per primary team    plan d/w Dr. Barrios

## 2024-11-04 ENCOUNTER — APPOINTMENT (OUTPATIENT)
Dept: HEMATOLOGY ONCOLOGY | Facility: CLINIC | Age: 64
End: 2024-11-04
Payer: COMMERCIAL

## 2024-11-04 VITALS
HEART RATE: 118 BPM | SYSTOLIC BLOOD PRESSURE: 137 MMHG | OXYGEN SATURATION: 98 % | DIASTOLIC BLOOD PRESSURE: 83 MMHG | WEIGHT: 225 LBS | BODY MASS INDEX: 31.5 KG/M2 | TEMPERATURE: 97.4 F | HEIGHT: 71 IN

## 2024-11-04 DIAGNOSIS — C18.9 MALIGNANT NEOPLASM OF COLON, UNSPECIFIED: ICD-10-CM

## 2024-11-04 DIAGNOSIS — T82.898A OTHER SPECIFIED COMPLICATION OF VASCULAR PROSTHETIC DEVICES, IMPLANTS AND GRAFTS, INITIAL ENCOUNTER: ICD-10-CM

## 2024-11-04 DIAGNOSIS — C78.7 MALIGNANT NEOPLASM OF COLON, UNSPECIFIED: ICD-10-CM

## 2024-11-04 DIAGNOSIS — G62.9 POLYNEUROPATHY, UNSPECIFIED: ICD-10-CM

## 2024-11-04 PROCEDURE — 99215 OFFICE O/P EST HI 40 MIN: CPT

## 2024-11-04 PROCEDURE — 99417 PROLNG OP E/M EACH 15 MIN: CPT

## 2024-11-04 PROCEDURE — G2211 COMPLEX E/M VISIT ADD ON: CPT

## 2024-11-12 ENCOUNTER — RESULT REVIEW (OUTPATIENT)
Age: 64
End: 2024-11-12

## 2024-11-12 ENCOUNTER — APPOINTMENT (OUTPATIENT)
Dept: INFUSION THERAPY | Facility: CLINIC | Age: 64
End: 2024-11-12

## 2024-11-12 ENCOUNTER — APPOINTMENT (OUTPATIENT)
Dept: HEMATOLOGY ONCOLOGY | Facility: CLINIC | Age: 64
End: 2024-11-12

## 2024-11-12 LAB
BASOPHILS # BLD AUTO: 0.12 K/UL — SIGNIFICANT CHANGE UP (ref 0–0.2)
BASOPHILS NFR BLD AUTO: 2 % — SIGNIFICANT CHANGE UP (ref 0–2)
EOSINOPHIL # BLD AUTO: 0.24 K/UL — SIGNIFICANT CHANGE UP (ref 0–0.5)
EOSINOPHIL NFR BLD AUTO: 4 % — SIGNIFICANT CHANGE UP (ref 0–6)
HCT VFR BLD CALC: 31.6 % — LOW (ref 39–50)
HGB BLD-MCNC: 9.4 G/DL — LOW (ref 13–17)
IMM GRANULOCYTES NFR BLD AUTO: 0.2 % — SIGNIFICANT CHANGE UP (ref 0–0.9)
LYMPHOCYTES # BLD AUTO: 1.34 K/UL — SIGNIFICANT CHANGE UP (ref 1–3.3)
LYMPHOCYTES # BLD AUTO: 22.2 % — SIGNIFICANT CHANGE UP (ref 13–44)
MCHC RBC-ENTMCNC: 24.5 PG — LOW (ref 27–34)
MCHC RBC-ENTMCNC: 29.7 G/DL — LOW (ref 32–36)
MCV RBC AUTO: 82.5 FL — SIGNIFICANT CHANGE UP (ref 80–100)
MONOCYTES # BLD AUTO: 0.59 K/UL — SIGNIFICANT CHANGE UP (ref 0–0.9)
MONOCYTES NFR BLD AUTO: 9.8 % — SIGNIFICANT CHANGE UP (ref 2–14)
NEUTROPHILS # BLD AUTO: 3.74 K/UL — SIGNIFICANT CHANGE UP (ref 1.8–7.4)
NEUTROPHILS NFR BLD AUTO: 61.8 % — SIGNIFICANT CHANGE UP (ref 43–77)
NRBC # BLD: 0 /100 WBCS — SIGNIFICANT CHANGE UP (ref 0–0)
PLATELET # BLD AUTO: 348 K/UL — SIGNIFICANT CHANGE UP (ref 150–400)
RBC # BLD: 3.83 M/UL — LOW (ref 4.2–5.8)
RBC # FLD: 17.9 % — HIGH (ref 10.3–14.5)
WBC # BLD: 6.04 K/UL — SIGNIFICANT CHANGE UP (ref 3.8–10.5)
WBC # FLD AUTO: 6.04 K/UL — SIGNIFICANT CHANGE UP (ref 3.8–10.5)

## 2024-11-12 PROCEDURE — 99215 OFFICE O/P EST HI 40 MIN: CPT

## 2024-11-12 PROCEDURE — G2211 COMPLEX E/M VISIT ADD ON: CPT

## 2024-11-12 PROCEDURE — 99417 PROLNG OP E/M EACH 15 MIN: CPT

## 2024-11-13 LAB
ALBUMIN SERPL ELPH-MCNC: 4.1 G/DL — SIGNIFICANT CHANGE UP (ref 3.3–5)
ALP SERPL-CCNC: 136 U/L — HIGH (ref 40–120)
ALT FLD-CCNC: 14 U/L — SIGNIFICANT CHANGE UP (ref 10–45)
ANION GAP SERPL CALC-SCNC: 12 MMOL/L — SIGNIFICANT CHANGE UP (ref 5–17)
AST SERPL-CCNC: 17 U/L — SIGNIFICANT CHANGE UP (ref 10–40)
BILIRUB SERPL-MCNC: 0.2 MG/DL — SIGNIFICANT CHANGE UP (ref 0.2–1.2)
BUN SERPL-MCNC: 6 MG/DL — LOW (ref 7–23)
CALCIUM SERPL-MCNC: 8.4 MG/DL — SIGNIFICANT CHANGE UP (ref 8.4–10.5)
CEA SERPL-MCNC: 127 NG/ML — HIGH (ref 0–3.8)
CHLORIDE SERPL-SCNC: 108 MMOL/L — SIGNIFICANT CHANGE UP (ref 96–108)
CO2 SERPL-SCNC: 23 MMOL/L — SIGNIFICANT CHANGE UP (ref 22–31)
CREAT SERPL-MCNC: 0.79 MG/DL — SIGNIFICANT CHANGE UP (ref 0.5–1.3)
EGFR: 99 ML/MIN/1.73M2 — SIGNIFICANT CHANGE UP
GLUCOSE SERPL-MCNC: 116 MG/DL — HIGH (ref 70–99)
MAGNESIUM SERPL-MCNC: 2.1 MG/DL — SIGNIFICANT CHANGE UP (ref 1.6–2.6)
POTASSIUM SERPL-MCNC: 4.2 MMOL/L — SIGNIFICANT CHANGE UP (ref 3.5–5.3)
POTASSIUM SERPL-SCNC: 4.2 MMOL/L — SIGNIFICANT CHANGE UP (ref 3.5–5.3)
PROT SERPL-MCNC: 6.9 G/DL — SIGNIFICANT CHANGE UP (ref 6–8.3)
SODIUM SERPL-SCNC: 142 MMOL/L — SIGNIFICANT CHANGE UP (ref 135–145)

## 2024-11-14 ENCOUNTER — APPOINTMENT (OUTPATIENT)
Dept: INFUSION THERAPY | Facility: CLINIC | Age: 64
End: 2024-11-14

## 2024-11-27 ENCOUNTER — APPOINTMENT (OUTPATIENT)
Dept: HEMATOLOGY ONCOLOGY | Facility: CLINIC | Age: 64
End: 2024-11-27

## 2024-11-27 ENCOUNTER — APPOINTMENT (OUTPATIENT)
Dept: INFUSION THERAPY | Facility: CLINIC | Age: 64
End: 2024-11-27

## 2024-11-27 ENCOUNTER — RESULT REVIEW (OUTPATIENT)
Age: 64
End: 2024-11-27

## 2024-11-27 DIAGNOSIS — C18.9 MALIGNANT NEOPLASM OF COLON, UNSPECIFIED: ICD-10-CM

## 2024-11-27 DIAGNOSIS — T82.898A OTHER SPECIFIED COMPLICATION OF VASCULAR PROSTHETIC DEVICES, IMPLANTS AND GRAFTS, INITIAL ENCOUNTER: ICD-10-CM

## 2024-11-27 DIAGNOSIS — C78.7 MALIGNANT NEOPLASM OF COLON, UNSPECIFIED: ICD-10-CM

## 2024-11-27 DIAGNOSIS — G62.9 POLYNEUROPATHY, UNSPECIFIED: ICD-10-CM

## 2024-11-27 LAB
BASOPHILS # BLD AUTO: 0.1 K/UL — SIGNIFICANT CHANGE UP (ref 0–0.2)
BASOPHILS NFR BLD AUTO: 1.8 % — SIGNIFICANT CHANGE UP (ref 0–2)
EOSINOPHIL # BLD AUTO: 0.27 K/UL — SIGNIFICANT CHANGE UP (ref 0–0.5)
EOSINOPHIL NFR BLD AUTO: 5 % — SIGNIFICANT CHANGE UP (ref 0–6)
HCT VFR BLD CALC: 33 % — LOW (ref 39–50)
HGB BLD-MCNC: 9.9 G/DL — LOW (ref 13–17)
IMM GRANULOCYTES NFR BLD AUTO: 0.6 % — SIGNIFICANT CHANGE UP (ref 0–0.9)
LYMPHOCYTES # BLD AUTO: 1.41 K/UL — SIGNIFICANT CHANGE UP (ref 1–3.3)
LYMPHOCYTES # BLD AUTO: 26.1 % — SIGNIFICANT CHANGE UP (ref 13–44)
MCHC RBC-ENTMCNC: 24.7 PG — LOW (ref 27–34)
MCHC RBC-ENTMCNC: 30 G/DL — LOW (ref 32–36)
MCV RBC AUTO: 82.3 FL — SIGNIFICANT CHANGE UP (ref 80–100)
MONOCYTES # BLD AUTO: 0.54 K/UL — SIGNIFICANT CHANGE UP (ref 0–0.9)
MONOCYTES NFR BLD AUTO: 10 % — SIGNIFICANT CHANGE UP (ref 2–14)
NEUTROPHILS # BLD AUTO: 3.06 K/UL — SIGNIFICANT CHANGE UP (ref 1.8–7.4)
NEUTROPHILS NFR BLD AUTO: 56.5 % — SIGNIFICANT CHANGE UP (ref 43–77)
NRBC # BLD: 0 /100 WBCS — SIGNIFICANT CHANGE UP (ref 0–0)
PLATELET # BLD AUTO: 267 K/UL — SIGNIFICANT CHANGE UP (ref 150–400)
RBC # BLD: 4.01 M/UL — LOW (ref 4.2–5.8)
RBC # FLD: 17.8 % — HIGH (ref 10.3–14.5)
WBC # BLD: 5.41 K/UL — SIGNIFICANT CHANGE UP (ref 3.8–10.5)
WBC # FLD AUTO: 5.41 K/UL — SIGNIFICANT CHANGE UP (ref 3.8–10.5)

## 2024-11-27 PROCEDURE — G2211 COMPLEX E/M VISIT ADD ON: CPT

## 2024-11-27 PROCEDURE — 99215 OFFICE O/P EST HI 40 MIN: CPT

## 2024-11-27 PROCEDURE — 99417 PROLNG OP E/M EACH 15 MIN: CPT

## 2024-11-28 LAB
ALBUMIN SERPL ELPH-MCNC: 4.1 G/DL — SIGNIFICANT CHANGE UP (ref 3.3–5)
ALP SERPL-CCNC: 150 U/L — HIGH (ref 40–120)
ALT FLD-CCNC: 13 U/L — SIGNIFICANT CHANGE UP (ref 10–45)
ANION GAP SERPL CALC-SCNC: 11 MMOL/L — SIGNIFICANT CHANGE UP (ref 5–17)
AST SERPL-CCNC: 15 U/L — SIGNIFICANT CHANGE UP (ref 10–40)
BILIRUB SERPL-MCNC: 0.2 MG/DL — SIGNIFICANT CHANGE UP (ref 0.2–1.2)
BUN SERPL-MCNC: 9 MG/DL — SIGNIFICANT CHANGE UP (ref 7–23)
CALCIUM SERPL-MCNC: 8.8 MG/DL — SIGNIFICANT CHANGE UP (ref 8.4–10.5)
CHLORIDE SERPL-SCNC: 106 MMOL/L — SIGNIFICANT CHANGE UP (ref 96–108)
CO2 SERPL-SCNC: 24 MMOL/L — SIGNIFICANT CHANGE UP (ref 22–31)
CREAT SERPL-MCNC: 0.83 MG/DL — SIGNIFICANT CHANGE UP (ref 0.5–1.3)
EGFR: 98 ML/MIN/1.73M2 — SIGNIFICANT CHANGE UP
GLUCOSE SERPL-MCNC: 90 MG/DL — SIGNIFICANT CHANGE UP (ref 70–99)
MAGNESIUM SERPL-MCNC: 2.2 MG/DL — SIGNIFICANT CHANGE UP (ref 1.6–2.6)
POTASSIUM SERPL-MCNC: 4.1 MMOL/L — SIGNIFICANT CHANGE UP (ref 3.5–5.3)
POTASSIUM SERPL-SCNC: 4.1 MMOL/L — SIGNIFICANT CHANGE UP (ref 3.5–5.3)
PROT SERPL-MCNC: 6.8 G/DL — SIGNIFICANT CHANGE UP (ref 6–8.3)
SODIUM SERPL-SCNC: 141 MMOL/L — SIGNIFICANT CHANGE UP (ref 135–145)

## 2024-11-29 ENCOUNTER — APPOINTMENT (OUTPATIENT)
Dept: INFUSION THERAPY | Facility: CLINIC | Age: 64
End: 2024-11-29

## 2024-12-03 ENCOUNTER — APPOINTMENT (OUTPATIENT)
Dept: CT IMAGING | Facility: CLINIC | Age: 64
End: 2024-12-03

## 2024-12-03 ENCOUNTER — OUTPATIENT (OUTPATIENT)
Dept: OUTPATIENT SERVICES | Facility: HOSPITAL | Age: 64
LOS: 1 days | End: 2024-12-03
Payer: SELF-PAY

## 2024-12-03 DIAGNOSIS — Z00.8 ENCOUNTER FOR OTHER GENERAL EXAMINATION: ICD-10-CM

## 2024-12-03 DIAGNOSIS — C18.9 MALIGNANT NEOPLASM OF COLON, UNSPECIFIED: ICD-10-CM

## 2024-12-03 PROCEDURE — 74177 CT ABD & PELVIS W/CONTRAST: CPT | Mod: 26

## 2024-12-03 PROCEDURE — 71260 CT THORAX DX C+: CPT | Mod: 26

## 2024-12-03 PROCEDURE — 74177 CT ABD & PELVIS W/CONTRAST: CPT

## 2024-12-03 PROCEDURE — 71260 CT THORAX DX C+: CPT

## 2024-12-09 ENCOUNTER — OUTPATIENT (OUTPATIENT)
Dept: OUTPATIENT SERVICES | Facility: HOSPITAL | Age: 64
LOS: 1 days | Discharge: HOME | End: 2024-12-09
Payer: MEDICAID

## 2024-12-09 DIAGNOSIS — C18.9 MALIGNANT NEOPLASM OF COLON, UNSPECIFIED: ICD-10-CM

## 2024-12-09 DIAGNOSIS — S72.90XA UNSPECIFIED FRACTURE OF UNSPECIFIED FEMUR, INITIAL ENCOUNTER FOR CLOSED FRACTURE: Chronic | ICD-10-CM

## 2024-12-10 ENCOUNTER — APPOINTMENT (OUTPATIENT)
Dept: INFUSION THERAPY | Facility: CLINIC | Age: 64
End: 2024-12-10

## 2024-12-10 ENCOUNTER — RESULT REVIEW (OUTPATIENT)
Age: 64
End: 2024-12-10

## 2024-12-10 ENCOUNTER — NON-APPOINTMENT (OUTPATIENT)
Age: 64
End: 2024-12-10

## 2024-12-10 VITALS
OXYGEN SATURATION: 97 % | SYSTOLIC BLOOD PRESSURE: 127 MMHG | TEMPERATURE: 97.7 F | HEIGHT: 71 IN | BODY MASS INDEX: 31.92 KG/M2 | DIASTOLIC BLOOD PRESSURE: 82 MMHG | WEIGHT: 228 LBS | HEART RATE: 114 BPM

## 2024-12-10 LAB
ALBUMIN SERPL ELPH-MCNC: 3.9 G/DL — SIGNIFICANT CHANGE UP (ref 3.3–5)
ALP SERPL-CCNC: 158 U/L — HIGH (ref 40–120)
ALT FLD-CCNC: 13 U/L — SIGNIFICANT CHANGE UP (ref 10–45)
ANION GAP SERPL CALC-SCNC: 12 MMOL/L — SIGNIFICANT CHANGE UP (ref 5–17)
AST SERPL-CCNC: 17 U/L — SIGNIFICANT CHANGE UP (ref 10–40)
BASOPHILS # BLD AUTO: 0.09 K/UL — SIGNIFICANT CHANGE UP (ref 0–0.2)
BASOPHILS NFR BLD AUTO: 2.6 % — HIGH (ref 0–2)
BILIRUB SERPL-MCNC: 0.2 MG/DL — SIGNIFICANT CHANGE UP (ref 0.2–1.2)
BUN SERPL-MCNC: 6 MG/DL — LOW (ref 7–23)
CALCIUM SERPL-MCNC: 8.7 MG/DL — SIGNIFICANT CHANGE UP (ref 8.4–10.5)
CHLORIDE SERPL-SCNC: 106 MMOL/L — SIGNIFICANT CHANGE UP (ref 96–108)
CO2 SERPL-SCNC: 22 MMOL/L — SIGNIFICANT CHANGE UP (ref 22–31)
CREAT SERPL-MCNC: 0.84 MG/DL — SIGNIFICANT CHANGE UP (ref 0.5–1.3)
EGFR: 97 ML/MIN/1.73M2 — SIGNIFICANT CHANGE UP
EOSINOPHIL # BLD AUTO: 0.09 K/UL — SIGNIFICANT CHANGE UP (ref 0–0.5)
EOSINOPHIL NFR BLD AUTO: 2.6 % — SIGNIFICANT CHANGE UP (ref 0–6)
GLUCOSE SERPL-MCNC: 129 MG/DL — HIGH (ref 70–99)
HCT VFR BLD CALC: 33 % — LOW (ref 39–50)
HGB BLD-MCNC: 9.6 G/DL — LOW (ref 13–17)
IMM GRANULOCYTES NFR BLD AUTO: 1.1 % — HIGH (ref 0–0.9)
LYMPHOCYTES # BLD AUTO: 1.09 K/UL — SIGNIFICANT CHANGE UP (ref 1–3.3)
LYMPHOCYTES # BLD AUTO: 31.1 % — SIGNIFICANT CHANGE UP (ref 13–44)
MAGNESIUM SERPL-MCNC: 2.2 MG/DL — SIGNIFICANT CHANGE UP (ref 1.6–2.6)
MCHC RBC-ENTMCNC: 24.2 PG — LOW (ref 27–34)
MCHC RBC-ENTMCNC: 29.1 G/DL — LOW (ref 32–36)
MCV RBC AUTO: 83.3 FL — SIGNIFICANT CHANGE UP (ref 80–100)
MONOCYTES # BLD AUTO: 0.43 K/UL — SIGNIFICANT CHANGE UP (ref 0–0.9)
MONOCYTES NFR BLD AUTO: 12.3 % — SIGNIFICANT CHANGE UP (ref 2–14)
NEUTROPHILS # BLD AUTO: 1.76 K/UL — LOW (ref 1.8–7.4)
NEUTROPHILS NFR BLD AUTO: 50.3 % — SIGNIFICANT CHANGE UP (ref 43–77)
NRBC # BLD: 0 /100 WBCS — SIGNIFICANT CHANGE UP (ref 0–0)
PLATELET # BLD AUTO: 225 K/UL — SIGNIFICANT CHANGE UP (ref 150–400)
POTASSIUM SERPL-MCNC: 4.3 MMOL/L — SIGNIFICANT CHANGE UP (ref 3.5–5.3)
POTASSIUM SERPL-SCNC: 4.3 MMOL/L — SIGNIFICANT CHANGE UP (ref 3.5–5.3)
PROT SERPL-MCNC: 6.3 G/DL — SIGNIFICANT CHANGE UP (ref 6–8.3)
RBC # BLD: 3.96 M/UL — LOW (ref 4.2–5.8)
RBC # FLD: 18.3 % — HIGH (ref 10.3–14.5)
SODIUM SERPL-SCNC: 141 MMOL/L — SIGNIFICANT CHANGE UP (ref 135–145)
WBC # BLD: 3.5 K/UL — LOW (ref 3.8–10.5)
WBC # FLD AUTO: 3.5 K/UL — LOW (ref 3.8–10.5)

## 2024-12-12 ENCOUNTER — RESULT REVIEW (OUTPATIENT)
Age: 64
End: 2024-12-12

## 2024-12-12 ENCOUNTER — NON-APPOINTMENT (OUTPATIENT)
Age: 64
End: 2024-12-12

## 2024-12-12 ENCOUNTER — APPOINTMENT (OUTPATIENT)
Dept: HEMATOLOGY ONCOLOGY | Facility: CLINIC | Age: 64
End: 2024-12-12
Payer: MEDICAID

## 2024-12-12 ENCOUNTER — APPOINTMENT (OUTPATIENT)
Dept: INFUSION THERAPY | Facility: CLINIC | Age: 64
End: 2024-12-12
Payer: MEDICAID

## 2024-12-12 VITALS
OXYGEN SATURATION: 97 % | WEIGHT: 227.44 LBS | HEART RATE: 120 BPM | DIASTOLIC BLOOD PRESSURE: 74 MMHG | SYSTOLIC BLOOD PRESSURE: 143 MMHG | TEMPERATURE: 98.3 F | BODY MASS INDEX: 31.84 KG/M2 | HEIGHT: 71 IN

## 2024-12-12 DIAGNOSIS — G62.9 POLYNEUROPATHY, UNSPECIFIED: ICD-10-CM

## 2024-12-12 DIAGNOSIS — C78.7 MALIGNANT NEOPLASM OF COLON, UNSPECIFIED: ICD-10-CM

## 2024-12-12 DIAGNOSIS — C18.9 MALIGNANT NEOPLASM OF COLON, UNSPECIFIED: ICD-10-CM

## 2024-12-12 DIAGNOSIS — T82.898A OTHER SPECIFIED COMPLICATION OF VASCULAR PROSTHETIC DEVICES, IMPLANTS AND GRAFTS, INITIAL ENCOUNTER: ICD-10-CM

## 2024-12-12 LAB
BASOPHILS # BLD AUTO: 0.08 K/UL — SIGNIFICANT CHANGE UP (ref 0–0.2)
BASOPHILS NFR BLD AUTO: 1.2 % — SIGNIFICANT CHANGE UP (ref 0–2)
EOSINOPHIL # BLD AUTO: 0.1 K/UL — SIGNIFICANT CHANGE UP (ref 0–0.5)
EOSINOPHIL NFR BLD AUTO: 1.5 % — SIGNIFICANT CHANGE UP (ref 0–6)
HCT VFR BLD CALC: 35 % — LOW (ref 39–50)
HGB BLD-MCNC: 10.3 G/DL — LOW (ref 13–17)
IMM GRANULOCYTES NFR BLD AUTO: 1.1 % — HIGH (ref 0–0.9)
LYMPHOCYTES # BLD AUTO: 1.48 K/UL — SIGNIFICANT CHANGE UP (ref 1–3.3)
LYMPHOCYTES # BLD AUTO: 22.5 % — SIGNIFICANT CHANGE UP (ref 13–44)
MCHC RBC-ENTMCNC: 24.5 PG — LOW (ref 27–34)
MCHC RBC-ENTMCNC: 29.4 G/DL — LOW (ref 32–36)
MCV RBC AUTO: 83.1 FL — SIGNIFICANT CHANGE UP (ref 80–100)
MONOCYTES # BLD AUTO: 0.64 K/UL — SIGNIFICANT CHANGE UP (ref 0–0.9)
MONOCYTES NFR BLD AUTO: 9.7 % — SIGNIFICANT CHANGE UP (ref 2–14)
NEUTROPHILS # BLD AUTO: 4.22 K/UL — SIGNIFICANT CHANGE UP (ref 1.8–7.4)
NEUTROPHILS NFR BLD AUTO: 64 % — SIGNIFICANT CHANGE UP (ref 43–77)
NRBC # BLD: 0 /100 WBCS — SIGNIFICANT CHANGE UP (ref 0–0)
PLATELET # BLD AUTO: 303 K/UL — SIGNIFICANT CHANGE UP (ref 150–400)
RBC # BLD: 4.21 M/UL — SIGNIFICANT CHANGE UP (ref 4.2–5.8)
RBC # FLD: 17.7 % — HIGH (ref 10.3–14.5)
WBC # BLD: 6.59 K/UL — SIGNIFICANT CHANGE UP (ref 3.8–10.5)
WBC # FLD AUTO: 6.59 K/UL — SIGNIFICANT CHANGE UP (ref 3.8–10.5)

## 2024-12-12 PROCEDURE — 93010 ELECTROCARDIOGRAM REPORT: CPT

## 2024-12-12 PROCEDURE — 99417 PROLNG OP E/M EACH 15 MIN: CPT

## 2024-12-12 PROCEDURE — 99215 OFFICE O/P EST HI 40 MIN: CPT

## 2024-12-12 PROCEDURE — G2211 COMPLEX E/M VISIT ADD ON: CPT

## 2024-12-12 RX ORDER — ENOXAPARIN SODIUM 150 MG/ML
150 INJECTION, SOLUTION SUBCUTANEOUS DAILY
Qty: 30 | Refills: 1 | Status: ACTIVE | COMMUNITY
Start: 2024-12-12 | End: 1900-01-01

## 2024-12-13 DIAGNOSIS — G62.9 POLYNEUROPATHY, UNSPECIFIED: ICD-10-CM

## 2024-12-16 RX ORDER — TRIFLURIDINE AND TIPIRACIL 15; 6.14 MG/1; MG/1
15-6.14 TABLET, FILM COATED ORAL
Qty: 120 | Refills: 5 | Status: ACTIVE | COMMUNITY
Start: 2024-12-13 | End: 1900-01-01

## 2024-12-19 ENCOUNTER — OUTPATIENT (OUTPATIENT)
Dept: OUTPATIENT SERVICES | Facility: HOSPITAL | Age: 64
LOS: 1 days | End: 2024-12-19
Payer: COMMERCIAL

## 2024-12-19 ENCOUNTER — APPOINTMENT (OUTPATIENT)
Dept: CT IMAGING | Facility: CLINIC | Age: 64
End: 2024-12-19
Payer: COMMERCIAL

## 2024-12-19 DIAGNOSIS — Z00.8 ENCOUNTER FOR OTHER GENERAL EXAMINATION: ICD-10-CM

## 2024-12-19 DIAGNOSIS — S72.90XA UNSPECIFIED FRACTURE OF UNSPECIFIED FEMUR, INITIAL ENCOUNTER FOR CLOSED FRACTURE: Chronic | ICD-10-CM

## 2024-12-19 DIAGNOSIS — C18.9 MALIGNANT NEOPLASM OF COLON, UNSPECIFIED: ICD-10-CM

## 2024-12-19 PROCEDURE — 71260 CT THORAX DX C+: CPT

## 2024-12-19 PROCEDURE — 71260 CT THORAX DX C+: CPT | Mod: 26

## 2024-12-20 ENCOUNTER — RESULT REVIEW (OUTPATIENT)
Age: 64
End: 2024-12-20

## 2024-12-20 ENCOUNTER — APPOINTMENT (OUTPATIENT)
Dept: HEMATOLOGY ONCOLOGY | Facility: CLINIC | Age: 64
End: 2024-12-20

## 2024-12-20 LAB
BASOPHILS # BLD AUTO: 0.13 K/UL — SIGNIFICANT CHANGE UP (ref 0–0.2)
BASOPHILS NFR BLD AUTO: 2.5 % — HIGH (ref 0–2)
EOSINOPHIL # BLD AUTO: 0.11 K/UL — SIGNIFICANT CHANGE UP (ref 0–0.5)
EOSINOPHIL NFR BLD AUTO: 2.1 % — SIGNIFICANT CHANGE UP (ref 0–6)
HCT VFR BLD CALC: 32.4 % — LOW (ref 39–50)
HGB BLD-MCNC: 9.5 G/DL — LOW (ref 13–17)
IMM GRANULOCYTES NFR BLD AUTO: 0.4 % — SIGNIFICANT CHANGE UP (ref 0–0.9)
LYMPHOCYTES # BLD AUTO: 1.63 K/UL — SIGNIFICANT CHANGE UP (ref 1–3.3)
LYMPHOCYTES # BLD AUTO: 30.9 % — SIGNIFICANT CHANGE UP (ref 13–44)
MCHC RBC-ENTMCNC: 24.4 PG — LOW (ref 27–34)
MCHC RBC-ENTMCNC: 29.3 G/DL — LOW (ref 32–36)
MCV RBC AUTO: 83.1 FL — SIGNIFICANT CHANGE UP (ref 80–100)
MONOCYTES # BLD AUTO: 0.72 K/UL — SIGNIFICANT CHANGE UP (ref 0–0.9)
MONOCYTES NFR BLD AUTO: 13.6 % — SIGNIFICANT CHANGE UP (ref 2–14)
NEUTROPHILS # BLD AUTO: 2.67 K/UL — SIGNIFICANT CHANGE UP (ref 1.8–7.4)
NEUTROPHILS NFR BLD AUTO: 50.5 % — SIGNIFICANT CHANGE UP (ref 43–77)
NRBC # BLD: 0 /100 WBCS — SIGNIFICANT CHANGE UP (ref 0–0)
PLATELET # BLD AUTO: 287 K/UL — SIGNIFICANT CHANGE UP (ref 150–400)
RBC # BLD: 3.9 M/UL — LOW (ref 4.2–5.8)
RBC # FLD: 17.5 % — HIGH (ref 10.3–14.5)
WBC # BLD: 5.28 K/UL — SIGNIFICANT CHANGE UP (ref 3.8–10.5)
WBC # FLD AUTO: 5.28 K/UL — SIGNIFICANT CHANGE UP (ref 3.8–10.5)

## 2024-12-23 ENCOUNTER — NON-APPOINTMENT (OUTPATIENT)
Age: 64
End: 2024-12-23

## 2024-12-23 LAB
ALBUMIN SERPL ELPH-MCNC: 3.9 G/DL
ALP BLD-CCNC: 141 U/L
ALT SERPL-CCNC: 14 U/L
ANION GAP SERPL CALC-SCNC: 12 MMOL/L
AST SERPL-CCNC: 17 U/L
BILIRUB SERPL-MCNC: 0.2 MG/DL
BUN SERPL-MCNC: 11 MG/DL
CALCIUM SERPL-MCNC: 9.1 MG/DL
CHLORIDE SERPL-SCNC: 106 MMOL/L
CO2 SERPL-SCNC: 26 MMOL/L
CREAT SERPL-MCNC: 0.82 MG/DL
EGFR: 98 ML/MIN/1.73M2
GLUCOSE SERPL-MCNC: 104 MG/DL
MAGNESIUM SERPL-MCNC: 2.3 MG/DL
POTASSIUM SERPL-SCNC: 4.2 MMOL/L
PROT SERPL-MCNC: 6.7 G/DL
SODIUM SERPL-SCNC: 143 MMOL/L

## 2024-12-24 ENCOUNTER — APPOINTMENT (OUTPATIENT)
Dept: INFUSION THERAPY | Facility: CLINIC | Age: 64
End: 2024-12-24

## 2024-12-26 ENCOUNTER — APPOINTMENT (OUTPATIENT)
Dept: INFUSION THERAPY | Facility: CLINIC | Age: 64
End: 2024-12-26

## 2025-01-03 ENCOUNTER — OUTPATIENT (OUTPATIENT)
Dept: OUTPATIENT SERVICES | Facility: HOSPITAL | Age: 65
LOS: 1 days | Discharge: HOME | End: 2025-01-03

## 2025-01-03 DIAGNOSIS — C18.9 MALIGNANT NEOPLASM OF COLON, UNSPECIFIED: ICD-10-CM

## 2025-01-03 DIAGNOSIS — S72.90XA UNSPECIFIED FRACTURE OF UNSPECIFIED FEMUR, INITIAL ENCOUNTER FOR CLOSED FRACTURE: Chronic | ICD-10-CM

## 2025-01-06 ENCOUNTER — RESULT REVIEW (OUTPATIENT)
Age: 65
End: 2025-01-06

## 2025-01-06 ENCOUNTER — APPOINTMENT (OUTPATIENT)
Dept: INFUSION THERAPY | Facility: CLINIC | Age: 65
End: 2025-01-06

## 2025-01-06 LAB
ALBUMIN SERPL ELPH-MCNC: 4 G/DL — SIGNIFICANT CHANGE UP (ref 3.3–5)
ALP SERPL-CCNC: 113 U/L — SIGNIFICANT CHANGE UP (ref 40–120)
ALT FLD-CCNC: 12 U/L — SIGNIFICANT CHANGE UP (ref 10–45)
ANION GAP SERPL CALC-SCNC: 11 MMOL/L — SIGNIFICANT CHANGE UP (ref 5–17)
AST SERPL-CCNC: 15 U/L — SIGNIFICANT CHANGE UP (ref 10–40)
BASOPHILS # BLD AUTO: 0.06 K/UL — SIGNIFICANT CHANGE UP (ref 0–0.2)
BASOPHILS NFR BLD AUTO: 1.1 % — SIGNIFICANT CHANGE UP (ref 0–2)
BILIRUB SERPL-MCNC: 0.3 MG/DL — SIGNIFICANT CHANGE UP (ref 0.2–1.2)
BUN SERPL-MCNC: 10 MG/DL — SIGNIFICANT CHANGE UP (ref 7–23)
CALCIUM SERPL-MCNC: 8.6 MG/DL — SIGNIFICANT CHANGE UP (ref 8.4–10.5)
CHLORIDE SERPL-SCNC: 107 MMOL/L — SIGNIFICANT CHANGE UP (ref 96–108)
CO2 SERPL-SCNC: 23 MMOL/L — SIGNIFICANT CHANGE UP (ref 22–31)
CREAT SERPL-MCNC: 0.73 MG/DL — SIGNIFICANT CHANGE UP (ref 0.5–1.3)
EGFR: 102 ML/MIN/1.73M2 — SIGNIFICANT CHANGE UP
EGFR: 102 ML/MIN/1.73M2 — SIGNIFICANT CHANGE UP
EOSINOPHIL # BLD AUTO: 0.2 K/UL — SIGNIFICANT CHANGE UP (ref 0–0.5)
EOSINOPHIL NFR BLD AUTO: 3.7 % — SIGNIFICANT CHANGE UP (ref 0–6)
GLUCOSE SERPL-MCNC: 80 MG/DL — SIGNIFICANT CHANGE UP (ref 70–99)
HCT VFR BLD CALC: 28.8 % — LOW (ref 39–50)
HGB BLD-MCNC: 8.5 G/DL — LOW (ref 13–17)
IMM GRANULOCYTES NFR BLD AUTO: 0.6 % — SIGNIFICANT CHANGE UP (ref 0–0.9)
LYMPHOCYTES # BLD AUTO: 0.94 K/UL — LOW (ref 1–3.3)
LYMPHOCYTES # BLD AUTO: 17.5 % — SIGNIFICANT CHANGE UP (ref 13–44)
MAGNESIUM SERPL-MCNC: 2 MG/DL — SIGNIFICANT CHANGE UP (ref 1.6–2.6)
MCHC RBC-ENTMCNC: 24.4 PG — LOW (ref 27–34)
MCHC RBC-ENTMCNC: 29.5 G/DL — LOW (ref 32–36)
MCV RBC AUTO: 82.5 FL — SIGNIFICANT CHANGE UP (ref 80–100)
MONOCYTES # BLD AUTO: 0.35 K/UL — SIGNIFICANT CHANGE UP (ref 0–0.9)
MONOCYTES NFR BLD AUTO: 6.5 % — SIGNIFICANT CHANGE UP (ref 2–14)
NEUTROPHILS # BLD AUTO: 3.8 K/UL — SIGNIFICANT CHANGE UP (ref 1.8–7.4)
NEUTROPHILS NFR BLD AUTO: 70.6 % — SIGNIFICANT CHANGE UP (ref 43–77)
NRBC # BLD: 0 /100 WBCS — SIGNIFICANT CHANGE UP (ref 0–0)
NRBC BLD-RTO: 0 /100 WBCS — SIGNIFICANT CHANGE UP (ref 0–0)
PLATELET # BLD AUTO: 221 K/UL — SIGNIFICANT CHANGE UP (ref 150–400)
POTASSIUM SERPL-MCNC: 4.3 MMOL/L — SIGNIFICANT CHANGE UP (ref 3.5–5.3)
POTASSIUM SERPL-SCNC: 4.3 MMOL/L — SIGNIFICANT CHANGE UP (ref 3.5–5.3)
PROT SERPL-MCNC: 6.7 G/DL — SIGNIFICANT CHANGE UP (ref 6–8.3)
RBC # BLD: 3.49 M/UL — LOW (ref 4.2–5.8)
RBC # FLD: 18.2 % — HIGH (ref 10.3–14.5)
SODIUM SERPL-SCNC: 141 MMOL/L — SIGNIFICANT CHANGE UP (ref 135–145)
WBC # BLD: 5.38 K/UL — SIGNIFICANT CHANGE UP (ref 3.8–10.5)
WBC # FLD AUTO: 5.38 K/UL — SIGNIFICANT CHANGE UP (ref 3.8–10.5)

## 2025-01-07 ENCOUNTER — NON-APPOINTMENT (OUTPATIENT)
Age: 65
End: 2025-01-07

## 2025-01-13 ENCOUNTER — APPOINTMENT (OUTPATIENT)
Dept: HEMATOLOGY ONCOLOGY | Facility: CLINIC | Age: 65
End: 2025-01-13

## 2025-01-17 ENCOUNTER — RESULT REVIEW (OUTPATIENT)
Age: 65
End: 2025-01-17

## 2025-01-17 ENCOUNTER — APPOINTMENT (OUTPATIENT)
Dept: INFUSION THERAPY | Facility: CLINIC | Age: 65
End: 2025-01-17

## 2025-01-17 ENCOUNTER — APPOINTMENT (OUTPATIENT)
Dept: HEMATOLOGY ONCOLOGY | Facility: CLINIC | Age: 65
End: 2025-01-17

## 2025-01-17 VITALS
TEMPERATURE: 98 F | DIASTOLIC BLOOD PRESSURE: 74 MMHG | HEIGHT: 71 IN | BODY MASS INDEX: 32.13 KG/M2 | WEIGHT: 229.5 LBS | SYSTOLIC BLOOD PRESSURE: 138 MMHG | HEART RATE: 128 BPM | OXYGEN SATURATION: 98 %

## 2025-01-17 LAB
ALBUMIN SERPL ELPH-MCNC: 4.1 G/DL — SIGNIFICANT CHANGE UP (ref 3.3–5)
ALP SERPL-CCNC: 113 U/L — SIGNIFICANT CHANGE UP (ref 40–120)
ALT FLD-CCNC: 17 U/L — SIGNIFICANT CHANGE UP (ref 10–45)
ANION GAP SERPL CALC-SCNC: 11 MMOL/L — SIGNIFICANT CHANGE UP (ref 5–17)
AST SERPL-CCNC: 20 U/L — SIGNIFICANT CHANGE UP (ref 10–40)
BASOPHILS # BLD AUTO: 0.04 K/UL — SIGNIFICANT CHANGE UP (ref 0–0.2)
BASOPHILS NFR BLD AUTO: 1.3 % — SIGNIFICANT CHANGE UP (ref 0–2)
BILIRUB SERPL-MCNC: 0.3 MG/DL — SIGNIFICANT CHANGE UP (ref 0.2–1.2)
BUN SERPL-MCNC: 10 MG/DL — SIGNIFICANT CHANGE UP (ref 7–23)
CALCIUM SERPL-MCNC: 8.8 MG/DL — SIGNIFICANT CHANGE UP (ref 8.4–10.5)
CHLORIDE SERPL-SCNC: 105 MMOL/L — SIGNIFICANT CHANGE UP (ref 96–108)
CO2 SERPL-SCNC: 24 MMOL/L — SIGNIFICANT CHANGE UP (ref 22–31)
CREAT SERPL-MCNC: 0.71 MG/DL — SIGNIFICANT CHANGE UP (ref 0.5–1.3)
EGFR: 102 ML/MIN/1.73M2 — SIGNIFICANT CHANGE UP
EGFR: 102 ML/MIN/1.73M2 — SIGNIFICANT CHANGE UP
EOSINOPHIL # BLD AUTO: 0.15 K/UL — SIGNIFICANT CHANGE UP (ref 0–0.5)
EOSINOPHIL NFR BLD AUTO: 4.9 % — SIGNIFICANT CHANGE UP (ref 0–6)
GLUCOSE SERPL-MCNC: 108 MG/DL — HIGH (ref 70–99)
HCT VFR BLD CALC: 29.8 % — LOW (ref 39–50)
HGB BLD-MCNC: 8.9 G/DL — LOW (ref 13–17)
IMM GRANULOCYTES NFR BLD AUTO: 0.3 % — SIGNIFICANT CHANGE UP (ref 0–0.9)
LYMPHOCYTES # BLD AUTO: 0.99 K/UL — LOW (ref 1–3.3)
LYMPHOCYTES # BLD AUTO: 32.1 % — SIGNIFICANT CHANGE UP (ref 13–44)
MAGNESIUM SERPL-MCNC: 2 MG/DL — SIGNIFICANT CHANGE UP (ref 1.6–2.6)
MCHC RBC-ENTMCNC: 24.9 PG — LOW (ref 27–34)
MCHC RBC-ENTMCNC: 29.9 G/DL — LOW (ref 32–36)
MCV RBC AUTO: 83.2 FL — SIGNIFICANT CHANGE UP (ref 80–100)
MONOCYTES # BLD AUTO: 0.39 K/UL — SIGNIFICANT CHANGE UP (ref 0–0.9)
MONOCYTES NFR BLD AUTO: 12.7 % — SIGNIFICANT CHANGE UP (ref 2–14)
NEUTROPHILS # BLD AUTO: 1.5 K/UL — LOW (ref 1.8–7.4)
NEUTROPHILS NFR BLD AUTO: 48.7 % — SIGNIFICANT CHANGE UP (ref 43–77)
NRBC # BLD: 0 /100 WBCS — SIGNIFICANT CHANGE UP (ref 0–0)
NRBC BLD-RTO: 0 /100 WBCS — SIGNIFICANT CHANGE UP (ref 0–0)
PLATELET # BLD AUTO: 292 K/UL — SIGNIFICANT CHANGE UP (ref 150–400)
POTASSIUM SERPL-MCNC: 4.1 MMOL/L — SIGNIFICANT CHANGE UP (ref 3.5–5.3)
POTASSIUM SERPL-SCNC: 4.1 MMOL/L — SIGNIFICANT CHANGE UP (ref 3.5–5.3)
PROT SERPL-MCNC: 7 G/DL — SIGNIFICANT CHANGE UP (ref 6–8.3)
RBC # BLD: 3.58 M/UL — LOW (ref 4.2–5.8)
RBC # FLD: 19.1 % — HIGH (ref 10.3–14.5)
SODIUM SERPL-SCNC: 139 MMOL/L — SIGNIFICANT CHANGE UP (ref 135–145)
WBC # BLD: 3.1 K/UL — LOW (ref 3.8–10.5)
WBC # FLD AUTO: 3.1 K/UL — LOW (ref 3.8–10.5)

## 2025-01-17 PROCEDURE — 99214 OFFICE O/P EST MOD 30 MIN: CPT

## 2025-01-17 PROCEDURE — G2211 COMPLEX E/M VISIT ADD ON: CPT

## 2025-01-18 ENCOUNTER — APPOINTMENT (OUTPATIENT)
Dept: CT IMAGING | Facility: CLINIC | Age: 65
End: 2025-01-18

## 2025-01-24 ENCOUNTER — APPOINTMENT (OUTPATIENT)
Dept: HEMATOLOGY ONCOLOGY | Facility: CLINIC | Age: 65
End: 2025-01-24

## 2025-01-24 ENCOUNTER — RESULT REVIEW (OUTPATIENT)
Age: 65
End: 2025-01-24

## 2025-01-24 LAB
BASOPHILS # BLD AUTO: 0.05 K/UL — SIGNIFICANT CHANGE UP (ref 0–0.2)
BASOPHILS NFR BLD AUTO: 1.2 % — SIGNIFICANT CHANGE UP (ref 0–2)
EOSINOPHIL # BLD AUTO: 0.1 K/UL — SIGNIFICANT CHANGE UP (ref 0–0.5)
EOSINOPHIL NFR BLD AUTO: 2.5 % — SIGNIFICANT CHANGE UP (ref 0–6)
HCT VFR BLD CALC: 31.1 % — LOW (ref 39–50)
HGB BLD-MCNC: 9.3 G/DL — LOW (ref 13–17)
IMM GRANULOCYTES NFR BLD AUTO: 0.5 % — SIGNIFICANT CHANGE UP (ref 0–0.9)
LYMPHOCYTES # BLD AUTO: 1.43 K/UL — SIGNIFICANT CHANGE UP (ref 1–3.3)
LYMPHOCYTES # BLD AUTO: 35 % — SIGNIFICANT CHANGE UP (ref 13–44)
MCHC RBC-ENTMCNC: 24.7 PG — LOW (ref 27–34)
MCHC RBC-ENTMCNC: 29.9 G/DL — LOW (ref 32–36)
MCV RBC AUTO: 82.5 FL — SIGNIFICANT CHANGE UP (ref 80–100)
MONOCYTES # BLD AUTO: 0.49 K/UL — SIGNIFICANT CHANGE UP (ref 0–0.9)
MONOCYTES NFR BLD AUTO: 12 % — SIGNIFICANT CHANGE UP (ref 2–14)
NEUTROPHILS # BLD AUTO: 1.99 K/UL — SIGNIFICANT CHANGE UP (ref 1.8–7.4)
NEUTROPHILS NFR BLD AUTO: 48.8 % — SIGNIFICANT CHANGE UP (ref 43–77)
NRBC # BLD: 0 /100 WBCS — SIGNIFICANT CHANGE UP (ref 0–0)
NRBC BLD-RTO: 0 /100 WBCS — SIGNIFICANT CHANGE UP (ref 0–0)
PLATELET # BLD AUTO: 273 K/UL — SIGNIFICANT CHANGE UP (ref 150–400)
RBC # BLD: 3.77 M/UL — LOW (ref 4.2–5.8)
RBC # FLD: 18.4 % — HIGH (ref 10.3–14.5)
WBC # BLD: 4.08 K/UL — SIGNIFICANT CHANGE UP (ref 3.8–10.5)
WBC # FLD AUTO: 4.08 K/UL — SIGNIFICANT CHANGE UP (ref 3.8–10.5)

## 2025-02-03 ENCOUNTER — RESULT REVIEW (OUTPATIENT)
Age: 65
End: 2025-02-03

## 2025-02-03 ENCOUNTER — APPOINTMENT (OUTPATIENT)
Dept: INFUSION THERAPY | Facility: CLINIC | Age: 65
End: 2025-02-03

## 2025-02-03 LAB
ALBUMIN SERPL ELPH-MCNC: 4 G/DL — SIGNIFICANT CHANGE UP (ref 3.3–5)
ALP SERPL-CCNC: 107 U/L — SIGNIFICANT CHANGE UP (ref 40–120)
ALT FLD-CCNC: 23 U/L — SIGNIFICANT CHANGE UP (ref 10–45)
ANION GAP SERPL CALC-SCNC: 12 MMOL/L — SIGNIFICANT CHANGE UP (ref 5–17)
AST SERPL-CCNC: 23 U/L — SIGNIFICANT CHANGE UP (ref 10–40)
BASOPHILS # BLD AUTO: 0 K/UL — SIGNIFICANT CHANGE UP (ref 0–0.2)
BASOPHILS NFR BLD AUTO: 0 % — SIGNIFICANT CHANGE UP (ref 0–2)
BILIRUB SERPL-MCNC: 0.3 MG/DL — SIGNIFICANT CHANGE UP (ref 0.2–1.2)
BUN SERPL-MCNC: 10 MG/DL — SIGNIFICANT CHANGE UP (ref 7–23)
CALCIUM SERPL-MCNC: 8.3 MG/DL — LOW (ref 8.4–10.5)
CHLORIDE SERPL-SCNC: 105 MMOL/L — SIGNIFICANT CHANGE UP (ref 96–108)
CO2 SERPL-SCNC: 25 MMOL/L — SIGNIFICANT CHANGE UP (ref 22–31)
CREAT SERPL-MCNC: 0.72 MG/DL — SIGNIFICANT CHANGE UP (ref 0.5–1.3)
DACRYOCYTES BLD QL SMEAR: SLIGHT — SIGNIFICANT CHANGE UP
EGFR: 102 ML/MIN/1.73M2 — SIGNIFICANT CHANGE UP
EGFR: 102 ML/MIN/1.73M2 — SIGNIFICANT CHANGE UP
ELLIPTOCYTES BLD QL SMEAR: SLIGHT — SIGNIFICANT CHANGE UP
EOSINOPHIL # BLD AUTO: 0 K/UL — SIGNIFICANT CHANGE UP (ref 0–0.5)
EOSINOPHIL NFR BLD AUTO: 0 % — SIGNIFICANT CHANGE UP (ref 0–6)
GLUCOSE SERPL-MCNC: 104 MG/DL — HIGH (ref 70–99)
HCT VFR BLD CALC: 27.8 % — LOW (ref 39–50)
HGB BLD-MCNC: 8.3 G/DL — LOW (ref 13–17)
HYPOCHROMIA BLD QL: SLIGHT — SIGNIFICANT CHANGE UP
LG PLATELETS BLD QL AUTO: SLIGHT — SIGNIFICANT CHANGE UP
LYMPHOCYTES # BLD AUTO: 0.91 K/UL — LOW (ref 1–3.3)
LYMPHOCYTES # BLD AUTO: 31 % — SIGNIFICANT CHANGE UP (ref 13–44)
MACROCYTES BLD QL: SLIGHT — SIGNIFICANT CHANGE UP
MAGNESIUM SERPL-MCNC: 1.9 MG/DL — SIGNIFICANT CHANGE UP (ref 1.6–2.6)
MCHC RBC-ENTMCNC: 24.6 PG — LOW (ref 27–34)
MCHC RBC-ENTMCNC: 29.9 G/DL — LOW (ref 32–36)
MCV RBC AUTO: 82.5 FL — SIGNIFICANT CHANGE UP (ref 80–100)
MICROCYTES BLD QL: SLIGHT — SIGNIFICANT CHANGE UP
MONOCYTES # BLD AUTO: 0.12 K/UL — SIGNIFICANT CHANGE UP (ref 0–0.9)
MONOCYTES NFR BLD AUTO: 4 % — SIGNIFICANT CHANGE UP (ref 2–14)
NEUTROPHILS # BLD AUTO: 1.9 K/UL — SIGNIFICANT CHANGE UP (ref 1.8–7.4)
NEUTROPHILS NFR BLD AUTO: 65 % — SIGNIFICANT CHANGE UP (ref 43–77)
NRBC # BLD: 0 /100 WBCS — SIGNIFICANT CHANGE UP (ref 0–0)
NRBC # BLD: SIGNIFICANT CHANGE UP /100 WBCS (ref 0–0)
NRBC BLD-RTO: 0 /100 WBCS — SIGNIFICANT CHANGE UP (ref 0–0)
NRBC BLD-RTO: SIGNIFICANT CHANGE UP /100 WBCS (ref 0–0)
PLAT MORPH BLD: NORMAL — SIGNIFICANT CHANGE UP
PLATELET # BLD AUTO: 197 K/UL — SIGNIFICANT CHANGE UP (ref 150–400)
POTASSIUM SERPL-MCNC: 3.6 MMOL/L — SIGNIFICANT CHANGE UP (ref 3.5–5.3)
POTASSIUM SERPL-SCNC: 3.6 MMOL/L — SIGNIFICANT CHANGE UP (ref 3.5–5.3)
PROT SERPL-MCNC: 6.7 G/DL — SIGNIFICANT CHANGE UP (ref 6–8.3)
RBC # BLD: 3.37 M/UL — LOW (ref 4.2–5.8)
RBC # FLD: 19.1 % — HIGH (ref 10.3–14.5)
RBC BLD AUTO: ABNORMAL
SCHISTOCYTES BLD QL AUTO: SLIGHT — SIGNIFICANT CHANGE UP
SODIUM SERPL-SCNC: 142 MMOL/L — SIGNIFICANT CHANGE UP (ref 135–145)
WBC # BLD: 2.93 K/UL — LOW (ref 3.8–10.5)
WBC # FLD AUTO: 2.93 K/UL — LOW (ref 3.8–10.5)

## 2025-02-05 ENCOUNTER — OUTPATIENT (OUTPATIENT)
Dept: OUTPATIENT SERVICES | Facility: HOSPITAL | Age: 65
LOS: 1 days | End: 2025-02-05
Payer: COMMERCIAL

## 2025-02-05 ENCOUNTER — APPOINTMENT (OUTPATIENT)
Dept: CT IMAGING | Facility: CLINIC | Age: 65
End: 2025-02-05
Payer: COMMERCIAL

## 2025-02-05 DIAGNOSIS — T82.898A OTHER SPECIFIED COMPLICATION OF VASCULAR PROSTHETIC DEVICES, IMPLANTS AND GRAFTS, INITIAL ENCOUNTER: ICD-10-CM

## 2025-02-05 DIAGNOSIS — S72.90XA UNSPECIFIED FRACTURE OF UNSPECIFIED FEMUR, INITIAL ENCOUNTER FOR CLOSED FRACTURE: Chronic | ICD-10-CM

## 2025-02-05 PROCEDURE — 71260 CT THORAX DX C+: CPT

## 2025-02-05 PROCEDURE — 71260 CT THORAX DX C+: CPT | Mod: 26

## 2025-02-10 DIAGNOSIS — G62.9 POLYNEUROPATHY, UNSPECIFIED: ICD-10-CM

## 2025-02-14 ENCOUNTER — LABORATORY RESULT (OUTPATIENT)
Age: 65
End: 2025-02-14

## 2025-02-14 ENCOUNTER — APPOINTMENT (OUTPATIENT)
Dept: INFUSION THERAPY | Facility: CLINIC | Age: 65
End: 2025-02-14

## 2025-02-14 ENCOUNTER — APPOINTMENT (OUTPATIENT)
Dept: HEMATOLOGY ONCOLOGY | Facility: CLINIC | Age: 65
End: 2025-02-14

## 2025-02-14 VITALS
OXYGEN SATURATION: 100 % | HEART RATE: 107 BPM | SYSTOLIC BLOOD PRESSURE: 130 MMHG | BODY MASS INDEX: 31.92 KG/M2 | TEMPERATURE: 98.4 F | DIASTOLIC BLOOD PRESSURE: 73 MMHG | HEIGHT: 71 IN | WEIGHT: 228 LBS

## 2025-02-14 DIAGNOSIS — G62.9 POLYNEUROPATHY, UNSPECIFIED: ICD-10-CM

## 2025-02-14 DIAGNOSIS — T82.898A OTHER SPECIFIED COMPLICATION OF VASCULAR PROSTHETIC DEVICES, IMPLANTS AND GRAFTS, INITIAL ENCOUNTER: ICD-10-CM

## 2025-02-14 DIAGNOSIS — C78.7 MALIGNANT NEOPLASM OF COLON, UNSPECIFIED: ICD-10-CM

## 2025-02-14 DIAGNOSIS — C18.9 MALIGNANT NEOPLASM OF COLON, UNSPECIFIED: ICD-10-CM

## 2025-02-14 PROCEDURE — 99417 PROLNG OP E/M EACH 15 MIN: CPT

## 2025-02-14 PROCEDURE — G2211 COMPLEX E/M VISIT ADD ON: CPT

## 2025-02-14 PROCEDURE — 99215 OFFICE O/P EST HI 40 MIN: CPT

## 2025-02-18 ENCOUNTER — NON-APPOINTMENT (OUTPATIENT)
Age: 65
End: 2025-02-18

## 2025-02-22 LAB
ALBUMIN SERPL ELPH-MCNC: 4.2 G/DL
ALP BLD-CCNC: 126 U/L
ALT SERPL-CCNC: 19 U/L
ANION GAP SERPL CALC-SCNC: 12 MMOL/L
AST SERPL-CCNC: 18 U/L
BILIRUB SERPL-MCNC: 0.3 MG/DL
BUN SERPL-MCNC: 9 MG/DL
CALCIUM SERPL-MCNC: 9.1 MG/DL
CHLORIDE SERPL-SCNC: 103 MMOL/L
CO2 SERPL-SCNC: 25 MMOL/L
CREAT SERPL-MCNC: 0.78 MG/DL
EGFR: 100 ML/MIN/1.73M2
GLUCOSE SERPL-MCNC: 92 MG/DL
MAGNESIUM SERPL-MCNC: 2.2 MG/DL
POTASSIUM SERPL-SCNC: 4.5 MMOL/L
PROT SERPL-MCNC: 7.4 G/DL
SODIUM SERPL-SCNC: 140 MMOL/L

## 2025-02-26 ENCOUNTER — OUTPATIENT (OUTPATIENT)
Dept: OUTPATIENT SERVICES | Facility: HOSPITAL | Age: 65
LOS: 1 days | Discharge: HOME | End: 2025-02-26
Payer: COMMERCIAL

## 2025-02-26 ENCOUNTER — RESULT REVIEW (OUTPATIENT)
Age: 65
End: 2025-02-26

## 2025-02-26 DIAGNOSIS — S72.90XA UNSPECIFIED FRACTURE OF UNSPECIFIED FEMUR, INITIAL ENCOUNTER FOR CLOSED FRACTURE: Chronic | ICD-10-CM

## 2025-02-26 DIAGNOSIS — T82.898A OTHER SPECIFIED COMPLICATION OF VASCULAR PROSTHETIC DEVICES, IMPLANTS AND GRAFTS, INITIAL ENCOUNTER: ICD-10-CM

## 2025-02-26 PROCEDURE — 36598 INJ W/FLUOR EVAL CV DEVICE: CPT | Mod: RT

## 2025-02-26 PROCEDURE — 36598 INJ W/FLUOR EVAL CV DEVICE: CPT

## 2025-02-27 ENCOUNTER — NON-APPOINTMENT (OUTPATIENT)
Age: 65
End: 2025-02-27

## 2025-02-27 DIAGNOSIS — T82.898A OTHER SPECIFIED COMPLICATION OF VASCULAR PROSTHETIC DEVICES, IMPLANTS AND GRAFTS, INITIAL ENCOUNTER: ICD-10-CM

## 2025-02-27 NOTE — DISCHARGE NOTE PROVIDER - NSDCQMPCI_CARD_ALL_CORE
On Treatment Visit       Patient: Charlette Rai   YOB: 1937   Medical Record Number: [id number]     Date of Visit  February 27, 2025   Primary Diagnosis:Malignant neoplasm of lower lobe, right bronchus or lung [C34.31]  Cancer Staging: Cancer Staging   Malignant neoplasm of lower lobe of left lung  Staging form: Lung, AJCC V9  - Clinical: cT2, cN0, cM0 - Signed by Brennan Medrano MD on 2/6/2025         was seen today for an on treatment visit.  She is receiving radiation therapy to the right lower lobe lung. She  has received 3300 cGy in 3 fractions out of a planned dose of 5500 cGy in 5 fractions.    Today on exam the patient is tolerating radiation therapy well and has no new disease or treatment-related complaints.  She is doing supplemental oxygen at 4 L.  She complains of chronic back pain.                                            Review of Systems:   Review of Systems   Constitutional:  Positive for appetite change (decreased) and fatigue.   Respiratory:  Positive for cough and shortness of breath (with exertion).    Gastrointestinal:  Negative for constipation, diarrhea and nausea.   Genitourinary:  Negative for difficulty urinating, dysuria and frequency.   Musculoskeletal:  Positive for arthralgias, back pain and gait problem.   Skin:  Negative for color change and rash.   Neurological:  Positive for dizziness (with postural changes) and weakness (generalized). Negative for headaches.   Psychiatric/Behavioral:  Positive for sleep disturbance (wakes throughout the night r/t pain in back).        Vitals:     Vitals:    02/27/25 1436   BP: 172/50   Pulse: 72   Resp: 18   Temp: 98 °F (36.7 °C)   SpO2: 100%       Weight:   Wt Readings from Last 3 Encounters:   02/26/25 66 kg (145 lb 9.8 oz)   02/25/25 63.5 kg (140 lb)   02/17/25 64.8 kg (142 lb 13.7 oz)      Pain:    Pain Score    02/27/25 1436   PainSc: 10-Worst pain ever   PainLoc: Back         Physical Exam:  Gen: WD/WN; NAD  wheelchair.  HEENT: MMM  Trachea: midline  Chest: symmetric  Resp: Breathing unlabored.  Supplemental O2 4 L per nasal cannula.  Breath sounds diminished.  Cor: RRR, +murmur  Neuro: awake and alert; no aphasia or neglect    Plan: I have reviewed treatment setup notes, checked and approved the daily guidance images.  I reviewed dose delivery, treatment parameters and deemed them appropriate. We plan to continue radiation therapy as prescribed.  Patient requested pain meds for back pain.  Advised her that pain management can be complicated in dialysis patients and also her pain is chronic and unrelated to her cancer.  Therefore, this should be pursued with a pain specialist or her PCP and nephrologist.          Radiation Oncology    Electronically signed by Ruth Arellano MD 2/27/2025  14:51 EST     No

## 2025-02-28 ENCOUNTER — RESULT REVIEW (OUTPATIENT)
Age: 65
End: 2025-02-28

## 2025-02-28 ENCOUNTER — APPOINTMENT (OUTPATIENT)
Dept: HEMATOLOGY ONCOLOGY | Facility: CLINIC | Age: 65
End: 2025-02-28

## 2025-02-28 VITALS
BODY MASS INDEX: 30.94 KG/M2 | DIASTOLIC BLOOD PRESSURE: 72 MMHG | TEMPERATURE: 98 F | HEIGHT: 71 IN | HEART RATE: 107 BPM | OXYGEN SATURATION: 98 % | SYSTOLIC BLOOD PRESSURE: 131 MMHG | WEIGHT: 221 LBS

## 2025-02-28 DIAGNOSIS — T82.898A OTHER SPECIFIED COMPLICATION OF VASCULAR PROSTHETIC DEVICES, IMPLANTS AND GRAFTS, INITIAL ENCOUNTER: ICD-10-CM

## 2025-02-28 DIAGNOSIS — G62.9 POLYNEUROPATHY, UNSPECIFIED: ICD-10-CM

## 2025-02-28 DIAGNOSIS — T45.1X5A NAUSEA: ICD-10-CM

## 2025-02-28 DIAGNOSIS — C78.7 MALIGNANT NEOPLASM OF COLON, UNSPECIFIED: ICD-10-CM

## 2025-02-28 DIAGNOSIS — R11.0 NAUSEA: ICD-10-CM

## 2025-02-28 DIAGNOSIS — C18.9 MALIGNANT NEOPLASM OF COLON, UNSPECIFIED: ICD-10-CM

## 2025-02-28 LAB
BASOPHILS # BLD AUTO: 0.02 K/UL — SIGNIFICANT CHANGE UP (ref 0–0.2)
BASOPHILS NFR BLD AUTO: 0.5 % — SIGNIFICANT CHANGE UP (ref 0–2)
EOSINOPHIL # BLD AUTO: 0.02 K/UL — SIGNIFICANT CHANGE UP (ref 0–0.5)
EOSINOPHIL NFR BLD AUTO: 0.5 % — SIGNIFICANT CHANGE UP (ref 0–6)
HCT VFR BLD CALC: 29.5 % — LOW (ref 39–50)
HGB BLD-MCNC: 8.7 G/DL — LOW (ref 13–17)
IMM GRANULOCYTES NFR BLD AUTO: 0.3 % — SIGNIFICANT CHANGE UP (ref 0–0.9)
LYMPHOCYTES # BLD AUTO: 0.99 K/UL — LOW (ref 1–3.3)
LYMPHOCYTES # BLD AUTO: 25.1 % — SIGNIFICANT CHANGE UP (ref 13–44)
MCHC RBC-ENTMCNC: 24.8 PG — LOW (ref 27–34)
MCHC RBC-ENTMCNC: 29.5 G/DL — LOW (ref 32–36)
MCV RBC AUTO: 84 FL — SIGNIFICANT CHANGE UP (ref 80–100)
MONOCYTES # BLD AUTO: 0.17 K/UL — SIGNIFICANT CHANGE UP (ref 0–0.9)
MONOCYTES NFR BLD AUTO: 4.3 % — SIGNIFICANT CHANGE UP (ref 2–14)
NEUTROPHILS # BLD AUTO: 2.73 K/UL — SIGNIFICANT CHANGE UP (ref 1.8–7.4)
NEUTROPHILS NFR BLD AUTO: 69.3 % — SIGNIFICANT CHANGE UP (ref 43–77)
NRBC BLD AUTO-RTO: 0 /100 WBCS — SIGNIFICANT CHANGE UP (ref 0–0)
PLATELET # BLD AUTO: 250 K/UL — SIGNIFICANT CHANGE UP (ref 150–400)
RBC # BLD: 3.51 M/UL — LOW (ref 4.2–5.8)
RBC # FLD: 20.3 % — HIGH (ref 10.3–14.5)
WBC # BLD: 3.94 K/UL — SIGNIFICANT CHANGE UP (ref 3.8–10.5)
WBC # FLD AUTO: 3.94 K/UL — SIGNIFICANT CHANGE UP (ref 3.8–10.5)

## 2025-02-28 PROCEDURE — 99213 OFFICE O/P EST LOW 20 MIN: CPT

## 2025-03-03 ENCOUNTER — RESULT REVIEW (OUTPATIENT)
Age: 65
End: 2025-03-03

## 2025-03-03 ENCOUNTER — TRANSCRIPTION ENCOUNTER (OUTPATIENT)
Age: 65
End: 2025-03-03

## 2025-03-03 ENCOUNTER — OUTPATIENT (OUTPATIENT)
Dept: INPATIENT UNIT | Facility: HOSPITAL | Age: 65
LOS: 1 days | Discharge: HOME | End: 2025-03-03
Payer: COMMERCIAL

## 2025-03-03 VITALS
TEMPERATURE: 97 F | RESPIRATION RATE: 16 BRPM | HEIGHT: 71 IN | OXYGEN SATURATION: 98 % | SYSTOLIC BLOOD PRESSURE: 149 MMHG | DIASTOLIC BLOOD PRESSURE: 83 MMHG | HEART RATE: 115 BPM | WEIGHT: 227.96 LBS

## 2025-03-03 VITALS
SYSTOLIC BLOOD PRESSURE: 127 MMHG | RESPIRATION RATE: 16 BRPM | OXYGEN SATURATION: 100 % | HEART RATE: 79 BPM | DIASTOLIC BLOOD PRESSURE: 75 MMHG | TEMPERATURE: 98 F

## 2025-03-03 DIAGNOSIS — S72.90XA UNSPECIFIED FRACTURE OF UNSPECIFIED FEMUR, INITIAL ENCOUNTER FOR CLOSED FRACTURE: Chronic | ICD-10-CM

## 2025-03-03 DIAGNOSIS — I87.1 COMPRESSION OF VEIN: ICD-10-CM

## 2025-03-03 DIAGNOSIS — T82.898A OTHER SPECIFIED COMPLICATION OF VASCULAR PROSTHETIC DEVICES, IMPLANTS AND GRAFTS, INITIAL ENCOUNTER: ICD-10-CM

## 2025-03-03 DIAGNOSIS — Z45.2 ENCOUNTER FOR ADJUSTMENT AND MANAGEMENT OF VASCULAR ACCESS DEVICE: ICD-10-CM

## 2025-03-03 LAB
ALBUMIN SERPL ELPH-MCNC: 4 G/DL
ALP BLD-CCNC: 114 U/L
ALT SERPL-CCNC: 24 U/L
ANION GAP SERPL CALC-SCNC: 12 MMOL/L
AST SERPL-CCNC: 25 U/L
BILIRUB SERPL-MCNC: 0.3 MG/DL
BUN SERPL-MCNC: 8 MG/DL
CALCIUM SERPL-MCNC: 8.8 MG/DL
CHLORIDE SERPL-SCNC: 104 MMOL/L
CO2 SERPL-SCNC: 25 MMOL/L
CREAT SERPL-MCNC: 0.81 MG/DL
EGFR: 98 ML/MIN/1.73M2
GLUCOSE SERPL-MCNC: 99 MG/DL
HCT VFR BLD CALC: 28.4 % — LOW (ref 39–50)
HGB BLD-MCNC: 8.3 G/DL — LOW (ref 13–17)
INR BLD: 1.27 RATIO — HIGH (ref 0.85–1.16)
MAGNESIUM SERPL-MCNC: 2 MG/DL
MCHC RBC-ENTMCNC: 24.9 PG — LOW (ref 27–34)
MCHC RBC-ENTMCNC: 29.2 G/DL — LOW (ref 32–36)
MCV RBC AUTO: 85.3 FL — SIGNIFICANT CHANGE UP (ref 80–100)
NRBC # BLD AUTO: 0.02 K/UL — HIGH (ref 0–0)
NRBC # FLD: 0.02 K/UL — HIGH (ref 0–0)
NRBC BLD AUTO-RTO: 0 /100 WBCS — SIGNIFICANT CHANGE UP (ref 0–0)
PLATELET # BLD AUTO: 211 K/UL — SIGNIFICANT CHANGE UP (ref 150–400)
PMV BLD: 10 FL — SIGNIFICANT CHANGE UP (ref 7–13)
POTASSIUM SERPL-SCNC: 4 MMOL/L
PROT SERPL-MCNC: 7.1 G/DL
PROTHROM AB SERPL-ACNC: 14.6 SEC — HIGH (ref 9.9–13.4)
RBC # BLD: 3.33 M/UL — LOW (ref 4.2–5.8)
RBC # FLD: 20.9 % — HIGH (ref 10.3–14.5)
SODIUM SERPL-SCNC: 141 MMOL/L
WBC # BLD: 4.19 K/UL — SIGNIFICANT CHANGE UP (ref 3.8–10.5)
WBC # FLD AUTO: 4.19 K/UL — SIGNIFICANT CHANGE UP (ref 3.8–10.5)

## 2025-03-03 PROCEDURE — C1769: CPT

## 2025-03-03 PROCEDURE — C1894: CPT

## 2025-03-03 PROCEDURE — C1887: CPT

## 2025-03-03 PROCEDURE — 77001 FLUOROGUIDE FOR VEIN DEVICE: CPT | Mod: 26

## 2025-03-03 PROCEDURE — 36590 REMOVAL TUNNELED CV CATH: CPT

## 2025-03-03 PROCEDURE — 77001 FLUOROGUIDE FOR VEIN DEVICE: CPT

## 2025-03-03 PROCEDURE — 85610 PROTHROMBIN TIME: CPT

## 2025-03-03 PROCEDURE — 36415 COLL VENOUS BLD VENIPUNCTURE: CPT

## 2025-03-03 PROCEDURE — 85027 COMPLETE CBC AUTOMATED: CPT

## 2025-03-03 RX ORDER — ONDANSETRON HCL/PF 4 MG/2 ML
4 VIAL (ML) INJECTION ONCE
Refills: 0 | Status: DISCONTINUED | OUTPATIENT
Start: 2025-03-03 | End: 2025-03-03

## 2025-03-03 RX ORDER — ENOXAPARIN SODIUM 100 MG/ML
150 INJECTION SUBCUTANEOUS
Refills: 0 | DISCHARGE

## 2025-03-03 RX ORDER — GABAPENTIN 400 MG/1
1 CAPSULE ORAL
Refills: 0 | DISCHARGE

## 2025-03-03 RX ORDER — OXYCODONE HYDROCHLORIDE 30 MG/1
5 TABLET ORAL ONCE
Refills: 0 | Status: DISCONTINUED | OUTPATIENT
Start: 2025-03-03 | End: 2025-03-03

## 2025-03-03 RX ORDER — FENTANYL CITRATE-0.9 % NACL/PF 100MCG/2ML
50 SYRINGE (ML) INTRAVENOUS
Refills: 0 | Status: DISCONTINUED | OUTPATIENT
Start: 2025-03-03 | End: 2025-03-03

## 2025-03-03 NOTE — ASU PATIENT PROFILE, ADULT - PROVIDER NOTIFICATION
From: [place]  Sent: 2/4/2020 8:44 AM EST  To: Shaan Matos  Clinical Staff  Subject: RE: Test Results Question    The Hemoglobin level was from Dr. Geneva Veronica. Not from the Pulmonologist. Check again!! Release results from Dr Geneva Veronica so my mother doesn't have to be poked again.  ----- Message -----  From: Jemma Schuler  Sent: 2/4/2020 8:32 AM EST  To: [place]  Subject: RE: Test Results Question  Tracy Willoughby,  The labs that [place] had drawn were Dr. Michele Spaulding orders that is why Dr. Geneva Veronica didn't receive them or release the results. Please contact Dr. Gertrude Fonseca to have these labs releases. Thank you. Paulette KEARNEY      ----- Message -----   From: [place]   Sent: 2/4/2020 8:20 AM EST   To: Mady Cates MD  Subject: RE: Test Results Question    Please post my Mother's Hemoglobin results. Why wasn't this called on Friday? You are aware of my Mother's recent 2 units blood transfusion and 2 iron fusions. I have a call into her Hematologist. You posted her other labs on Friday. No excuse for this whatsoever.    Angela Shabazz
Declines

## 2025-03-03 NOTE — ASU PATIENT PROFILE, ADULT - AS SC BRADEN SENSORY
[facility] Progress Note      Impression:  66 y/o gentleman with history of DM, CKD, hypertension and PAD admitted with osteomyelitis of the L fifth metatarsal found to have TYRELL on CKD     *Acute kidney injury: sCr has increased in setting of soft tissue infection and TMP-SMX use  - disproportionate rise in sCr vs. BUN and timeline is most consistent with TMP-SMX induced TYRELL--interstitial nephritis vs. Direct tubular injury  - Scr has improved since admission off TMP-SMX but remains elevated with possible contribution of hemodynamic injury  - Scr has improved further with volume expansion and cessation of tmp-smx  - urinalysis is bland arguing against more significant renal parenchymal injury     *CKD 3B: baseline CKD with findings suggestive of ischemic nephropathy     *Kidney disease with fluid retention:chronic volume excess on diuretic therapy  - stable  - resume home diuretics on discharge     *Diabetes with diabetic nephropathy: chronic kidney disease with DM without heavy proteinuria.  Continue supportive care and ongoing risk stratification     *Anemia with CKD     *BMI 45: weight above target; optimize podiatric status to increase mobility     Recommendations  1. Would recommend avoidance of TMP-SMX ongoing as the timeline of his TYRELL appears to be very consistent with the etiology of his injury--added to ADR list  2. Resume home diuretics upon discharge at lower dose  3. F/u renal function at [facility]  4. F/u with renal in 2-4 weeks after discharge from [facility]      Subjective:  Feels better  No pain    Medications  Medications Prior to Admission   Medication Sig Dispense Refill   • traMADol (ULTRAM) 50 MG tablet Take 50 mg by mouth every 6 hours as needed for Pain.     • torsemide (DEMADEX) 20 MG tablet Take 2 tablets by mouth daily. 30 tablet 0   • famotidine (PEPCID) 20 MG tablet TAKE 1 TABLET BY MOUTH DAILY 90 tablet 0   • spironolactone (ALDACTONE) 25 MG tablet TAKE 1 TABLET BY  MOUTH DAILY 90 tablet 0   • metoPROLOL succinate (TOPROL-XL) 25 MG 24 hr tablet Take 1 tablet by mouth 2 times daily. (Patient taking differently: Take 25 mg by mouth daily. ) 60 tablet 3   • Insulin Lispro, 1 Unit Dial, (HumaLOG KwikPen) 100 UNIT/ML pen-injector Inject 12 Units into the skin 3 times daily (before meals). Prime 2 units before each dose. (Patient taking differently: Inject 14 Units into the skin 2 times daily. Prime 2 units before each dose. ) 15 mL 12   • insulin glargine (Basaglar KwikPen) 100 UNIT/ML pen-injector Inject 50 Units into the skin 2 times daily. Prime 2 units before each dose.     • Ascorbic Acid (VITAMIN C) 500 MG tablet Take 500 mg by mouth 2 times daily.     • Cholecalciferol 50 mcg (2,000 units) capsule Take 50 mcg by mouth daily.     • Multiple Minerals-Vitamins (PROSTEON) Tab Take 1 tablet by mouth daily.     • [DISCONTINUED] sulfamethoxazole-trimethoprim (Bactrim DS) 800-160 MG per tablet Take 1 tablet by mouth 2 times daily for 14 days. 28 tablet 0   • allopurinol (ZYLOPRIM) 100 MG tablet Take 1 tablet by mouth daily. 30 tablet 3   • [DISCONTINUED] atorvastatin (LIPITOR) 40 MG tablet Take 1 tablet by mouth daily. (Patient taking differently: Take 10 mg by mouth daily. ) 90 tablet 2       Review of Systems  Review of Systems   Constitutional: Negative.  Negative for activity change, chills, fatigue and fever.   HENT: Negative.  Negative for congestion and hearing loss.    Eyes: Negative.  Negative for discharge and itching.   Respiratory: Negative for cough.    Cardiovascular: Negative for chest pain and leg swelling.   Gastrointestinal: Negative for abdominal distention, abdominal pain, constipation and vomiting.   Endocrine: Negative for cold intolerance.   Genitourinary: Negative for decreased urine volume and difficulty urinating.   Musculoskeletal: Positive for gait problem. Negative for arthralgias.   Skin: Positive for wound. Negative for color change.    Allergic/Immunologic: Negative for immunocompromised state.   Neurological: Negative for weakness and light-headedness.   Hematological: Does not bruise/bleed easily.   Psychiatric/Behavioral: Negative for confusion.         Physical Exam  Physical Exam  Vitals reviewed.   Constitutional:       Appearance: Normal appearance.   HENT:      Head: Normocephalic and atraumatic.      Nose: Nose normal.      Mouth/Throat:      Mouth: Mucous membranes are moist.      Pharynx: Oropharynx is clear.   Eyes:      Extraocular Movements: Extraocular movements intact.      Conjunctiva/sclera: Conjunctivae normal.      Pupils: Pupils are equal, round, and reactive to light.   Cardiovascular:      Rate and Rhythm: Normal rate and regular rhythm.      Pulses: Normal pulses.   Pulmonary:      Effort: Pulmonary effort is normal.      Breath sounds: Normal breath sounds.   Abdominal:      General: Bowel sounds are normal.      Palpations: Abdomen is soft.   Musculoskeletal:         General: Normal range of motion.      Cervical back: Normal range of motion.   Skin:     General: Skin is warm and dry.      Capillary Refill: Capillary refill takes less than 2 seconds.   Neurological:      General: No focal deficit present.      Mental Status: He is alert and oriented to person, place, and time.   Psychiatric:         Mood and Affect: Mood normal.          Last Recorded Vitals  Blood pressure 127/86, pulse 80, temperature 97.9 °F (36.6 °C), temperature source Oral, resp. rate 16, height 6' (1.829 m), weight (!) 146.2 kg (322 lb 5 oz), SpO2 97 %.    I/O's    Intake/Output Summary (Last 24 hours) at 4/17/2021 1108  Last data filed at 4/17/2021 0800  Gross per 24 hour   Intake 960 ml   Output 2275 ml   Net -1315 ml       Relevant Results  No results displayed because visit has over 200 results.           Principal Problem:    Osteomyelitis of left foot (CMS/HCC)  Active Problems:    Benign essential hypertension    Hypercholesterolemia     Type 2 diabetes mellitus with hyperglycemia (CMS/HCC)    CKD (chronic kidney disease)    Peripheral vascular disease due to secondary diabetes mellitus (CMS/HCC)    Morbid obesity with BMI of 45.0-49.9, adult (CMS/HCC)    Gangrene of left foot (CMS/HCC)  Resolved Problems:    * No resolved hospital problems. *          Miles Kang MD  4/17/2021           (4) no impairment

## 2025-03-03 NOTE — ASU DISCHARGE PLAN (ADULT/PEDIATRIC) - NS MD DC FALL RISK RISK
For information on Fall & Injury Prevention, visit: [email or web address] OR  [email or web address] OR  https://www.cdc.gov/steadi/patient.html

## 2025-03-03 NOTE — ASU PATIENT PROFILE, ADULT - ABILITY TO HEAR (WITH HEARING AID OR HEARING APPLIANCE IF NORMALLY USED):
Initial / Assessment/Plan of Care Note     Baseline Assessment  66 year old admitted 11/4/2019 as Observation with a diagnosis of Intractable back pain.   Prior to admission patient was living with Spouse/significant other, Adult children and residing at House.  Patient does  have a Power of  for Healthcare.  Document is not activated.  Agent is pt's spouse Josiah Tavarez. Patient’s Primary Care Provider is Michael D D'Amico, MD.     Medical History  Past Medical History:   Diagnosis Date   • Abdominal abscess 6/2013    I&D with Vac placed until 8/6/13, Wound care per [facility]   • Acute diastolic CHF (congestive heart failure) (CMS/[facility]) 2/22/2019   • Acute iritis of right eye 2018   • Acute renal failure (ARF) (CMS/[facility])    • Anemia    • Anxiety    • Breast lump in female     benign   • CAD (coronary artery disease) 2001    when had MI   • CAP (community acquired pneumonia) 10/21/2018   • Cataracts, bilateral 2019   • Chronic back pain    • Chronic bronchitis (CMS/[facility])    • Chronic kidney disease (CKD), stage III (moderate) (CMS/[facility])    • Chronic pain     back    • COPD (chronic obstructive pulmonary disease) (CMS/[facility])     doesn't use inhaler very often except when sick with cold   • DDD (degenerative disc disease), lumbosacral    • Degenerative joint disease 06/29/2004    right shoulder, back   • Depression     major depressive disorder   • Diabetes mellitus (CMS/[facility])     insulin dependent, plus oral medication   • Diabetic neuropathy (CMS/[facility])    • Diastolic dysfunction    • Edema 2/27/2012   • Elevated serum globulin level 1/6/2015   • Esophageal reflux    • Falls frequently     legs give out- sees Dr. Ball for her back problems   • Fatigue    • Fibromyalgia     severe   • Fracture 1990's    right shoulder   • Gout    • Hx of staphylococcal infection 8/14/2013   • Hyperlipidemia    • Hypertension    • Hypothyroid    • Impaired mobility and ADLs     uses walker    • Insomnia    • Leg swelling    • Macular  degeneration    • Meniere disease     right    • Mild nonproliferative diabetic retinopathy associated with type 2 diabetes mellitus (CMS/[facility]) 03/30/2016   • Morbid obesity with BMI of 40.0-44.9, adult (CMS/[facility]) 10/24/2014   • Old myocardial infarction 09/01/2001   • Osteoporosis    • Pain management     Maritza Mak/[facility]/[address]   • Pneumonia due to infectious organism 12/13/2017   • Psoriasis     right thumb and hand   • Psoriatic arthritis (CMS/[facility])    • Pulmonary hypertension (CMS/[facility])    • RAD (reactive airway disease)    • RLS (restless legs syndrome)    • Serum albumin decreased 1/6/2015   • Sinusitis, chronic    • Sleep apnea     Auto CPAP 5-15 cm H2O, 9/2019 developed TMJ so not using regularly   • SOB (shortness of breath) on exertion     Pulmonologist Dr Alanis   • Tobacco abuse    • Tubular adenoma of colon 3/8/2013    multiple on colonoscopy with Dr. Hung (GI)   • Urinary incontinence    • Vitamin D insufficiency 11/13/2015   • Vulvar cyst 12/11/2015    left       Prior to Admission Status  Functional Status  Ambulation: Walker, Wheelchair  Bathing: Independent/Self  Dressing: Independent/Self  Toileting: Independent/Self  Meal Preparation: Independent/Self  Shopping: Independent/Self  Medication Preparation: Independent/Self  Medication Administration: Independent/Self  Housekeeping: Independent/Self  Laundry: Independent/Self  Transportation: Significant Other    Agency/Support  Type of Services Prior to Hospitalization: None  Support Systems: Spouse/Significant other, Children  Home Devices/Equipment: CPAP/BiPAP machine (T), Shower chair  Mobility Assist Devices: Front-wheeled walker  Sensory Support Devices: Eyeglasses    Current Status  PT Ambulation Tips: Use gait belt, Use walker, Walk to bathroom, Needs minimal assist, Use back brace, Needs supervision  PT Transfer Tips: Use gait belt, Needs supervision, Needs minimal assist, Up in chair for meals, Use walker, Use back  brace  OT Bathing Tips: Bathes with moderate assist  OT Dressing Tips: Dresses with moderate assist  OT Toileting Tips: Toilets with moderate assist  Current Mental Status: Cooperative    Insurance  Primary: MEDICARE  Secondary: ANTHEM/BCBS    Barriers to Discharge  Identified Barriers to Discharge/Transition Planning: Assessment/stabilization in progress    Progress Note  Peer Coverage:  Case opened this date for D/C Planning consult. Chart reviewed this date. Pt appropriate and medically stable for D/C this date. Per PT/OT recommendations, pt would benefit from home therapy at D/C.     This writer met with pt this date to introduce self and explain SW role. Pt was alert and oriented X4 and agreeable to meeting with this writer. Pt confirmed that she plans to return home with her spouse, Josiah. Pt has a ramp to enter home and spouse will provide transportation home. Pt aware of recommendations for home therapy and explained that she has received home therapy before through [facility]. Pt was agreeable to having [facility] again. MD has placed orders for [facility] home care for RN, PT, OT services. Pt had no other questions or concerns for SW at this time.     SW will remain available for any further needs that arise prior to D/C.     Plan  SW/CM - Recommendations for Discharge: Home, Home therapy  PT - Recommendations for Discharge: Home, Home therapy  OT - Recommendations for Discharge: Home, Home therapy  Anticipate patient will need post-hospital services. Necessary services are available.  Anticipate patient can return to the environment from which patient entered the hospital.   Anticipate patient can provide self-care at discharge.    Refer to SW/CM Flowsheet for Goals and objective data.      Adequate: hears normal conversation without difficulty

## 2025-03-03 NOTE — ASU DISCHARGE PLAN (ADULT/PEDIATRIC) - ASU DC REMOVE DRESSINGFT
Department of Emergency Medicine  FIRST PROVIDER TRIAGE NOTE             Independent MLP           10/3/23  8:19 PM EDT    Date of Encounter: 10/3/23   MRN: [id number]      HPI: Олег Beltrán is a 61 y.o. female who presents to the ED for Foot Pain (Left foot pain- burning that began yesterday. NKI. Patient also had nose bleed this AM. Patient reports diarrhea as well.)       ROS: Negative for abd pain or back pain. PE: Gen Appearance/Constitutional: alert  HEENT: NC/NT. PERRLA,  Airway patent. GI: tender to palpation  Musculoskeletal: moves all extremities x 4     Initial Plan of Care: All treatment areas with department are currently occupied. Plan to order/Initiate the following while awaiting opening in ED: labs and EKG,imaging.   Initiate Treatment-Testing, Proceed toTreatment Area When Bed Available for ED Attending/MLP to Continue Care    Electronically signed by TIMMY Mtz CNP   DD: 10/3/23       TIMMY Ravi CNP  10/03/23 2019 48

## 2025-03-03 NOTE — ASU DISCHARGE PLAN (ADULT/PEDIATRIC) - MODE OF TRANSPORTATION
Called and reviewed with patient   Adrenal function is fine      Observe for now  Still think a lot of this was anxiety       Potassium (mmol/L)   Date Value   02/07/2017 4.5       Component   Latest Ref Rng 2/7/2017   Adrenocorticotropic Hormone      0 - 46 pg/mL 5.4   Cortisol Free Serum     0.21      Wheelchair/Stroller

## 2025-03-05 DIAGNOSIS — Z45.2 ENCOUNTER FOR ADJUSTMENT AND MANAGEMENT OF VASCULAR ACCESS DEVICE: ICD-10-CM

## 2025-03-09 ENCOUNTER — OUTPATIENT (OUTPATIENT)
Dept: OUTPATIENT SERVICES | Facility: HOSPITAL | Age: 65
LOS: 1 days | Discharge: HOME | End: 2025-03-09
Payer: MEDICAID

## 2025-03-09 DIAGNOSIS — G62.9 POLYNEUROPATHY, UNSPECIFIED: ICD-10-CM

## 2025-03-09 DIAGNOSIS — C18.9 MALIGNANT NEOPLASM OF COLON, UNSPECIFIED: ICD-10-CM

## 2025-03-09 DIAGNOSIS — S72.90XA UNSPECIFIED FRACTURE OF UNSPECIFIED FEMUR, INITIAL ENCOUNTER FOR CLOSED FRACTURE: Chronic | ICD-10-CM

## 2025-03-14 ENCOUNTER — RESULT REVIEW (OUTPATIENT)
Age: 65
End: 2025-03-14

## 2025-03-14 ENCOUNTER — APPOINTMENT (OUTPATIENT)
Dept: HEMATOLOGY ONCOLOGY | Facility: CLINIC | Age: 65
End: 2025-03-14

## 2025-03-14 VITALS
BODY MASS INDEX: 30.8 KG/M2 | SYSTOLIC BLOOD PRESSURE: 121 MMHG | HEIGHT: 71 IN | TEMPERATURE: 97.4 F | DIASTOLIC BLOOD PRESSURE: 81 MMHG | HEART RATE: 103 BPM | WEIGHT: 220 LBS | OXYGEN SATURATION: 98 %

## 2025-03-14 DIAGNOSIS — C18.9 MALIGNANT NEOPLASM OF COLON, UNSPECIFIED: ICD-10-CM

## 2025-03-14 DIAGNOSIS — C78.7 MALIGNANT NEOPLASM OF COLON, UNSPECIFIED: ICD-10-CM

## 2025-03-14 DIAGNOSIS — G62.9 POLYNEUROPATHY, UNSPECIFIED: ICD-10-CM

## 2025-03-14 DIAGNOSIS — T82.898A OTHER SPECIFIED COMPLICATION OF VASCULAR PROSTHETIC DEVICES, IMPLANTS AND GRAFTS, INITIAL ENCOUNTER: ICD-10-CM

## 2025-03-14 LAB
ALBUMIN SERPL ELPH-MCNC: 4 G/DL
ALP BLD-CCNC: 120 U/L
ALT SERPL-CCNC: 17 U/L
ANION GAP SERPL CALC-SCNC: 11 MMOL/L
ANISOCYTOSIS BLD QL: SLIGHT — SIGNIFICANT CHANGE UP
AST SERPL-CCNC: 22 U/L
BASOPHILS # BLD AUTO: 0.02 K/UL — SIGNIFICANT CHANGE UP (ref 0–0.2)
BASOPHILS NFR BLD AUTO: 1 % — SIGNIFICANT CHANGE UP (ref 0–2)
BILIRUB SERPL-MCNC: 0.3 MG/DL
BUN SERPL-MCNC: 9 MG/DL
CALCIUM SERPL-MCNC: 8.7 MG/DL
CHLORIDE SERPL-SCNC: 104 MMOL/L
CO2 SERPL-SCNC: 26 MMOL/L
CREAT SERPL-MCNC: 0.74 MG/DL
DACRYOCYTES BLD QL SMEAR: SLIGHT — SIGNIFICANT CHANGE UP
EGFRCR SERPLBLD CKD-EPI 2021: 101 ML/MIN/1.73M2
EOSINOPHIL # BLD AUTO: 0.05 K/UL — SIGNIFICANT CHANGE UP (ref 0–0.5)
EOSINOPHIL NFR BLD AUTO: 2 % — SIGNIFICANT CHANGE UP (ref 0–6)
GLUCOSE SERPL-MCNC: 89 MG/DL
HCT VFR BLD CALC: 28.3 % — LOW (ref 39–50)
HGB BLD-MCNC: 8.4 G/DL — LOW (ref 13–17)
HYPOCHROMIA BLD QL: SLIGHT — SIGNIFICANT CHANGE UP
LG PLATELETS BLD QL AUTO: SLIGHT — SIGNIFICANT CHANGE UP
LYMPHOCYTES # BLD AUTO: 1.07 K/UL — SIGNIFICANT CHANGE UP (ref 1–3.3)
LYMPHOCYTES # BLD AUTO: 46 % — HIGH (ref 13–44)
MACROCYTES BLD QL: SLIGHT — SIGNIFICANT CHANGE UP
MAGNESIUM SERPL-MCNC: 2.1 MG/DL
MCHC RBC-ENTMCNC: 25.5 PG — LOW (ref 27–34)
MCHC RBC-ENTMCNC: 29.7 G/DL — LOW (ref 32–36)
MCV RBC AUTO: 85.8 FL — SIGNIFICANT CHANGE UP (ref 80–100)
MICROCYTES BLD QL: SLIGHT — SIGNIFICANT CHANGE UP
MONOCYTES # BLD AUTO: 0.44 K/UL — SIGNIFICANT CHANGE UP (ref 0–0.9)
MONOCYTES NFR BLD AUTO: 19 % — HIGH (ref 2–14)
NEUTROPHILS # BLD AUTO: 0.75 K/UL — LOW (ref 1.8–7.4)
NEUTROPHILS NFR BLD AUTO: 32 % — LOW (ref 43–77)
NRBC # BLD: 0 /100 WBCS — SIGNIFICANT CHANGE UP (ref 0–0)
NRBC BLD AUTO-RTO: SIGNIFICANT CHANGE UP /100 WBCS (ref 0–0)
NRBC BLD-RTO: 0 /100 WBCS — SIGNIFICANT CHANGE UP (ref 0–0)
OVALOCYTES BLD QL SMEAR: SLIGHT — SIGNIFICANT CHANGE UP
PLAT MORPH BLD: NORMAL — SIGNIFICANT CHANGE UP
PLATELET # BLD AUTO: 325 K/UL — SIGNIFICANT CHANGE UP (ref 150–400)
POIKILOCYTOSIS BLD QL AUTO: SLIGHT — SIGNIFICANT CHANGE UP
POLYCHROMASIA BLD QL SMEAR: SLIGHT — SIGNIFICANT CHANGE UP
POTASSIUM SERPL-SCNC: 4.1 MMOL/L
PROT SERPL-MCNC: 7.1 G/DL
RBC # BLD: 3.3 M/UL — LOW (ref 4.2–5.8)
RBC # FLD: 21.7 % — HIGH (ref 10.3–14.5)
RBC BLD AUTO: ABNORMAL
SODIUM SERPL-SCNC: 141 MMOL/L
WBC # BLD: 2.33 K/UL — LOW (ref 3.8–10.5)
WBC # FLD AUTO: 2.33 K/UL — LOW (ref 3.8–10.5)

## 2025-03-14 PROCEDURE — 99214 OFFICE O/P EST MOD 30 MIN: CPT

## 2025-03-14 PROCEDURE — G2211 COMPLEX E/M VISIT ADD ON: CPT

## 2025-03-21 ENCOUNTER — RESULT REVIEW (OUTPATIENT)
Age: 65
End: 2025-03-21

## 2025-03-21 ENCOUNTER — APPOINTMENT (OUTPATIENT)
Dept: HEMATOLOGY ONCOLOGY | Facility: CLINIC | Age: 65
End: 2025-03-21

## 2025-03-21 ENCOUNTER — NON-APPOINTMENT (OUTPATIENT)
Age: 65
End: 2025-03-21

## 2025-03-21 LAB
BASOPHILS # BLD AUTO: 0.04 K/UL — SIGNIFICANT CHANGE UP (ref 0–0.2)
BASOPHILS NFR BLD AUTO: 1 % — SIGNIFICANT CHANGE UP (ref 0–2)
EOSINOPHIL # BLD AUTO: 0.05 K/UL — SIGNIFICANT CHANGE UP (ref 0–0.5)
EOSINOPHIL NFR BLD AUTO: 1.3 % — SIGNIFICANT CHANGE UP (ref 0–6)
HCT VFR BLD CALC: 29.9 % — LOW (ref 39–50)
HGB BLD-MCNC: 8.9 G/DL — LOW (ref 13–17)
IMM GRANULOCYTES NFR BLD AUTO: 0.3 % — SIGNIFICANT CHANGE UP (ref 0–0.9)
LYMPHOCYTES # BLD AUTO: 1.2 K/UL — SIGNIFICANT CHANGE UP (ref 1–3.3)
LYMPHOCYTES # BLD AUTO: 31.4 % — SIGNIFICANT CHANGE UP (ref 13–44)
MCHC RBC-ENTMCNC: 25.1 PG — LOW (ref 27–34)
MCHC RBC-ENTMCNC: 29.8 G/DL — LOW (ref 32–36)
MCV RBC AUTO: 84.5 FL — SIGNIFICANT CHANGE UP (ref 80–100)
MONOCYTES # BLD AUTO: 0.48 K/UL — SIGNIFICANT CHANGE UP (ref 0–0.9)
MONOCYTES NFR BLD AUTO: 12.6 % — SIGNIFICANT CHANGE UP (ref 2–14)
NEUTROPHILS # BLD AUTO: 2.04 K/UL — SIGNIFICANT CHANGE UP (ref 1.8–7.4)
NEUTROPHILS NFR BLD AUTO: 53.4 % — SIGNIFICANT CHANGE UP (ref 43–77)
NRBC BLD AUTO-RTO: 0 /100 WBCS — SIGNIFICANT CHANGE UP (ref 0–0)
PLATELET # BLD AUTO: 254 K/UL — SIGNIFICANT CHANGE UP (ref 150–400)
RBC # BLD: 3.54 M/UL — LOW (ref 4.2–5.8)
RBC # FLD: 21 % — HIGH (ref 10.3–14.5)
WBC # BLD: 3.82 K/UL — SIGNIFICANT CHANGE UP (ref 3.8–10.5)
WBC # FLD AUTO: 3.82 K/UL — SIGNIFICANT CHANGE UP (ref 3.8–10.5)

## 2025-03-28 ENCOUNTER — APPOINTMENT (OUTPATIENT)
Dept: HEMATOLOGY ONCOLOGY | Facility: CLINIC | Age: 65
End: 2025-03-28

## 2025-04-07 ENCOUNTER — APPOINTMENT (OUTPATIENT)
Dept: CT IMAGING | Facility: CLINIC | Age: 65
End: 2025-04-07
Payer: COMMERCIAL

## 2025-04-07 ENCOUNTER — OUTPATIENT (OUTPATIENT)
Dept: OUTPATIENT SERVICES | Facility: HOSPITAL | Age: 65
LOS: 1 days | End: 2025-04-07
Payer: COMMERCIAL

## 2025-04-07 DIAGNOSIS — C18.9 MALIGNANT NEOPLASM OF COLON, UNSPECIFIED: ICD-10-CM

## 2025-04-07 DIAGNOSIS — S72.90XA UNSPECIFIED FRACTURE OF UNSPECIFIED FEMUR, INITIAL ENCOUNTER FOR CLOSED FRACTURE: Chronic | ICD-10-CM

## 2025-04-07 PROCEDURE — 71260 CT THORAX DX C+: CPT | Mod: 26

## 2025-04-07 PROCEDURE — 74177 CT ABD & PELVIS W/CONTRAST: CPT

## 2025-04-07 PROCEDURE — 71260 CT THORAX DX C+: CPT

## 2025-04-07 PROCEDURE — 74177 CT ABD & PELVIS W/CONTRAST: CPT | Mod: 26

## 2025-04-21 ENCOUNTER — RESULT REVIEW (OUTPATIENT)
Age: 65
End: 2025-04-21

## 2025-04-21 ENCOUNTER — LABORATORY RESULT (OUTPATIENT)
Age: 65
End: 2025-04-21

## 2025-04-21 ENCOUNTER — APPOINTMENT (OUTPATIENT)
Dept: HEMATOLOGY ONCOLOGY | Facility: CLINIC | Age: 65
End: 2025-04-21
Payer: COMMERCIAL

## 2025-04-21 ENCOUNTER — NON-APPOINTMENT (OUTPATIENT)
Age: 65
End: 2025-04-21

## 2025-04-21 ENCOUNTER — RESULT CHARGE (OUTPATIENT)
Age: 65
End: 2025-04-21

## 2025-04-21 VITALS
BODY MASS INDEX: 30.1 KG/M2 | DIASTOLIC BLOOD PRESSURE: 78 MMHG | WEIGHT: 215 LBS | TEMPERATURE: 98.2 F | HEART RATE: 106 BPM | SYSTOLIC BLOOD PRESSURE: 124 MMHG | HEIGHT: 71 IN | OXYGEN SATURATION: 97 %

## 2025-04-21 DIAGNOSIS — T82.898A OTHER SPECIFIED COMPLICATION OF VASCULAR PROSTHETIC DEVICES, IMPLANTS AND GRAFTS, INITIAL ENCOUNTER: ICD-10-CM

## 2025-04-21 DIAGNOSIS — C18.9 MALIGNANT NEOPLASM OF COLON, UNSPECIFIED: ICD-10-CM

## 2025-04-21 DIAGNOSIS — C78.7 MALIGNANT NEOPLASM OF COLON, UNSPECIFIED: ICD-10-CM

## 2025-04-21 DIAGNOSIS — G62.9 POLYNEUROPATHY, UNSPECIFIED: ICD-10-CM

## 2025-04-21 LAB
BASOPHILS # BLD AUTO: 0.04 K/UL — SIGNIFICANT CHANGE UP (ref 0–0.2)
BASOPHILS NFR BLD AUTO: 0.8 % — SIGNIFICANT CHANGE UP (ref 0–2)
EOSINOPHIL # BLD AUTO: 0.11 K/UL — SIGNIFICANT CHANGE UP (ref 0–0.5)
EOSINOPHIL NFR BLD AUTO: 2.2 % — SIGNIFICANT CHANGE UP (ref 0–6)
HCT VFR BLD CALC: 30.2 % — LOW (ref 39–50)
HGB BLD-MCNC: 8.8 G/DL — LOW (ref 13–17)
IMM GRANULOCYTES NFR BLD AUTO: 0.2 % — SIGNIFICANT CHANGE UP (ref 0–0.9)
LYMPHOCYTES # BLD AUTO: 1.21 K/UL — SIGNIFICANT CHANGE UP (ref 1–3.3)
LYMPHOCYTES # BLD AUTO: 24.5 % — SIGNIFICANT CHANGE UP (ref 13–44)
MCHC RBC-ENTMCNC: 25 PG — LOW (ref 27–34)
MCHC RBC-ENTMCNC: 29.1 G/DL — LOW (ref 32–36)
MCV RBC AUTO: 85.8 FL — SIGNIFICANT CHANGE UP (ref 80–100)
MONOCYTES # BLD AUTO: 0.87 K/UL — SIGNIFICANT CHANGE UP (ref 0–0.9)
MONOCYTES NFR BLD AUTO: 17.6 % — HIGH (ref 2–14)
NEUTROPHILS # BLD AUTO: 2.7 K/UL — SIGNIFICANT CHANGE UP (ref 1.8–7.4)
NEUTROPHILS NFR BLD AUTO: 54.7 % — SIGNIFICANT CHANGE UP (ref 43–77)
NRBC BLD AUTO-RTO: 0 /100 WBCS — SIGNIFICANT CHANGE UP (ref 0–0)
PLATELET # BLD AUTO: 323 K/UL — SIGNIFICANT CHANGE UP (ref 150–400)
RBC # BLD: 3.52 M/UL — LOW (ref 4.2–5.8)
RBC # FLD: 21.3 % — HIGH (ref 10.3–14.5)
WBC # BLD: 4.94 K/UL — SIGNIFICANT CHANGE UP (ref 3.8–10.5)
WBC # FLD AUTO: 4.94 K/UL — SIGNIFICANT CHANGE UP (ref 3.8–10.5)

## 2025-04-21 PROCEDURE — 99417 PROLNG OP E/M EACH 15 MIN: CPT

## 2025-04-21 PROCEDURE — 93010 ELECTROCARDIOGRAM REPORT: CPT

## 2025-04-21 PROCEDURE — G2211 COMPLEX E/M VISIT ADD ON: CPT

## 2025-04-21 PROCEDURE — 99215 OFFICE O/P EST HI 40 MIN: CPT

## 2025-04-22 ENCOUNTER — NON-APPOINTMENT (OUTPATIENT)
Age: 65
End: 2025-04-22

## 2025-04-22 LAB
ALBUMIN SERPL ELPH-MCNC: 4.2 G/DL
ALP BLD-CCNC: 144 U/L
ALT SERPL-CCNC: 21 U/L
ANION GAP SERPL CALC-SCNC: 13 MMOL/L
AST SERPL-CCNC: 27 U/L
BILIRUB SERPL-MCNC: 0.4 MG/DL
BUN SERPL-MCNC: 10 MG/DL
CALCIUM SERPL-MCNC: 9 MG/DL
CEA SERPL-MCNC: 834 NG/ML
CHLORIDE SERPL-SCNC: 101 MMOL/L
CO2 SERPL-SCNC: 25 MMOL/L
CREAT SERPL-MCNC: 0.82 MG/DL
EGFRCR SERPLBLD CKD-EPI 2021: 98 ML/MIN/1.73M2
GLUCOSE SERPL-MCNC: 81 MG/DL
MAGNESIUM SERPL-MCNC: 2.2 MG/DL
POTASSIUM SERPL-SCNC: 4.5 MMOL/L
PROT SERPL-MCNC: 7.5 G/DL
SODIUM SERPL-SCNC: 139 MMOL/L

## 2025-04-24 RX ORDER — REGORAFENIB 40 MG/1
40 TABLET, FILM COATED ORAL DAILY
Qty: 63 | Refills: 0 | Status: ACTIVE | COMMUNITY
Start: 2025-04-21 | End: 1900-01-01

## 2025-05-05 ENCOUNTER — NON-APPOINTMENT (OUTPATIENT)
Age: 65
End: 2025-05-05

## 2025-05-05 ENCOUNTER — OUTPATIENT (OUTPATIENT)
Dept: OUTPATIENT SERVICES | Facility: HOSPITAL | Age: 65
LOS: 1 days | Discharge: HOME | End: 2025-05-05

## 2025-05-05 DIAGNOSIS — C18.9 MALIGNANT NEOPLASM OF COLON, UNSPECIFIED: ICD-10-CM

## 2025-05-05 DIAGNOSIS — G62.9 POLYNEUROPATHY, UNSPECIFIED: ICD-10-CM

## 2025-05-05 DIAGNOSIS — S72.90XA UNSPECIFIED FRACTURE OF UNSPECIFIED FEMUR, INITIAL ENCOUNTER FOR CLOSED FRACTURE: Chronic | ICD-10-CM

## 2025-05-06 NOTE — ED ADULT NURSE NOTE - SEPSIS REFERENCE DATA CRITERIA 2
Orthopedic Surgery Progress Note     S: Patient seen and examined today. No acute events overnight. Pain is well controlled. Denies f/c, chest pain, shortness of breath, dizziness. Pt notes he had a BM yesterday and today and his abdomen feels much better.     MEDICATIONS  (STANDING):  acetaminophen     Tablet .. 975 milliGRAM(s) Oral every 6 hours  atorvastatin 40 milliGRAM(s) Oral at bedtime  bacitracin   Ointment 1 Application(s) Topical three times a day  ertapenem  IVPB      ertapenem  IVPB 1000 milliGRAM(s) IV Intermittent every 24 hours  gabapentin 100 milliGRAM(s) Oral every 8 hours  glucagon  Injectable 1 milliGRAM(s) IntraMuscular once  insulin lispro (ADMELOG) corrective regimen sliding scale   SubCutaneous three times a day before meals  insulin lispro (ADMELOG) corrective regimen sliding scale   SubCutaneous at bedtime  methocarbamol 500 milliGRAM(s) Oral every 12 hours  multivitamin 1 Tablet(s) Oral daily  omega-3-Acid Ethyl Esters 2 Gram(s) Oral two times a day  pantoprazole    Tablet 40 milliGRAM(s) Oral before breakfast  senna 2 Tablet(s) Oral at bedtime  simethicone 80 milliGRAM(s) Chew two times a day    MEDICATIONS  (PRN):  dibucaine 1% Ointment 1 Application(s) Topical daily PRN hemorrhoid pain  magnesium hydroxide Suspension 30 milliLiter(s) Oral every 12 hours PRN Constipation      Vital Signs Last 24 Hrs  T(C): 36.8 (06 May 2025 06:02), Max: 36.8 (06 May 2025 06:02)  T(F): 98.2 (06 May 2025 06:02), Max: 98.2 (06 May 2025 06:02)  HR: 100 (06 May 2025 06:02) (94 - 106)  BP: 141/67 (06 May 2025 06:02) (131/69 - 147/75)  BP(mean): --  RR: 17 (06 May 2025 06:02) (17 - 18)  SpO2: 100% (06 May 2025 06:02) (96% - 100%)    Parameters below as of 06 May 2025 06:02  Patient On (Oxygen Delivery Method): room air        05-04-25 @ 07:01  -  05-05-25 @ 07:00  --------------------------------------------------------  IN: 1100 mL / OUT: 1015 mL / NET: 85 mL    05-05-25 @ 07:01  -  05-06-25 @ 06:07  --------------------------------------------------------  IN: 0 mL / OUT: 1558 mL / NET: -1558 mL        Physical Exam:  Gen: NAD  Spine:  Back:   Dressing CDI  2x IR DHARA drains in place    Motor:                   C5                C6              C7               C8           T1   R            5/5                5/5            5/5             5/5          5/5  L             5/5               5/5             5/5             5/5          5/5                L2             L3             L4               L5            S1  R         3/5           4/5          5/5             5/5           5/5  L          4/5          5/5           5/5             5/5           5/5      LABS:                        8.7    9.10  )-----------( 835      ( 06 May 2025 02:08 )             26.0     05-06    136  |  109[H]  |  7   ----------------------------<  88  3.6   |  16[L]  |  0.77    Ca    7.9[L]      06 May 2025 02:08  Phos  2.2     05-05  Mg     2.00     05-05     Abnormal Lactate: > 2

## 2025-05-12 ENCOUNTER — RESULT REVIEW (OUTPATIENT)
Age: 65
End: 2025-05-12

## 2025-05-12 ENCOUNTER — APPOINTMENT (OUTPATIENT)
Dept: HEMATOLOGY ONCOLOGY | Facility: CLINIC | Age: 65
End: 2025-05-12

## 2025-05-12 VITALS
BODY MASS INDEX: 29.26 KG/M2 | HEIGHT: 71 IN | TEMPERATURE: 97.2 F | DIASTOLIC BLOOD PRESSURE: 84 MMHG | SYSTOLIC BLOOD PRESSURE: 146 MMHG | HEART RATE: 109 BPM | OXYGEN SATURATION: 99 % | WEIGHT: 209 LBS

## 2025-05-12 LAB
ALBUMIN SERPL ELPH-MCNC: 3.9 G/DL
ALP BLD-CCNC: 158 U/L
ALT SERPL-CCNC: 28 U/L
ANION GAP SERPL CALC-SCNC: 15 MMOL/L
AST SERPL-CCNC: 40 U/L
BASOPHILS # BLD AUTO: 0.08 K/UL — SIGNIFICANT CHANGE UP (ref 0–0.2)
BASOPHILS NFR BLD AUTO: 1.2 % — SIGNIFICANT CHANGE UP (ref 0–2)
BILIRUB SERPL-MCNC: 0.3 MG/DL
BUN SERPL-MCNC: 12 MG/DL
CALCIUM SERPL-MCNC: 8.9 MG/DL
CHLORIDE SERPL-SCNC: 102 MMOL/L
CO2 SERPL-SCNC: 24 MMOL/L
CREAT SERPL-MCNC: 0.74 MG/DL
EGFRCR SERPLBLD CKD-EPI 2021: 101 ML/MIN/1.73M2
EOSINOPHIL # BLD AUTO: 0.18 K/UL — SIGNIFICANT CHANGE UP (ref 0–0.5)
EOSINOPHIL NFR BLD AUTO: 2.7 % — SIGNIFICANT CHANGE UP (ref 0–6)
GLUCOSE SERPL-MCNC: 118 MG/DL
HCT VFR BLD CALC: 33.8 % — LOW (ref 39–50)
HGB BLD-MCNC: 9.9 G/DL — LOW (ref 13–17)
IMM GRANULOCYTES NFR BLD AUTO: 0.3 % — SIGNIFICANT CHANGE UP (ref 0–0.9)
LYMPHOCYTES # BLD AUTO: 1.74 K/UL — SIGNIFICANT CHANGE UP (ref 1–3.3)
LYMPHOCYTES # BLD AUTO: 26.3 % — SIGNIFICANT CHANGE UP (ref 13–44)
MAGNESIUM SERPL-MCNC: 2.2 MG/DL
MCHC RBC-ENTMCNC: 23.8 PG — LOW (ref 27–34)
MCHC RBC-ENTMCNC: 29.3 G/DL — LOW (ref 32–36)
MCV RBC AUTO: 81.3 FL — SIGNIFICANT CHANGE UP (ref 80–100)
MONOCYTES # BLD AUTO: 0.47 K/UL — SIGNIFICANT CHANGE UP (ref 0–0.9)
MONOCYTES NFR BLD AUTO: 7.1 % — SIGNIFICANT CHANGE UP (ref 2–14)
NEUTROPHILS # BLD AUTO: 4.12 K/UL — SIGNIFICANT CHANGE UP (ref 1.8–7.4)
NEUTROPHILS NFR BLD AUTO: 62.4 % — SIGNIFICANT CHANGE UP (ref 43–77)
NRBC BLD AUTO-RTO: 0 /100 WBCS — SIGNIFICANT CHANGE UP (ref 0–0)
PLATELET # BLD AUTO: 401 K/UL — HIGH (ref 150–400)
POTASSIUM SERPL-SCNC: 4.1 MMOL/L
PROT SERPL-MCNC: 7.8 G/DL
RBC # BLD: 4.16 M/UL — LOW (ref 4.2–5.8)
RBC # FLD: 18.7 % — HIGH (ref 10.3–14.5)
SODIUM SERPL-SCNC: 141 MMOL/L
WBC # BLD: 6.61 K/UL — SIGNIFICANT CHANGE UP (ref 3.8–10.5)
WBC # FLD AUTO: 6.61 K/UL — SIGNIFICANT CHANGE UP (ref 3.8–10.5)

## 2025-05-12 PROCEDURE — G2211 COMPLEX E/M VISIT ADD ON: CPT

## 2025-05-12 PROCEDURE — 99214 OFFICE O/P EST MOD 30 MIN: CPT

## 2025-05-13 ENCOUNTER — APPOINTMENT (OUTPATIENT)
Dept: SURGICAL ONCOLOGY | Facility: CLINIC | Age: 65
End: 2025-05-13
Payer: SELF-PAY

## 2025-05-13 VITALS
SYSTOLIC BLOOD PRESSURE: 146 MMHG | OXYGEN SATURATION: 97 % | WEIGHT: 209 LBS | DIASTOLIC BLOOD PRESSURE: 83 MMHG | RESPIRATION RATE: 16 BRPM | BODY MASS INDEX: 29.26 KG/M2 | HEIGHT: 71 IN | HEART RATE: 115 BPM

## 2025-05-13 PROCEDURE — 99205 OFFICE O/P NEW HI 60 MIN: CPT

## 2025-05-13 PROCEDURE — G2211 COMPLEX E/M VISIT ADD ON: CPT

## 2025-05-19 VITALS — DIASTOLIC BLOOD PRESSURE: 79 MMHG | SYSTOLIC BLOOD PRESSURE: 133 MMHG

## 2025-05-28 ENCOUNTER — APPOINTMENT (OUTPATIENT)
Dept: HEMATOLOGY ONCOLOGY | Facility: CLINIC | Age: 65
End: 2025-05-28

## 2025-05-28 ENCOUNTER — RESULT REVIEW (OUTPATIENT)
Age: 65
End: 2025-05-28

## 2025-05-28 VITALS
WEIGHT: 208 LBS | TEMPERATURE: 97.3 F | DIASTOLIC BLOOD PRESSURE: 94 MMHG | BODY MASS INDEX: 29.01 KG/M2 | OXYGEN SATURATION: 98 % | HEART RATE: 102 BPM | SYSTOLIC BLOOD PRESSURE: 143 MMHG

## 2025-05-28 LAB
BASOPHILS # BLD AUTO: 0.09 K/UL — SIGNIFICANT CHANGE UP (ref 0–0.2)
BASOPHILS NFR BLD AUTO: 1.4 % — SIGNIFICANT CHANGE UP (ref 0–2)
EOSINOPHIL # BLD AUTO: 0.19 K/UL — SIGNIFICANT CHANGE UP (ref 0–0.5)
EOSINOPHIL NFR BLD AUTO: 2.9 % — SIGNIFICANT CHANGE UP (ref 0–6)
HCT VFR BLD CALC: 34.8 % — LOW (ref 39–50)
HGB BLD-MCNC: 10.2 G/DL — LOW (ref 13–17)
IMM GRANULOCYTES NFR BLD AUTO: 0.3 % — SIGNIFICANT CHANGE UP (ref 0–0.9)
LYMPHOCYTES # BLD AUTO: 1.82 K/UL — SIGNIFICANT CHANGE UP (ref 1–3.3)
LYMPHOCYTES # BLD AUTO: 28.1 % — SIGNIFICANT CHANGE UP (ref 13–44)
MCHC RBC-ENTMCNC: 23.6 PG — LOW (ref 27–34)
MCHC RBC-ENTMCNC: 29.3 G/DL — LOW (ref 32–36)
MCV RBC AUTO: 80.6 FL — SIGNIFICANT CHANGE UP (ref 80–100)
MONOCYTES # BLD AUTO: 0.61 K/UL — SIGNIFICANT CHANGE UP (ref 0–0.9)
MONOCYTES NFR BLD AUTO: 9.4 % — SIGNIFICANT CHANGE UP (ref 2–14)
NEUTROPHILS # BLD AUTO: 3.75 K/UL — SIGNIFICANT CHANGE UP (ref 1.8–7.4)
NEUTROPHILS NFR BLD AUTO: 57.9 % — SIGNIFICANT CHANGE UP (ref 43–77)
NRBC BLD AUTO-RTO: 0 /100 WBCS — SIGNIFICANT CHANGE UP (ref 0–0)
PLATELET # BLD AUTO: 266 K/UL — SIGNIFICANT CHANGE UP (ref 150–400)
RBC # BLD: 4.32 M/UL — SIGNIFICANT CHANGE UP (ref 4.2–5.8)
RBC # FLD: 18.4 % — HIGH (ref 10.3–14.5)
WBC # BLD: 6.48 K/UL — SIGNIFICANT CHANGE UP (ref 3.8–10.5)
WBC # FLD AUTO: 6.48 K/UL — SIGNIFICANT CHANGE UP (ref 3.8–10.5)

## 2025-05-28 PROCEDURE — G2211 COMPLEX E/M VISIT ADD ON: CPT

## 2025-05-28 PROCEDURE — 99214 OFFICE O/P EST MOD 30 MIN: CPT

## 2025-05-29 LAB
ALBUMIN SERPL ELPH-MCNC: 4 G/DL
ALP BLD-CCNC: 163 U/L
ALT SERPL-CCNC: 18 U/L
ANION GAP SERPL CALC-SCNC: 13 MMOL/L
AST SERPL-CCNC: 39 U/L
BILIRUB SERPL-MCNC: 0.4 MG/DL
BUN SERPL-MCNC: 9 MG/DL
CALCIUM SERPL-MCNC: 9 MG/DL
CHLORIDE SERPL-SCNC: 104 MMOL/L
CO2 SERPL-SCNC: 24 MMOL/L
CREAT SERPL-MCNC: 0.83 MG/DL
EGFRCR SERPLBLD CKD-EPI 2021: 98 ML/MIN/1.73M2
GLUCOSE SERPL-MCNC: 71 MG/DL
MAGNESIUM SERPL-MCNC: 2.2 MG/DL
POTASSIUM SERPL-SCNC: 4.5 MMOL/L
PROT SERPL-MCNC: 7.6 G/DL
SODIUM SERPL-SCNC: 140 MMOL/L

## 2025-06-02 VITALS — DIASTOLIC BLOOD PRESSURE: 84 MMHG | SYSTOLIC BLOOD PRESSURE: 127 MMHG

## 2025-06-09 ENCOUNTER — RESULT REVIEW (OUTPATIENT)
Age: 65
End: 2025-06-09

## 2025-06-09 ENCOUNTER — APPOINTMENT (OUTPATIENT)
Dept: HEMATOLOGY ONCOLOGY | Facility: CLINIC | Age: 65
End: 2025-06-09

## 2025-06-09 VITALS
OXYGEN SATURATION: 98 % | WEIGHT: 205 LBS | BODY MASS INDEX: 28.59 KG/M2 | TEMPERATURE: 97.7 F | HEART RATE: 113 BPM | SYSTOLIC BLOOD PRESSURE: 132 MMHG | DIASTOLIC BLOOD PRESSURE: 81 MMHG

## 2025-06-09 LAB
BASOPHILS # BLD AUTO: 0.1 K/UL — SIGNIFICANT CHANGE UP (ref 0–0.2)
BASOPHILS NFR BLD AUTO: 1.4 % — SIGNIFICANT CHANGE UP (ref 0–2)
EOSINOPHIL # BLD AUTO: 0.17 K/UL — SIGNIFICANT CHANGE UP (ref 0–0.5)
EOSINOPHIL NFR BLD AUTO: 2.4 % — SIGNIFICANT CHANGE UP (ref 0–6)
HCT VFR BLD CALC: 34.8 % — LOW (ref 39–50)
HGB BLD-MCNC: 10 G/DL — LOW (ref 13–17)
IMM GRANULOCYTES NFR BLD AUTO: 0.3 % — SIGNIFICANT CHANGE UP (ref 0–0.9)
LYMPHOCYTES # BLD AUTO: 1.64 K/UL — SIGNIFICANT CHANGE UP (ref 1–3.3)
LYMPHOCYTES # BLD AUTO: 22.8 % — SIGNIFICANT CHANGE UP (ref 13–44)
MCHC RBC-ENTMCNC: 22.8 PG — LOW (ref 27–34)
MCHC RBC-ENTMCNC: 28.7 G/DL — LOW (ref 32–36)
MCV RBC AUTO: 79.3 FL — LOW (ref 80–100)
MONOCYTES # BLD AUTO: 0.55 K/UL — SIGNIFICANT CHANGE UP (ref 0–0.9)
MONOCYTES NFR BLD AUTO: 7.6 % — SIGNIFICANT CHANGE UP (ref 2–14)
NEUTROPHILS # BLD AUTO: 4.72 K/UL — SIGNIFICANT CHANGE UP (ref 1.8–7.4)
NEUTROPHILS NFR BLD AUTO: 65.5 % — SIGNIFICANT CHANGE UP (ref 43–77)
NRBC BLD AUTO-RTO: 0 /100 WBCS — SIGNIFICANT CHANGE UP (ref 0–0)
PLATELET # BLD AUTO: 298 K/UL — SIGNIFICANT CHANGE UP (ref 150–400)
RBC # BLD: 4.39 M/UL — SIGNIFICANT CHANGE UP (ref 4.2–5.8)
RBC # FLD: 17.8 % — HIGH (ref 10.3–14.5)
WBC # BLD: 7.2 K/UL — SIGNIFICANT CHANGE UP (ref 3.8–10.5)
WBC # FLD AUTO: 7.2 K/UL — SIGNIFICANT CHANGE UP (ref 3.8–10.5)

## 2025-06-09 PROCEDURE — G2211 COMPLEX E/M VISIT ADD ON: CPT

## 2025-06-09 PROCEDURE — 99417 PROLNG OP E/M EACH 15 MIN: CPT

## 2025-06-09 PROCEDURE — 99215 OFFICE O/P EST HI 40 MIN: CPT

## 2025-06-10 LAB
ALBUMIN SERPL ELPH-MCNC: 4.2 G/DL
ALP BLD-CCNC: 159 U/L
ALT SERPL-CCNC: 32 U/L
ANION GAP SERPL CALC-SCNC: 14 MMOL/L
AST SERPL-CCNC: 48 U/L
BILIRUB SERPL-MCNC: 0.5 MG/DL
BUN SERPL-MCNC: 9 MG/DL
CALCIUM SERPL-MCNC: 9 MG/DL
CHLORIDE SERPL-SCNC: 101 MMOL/L
CO2 SERPL-SCNC: 23 MMOL/L
CREAT SERPL-MCNC: 0.82 MG/DL
EGFRCR SERPLBLD CKD-EPI 2021: 98 ML/MIN/1.73M2
GLUCOSE SERPL-MCNC: 80 MG/DL
POTASSIUM SERPL-SCNC: 4 MMOL/L
PROT SERPL-MCNC: 8.1 G/DL
SODIUM SERPL-SCNC: 138 MMOL/L

## 2025-06-12 ENCOUNTER — APPOINTMENT (OUTPATIENT)
Dept: MRI IMAGING | Facility: CLINIC | Age: 65
End: 2025-06-12

## 2025-06-23 ENCOUNTER — APPOINTMENT (OUTPATIENT)
Dept: HEMATOLOGY ONCOLOGY | Facility: CLINIC | Age: 65
End: 2025-06-23

## 2025-06-23 ENCOUNTER — RESULT REVIEW (OUTPATIENT)
Age: 65
End: 2025-06-23

## 2025-06-23 VITALS
WEIGHT: 207 LBS | DIASTOLIC BLOOD PRESSURE: 79 MMHG | BODY MASS INDEX: 28.87 KG/M2 | SYSTOLIC BLOOD PRESSURE: 132 MMHG | TEMPERATURE: 97.6 F | HEART RATE: 80 BPM | OXYGEN SATURATION: 99 %

## 2025-06-23 LAB
BASOPHILS # BLD AUTO: 0.08 K/UL — SIGNIFICANT CHANGE UP (ref 0–0.2)
BASOPHILS NFR BLD AUTO: 1.4 % — SIGNIFICANT CHANGE UP (ref 0–2)
EOSINOPHIL # BLD AUTO: 0.15 K/UL — SIGNIFICANT CHANGE UP (ref 0–0.5)
EOSINOPHIL NFR BLD AUTO: 2.6 % — SIGNIFICANT CHANGE UP (ref 0–6)
HCT VFR BLD CALC: 33.8 % — LOW (ref 39–50)
HGB BLD-MCNC: 9.4 G/DL — LOW (ref 13–17)
IMM GRANULOCYTES # BLD AUTO: 0.01 K/UL — SIGNIFICANT CHANGE UP (ref 0–0.07)
IMM GRANULOCYTES NFR BLD AUTO: 0.2 % — SIGNIFICANT CHANGE UP (ref 0–0.9)
LYMPHOCYTES # BLD AUTO: 1.9 K/UL — SIGNIFICANT CHANGE UP (ref 1–3.3)
LYMPHOCYTES NFR BLD AUTO: 32.6 % — SIGNIFICANT CHANGE UP (ref 13–44)
MCHC RBC-ENTMCNC: 21.8 PG — LOW (ref 27–34)
MCHC RBC-ENTMCNC: 27.8 G/DL — LOW (ref 32–36)
MCV RBC AUTO: 78.2 FL — LOW (ref 80–100)
MONOCYTES # BLD AUTO: 0.47 K/UL — SIGNIFICANT CHANGE UP (ref 0–0.9)
MONOCYTES NFR BLD AUTO: 8.1 % — SIGNIFICANT CHANGE UP (ref 2–14)
NEUTROPHILS # BLD AUTO: 3.21 K/UL — SIGNIFICANT CHANGE UP (ref 1.8–7.4)
NEUTROPHILS NFR BLD AUTO: 55.1 % — SIGNIFICANT CHANGE UP (ref 43–77)
NRBC # BLD AUTO: 0 K/UL — SIGNIFICANT CHANGE UP (ref 0–0)
NRBC # FLD: 0 K/UL — SIGNIFICANT CHANGE UP (ref 0–0)
NRBC BLD AUTO-RTO: 0 /100 WBCS — SIGNIFICANT CHANGE UP (ref 0–0)
PLATELET # BLD AUTO: 264 K/UL — SIGNIFICANT CHANGE UP (ref 150–400)
PMV BLD: 8.8 FL — SIGNIFICANT CHANGE UP (ref 7–13)
RBC # BLD: 4.32 M/UL — SIGNIFICANT CHANGE UP (ref 4.2–5.8)
RBC # FLD: 17.7 % — HIGH (ref 10.3–14.5)
WBC # BLD: 5.82 K/UL — SIGNIFICANT CHANGE UP (ref 3.8–10.5)
WBC # FLD AUTO: 5.82 K/UL — SIGNIFICANT CHANGE UP (ref 3.8–10.5)

## 2025-06-23 PROCEDURE — G2211 COMPLEX E/M VISIT ADD ON: CPT

## 2025-06-23 PROCEDURE — 99213 OFFICE O/P EST LOW 20 MIN: CPT

## 2025-06-24 ENCOUNTER — NON-APPOINTMENT (OUTPATIENT)
Age: 65
End: 2025-06-24

## 2025-06-24 LAB
ALBUMIN SERPL ELPH-MCNC: 3.9 G/DL
ALP BLD-CCNC: 137 U/L
ALT SERPL-CCNC: 19 U/L
ANION GAP SERPL CALC-SCNC: 15 MMOL/L
AST SERPL-CCNC: 40 U/L
BILIRUB SERPL-MCNC: 0.4 MG/DL
BUN SERPL-MCNC: 9 MG/DL
CALCIUM SERPL-MCNC: 8.6 MG/DL
CEA SERPL-MCNC: 1785 NG/ML
CHLORIDE SERPL-SCNC: 103 MMOL/L
CO2 SERPL-SCNC: 22 MMOL/L
CREAT SERPL-MCNC: 0.74 MG/DL
EGFRCR SERPLBLD CKD-EPI 2021: 101 ML/MIN/1.73M2
GLUCOSE SERPL-MCNC: 75 MG/DL
MAGNESIUM SERPL-MCNC: 2.1 MG/DL
POTASSIUM SERPL-SCNC: 3.8 MMOL/L
PROT SERPL-MCNC: 7.3 G/DL
SODIUM SERPL-SCNC: 141 MMOL/L

## 2025-07-07 ENCOUNTER — APPOINTMENT (OUTPATIENT)
Dept: CT IMAGING | Facility: CLINIC | Age: 65
End: 2025-07-07
Payer: MEDICAID

## 2025-07-07 ENCOUNTER — OUTPATIENT (OUTPATIENT)
Dept: OUTPATIENT SERVICES | Facility: HOSPITAL | Age: 65
LOS: 1 days | End: 2025-07-07
Payer: COMMERCIAL

## 2025-07-07 DIAGNOSIS — C18.9 MALIGNANT NEOPLASM OF COLON, UNSPECIFIED: ICD-10-CM

## 2025-07-07 PROCEDURE — 74177 CT ABD & PELVIS W/CONTRAST: CPT | Mod: 26

## 2025-07-07 PROCEDURE — 71260 CT THORAX DX C+: CPT | Mod: 26

## 2025-07-07 PROCEDURE — 74177 CT ABD & PELVIS W/CONTRAST: CPT

## 2025-07-07 PROCEDURE — 71260 CT THORAX DX C+: CPT

## 2025-07-14 ENCOUNTER — NON-APPOINTMENT (OUTPATIENT)
Age: 65
End: 2025-07-14

## 2025-07-14 ENCOUNTER — APPOINTMENT (OUTPATIENT)
Dept: HEMATOLOGY ONCOLOGY | Facility: CLINIC | Age: 65
End: 2025-07-14

## 2025-07-30 ENCOUNTER — LABORATORY RESULT (OUTPATIENT)
Age: 65
End: 2025-07-30

## 2025-07-30 ENCOUNTER — NON-APPOINTMENT (OUTPATIENT)
Age: 65
End: 2025-07-30

## 2025-07-30 ENCOUNTER — APPOINTMENT (OUTPATIENT)
Dept: HEMATOLOGY ONCOLOGY | Facility: CLINIC | Age: 65
End: 2025-07-30
Payer: COMMERCIAL

## 2025-07-30 ENCOUNTER — RESULT REVIEW (OUTPATIENT)
Age: 65
End: 2025-07-30

## 2025-07-30 VITALS
BODY MASS INDEX: 28.59 KG/M2 | SYSTOLIC BLOOD PRESSURE: 190 MMHG | WEIGHT: 205 LBS | TEMPERATURE: 97.6 F | HEART RATE: 115 BPM | OXYGEN SATURATION: 98 % | DIASTOLIC BLOOD PRESSURE: 100 MMHG

## 2025-07-30 LAB
ALBUMIN SERPL ELPH-MCNC: 3.9 G/DL
ALP BLD-CCNC: 136 U/L
ALT SERPL-CCNC: 31 U/L
ANION GAP SERPL CALC-SCNC: 14 MMOL/L
AST SERPL-CCNC: 46 U/L
BILIRUB SERPL-MCNC: 0.4 MG/DL
BUN SERPL-MCNC: 8 MG/DL
CALCIUM SERPL-MCNC: 9 MG/DL
CHLORIDE SERPL-SCNC: 102 MMOL/L
CO2 SERPL-SCNC: 23 MMOL/L
CREAT SERPL-MCNC: 0.68 MG/DL
EGFRCR SERPLBLD CKD-EPI 2021: 104 ML/MIN/1.73M2
GLUCOSE SERPL-MCNC: 143 MG/DL
MAGNESIUM SERPL-MCNC: 2 MG/DL
POTASSIUM SERPL-SCNC: 3.8 MMOL/L
PROT SERPL-MCNC: 7.4 G/DL
SODIUM SERPL-SCNC: 139 MMOL/L

## 2025-07-30 PROCEDURE — G2211 COMPLEX E/M VISIT ADD ON: CPT

## 2025-07-30 PROCEDURE — 99215 OFFICE O/P EST HI 40 MIN: CPT

## 2025-07-30 PROCEDURE — 99417 PROLNG OP E/M EACH 15 MIN: CPT

## 2025-07-31 LAB
APPEARANCE: CLEAR
BILIRUBIN URINE: NEGATIVE
BLOOD URINE: NEGATIVE
COLOR: YELLOW
GLUCOSE QUALITATIVE U: NEGATIVE MG/DL
KETONES URINE: NEGATIVE MG/DL
LEUKOCYTE ESTERASE URINE: NEGATIVE
NITRITE URINE: NEGATIVE
PH URINE: 7
PROTEIN URINE: 100 MG/DL
SPECIFIC GRAVITY URINE: 1.01
UROBILINOGEN URINE: 1 MG/DL

## 2025-08-05 RX ORDER — OXYCODONE 5 MG/1
5 TABLET ORAL
Qty: 60 | Refills: 0 | Status: ACTIVE | COMMUNITY
Start: 2025-08-05 | End: 1900-01-01

## 2025-08-06 ENCOUNTER — NON-APPOINTMENT (OUTPATIENT)
Age: 65
End: 2025-08-06

## 2025-08-13 VITALS — DIASTOLIC BLOOD PRESSURE: 88 MMHG | SYSTOLIC BLOOD PRESSURE: 153 MMHG

## 2025-08-18 ENCOUNTER — APPOINTMENT (OUTPATIENT)
Dept: HEMATOLOGY ONCOLOGY | Facility: CLINIC | Age: 65
End: 2025-08-18
Payer: COMMERCIAL

## 2025-08-18 ENCOUNTER — RESULT REVIEW (OUTPATIENT)
Age: 65
End: 2025-08-18

## 2025-08-18 VITALS
HEART RATE: 62 BPM | DIASTOLIC BLOOD PRESSURE: 99 MMHG | BODY MASS INDEX: 27.62 KG/M2 | TEMPERATURE: 97.1 F | WEIGHT: 198 LBS | SYSTOLIC BLOOD PRESSURE: 164 MMHG | OXYGEN SATURATION: 98 %

## 2025-08-18 DIAGNOSIS — C78.7 MALIGNANT NEOPLASM OF COLON, UNSPECIFIED: ICD-10-CM

## 2025-08-18 DIAGNOSIS — C18.9 MALIGNANT NEOPLASM OF COLON, UNSPECIFIED: ICD-10-CM

## 2025-08-18 DIAGNOSIS — T82.898A OTHER SPECIFIED COMPLICATION OF VASCULAR PROSTHETIC DEVICES, IMPLANTS AND GRAFTS, INITIAL ENCOUNTER: ICD-10-CM

## 2025-08-18 DIAGNOSIS — G62.9 POLYNEUROPATHY, UNSPECIFIED: ICD-10-CM

## 2025-08-18 PROCEDURE — 99215 OFFICE O/P EST HI 40 MIN: CPT

## 2025-08-18 PROCEDURE — G2211 COMPLEX E/M VISIT ADD ON: CPT

## 2025-08-19 LAB
ALBUMIN SERPL ELPH-MCNC: 4.1 G/DL
ALP BLD-CCNC: 179 U/L
ALT SERPL-CCNC: 39 U/L
ANION GAP SERPL CALC-SCNC: 11 MMOL/L
AST SERPL-CCNC: 55 U/L
BILIRUB SERPL-MCNC: 0.2 MG/DL
BUN SERPL-MCNC: 9 MG/DL
CALCIUM SERPL-MCNC: 9.2 MG/DL
CEA SERPL-MCNC: 1818 NG/ML
CHLORIDE SERPL-SCNC: 100 MMOL/L
CO2 SERPL-SCNC: 26 MMOL/L
CREAT SERPL-MCNC: 0.72 MG/DL
EGFRCR SERPLBLD CKD-EPI 2021: 102 ML/MIN/1.73M2
GLUCOSE SERPL-MCNC: 85 MG/DL
MAGNESIUM SERPL-MCNC: 2.2 MG/DL
POTASSIUM SERPL-SCNC: 4.8 MMOL/L
PROT SERPL-MCNC: 8 G/DL
SODIUM SERPL-SCNC: 137 MMOL/L

## 2025-08-27 ENCOUNTER — NON-APPOINTMENT (OUTPATIENT)
Age: 65
End: 2025-08-27

## 2025-08-29 ENCOUNTER — NON-APPOINTMENT (OUTPATIENT)
Age: 65
End: 2025-08-29

## 2025-09-11 ENCOUNTER — RESULT REVIEW (OUTPATIENT)
Age: 65
End: 2025-09-11

## 2025-09-11 ENCOUNTER — APPOINTMENT (OUTPATIENT)
Dept: HEMATOLOGY ONCOLOGY | Facility: CLINIC | Age: 65
End: 2025-09-11
Payer: COMMERCIAL

## 2025-09-11 VITALS
DIASTOLIC BLOOD PRESSURE: 107 MMHG | BODY MASS INDEX: 27.2 KG/M2 | HEART RATE: 109 BPM | SYSTOLIC BLOOD PRESSURE: 156 MMHG | TEMPERATURE: 97.1 F | WEIGHT: 195 LBS | OXYGEN SATURATION: 97 %

## 2025-09-11 DIAGNOSIS — T82.898A OTHER SPECIFIED COMPLICATION OF VASCULAR PROSTHETIC DEVICES, IMPLANTS AND GRAFTS, INITIAL ENCOUNTER: ICD-10-CM

## 2025-09-11 DIAGNOSIS — R10.11 RIGHT UPPER QUADRANT PAIN: ICD-10-CM

## 2025-09-11 DIAGNOSIS — C18.9 MALIGNANT NEOPLASM OF COLON, UNSPECIFIED: ICD-10-CM

## 2025-09-11 DIAGNOSIS — C78.7 MALIGNANT NEOPLASM OF COLON, UNSPECIFIED: ICD-10-CM

## 2025-09-11 DIAGNOSIS — G62.9 POLYNEUROPATHY, UNSPECIFIED: ICD-10-CM

## 2025-09-11 DIAGNOSIS — I10 ESSENTIAL (PRIMARY) HYPERTENSION: ICD-10-CM

## 2025-09-11 PROCEDURE — 99215 OFFICE O/P EST HI 40 MIN: CPT

## 2025-09-11 PROCEDURE — G2211 COMPLEX E/M VISIT ADD ON: CPT

## 2025-09-11 PROCEDURE — 99417 PROLNG OP E/M EACH 15 MIN: CPT

## 2025-09-12 LAB
ALBUMIN SERPL ELPH-MCNC: 4.1 G/DL
ALP BLD-CCNC: 169 U/L
ALT SERPL-CCNC: 37 U/L
ANION GAP SERPL CALC-SCNC: 15 MMOL/L
AST SERPL-CCNC: 57 U/L
BILIRUB SERPL-MCNC: 0.3 MG/DL
BUN SERPL-MCNC: 10 MG/DL
CALCIUM SERPL-MCNC: 9 MG/DL
CEA SERPL-MCNC: 1597 NG/ML
CHLORIDE SERPL-SCNC: 97 MMOL/L
CO2 SERPL-SCNC: 24 MMOL/L
CREAT SERPL-MCNC: 0.86 MG/DL
EGFRCR SERPLBLD CKD-EPI 2021: 97 ML/MIN/1.73M2
GLUCOSE SERPL-MCNC: 104 MG/DL
MAGNESIUM SERPL-MCNC: 2.1 MG/DL
POTASSIUM SERPL-SCNC: 4.8 MMOL/L
PROT SERPL-MCNC: 8.2 G/DL
SODIUM SERPL-SCNC: 136 MMOL/L

## (undated) DEVICE — TOURNIQUET CUFF 24" DUAL PORT SINGLE BLADDER LUER LOCK  (BLACK)

## (undated) DEVICE — BLADE SCALPEL SAFETYLOCK #11

## (undated) DEVICE — POSITIONER FOAM EGG CRATE ULNAR 2PCS (PINK)

## (undated) DEVICE — MEDICATION LABELS W MARKER

## (undated) DEVICE — GLV 8.5 PROTEXIS (WHITE)

## (undated) DEVICE — DRSG WEBRIL 6"

## (undated) DEVICE — TOURNIQUET CUFF 30" SINGLE PORT W PLC

## (undated) DEVICE — BLADE SCALPEL SAFETYLOCK #15

## (undated) DEVICE — WARMING BLANKET UPPER ADULT

## (undated) DEVICE — DRAPE C ARM UNIVERSAL

## (undated) DEVICE — GLV 7 PROTEXIS (WHITE)

## (undated) DEVICE — DRSG STERISTRIPS 0.5 X 4"

## (undated) DEVICE — SUCTION YANKAUER NO CONTROL VENT

## (undated) DEVICE — STAPLER SKIN VISI-STAT 35 WIDE

## (undated) DEVICE — TOURNIQUET CUFF 18" DUAL PORT DUAL BLADDER W PLC (BLACK)

## (undated) DEVICE — DRSG XEROFORM 1 X 8"

## (undated) DEVICE — VENODYNE/SCD SLEEVE CALF LARGE

## (undated) DEVICE — SOL IRR POUR H2O 250ML

## (undated) DEVICE — STRYKER INTERPULSE HANDPIECE W IRR SUCTION TUBE

## (undated) DEVICE — VISITEC 4X4

## (undated) DEVICE — DRILL BIT SYNTHES ORTHO 1.8MM

## (undated) DEVICE — LAP PAD 18 X 18"

## (undated) DEVICE — DRSG WEBRIL 4"

## (undated) DEVICE — GLV 6.5 PROTEXIS (WHITE)

## (undated) DEVICE — GLV 7.5 PROTEXIS (WHITE)

## (undated) DEVICE — DRILL BIT SYNTHES ORTHO 4.3MM CALIBRATED

## (undated) DEVICE — GLV 9 DURAPRENE

## (undated) DEVICE — MARKING PEN W RULER

## (undated) DEVICE — SOL IRR BAG NS 0.9% 3000ML

## (undated) DEVICE — SPECIMEN CONTAINER 100ML

## (undated) DEVICE — FOLEY TRAY 16FR 5CC LTX UMETER CLOSED

## (undated) DEVICE — TOURNIQUET CUFF 12" DUAL PORT LUER LOCK

## (undated) DEVICE — SUT POLYSORB 0 30" GS-21 UNDYED

## (undated) DEVICE — GOWN TRIMAX LG

## (undated) DEVICE — TOURNIQUET CUFF 34" DUAL PORT W PLC

## (undated) DEVICE — WARMING BLANKET LOWER ADULT

## (undated) DEVICE — SOL IRR POUR NS 0.9% 500ML

## (undated) DEVICE — DRAPE TOWEL BLUE 17" X 24"

## (undated) DEVICE — TOURNIQUET CUFF 30" DUAL PORT W PLC

## (undated) DEVICE — DRAIN JACKSON PRATT 3 SPRING RESERVOIR W 10FR PVC DRAIN

## (undated) DEVICE — DRAPE MAYO STAND 30"

## (undated) DEVICE — PACK EXTREMITY

## (undated) DEVICE — DRILL BIT SYNTHES ORTHO CALIBRATED 2.8MM

## (undated) DEVICE — GLV 8 PROTEXIS (WHITE)

## (undated) DEVICE — BRACE KNEE IMMOBILIZER SPLINT SUPER LARGE 20"

## (undated) DEVICE — SUT POLYSORB 2-0 30" GS-21 UNDYED

## (undated) DEVICE — DRAPE IOBAN 23" X 23"

## (undated) DEVICE — DRSG ACE BANDAGE 4" NS